# Patient Record
Sex: MALE | Race: WHITE | NOT HISPANIC OR LATINO | Employment: OTHER | URBAN - METROPOLITAN AREA
[De-identification: names, ages, dates, MRNs, and addresses within clinical notes are randomized per-mention and may not be internally consistent; named-entity substitution may affect disease eponyms.]

---

## 2018-09-01 ENCOUNTER — HOSPITAL ENCOUNTER (EMERGENCY)
Facility: HOSPITAL | Age: 78
Discharge: HOME | End: 2018-09-01
Attending: EMERGENCY MEDICINE
Payer: COMMERCIAL

## 2018-09-01 VITALS
TEMPERATURE: 98 F | HEART RATE: 78 BPM | DIASTOLIC BLOOD PRESSURE: 97 MMHG | WEIGHT: 220 LBS | HEIGHT: 73 IN | OXYGEN SATURATION: 93 % | BODY MASS INDEX: 29.16 KG/M2 | RESPIRATION RATE: 18 BRPM | SYSTOLIC BLOOD PRESSURE: 183 MMHG

## 2018-09-01 DIAGNOSIS — N28.89 RENAL MASS, RIGHT: Primary | ICD-10-CM

## 2018-09-01 LAB
ANION GAP SERPL CALC-SCNC: 5 MMOL/L
BACTERIA #/AREA URNS HPF: NORMAL /HPF
BASOPHILS # BLD AUTO: 0.06 K/UL
BASOPHILS NFR BLD: 0.6 %
BILIRUB UR QL STRIP: NEGATIVE
BUN SERPL-MCNC: 18 MG/DL
CALCIUM SERPL-MCNC: 8.8 MG/DL
CHLORIDE SERPL-SCNC: 104 MMOL/L
CLARITY UR: CLEAR
CO2 SERPL-SCNC: 27 MMOL/L
COLOR UR: YELLOW
CREAT SERPL-MCNC: 1.3 MG/DL
DIFFERENTIAL METHOD: ABNORMAL
EOSINOPHIL # BLD AUTO: 0.2 K/UL
EOSINOPHIL NFR BLD: 1.8 %
ERYTHROCYTE [DISTWIDTH] IN BLOOD BY AUTOMATED COUNT: 12.3 %
EST. GFR  (AFRICAN AMERICAN): >60 ML/MIN/1.73 M^2
EST. GFR  (NON AFRICAN AMERICAN): 52.3 ML/MIN/1.73 M^2
GLUCOSE SERPL-MCNC: 136 MG/DL
GLUCOSE UR QL STRIP: NEGATIVE
HCT VFR BLD AUTO: 50.2 %
HGB BLD-MCNC: 17.6 G/DL
HGB UR QL STRIP: NEGATIVE
HYALINE CASTS #/AREA URNS LPF: 0 /LPF
IMM GRANULOCYTES # BLD AUTO: 0.04 K/UL
IMM GRANULOCYTES NFR BLD AUTO: 0.4 %
KETONES UR QL STRIP: NEGATIVE
LEUKOCYTE ESTERASE UR QL STRIP: NEGATIVE
LYMPHOCYTES # BLD AUTO: 1.8 K/UL
LYMPHOCYTES NFR BLD: 17.8 %
MCH RBC QN AUTO: 30.6 PG
MCHC RBC AUTO-ENTMCNC: 35.1 G/DL
MCV RBC AUTO: 87 FL
MICROSCOPIC COMMENT: NORMAL
MONOCYTES # BLD AUTO: 0.8 K/UL
MONOCYTES NFR BLD: 8.3 %
NEUTROPHILS # BLD AUTO: 7.1 K/UL
NEUTROPHILS NFR BLD: 71.1 %
NITRITE UR QL STRIP: NEGATIVE
NRBC BLD-RTO: 0 /100 WBC
PH UR STRIP: 7 [PH] (ref 5–8)
PLATELET # BLD AUTO: 309 K/UL
PMV BLD AUTO: 10.3 FL
POTASSIUM SERPL-SCNC: 3.7 MMOL/L
PROT UR QL STRIP: ABNORMAL
RBC # BLD AUTO: 5.76 M/UL
RBC #/AREA URNS HPF: 2 /HPF (ref 0–4)
SODIUM SERPL-SCNC: 136 MMOL/L
SP GR UR STRIP: 1.01 (ref 1–1.03)
URN SPEC COLLECT METH UR: ABNORMAL
UROBILINOGEN UR STRIP-ACNC: NEGATIVE EU/DL
WBC # BLD AUTO: 10 K/UL
WBC #/AREA URNS HPF: 0 /HPF (ref 0–5)
YEAST URNS QL MICRO: NORMAL

## 2018-09-01 PROCEDURE — 74177 CT ABD & PELVIS W/CONTRAST: CPT | Mod: TC

## 2018-09-01 PROCEDURE — 74176 CT ABD & PELVIS W/O CONTRAST: CPT | Mod: TC

## 2018-09-01 PROCEDURE — 63600175 PHARM REV CODE 636 W HCPCS: Performed by: EMERGENCY MEDICINE

## 2018-09-01 PROCEDURE — 99284 EMERGENCY DEPT VISIT MOD MDM: CPT | Mod: 25

## 2018-09-01 PROCEDURE — 74176 CT ABD & PELVIS W/O CONTRAST: CPT | Mod: 26,,, | Performed by: RADIOLOGY

## 2018-09-01 PROCEDURE — 74177 CT ABD & PELVIS W/CONTRAST: CPT | Mod: 26,,, | Performed by: RADIOLOGY

## 2018-09-01 PROCEDURE — 96375 TX/PRO/DX INJ NEW DRUG ADDON: CPT

## 2018-09-01 PROCEDURE — 25500020 PHARM REV CODE 255: Performed by: EMERGENCY MEDICINE

## 2018-09-01 PROCEDURE — 80048 BASIC METABOLIC PNL TOTAL CA: CPT

## 2018-09-01 PROCEDURE — 96374 THER/PROPH/DIAG INJ IV PUSH: CPT

## 2018-09-01 PROCEDURE — 85025 COMPLETE CBC W/AUTO DIFF WBC: CPT

## 2018-09-01 PROCEDURE — 81000 URINALYSIS NONAUTO W/SCOPE: CPT

## 2018-09-01 RX ORDER — MORPHINE SULFATE 4 MG/ML
4 INJECTION, SOLUTION INTRAMUSCULAR; INTRAVENOUS
Status: COMPLETED | OUTPATIENT
Start: 2018-09-01 | End: 2018-09-01

## 2018-09-01 RX ORDER — ONDANSETRON 2 MG/ML
4 INJECTION INTRAMUSCULAR; INTRAVENOUS
Status: COMPLETED | OUTPATIENT
Start: 2018-09-01 | End: 2018-09-01

## 2018-09-01 RX ORDER — HYDROCODONE BITARTRATE AND ACETAMINOPHEN 5; 325 MG/1; MG/1
1-2 TABLET ORAL EVERY 4 HOURS PRN
Qty: 18 TABLET | Refills: 0 | Status: SHIPPED | OUTPATIENT
Start: 2018-09-01 | End: 2018-09-21 | Stop reason: ALTCHOICE

## 2018-09-01 RX ADMIN — IOHEXOL 100 ML: 350 INJECTION, SOLUTION INTRAVENOUS at 06:09

## 2018-09-01 RX ADMIN — ONDANSETRON 4 MG: 2 INJECTION INTRAMUSCULAR; INTRAVENOUS at 02:09

## 2018-09-01 RX ADMIN — MORPHINE SULFATE 4 MG: 4 INJECTION, SOLUTION INTRAMUSCULAR; INTRAVENOUS at 02:09

## 2018-09-01 NOTE — ED NOTES
[facility], spoke with coordinator Casi.

## 2018-09-01 NOTE — ED PROVIDER NOTES
Encounter Date: 2018       History     Chief Complaint   Patient presents with    Flank Pain     right flank pain    Nausea     Patient presents to the ER with complaints of pain to the right flank.  Patient reports renal colic that started earlier today.  Pain is severe, sharp, nonradiating, with no exacerbating or alleviating factors.  Patient has nausea associated with the flank pain. He has a distant history of kidney stones and says the pain reminds him of that.  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          Review of patient's allergies indicates:  No Known Allergies  Past Medical History:   Diagnosis Date    Cancer     prostate    Hypertension     Kidney problem     only 1 kidney functions    Liver disease      Past Surgical History:   Procedure Laterality Date    colon repair      after prostatectomy    PROSTATECTOMY       History reviewed. No pertinent family history.  Social History     Tobacco Use    Smoking status: Former Smoker     Last attempt to quit: 2/15/1972     Years since quittin.5   Substance Use Topics    Alcohol use: Not on file    Drug use: No     Review of Systems   Constitutional: Negative for chills and fever.   HENT: Negative for sore throat.    Respiratory: Negative for shortness of breath.    Cardiovascular: Negative for chest pain.   Gastrointestinal: Positive for abdominal pain and nausea. Negative for vomiting.   Genitourinary: Negative for hematuria.   All other systems reviewed and are negative.      Physical Exam     Initial Vitals [18 1407]   BP Pulse Resp Temp SpO2   (!) 180/95 94 20 98.3 °F (36.8 °C) 96 %      MAP       --         Physical Exam    Nursing note and vitals reviewed.  Constitutional: He appears well-developed and well-nourished. He appears distressed.   HENT:   Head: Normocephalic and atraumatic.   Right Ear: External ear normal.   Left Ear: External ear normal.   Nose: Nose normal.   Mouth/Throat: Oropharynx is clear and  moist.   Eyes: Conjunctivae and EOM are normal. Pupils are equal, round, and reactive to light.   Neck: Normal range of motion. Neck supple.   Cardiovascular: Normal rate and regular rhythm.   No murmur heard.  Pulmonary/Chest: Breath sounds normal.   Abdominal: Soft. Bowel sounds are normal. He exhibits no distension. There is tenderness.   Mildly tender in the right flank.   Musculoskeletal: Normal range of motion.   Neurological: He is alert and oriented to person, place, and time. No cranial nerve deficit.   Skin: Skin is warm and dry.   Psychiatric: He has a normal mood and affect. His behavior is normal. Judgment and thought content normal.         ED Course   Procedures  Labs Reviewed   CBC W/ AUTO DIFFERENTIAL - Abnormal; Notable for the following components:       Result Value    Lymph% 17.8 (*)     All other components within normal limits   BASIC METABOLIC PANEL - Abnormal; Notable for the following components:    Glucose 136 (*)     Anion Gap 5 (*)     eGFR if non  52.3 (*)     All other components within normal limits   URINALYSIS - Abnormal; Notable for the following components:    Protein, UA 1+ (*)     All other components within normal limits   URINALYSIS MICROSCOPIC          Imaging Results          CT Renal Stone Study ABD Pelvis WO (Final result)     Abnormal  Result time 09/01/18 17:06:00    Final result by Damaris Alexis MD (09/01/18 17:06:00)                 Impression:    .    2.9 cm right renal mass not appearing a simple cyst and although could be complex cyst or solid mass is thought more suggestive of solid mass and renal cell carcinoma with bulky retroperitoneal adenopathy at the level of the right renal hilum.  Severe right renal cortical thinning and atrophy.    Severe left hydronephrosis versus parapelvic cysts more likely hydronephrosis and increased compared to the prior exam.    Fusiform abdominal aortic aneurysm larger today than on the prior exam with AP and  transverse diameters today 3.9 x 3.5 cm and contrast to 2.8 x 2.8 cm previously.    Additional findings as detailed above    This report was flagged in Epic as abnormal.      Electronically signed by: Damaris Alexis MD  Date:    09/01/2018  Time:    17:06             Narrative:    EXAMINATION:  CT RENAL STONE STUDY ABD PELVIS WO    CLINICAL HISTORY:  Flank pain, stone disease suspected;    TECHNIQUE:  Low dose axial images, sagittal and coronal reformations were obtained from the lung bases to the pubic symphysis.  Contrast was not administered.    COMPARISON:  5/2/2010    FINDINGS:  There are mild dependent hypoventilatory change in the lung bases.    There are a couple low-density masses left lobe of the liver the larger which measures 1.4 cm which are not significantly changed compared to the prior exam suggesting cysts.  There are no radiopaque stones in the gallbladder or CT findings of acute cholecystitis.  The spleen is not enlarged and is unremarkable in appearance.  The adrenal glands show no abnormality.  Pancreas is mildly atrophied.  There are couple small fat containing ventral hernias just to the right of the midline at and just cephalad to the level of the umbilicus containing loops of bowel without bowel obstructive change.    The appendix is not identified with certainty but no abnormal appendix inflammatory changes appendiceal region is seen.  There is diverticulosis without CT findings of acute diverticulitis.  There is small bilateral fat containing inguinal hernias.  There there is no free intraperitoneal air or fluid.    There are osseous degenerative changes. There is penile prosthesis.  There is small hiatal hernia.    There is fusiform aneurysmal dilatation of the distal abdominal aorta with AP and transverse diameters of 3.9 and 3.5 cm.    There is severe atrophy of the right kidney with diffuse cortical thinning. There is 2.9 cm exophytic mass anteriorly right kidney which although not  fully characterized on the noncontrast study does not have appearance suggesting a simple cyst. There is bulky right periaortic/retrocaval adenopathy at the level of the right renal hilum effacing fat planes, measuring approximately 6 cm transversely, 4.7 cm AP.  There is severe left hydronephrosis versus parapelvic cysts.  There is no left ureteral dilatation.  There is no opaque renal or left ureteral stone.    There are postoperative changes in the colon consistent hemicolectomy with anastomotic sutures distal descending colon.  There is no longer the left lower quadrant colostomy that was seen on the prior exam.    The abdominal aortic aneurysm previously measured 2.8 cm AP and 2.8 cm transversely.  The bulky adenopathy is new.  The hydronephrosis of the right kidney on the prior exam is no longer seen today.  The right renal mass is new.  The left hydronephrosis versus parapelvic cysts is greater today.                                 Medical Decision Making:   Initial Assessment:   Patient presents with complaints of pain in the right flank.  Differential includes kidney stone, UTI, diverticulitis among other diagnoses.  Patient's workup shows a renal mass on the right.  There is also associated adenopathy.  Obvious concern for neoplasm based on this appearance.  Case discussed with [facility].  The recommendation is for a contrasted scan of his abdomen and pelvis with renal mass protocol and then follow-up.  Dr. Wylie will be available on Tuesday.  Patient will be discharged home with instructions to return on Tuesday to see him.  I will discharge him with pain medicine.  Return immediately for worsening symptoms or fever or anything new.  Patient indicates understanding.  Case discussed with the patient and spouse.                      Clinical Impression:   The encounter diagnosis was Renal mass, right.                             Nir Bucio MD  09/01/18 9596

## 2018-09-21 ENCOUNTER — TELEPHONE (OUTPATIENT)
Dept: UROLOGY | Facility: CLINIC | Age: 78
End: 2018-09-21

## 2018-09-21 ENCOUNTER — OFFICE VISIT (OUTPATIENT)
Dept: UROLOGY | Facility: CLINIC | Age: 78
End: 2018-09-21
Payer: COMMERCIAL

## 2018-09-21 ENCOUNTER — LAB VISIT (OUTPATIENT)
Dept: LAB | Facility: HOSPITAL | Age: 78
End: 2018-09-21
Attending: UROLOGY
Payer: COMMERCIAL

## 2018-09-21 VITALS
WEIGHT: 216 LBS | HEART RATE: 76 BPM | HEIGHT: 73 IN | BODY MASS INDEX: 28.63 KG/M2 | SYSTOLIC BLOOD PRESSURE: 165 MMHG | DIASTOLIC BLOOD PRESSURE: 83 MMHG

## 2018-09-21 DIAGNOSIS — N28.89 RENAL MASS: ICD-10-CM

## 2018-09-21 DIAGNOSIS — C61 PROSTATE CANCER: ICD-10-CM

## 2018-09-21 DIAGNOSIS — C61 PROSTATE CANCER: Primary | ICD-10-CM

## 2018-09-21 LAB
BILIRUB SERPL-MCNC: NEGATIVE MG/DL
BLOOD URINE, POC: NEGATIVE
COLOR, POC UA: ABNORMAL
COMPLEXED PSA SERPL-MCNC: <0.01 NG/ML
GLUCOSE UR QL STRIP: NEGATIVE
KETONES UR QL STRIP: NEGATIVE
LEUKOCYTE ESTERASE URINE, POC: NEGATIVE
NITRITE, POC UA: NEGATIVE
PH, POC UA: 5
PROTEIN, POC: ABNORMAL
SPECIFIC GRAVITY, POC UA: 1020
UROBILINOGEN, POC UA: NEGATIVE

## 2018-09-21 PROCEDURE — 36415 COLL VENOUS BLD VENIPUNCTURE: CPT

## 2018-09-21 PROCEDURE — 99999 PR PBB SHADOW E&M-EST. PATIENT-LVL III: CPT | Mod: PBBFAC,,, | Performed by: UROLOGY

## 2018-09-21 PROCEDURE — 99213 OFFICE O/P EST LOW 20 MIN: CPT | Mod: PBBFAC | Performed by: UROLOGY

## 2018-09-21 PROCEDURE — 99204 OFFICE O/P NEW MOD 45 MIN: CPT | Mod: S$PBB,,, | Performed by: UROLOGY

## 2018-09-21 PROCEDURE — 81002 URINALYSIS NONAUTO W/O SCOPE: CPT | Mod: PBBFAC | Performed by: UROLOGY

## 2018-09-21 PROCEDURE — 84153 ASSAY OF PSA TOTAL: CPT

## 2018-09-21 RX ORDER — PRAVASTATIN SODIUM 20 MG/1
20 TABLET ORAL DAILY
COMMUNITY
Start: 2018-08-25

## 2018-09-21 RX ORDER — AMLODIPINE BESYLATE 10 MG/1
TABLET ORAL
COMMUNITY
Start: 2018-06-29

## 2018-09-21 NOTE — PROGRESS NOTES
Subjective:       Patient ID: Yobani Rios is a 78 y.o. male.    Chief Complaint:   DATE OF VISIT:  09/21/2018.    CHIEF COMPLAINT:  Suspected right renal mass.    HISTORY OF PRESENT ILLNESS:  Mr. Rios is a 78-year-old male who visit the  Emergency Room at [facility] on 09/01/2018.  The patient was  complaining of right flank pain.  A CT scan of the abdomen was performed.   The patient was found to have a 2.9 right renal mass with retroperitoneal  adenopathy at the level of the right renal hilum very suspicious of  neoplasm of the right kidney.  The patient also suffered of severe left  hydronephrosis and the hydronephrosis on the left side is worsen compared  to a previous CT scan.  The patient was referred to our office for further  evaluation and treatment.  The patient denies any urinary symptoms.  The  patient denies any gross hematuria.    PAST GENITOURINARY HISTORY:  Very significant.  The patient underwent a  radical prostatectomy in 2010 by Dr. Teresa Knox at [facility]  with severe complications of an undiagnosed perforated intestine that  required a 5-week hospitalization with many laparotomies and colectomy was  performed too.  Also, it is important to note that the patient underwent a  placement of a penile implant by Dr. Mariscal in [place] many years ago.   The implant that he has is a semirigid implant and also to be noted is that  the patient is incontinent since the prostatectomy was performed.  The  patient denies any pain.  The patient denies any weight loss.    The past medical history, past surgical history, the current medications  and the allergies are well documented in the medical record and all these  were reviewed by me during this visit.    The patient refers to me that he does not have a PSA for at least three  year    / [id number] blank(s)         EOR/HN dd: 09/21/2018 10:30:14 (CDT)   td: 09/21/2018 14:58:31 (CDT)  Doc ID #[id number]   Job ID #[id number]    CC:             HPI  Review of Systems   Constitutional: Negative for activity change and appetite change.   HENT: Negative.    Eyes: Negative for discharge.   Respiratory: Negative for cough and shortness of breath.    Cardiovascular: Negative for chest pain and palpitations.   Gastrointestinal: Negative for abdominal distention, abdominal pain, constipation and vomiting.   Genitourinary: Positive for flank pain. Negative for discharge, dysuria, frequency, hematuria, testicular pain and urgency.        Urine incontinence and penile implant in place.   Musculoskeletal: Negative for arthralgias.   Skin: Negative for rash.   Neurological: Negative for dizziness.   Psychiatric/Behavioral: The patient is not nervous/anxious.        Objective:      Physical Exam   Constitutional: He appears well-developed and well-nourished.   HENT:   Head: Normocephalic.   Eyes: Pupils are equal, round, and reactive to light.   Neck: Normal range of motion.   Cardiovascular: Normal rate.    Pulmonary/Chest: Effort normal.   Abdominal: Soft. He exhibits no distension and no mass. There is no tenderness.   Genitourinary: Rectum normal. Rectal exam shows no external hemorrhoid, no mass and no tenderness. Right testis shows no mass and no tenderness. Left testis shows no mass and no tenderness. Circumcised. No discharge found.             Musculoskeletal: Normal range of motion.   Neurological: He is alert.   Skin: Skin is warm.     Psychiatric: He has a normal mood and affect.       Assessment:       1. Prostate cancer    2. Renal mass        Plan:       Prostate cancer  -     Prostate Specific Antigen, Diagnostic; Future; Expected date: 09/21/2018    Renal mass  -     POCT URINE DIPSTICK WITHOUT MICROSCOPE  -     NM Renogram Without Lasix; Future; Expected date: 09/21/2018  -     MRI Abdomen W WO Contrast; Future; Expected date: 09/21/2018    Patient will need Retrogrades under anesthesia. RTC after above tests.

## 2018-09-21 NOTE — TELEPHONE ENCOUNTER
----- Message from Elo Saucedo sent at 9/21/2018  2:47 PM CDT -----  Contact: pt  Pt is requesting to speak with a nurse in regards to scheduling an appt for an MRI in [place]  Pt stated he can't schedule until they receive reports or orders for MRI  Please call pt to advise  Call back   Thanks

## 2018-09-21 NOTE — LETTER
September 21, 2018      Nir Bucio MD  [address] [place] [address]           [facility]  [address] [place] [address]  Phone: [phone]  Fax: [phone]          Patient: Yobani Rios   MR Number: [id number]   YOB: 1940   Date of Visit: 9/21/2018       Dear Dr. Nir Bucio:    Thank you for referring Yobani Rios to me for evaluation. Attached you will find relevant portions of my assessment and plan of care.    If you have questions, please do not hesitate to call me. I look forward to following Yobani Rios along with you.    Sincerely,    Michel Wylie MD    Enclosure  CC:  No Recipients    If you would like to receive this communication electronically, please contact [email or web address] or [phone] to request more information on [facility] access.    For providers and/or their staff who would like to refer a patient to [facility], please contact us through our one-stop-shop provider referral line, [facility], at [phone].    If you feel you have received this communication in error or would no longer like to receive these types of communications, please e-mail [email or web address]

## 2018-09-21 NOTE — TELEPHONE ENCOUNTER
Pt wanted to know if his penile implant had any metal in it. Informed pt that it does not and he is able to have a MRI. Pt verbalized understanding.

## 2018-09-21 NOTE — TELEPHONE ENCOUNTER
Informed pt that [facility] needs copy of card with penile implant info before scheduling. Pt verbalized understanding and said he would look for it. Also told pt I would look and see if I can find anything in the chart.

## 2018-09-21 NOTE — TELEPHONE ENCOUNTER
----- Message from Sandra Wilkins sent at 9/21/2018  2:18 PM CDT -----  Contact: [phone]  Patient is requesting a call back from the nurse in regards to setting up an appt in [place], he's unable to do so without speaking to a nurse.    Please call the patient upon request at phone number [phone].

## 2018-09-24 ENCOUNTER — TELEPHONE (OUTPATIENT)
Dept: UROLOGY | Facility: CLINIC | Age: 78
End: 2018-09-24

## 2018-09-24 NOTE — TELEPHONE ENCOUNTER
Informed pt that information was received by [facility] and they will be calling him to schedule his MRI appt. Pt verbalized understanding.

## 2018-09-24 NOTE — TELEPHONE ENCOUNTER
----- Message from Nabor Ulloa sent at 9/24/2018 12:18 PM CDT -----  Contact: pt  Pt was told an appointment would be made.  Pt would like to speak to someone regarding this. Pt was told the appointment address but insisted on speaking with Ryann.    Call Back #:    Thanks

## 2018-09-25 ENCOUNTER — TELEPHONE (OUTPATIENT)
Dept: UROLOGY | Facility: CLINIC | Age: 78
End: 2018-09-25

## 2018-09-25 ENCOUNTER — HOSPITAL ENCOUNTER (EMERGENCY)
Facility: HOSPITAL | Age: 78
Discharge: HOME | End: 2018-09-25
Attending: FAMILY MEDICINE
Payer: COMMERCIAL

## 2018-09-25 VITALS
TEMPERATURE: 97 F | SYSTOLIC BLOOD PRESSURE: 173 MMHG | DIASTOLIC BLOOD PRESSURE: 81 MMHG | OXYGEN SATURATION: 94 % | HEIGHT: 73 IN | BODY MASS INDEX: 28.49 KG/M2 | WEIGHT: 215 LBS | HEART RATE: 69 BPM

## 2018-09-25 DIAGNOSIS — R19.00 RETROPERITONEAL MASS: Primary | ICD-10-CM

## 2018-09-25 LAB
ANION GAP SERPL CALC-SCNC: 7 MMOL/L
BASOPHILS # BLD AUTO: 0.06 K/UL
BASOPHILS NFR BLD: 0.6 %
BUN SERPL-MCNC: 17 MG/DL
CALCIUM SERPL-MCNC: 8.8 MG/DL
CHLORIDE SERPL-SCNC: 102 MMOL/L
CO2 SERPL-SCNC: 26 MMOL/L
CREAT SERPL-MCNC: 1.5 MG/DL
DIFFERENTIAL METHOD: ABNORMAL
EOSINOPHIL # BLD AUTO: 0.3 K/UL
EOSINOPHIL NFR BLD: 2.3 %
ERYTHROCYTE [DISTWIDTH] IN BLOOD BY AUTOMATED COUNT: 12.4 %
EST. GFR  (AFRICAN AMERICAN): 50.8 ML/MIN/1.73 M^2
EST. GFR  (NON AFRICAN AMERICAN): 44 ML/MIN/1.73 M^2
GLUCOSE SERPL-MCNC: 106 MG/DL
HCT VFR BLD AUTO: 48.6 %
HGB BLD-MCNC: 17.1 G/DL
IMM GRANULOCYTES # BLD AUTO: 0.05 K/UL
IMM GRANULOCYTES NFR BLD AUTO: 0.5 %
LYMPHOCYTES # BLD AUTO: 1.2 K/UL
LYMPHOCYTES NFR BLD: 10.9 %
MCH RBC QN AUTO: 30.8 PG
MCHC RBC AUTO-ENTMCNC: 35.2 G/DL
MCV RBC AUTO: 87 FL
MONOCYTES # BLD AUTO: 1 K/UL
MONOCYTES NFR BLD: 9.3 %
NEUTROPHILS # BLD AUTO: 8.3 K/UL
NEUTROPHILS NFR BLD: 76.4 %
NRBC BLD-RTO: 0 /100 WBC
PLATELET # BLD AUTO: 271 K/UL
PMV BLD AUTO: 10 FL
POTASSIUM SERPL-SCNC: 3.7 MMOL/L
RBC # BLD AUTO: 5.56 M/UL
SODIUM SERPL-SCNC: 135 MMOL/L
WBC # BLD AUTO: 10.81 K/UL

## 2018-09-25 PROCEDURE — 85025 COMPLETE CBC W/AUTO DIFF WBC: CPT

## 2018-09-25 PROCEDURE — 99284 EMERGENCY DEPT VISIT MOD MDM: CPT | Mod: 25

## 2018-09-25 PROCEDURE — 96374 THER/PROPH/DIAG INJ IV PUSH: CPT

## 2018-09-25 PROCEDURE — 63600175 PHARM REV CODE 636 W HCPCS: Performed by: FAMILY MEDICINE

## 2018-09-25 PROCEDURE — 80048 BASIC METABOLIC PNL TOTAL CA: CPT

## 2018-09-25 PROCEDURE — 74176 CT ABD & PELVIS W/O CONTRAST: CPT | Mod: TC

## 2018-09-25 PROCEDURE — 25000003 PHARM REV CODE 250: Performed by: FAMILY MEDICINE

## 2018-09-25 PROCEDURE — 96361 HYDRATE IV INFUSION ADD-ON: CPT

## 2018-09-25 PROCEDURE — 74176 CT ABD & PELVIS W/O CONTRAST: CPT | Mod: 26,,, | Performed by: RADIOLOGY

## 2018-09-25 RX ORDER — DOCUSATE SODIUM 100 MG/1
100 CAPSULE, LIQUID FILLED ORAL 2 TIMES DAILY
Qty: 60 CAPSULE | Refills: 0 | COMMUNITY
Start: 2018-09-25 | End: 2023-10-05

## 2018-09-25 RX ORDER — KETOROLAC TROMETHAMINE 30 MG/ML
30 INJECTION, SOLUTION INTRAMUSCULAR; INTRAVENOUS
Status: COMPLETED | OUTPATIENT
Start: 2018-09-25 | End: 2018-09-25

## 2018-09-25 RX ORDER — HYDROCODONE BITARTRATE AND ACETAMINOPHEN 7.5; 325 MG/1; MG/1
1 TABLET ORAL EVERY 4 HOURS PRN
Qty: 18 TABLET | Refills: 0 | Status: ON HOLD | OUTPATIENT
Start: 2018-09-25 | End: 2018-10-26 | Stop reason: HOSPADM

## 2018-09-25 RX ADMIN — SODIUM CHLORIDE 1000 ML: 9 INJECTION, SOLUTION INTRAVENOUS at 02:09

## 2018-09-25 RX ADMIN — KETOROLAC TROMETHAMINE 30 MG: 30 INJECTION, SOLUTION INTRAMUSCULAR at 02:09

## 2018-09-25 NOTE — TELEPHONE ENCOUNTER
Spoke with pt and he states that he is making the MRI appt at [facility] onn Thursday or Friday. Make pt and a f/u appt for the following week. Pt verbalized understanding.

## 2018-09-25 NOTE — TELEPHONE ENCOUNTER
----- Message from Elo Saucedo sent at 9/25/2018 10:33 AM CDT -----  Contact: pt  Pt is requesting to speak with a nurse in regards to a mix up with his appt  Please call pt to advise  Call back [phone]  Thanks

## 2018-09-25 NOTE — DISCHARGE INSTRUCTIONS
Follow up with your doctor this week to get a diagnosis of your mass. You may have a tumor and need a biopsy to see if it is cancer or something else.

## 2018-09-25 NOTE — ED PROVIDER NOTES
Encounter Date: 2018       History     Chief Complaint   Patient presents with    Back Pain    Hip Pain     Patient is a very pleasant gentleman who presents with a complaint of right flank and hip pain for the past 3 days.  He states it intermittently gets worse but never goes all the way away.  He feels that he may have a kidney stone as he had this once before and it feels the same.          Review of patient's allergies indicates:  No Known Allergies  Past Medical History:   Diagnosis Date    Cancer     prostate    Hypertension     Kidney problem     only 1 kidney functions    Liver disease      Past Surgical History:   Procedure Laterality Date    colon repair      after prostatectomy    EXTRACTION, CATARACT Right 2012    Performed [facility] William Nice [facility] at [facility]    EXTRACTION, CATARACT, WITH IOL INSERTION Left 3/7/2012    Performed by William Nice MD at [facility]    PROSTATECTOMY      PROSTATECTOMY  2010     History reviewed. No pertinent family history.  Social History     Tobacco Use    Smoking status: Former Smoker     Last attempt to quit: 2/15/1972     Years since quittin.6    Smokeless tobacco: Never Used   Substance Use Topics    Alcohol use: No     Frequency: Never    Drug use: No     Review of Systems   Constitutional: Negative for chills, fatigue and fever.   HENT: Negative for congestion, ear pain, rhinorrhea, sinus pressure, sinus pain and sore throat.    Eyes: Negative for photophobia and redness.   Respiratory: Negative for cough, shortness of breath and wheezing.    Cardiovascular: Negative for chest pain, palpitations and leg swelling.   Gastrointestinal: Negative for abdominal pain, constipation, diarrhea and nausea.   Endocrine: Negative for polydipsia, polyphagia and polyuria.   Genitourinary: Negative for difficulty urinating, dysuria, flank pain, hematuria and urgency.   Musculoskeletal: Negative for arthralgias, back pain and joint swelling.   Skin:  Negative for color change.   Neurological: Negative for dizziness, seizures, syncope, weakness and headaches.   Hematological: Negative for adenopathy.   Psychiatric/Behavioral: Negative for sleep disturbance and suicidal ideas.   All other systems reviewed and are negative.      Physical Exam     Initial Vitals [09/25/18 1349]   BP Pulse Resp Temp SpO2   (!) 173/81 69 -- 97.1 °F (36.2 °C) (!) 94 %      MAP       --         Physical Exam    Nursing note and vitals reviewed.  Constitutional: He appears well-developed.   Uncomfortable but no distress   HENT:   Head: Normocephalic and atraumatic.   Eyes: Conjunctivae and EOM are normal. Pupils are equal, round, and reactive to light.   Neck: Normal range of motion. Neck supple.   Cardiovascular: Normal rate, regular rhythm, normal heart sounds and intact distal pulses.   Pulmonary/Chest: Breath sounds normal.   Abdominal: Soft. Bowel sounds are normal.   Musculoskeletal: Normal range of motion.   Neurological: He is alert and oriented to person, place, and time. He has normal strength and normal reflexes.   Skin: Skin is warm and dry. Capillary refill takes less than 2 seconds.   Psychiatric: He has a normal mood and affect. His behavior is normal.         ED Course   Procedures  Labs Reviewed - No data to display       Imaging Results    None       X-Rays:   Independently Interpreted Readings:   Abdomen: Retroperitoneal neoplasm that has grown since 9/1.      Medical Decision Making:   Clinical Tests:   Lab Tests: Ordered and Reviewed  The following lab test(s) were unremarkable: CBC, CMP and Urinalysis  Radiological Study: Ordered and Reviewed  ED Management:  CT reveals likely neoplastic mass.                       Clinical Impression:   The encounter diagnosis was Retroperitoneal mass.      Disposition:   Disposition: Discharged  Condition: Stable                        Janie Chiu MD  09/25/18 1509       Janie Chiu MD  10/14/18 0642

## 2018-09-26 ENCOUNTER — TELEPHONE (OUTPATIENT)
Dept: UROLOGY | Facility: CLINIC | Age: 78
End: 2018-09-26

## 2018-09-26 ENCOUNTER — HOSPITAL ENCOUNTER (OUTPATIENT)
Dept: RADIOLOGY | Facility: HOSPITAL | Age: 78
Discharge: HOME | End: 2018-09-26
Attending: UROLOGY
Payer: COMMERCIAL

## 2018-09-26 DIAGNOSIS — N28.89 RENAL MASS: ICD-10-CM

## 2018-09-26 PROCEDURE — 78707 K FLOW/FUNCT IMAGE W/O DRUG: CPT | Mod: 26,,, | Performed by: RADIOLOGY

## 2018-09-26 PROCEDURE — A9562 TC99M MERTIATIDE: HCPCS

## 2018-09-26 NOTE — TELEPHONE ENCOUNTER
----- Message from Annie Garrison sent at 9/26/2018  9:05 AM CDT -----  Contact: Dwight Quintanilla calling states got a request for a Prior Auth to see the Dr but the pt is out of network and she wants to know if will accept the rate of medicare pay since out of network. Can be reached at [phone]

## 2018-09-26 NOTE — TELEPHONE ENCOUNTER
Thisha from [employer] wants to know if we will take the medicare reimbursement for the visit because [facility] is out of network with [employer]. Informed her we would but will will look into why we are not in network.

## 2018-09-27 ENCOUNTER — TELEPHONE (OUTPATIENT)
Dept: UROLOGY | Facility: CLINIC | Age: 78
End: 2018-09-27

## 2018-09-27 NOTE — TELEPHONE ENCOUNTER
----- Message from Kelsea San sent at 9/27/2018 11:13 AM CDT -----  Contact: self  Would like a call back at --personal.

## 2018-09-27 NOTE — TELEPHONE ENCOUNTER
Pt wanted to know if Dr. Wylie look at this Renogram yet. Informed pt he has not but I will call him as soon as he does. Also informed pt that we are out of network with his insurance. We will accept the insurance but he will have a higher co-pay. Suggested that he call his insurance company to get estimated co-pay price. Pt verbalized understanding.

## 2018-10-11 ENCOUNTER — TELEPHONE (OUTPATIENT)
Dept: UROLOGY | Facility: CLINIC | Age: 78
End: 2018-10-11

## 2018-10-11 NOTE — TELEPHONE ENCOUNTER
----- Message from Monae Jung sent at 10/11/2018  3:54 PM CDT -----  Contact: Erica with [facility]  Erica is calling to speak with someone regarding a pre-certification for an MRI for the patient.  The patient is scheduled for tomorrow.  Call Back#[phone]  Thanks

## 2018-10-11 NOTE — TELEPHONE ENCOUNTER
----- Message from Katy Flynn sent at 10/11/2018 11:14 AM CDT -----  Contact: Lizz Zamudio with [facility] [phone] in [place] is calling for the precert for the MRI that is scheduled for tomorrow 10 12 18 as she has not received this yet/please advise

## 2018-10-11 NOTE — TELEPHONE ENCOUNTER
Informed her that I was waiting on Pre-service to approve his scan. Will escalate it and call with auth number when received.

## 2018-10-17 ENCOUNTER — HOSPITAL ENCOUNTER (INPATIENT)
Facility: HOSPITAL | Age: 78
LOS: 9 days | Discharge: HOME HEALTH | DRG: 336 | End: 2018-10-26
Attending: EMERGENCY MEDICINE | Admitting: SURGERY
Payer: COMMERCIAL

## 2018-10-17 ENCOUNTER — HOSPITAL ENCOUNTER (EMERGENCY)
Facility: HOSPITAL | Age: 78
Discharge: ANOTHER HOSPITAL | End: 2018-10-17
Attending: EMERGENCY MEDICINE
Payer: COMMERCIAL

## 2018-10-17 VITALS
RESPIRATION RATE: 20 BRPM | TEMPERATURE: 98 F | WEIGHT: 220 LBS | HEIGHT: 73 IN | SYSTOLIC BLOOD PRESSURE: 140 MMHG | HEART RATE: 102 BPM | OXYGEN SATURATION: 94 % | DIASTOLIC BLOOD PRESSURE: 91 MMHG | BODY MASS INDEX: 29.16 KG/M2

## 2018-10-17 DIAGNOSIS — K56.609 SMALL BOWEL OBSTRUCTION: Primary | ICD-10-CM

## 2018-10-17 DIAGNOSIS — I10 HYPERTENSION: ICD-10-CM

## 2018-10-17 DIAGNOSIS — K43.6 VENTRAL HERNIA WITH OBSTRUCTION AND WITHOUT GANGRENE: ICD-10-CM

## 2018-10-17 DIAGNOSIS — R19.00 ABDOMINAL MASS, UNSPECIFIED ABDOMINAL LOCATION: ICD-10-CM

## 2018-10-17 DIAGNOSIS — K46.0 INCARCERATED HERNIA: ICD-10-CM

## 2018-10-17 LAB
ALBUMIN SERPL BCP-MCNC: 3.9 G/DL
ALBUMIN SERPL BCP-MCNC: 4.2 G/DL
ALP SERPL-CCNC: 112 U/L
ALP SERPL-CCNC: 128 U/L
ALT SERPL W/O P-5'-P-CCNC: 32 U/L
ALT SERPL W/O P-5'-P-CCNC: 32 U/L
ANION GAP SERPL CALC-SCNC: 11 MMOL/L
ANION GAP SERPL CALC-SCNC: 12 MMOL/L
AST SERPL-CCNC: 24 U/L
AST SERPL-CCNC: 30 U/L
BACTERIA #/AREA URNS HPF: NORMAL /HPF
BASOPHILS # BLD AUTO: 0.07 K/UL
BASOPHILS # BLD AUTO: 0.08 K/UL
BASOPHILS NFR BLD: 0.3 %
BASOPHILS NFR BLD: 0.4 %
BILIRUB SERPL-MCNC: 1 MG/DL
BILIRUB SERPL-MCNC: 1.5 MG/DL
BILIRUB UR QL STRIP: ABNORMAL
BUN SERPL-MCNC: 23 MG/DL
BUN SERPL-MCNC: 26 MG/DL
CALCIUM SERPL-MCNC: 10.2 MG/DL
CALCIUM SERPL-MCNC: 9.9 MG/DL
CHLORIDE SERPL-SCNC: 101 MMOL/L
CHLORIDE SERPL-SCNC: 99 MMOL/L
CLARITY UR: CLEAR
CO2 SERPL-SCNC: 26 MMOL/L
CO2 SERPL-SCNC: 29 MMOL/L
COLOR UR: YELLOW
CREAT SERPL-MCNC: 1.5 MG/DL
CREAT SERPL-MCNC: 1.6 MG/DL
DIFFERENTIAL METHOD: ABNORMAL
DIFFERENTIAL METHOD: ABNORMAL
EOSINOPHIL # BLD AUTO: 0 K/UL
EOSINOPHIL # BLD AUTO: 0.2 K/UL
EOSINOPHIL NFR BLD: 0.1 %
EOSINOPHIL NFR BLD: 1.1 %
ERYTHROCYTE [DISTWIDTH] IN BLOOD BY AUTOMATED COUNT: 12.3 %
ERYTHROCYTE [DISTWIDTH] IN BLOOD BY AUTOMATED COUNT: 13.1 %
EST. GFR  (AFRICAN AMERICAN): 47 ML/MIN/1.73 M^2
EST. GFR  (AFRICAN AMERICAN): 50.8 ML/MIN/1.73 M^2
EST. GFR  (NON AFRICAN AMERICAN): 40.7 ML/MIN/1.73 M^2
EST. GFR  (NON AFRICAN AMERICAN): 44 ML/MIN/1.73 M^2
GLUCOSE SERPL-MCNC: 135 MG/DL
GLUCOSE SERPL-MCNC: 214 MG/DL
GLUCOSE UR QL STRIP: NEGATIVE
HCT VFR BLD AUTO: 52.7 %
HCT VFR BLD AUTO: 56.7 %
HGB BLD-MCNC: 18.2 G/DL
HGB BLD-MCNC: 18.5 G/DL
HGB UR QL STRIP: ABNORMAL
HYALINE CASTS #/AREA URNS LPF: 0 /LPF
IMM GRANULOCYTES # BLD AUTO: 0.1 K/UL
IMM GRANULOCYTES # BLD AUTO: 0.13 K/UL
IMM GRANULOCYTES NFR BLD AUTO: 0.5 %
IMM GRANULOCYTES NFR BLD AUTO: 0.6 %
KETONES UR QL STRIP: ABNORMAL
LACTATE SERPL-SCNC: 1.1 MMOL/L
LEUKOCYTE ESTERASE UR QL STRIP: NEGATIVE
LIPASE SERPL-CCNC: 12 U/L
LYMPHOCYTES # BLD AUTO: 1.1 K/UL
LYMPHOCYTES # BLD AUTO: 2.1 K/UL
LYMPHOCYTES NFR BLD: 10.1 %
LYMPHOCYTES NFR BLD: 5.1 %
MAGNESIUM SERPL-MCNC: 2.5 MG/DL
MCH RBC QN AUTO: 29.8 PG
MCH RBC QN AUTO: 30.3 PG
MCHC RBC AUTO-ENTMCNC: 32.6 G/DL
MCHC RBC AUTO-ENTMCNC: 34.5 G/DL
MCV RBC AUTO: 88 FL
MCV RBC AUTO: 92 FL
MICROSCOPIC COMMENT: NORMAL
MONOCYTES # BLD AUTO: 1 K/UL
MONOCYTES # BLD AUTO: 1.2 K/UL
MONOCYTES NFR BLD: 4.7 %
MONOCYTES NFR BLD: 5.8 %
NEUTROPHILS # BLD AUTO: 16.9 K/UL
NEUTROPHILS # BLD AUTO: 18.5 K/UL
NEUTROPHILS NFR BLD: 83.2 %
NEUTROPHILS NFR BLD: 88.1 %
NITRITE UR QL STRIP: NEGATIVE
NRBC BLD-RTO: 0 /100 WBC
NRBC BLD-RTO: 0 /100 WBC
PH UR STRIP: 6 [PH] (ref 5–8)
PHOSPHATE SERPL-MCNC: 3.6 MG/DL
PLATELET # BLD AUTO: 356 K/UL
PLATELET # BLD AUTO: 379 K/UL
PMV BLD AUTO: 10.2 FL
PMV BLD AUTO: 9.8 FL
POTASSIUM SERPL-SCNC: 3.5 MMOL/L
POTASSIUM SERPL-SCNC: 3.9 MMOL/L
PROT SERPL-MCNC: 7.7 G/DL
PROT SERPL-MCNC: 7.8 G/DL
PROT UR QL STRIP: ABNORMAL
RBC # BLD AUTO: 6 M/UL
RBC # BLD AUTO: 6.2 M/UL
RBC #/AREA URNS HPF: 3 /HPF (ref 0–4)
SODIUM SERPL-SCNC: 139 MMOL/L
SODIUM SERPL-SCNC: 139 MMOL/L
SP GR UR STRIP: >=1.03 (ref 1–1.03)
URN SPEC COLLECT METH UR: ABNORMAL
UROBILINOGEN UR STRIP-ACNC: NEGATIVE EU/DL
WBC # BLD AUTO: 20.32 K/UL
WBC # BLD AUTO: 21 K/UL
WBC #/AREA URNS HPF: 2 /HPF (ref 0–5)

## 2018-10-17 PROCEDURE — 99285 EMERGENCY DEPT VISIT HI MDM: CPT | Mod: 25

## 2018-10-17 PROCEDURE — 74176 CT ABD & PELVIS W/O CONTRAST: CPT | Mod: 26,,, | Performed by: RADIOLOGY

## 2018-10-17 PROCEDURE — 83690 ASSAY OF LIPASE: CPT

## 2018-10-17 PROCEDURE — 36415 COLL VENOUS BLD VENIPUNCTURE: CPT

## 2018-10-17 PROCEDURE — 63600175 PHARM REV CODE 636 W HCPCS: Performed by: STUDENT IN AN ORGANIZED HEALTH CARE EDUCATION/TRAINING PROGRAM

## 2018-10-17 PROCEDURE — 25000003 PHARM REV CODE 250: Performed by: STUDENT IN AN ORGANIZED HEALTH CARE EDUCATION/TRAINING PROGRAM

## 2018-10-17 PROCEDURE — 74176 CT ABD & PELVIS W/O CONTRAST: CPT | Mod: TC

## 2018-10-17 PROCEDURE — 96365 THER/PROPH/DIAG IV INF INIT: CPT

## 2018-10-17 PROCEDURE — 85025 COMPLETE CBC W/AUTO DIFF WBC: CPT

## 2018-10-17 PROCEDURE — 80053 COMPREHEN METABOLIC PANEL: CPT | Mod: 91

## 2018-10-17 PROCEDURE — 99285 EMERGENCY DEPT VISIT HI MDM: CPT | Mod: ,,, | Performed by: PHYSICIAN ASSISTANT

## 2018-10-17 PROCEDURE — 84100 ASSAY OF PHOSPHORUS: CPT

## 2018-10-17 PROCEDURE — 63600175 PHARM REV CODE 636 W HCPCS: Performed by: SURGERY

## 2018-10-17 PROCEDURE — 96375 TX/PRO/DX INJ NEW DRUG ADDON: CPT

## 2018-10-17 PROCEDURE — 96376 TX/PRO/DX INJ SAME DRUG ADON: CPT

## 2018-10-17 PROCEDURE — 25000003 PHARM REV CODE 250: Performed by: SURGERY

## 2018-10-17 PROCEDURE — 85025 COMPLETE CBC W/AUTO DIFF WBC: CPT | Mod: 91

## 2018-10-17 PROCEDURE — 99285 EMERGENCY DEPT VISIT HI MDM: CPT | Mod: 25,27

## 2018-10-17 PROCEDURE — 25000003 PHARM REV CODE 250: Performed by: EMERGENCY MEDICINE

## 2018-10-17 PROCEDURE — 83735 ASSAY OF MAGNESIUM: CPT

## 2018-10-17 PROCEDURE — 83605 ASSAY OF LACTIC ACID: CPT

## 2018-10-17 PROCEDURE — 25500020 PHARM REV CODE 255: Performed by: EMERGENCY MEDICINE

## 2018-10-17 PROCEDURE — 63600175 PHARM REV CODE 636 W HCPCS: Performed by: PHYSICIAN ASSISTANT

## 2018-10-17 PROCEDURE — 81000 URINALYSIS NONAUTO W/SCOPE: CPT

## 2018-10-17 PROCEDURE — A4216 STERILE WATER/SALINE, 10 ML: HCPCS | Performed by: SURGERY

## 2018-10-17 PROCEDURE — 11000001 HC ACUTE MED/SURG PRIVATE ROOM

## 2018-10-17 PROCEDURE — 96372 THER/PROPH/DIAG INJ SC/IM: CPT

## 2018-10-17 PROCEDURE — 99223 1ST HOSP IP/OBS HIGH 75: CPT | Mod: ,,, | Performed by: SURGERY

## 2018-10-17 PROCEDURE — 80053 COMPREHEN METABOLIC PANEL: CPT

## 2018-10-17 PROCEDURE — 96374 THER/PROPH/DIAG INJ IV PUSH: CPT

## 2018-10-17 PROCEDURE — 63600175 PHARM REV CODE 636 W HCPCS: Performed by: EMERGENCY MEDICINE

## 2018-10-17 RX ORDER — PIPERACILLIN SODIUM, TAZOBACTAM SODIUM 3; .375 G/15ML; G/15ML
3.38 INJECTION, POWDER, LYOPHILIZED, FOR SOLUTION INTRAVENOUS
Status: DISCONTINUED | OUTPATIENT
Start: 2018-10-17 | End: 2018-10-17

## 2018-10-17 RX ORDER — HYDROMORPHONE HYDROCHLORIDE 1 MG/ML
0.5 INJECTION, SOLUTION INTRAMUSCULAR; INTRAVENOUS; SUBCUTANEOUS ONCE
Status: COMPLETED | OUTPATIENT
Start: 2018-10-17 | End: 2018-10-17

## 2018-10-17 RX ORDER — ONDANSETRON 2 MG/ML
INJECTION INTRAMUSCULAR; INTRAVENOUS
Status: DISCONTINUED
Start: 2018-10-17 | End: 2018-10-17 | Stop reason: HOSPADM

## 2018-10-17 RX ORDER — SODIUM CHLORIDE 0.9 % (FLUSH) 0.9 %
3 SYRINGE (ML) INJECTION EVERY 8 HOURS
Status: DISCONTINUED | OUTPATIENT
Start: 2018-10-17 | End: 2018-10-26 | Stop reason: HOSPADM

## 2018-10-17 RX ORDER — LABETALOL HYDROCHLORIDE 5 MG/ML
10 INJECTION, SOLUTION INTRAVENOUS EVERY 6 HOURS PRN
Status: DISCONTINUED | OUTPATIENT
Start: 2018-10-17 | End: 2018-10-18

## 2018-10-17 RX ORDER — BUPIVACAINE HYDROCHLORIDE 5 MG/ML
10 INJECTION, SOLUTION EPIDURAL; INTRACAUDAL
Status: DISCONTINUED | OUTPATIENT
Start: 2018-10-17 | End: 2018-10-17

## 2018-10-17 RX ORDER — ENOXAPARIN SODIUM 100 MG/ML
40 INJECTION SUBCUTANEOUS EVERY 24 HOURS
Status: DISCONTINUED | OUTPATIENT
Start: 2018-10-17 | End: 2018-10-26 | Stop reason: HOSPADM

## 2018-10-17 RX ORDER — MORPHINE SULFATE 4 MG/ML
4 INJECTION, SOLUTION INTRAMUSCULAR; INTRAVENOUS
Status: COMPLETED | OUTPATIENT
Start: 2018-10-17 | End: 2018-10-17

## 2018-10-17 RX ORDER — SODIUM CHLORIDE 9 MG/ML
INJECTION, SOLUTION INTRAVENOUS CONTINUOUS
Status: DISCONTINUED | OUTPATIENT
Start: 2018-10-17 | End: 2018-10-19

## 2018-10-17 RX ORDER — HYDROMORPHONE HYDROCHLORIDE 1 MG/ML
0.5 INJECTION, SOLUTION INTRAMUSCULAR; INTRAVENOUS; SUBCUTANEOUS EVERY 4 HOURS PRN
Status: DISCONTINUED | OUTPATIENT
Start: 2018-10-17 | End: 2018-10-18

## 2018-10-17 RX ORDER — ONDANSETRON 2 MG/ML
4 INJECTION INTRAMUSCULAR; INTRAVENOUS EVERY 8 HOURS PRN
Status: DISCONTINUED | OUTPATIENT
Start: 2018-10-17 | End: 2018-10-26 | Stop reason: HOSPADM

## 2018-10-17 RX ORDER — ONDANSETRON 2 MG/ML
4 INJECTION INTRAMUSCULAR; INTRAVENOUS
Status: COMPLETED | OUTPATIENT
Start: 2018-10-17 | End: 2018-10-17

## 2018-10-17 RX ORDER — ONDANSETRON 4 MG/1
4 TABLET, ORALLY DISINTEGRATING ORAL
Status: COMPLETED | OUTPATIENT
Start: 2018-10-17 | End: 2018-10-17

## 2018-10-17 RX ADMIN — PIPERACILLIN AND TAZOBACTAM 3.38 G: 3; .375 INJECTION, POWDER, LYOPHILIZED, FOR SOLUTION INTRAVENOUS; PARENTERAL at 03:10

## 2018-10-17 RX ADMIN — ONDANSETRON 4 MG: 2 INJECTION INTRAMUSCULAR; INTRAVENOUS at 04:10

## 2018-10-17 RX ADMIN — MORPHINE SULFATE 4 MG: 4 INJECTION, SOLUTION INTRAMUSCULAR; INTRAVENOUS at 01:10

## 2018-10-17 RX ADMIN — TOPICAL ANESTHETIC: 200 SPRAY DENTAL; PERIODONTAL at 11:10

## 2018-10-17 RX ADMIN — ONDANSETRON 4 MG: 2 INJECTION INTRAMUSCULAR; INTRAVENOUS at 01:10

## 2018-10-17 RX ADMIN — ONDANSETRON 4 MG: 2 INJECTION INTRAMUSCULAR; INTRAVENOUS at 07:10

## 2018-10-17 RX ADMIN — MORPHINE SULFATE 4 MG: 4 INJECTION INTRAVENOUS at 03:10

## 2018-10-17 RX ADMIN — ENOXAPARIN SODIUM 40 MG: 100 INJECTION SUBCUTANEOUS at 04:10

## 2018-10-17 RX ADMIN — MORPHINE SULFATE 4 MG: 4 INJECTION, SOLUTION INTRAMUSCULAR; INTRAVENOUS at 04:10

## 2018-10-17 RX ADMIN — SODIUM CHLORIDE: 0.9 INJECTION, SOLUTION INTRAVENOUS at 01:10

## 2018-10-17 RX ADMIN — SODIUM CHLORIDE 1000 ML: 0.9 INJECTION, SOLUTION INTRAVENOUS at 09:10

## 2018-10-17 RX ADMIN — IOHEXOL 200 ML: 350 INJECTION, SOLUTION INTRAVENOUS at 12:10

## 2018-10-17 RX ADMIN — ONDANSETRON 4 MG: 4 TABLET, ORALLY DISINTEGRATING ORAL at 01:10

## 2018-10-17 RX ADMIN — MORPHINE SULFATE 4 MG: 4 INJECTION, SOLUTION INTRAMUSCULAR; INTRAVENOUS at 07:10

## 2018-10-17 RX ADMIN — HYDROMORPHONE HYDROCHLORIDE 0.5 MG: 1 INJECTION, SOLUTION INTRAMUSCULAR; INTRAVENOUS; SUBCUTANEOUS at 07:10

## 2018-10-17 RX ADMIN — Medication 3 ML: at 10:10

## 2018-10-17 RX ADMIN — HYDROMORPHONE HYDROCHLORIDE 0.5 MG: 1 INJECTION, SOLUTION INTRAMUSCULAR; INTRAVENOUS; SUBCUTANEOUS at 10:10

## 2018-10-17 RX ADMIN — MORPHINE SULFATE 4 MG: 4 INJECTION, SOLUTION INTRAMUSCULAR; INTRAVENOUS at 09:10

## 2018-10-17 RX ADMIN — PROMETHAZINE HYDROCHLORIDE 6.25 MG: 25 INJECTION INTRAMUSCULAR; INTRAVENOUS at 10:10

## 2018-10-17 NOTE — ASSESSMENT & PLAN NOTE
79 yo M with multiple previous abdominal surgeries here with possible small bowel obstruction, incidental retroperitoneal mass    -unable to view images from OSH, no CT  -admit  -will obtain SBFT to eval for obstruction  -npo  -ivf  -hold off on NG for now  -lactate wnl, monitor  -will need work up of retroperitoneal mass

## 2018-10-17 NOTE — SUBJECTIVE & OBJECTIVE
Current Facility-Administered Medications on File Prior to Encounter   Medication    [COMPLETED] morphine injection 4 mg    [COMPLETED] morphine injection 4 mg    [COMPLETED] ondansetron disintegrating tablet 4 mg    [COMPLETED] ondansetron injection 4 mg    [COMPLETED] piperacillin-tazobactam 3.375 g in dextrose 5 % 50 mL IVPB (ready to mix system)    [DISCONTINUED] piperacillin-tazobactam injection 3.375 g     Current Outpatient Medications on File Prior to Encounter   Medication Sig    amLODIPine (NORVASC) 10 MG tablet     B-complex with vitamin C tablet Take 1 tablet by mouth once daily.      calcium carbonate (OS-CARA) 600 mg (1,500 mg) Tab Take 600 mg by mouth 2 (two) times daily with meals.      docusate sodium (COLACE) 100 MG capsule Take 1 capsule (100 mg total) by mouth 2 (two) times daily.    fish oil-omega-3 fatty acids 300-1,000 mg capsule Take 1 g by mouth once daily.      HYDROcodone-acetaminophen (NORCO) 7.5-325 mg per tablet Take 1 tablet by mouth every 4 (four) hours as needed for Pain.    losartan-hydrochlorothiazide (HYZAAR) 100-12.5 mg per tablet Take 1 tablet by mouth once daily.      pravastatin (PRAVACHOL) 20 MG tablet     vitamin D 185 MG Tab Take 185 mg by mouth once daily.         Review of patient's allergies indicates:  No Known Allergies    Past Medical History:   Diagnosis Date    Cancer     prostate    Hypertension     Kidney problem     only 1 kidney functions    Liver disease     Umbilical hernia      Past Surgical History:   Procedure Laterality Date    colon repair      after prostatectomy    EXTRACTION, CATARACT Right 2/22/2012    [facility]    EXTRACTION, CATARACT, WITH IOL INSERTION Left 3/7/2012    [facility] at [facility]    PROSTATECTOMY      PROSTATECTOMY  2010     Family History     None        Tobacco Use    Smoking status: Former Smoker     Last attempt to quit: 2/15/1972     Years since  quittin.7    Smokeless tobacco: Never Used   Substance and Sexual Activity    Alcohol use: No     Frequency: Never    Drug use: No    Sexual activity: No     Review of Systems   Constitutional: Negative for activity change, appetite change and unexpected weight change.   Respiratory: Negative for cough.    Cardiovascular: Negative for chest pain.   Gastrointestinal: Positive for abdominal pain and constipation. Negative for abdominal distention.   Skin: Negative for wound.   Neurological: Negative for tremors and weakness.   Psychiatric/Behavioral: Negative for behavioral problems and confusion.     Objective:     Vital Signs (Most Recent):  Temp: 98.3 °F (36.8 °C) (10/17/18 0712)  Pulse: 97 (10/17/18 0803)  Resp: 17 (10/17/18 0803)  BP: (!) 176/86 (10/17/18 0803)  SpO2: 95 % (10/17/18 0724) Vital Signs (24h Range):  Temp:  [97.7 °F (36.5 °C)-98.3 °F (36.8 °C)] 98.3 °F (36.8 °C)  Pulse:  [] 97  Resp:  [14-20] 17  SpO2:  [90 %-97 %] 95 %  BP: (140-176)/() 176/86     Weight: 99.8 kg (220 lb)  Body mass index is 29.03 kg/m².    Physical Exam   Constitutional: He is oriented to person, place, and time. No distress.   HENT:   Head: Normocephalic and atraumatic.   Eyes: EOM are normal. Pupils are equal, round, and reactive to light.   Cardiovascular: Normal rate and regular rhythm.   Pulmonary/Chest: Effort normal. No respiratory distress.   Abdominal: Soft. He exhibits no distension.   Midline incision with reducible hernia to right of umbilicus, mildly tender in this area  No significant distention   Musculoskeletal: Normal range of motion. He exhibits no edema.   Neurological: He is alert and oriented to person, place, and time.   Skin: Skin is warm and dry. He is not diaphoretic.   Psychiatric: He has a normal mood and affect. His behavior is normal.       Significant Labs:  CBC:   Recent Labs   Lab  10/17/18   0145   WBC  20.32*   RBC  6.00   HGB  18.2*   HCT  52.7   PLT  356*   MCV  88   MCH   30.3   MCHC  34.5     BMP:   Recent Labs   Lab  10/17/18   0145   GLU  214*   NA  139   K  3.5   CL  99   CO2  29   BUN  23   CREATININE  1.5*   CALCIUM  9.9       Significant Diagnostics:  CT Abd- unable to review images  Read:    Air and stool throughout the loops of colon.  Surgical clips within the proximal sigmoid colon from previous partial colectomy.  There is a small ventral hernia just to the right of midline and slightly superior to the umbilicus measuring 3.2 cm in diameter.  There is a small internal loop of small bowel which results in a small-bowel obstruction at this level.  The loops of small bowel proximal to this hernia are fluid dilated measuring up to 3.2 cm in diameter.  The distal loops of small bowel are decompressed.    There is a large retroperitoneal soft tissue mass which encompasses the distal abdominal aorta current measuring 5.0 cm AP x 7.7 cm transverse.  This was present on the patient's most recent examination but has slightly increased in size over the interval.  This is new when compared to the examination from 2010.  This is suspicious for neoplasia.

## 2018-10-17 NOTE — ED NOTES
Accepted to [facility] for ED to ED transfer.  Report to be called to [phone].  Dr Santiago Hannah is the accepting physician.

## 2018-10-17 NOTE — HPI
77 yo M with h/o radical prostatectomy 2010 (surgery complicated by ?bowel injury, pt reports 9 surgeries including bowel resection) and ventral incisional hernia presented to OSH with severe abdominal pain. Mostly above umbilicus and on the right side of abdomen. Pain is improved now. No vomiting. Has passed some gas since onset of pain. No BM for two days. Denies hx previous small bowel obstructions. Denies weight loss.      Patient has had multiple ED visits lately for other problems, and was found to have an enlarging retroperitoneal mass with concern for malignancy. Recent PSA normal.

## 2018-10-17 NOTE — ED TRIAGE NOTES
"Pt reports abdominal pain starting at 5pm that has been progressively worsening.  Pt stated,"It feels like my intestines are being pushed out".  Pt reports bowel movement yesterday.  Pt denies nausea, vomiting and diarrhea.  "

## 2018-10-17 NOTE — ED NOTES
Called [facility] to arrange patient transport to [facility], spoke with Payal. Requested priority 2 transport, Payal said she would send an ambulance as soon as possible.

## 2018-10-17 NOTE — ED NOTES
"The patient was sent to the ER this morning by ems as a transfer from [facility] for abdominal pain that began around 0500, that worsened. He has a hx of prostate CA and an umbilical hernia. The pt had a CT at [place] that showed a possible SBO and a retroperitoneal mass measuring 8 cm that is contiguous with the aorta and IVC. Infrarenal abdominal aortic aneurysm noted 4 cm. The patient had Morphine at prior facility that controlled his pain. Pain now is "coming back" and rated 2/10. Pt arrived with PIV #18g Left AC. The patient reports he has been passing "some gas" and is able to belch  "

## 2018-10-17 NOTE — ED NOTES
Patient is dressed in a gown & placed on continuous cardiac monitor, automatic blood pressure cuff and continuous pulse oximeter.

## 2018-10-17 NOTE — CONSULTS
[facility]  General Surgery  Consult Note    Patient Name: Yobani Rios  MRN: [id number]  Code Status: Full Code  Admission Date: 10/17/2018  Hospital Length of Stay: 0 days  Attending Physician: Tiago Malone MD  Primary Care Provider: Laney Nice MD    Patient information was obtained from patient, past medical records and ER records.     Inpatient consult to General surgery  Consult performed by: Tiffany Lechuga MD  Consult ordered by: Rancho Kevin PA-C        Subjective:     Principal Problem: <principal problem not specified>    History of Present Illness: 79 yo M with h/o radical prostatectomy 2010 (surgery complicated by ?bowel injury, pt reports 9 surgeries including bowel resection) and ventral incisional hernia presented to OSH with severe abdominal pain. Mostly above umbilicus and on the right side of abdomen. Pain is improved now. No vomiting. Has passed some gas since onset of pain. No BM for two days. Denies hx previous small bowel obstructions. Denies weight loss.      Patient has had multiple ED visits lately for other problems, and was found to have an enlarging retroperitoneal mass with concern for malignancy. Recent PSA normal.     Current Facility-Administered Medications on File Prior to Encounter   Medication    [COMPLETED] morphine injection 4 mg    [COMPLETED] morphine injection 4 mg    [COMPLETED] ondansetron disintegrating tablet 4 mg    [COMPLETED] ondansetron injection 4 mg    [COMPLETED] piperacillin-tazobactam 3.375 g in dextrose 5 % 50 mL IVPB (ready to mix system)    [DISCONTINUED] piperacillin-tazobactam injection 3.375 g     Current Outpatient Medications on File Prior to Encounter   Medication Sig    amLODIPine (NORVASC) 10 MG tablet     B-complex with vitamin C tablet Take 1 tablet by mouth once daily.      calcium carbonate (OS-CARA) 600 mg (1,500 mg) Tab Take 600 mg by mouth 2 (two) times daily with meals.      docusate sodium (COLACE)  100 MG capsule Take 1 capsule (100 mg total) by mouth 2 (two) times daily.    fish oil-omega-3 fatty acids 300-1,000 mg capsule Take 1 g by mouth once daily.      HYDROcodone-acetaminophen (NORCO) 7.5-325 mg per tablet Take 1 tablet by mouth every 4 (four) hours as needed for Pain.    losartan-hydrochlorothiazide (HYZAAR) 100-12.5 mg per tablet Take 1 tablet by mouth once daily.      pravastatin (PRAVACHOL) 20 MG tablet     vitamin D 185 MG Tab Take 185 mg by mouth once daily.         Review of patient's allergies indicates:  No Known Allergies    Past Medical History:   Diagnosis Date    Cancer     prostate    Hypertension     Kidney problem     only 1 kidney functions    Liver disease     Umbilical hernia      Past Surgical History:   Procedure Laterality Date    colon repair      after prostatectomy    EXTRACTION, CATARACT Right 2012    [facility]    EXTRACTION, CATARACT, WITH IOL INSERTION Left 3/7/2012    [facility]    PROSTATECTOMY      PROSTATECTOMY  2010     Family History     None        Tobacco Use    Smoking status: Former Smoker     Last attempt to quit: 2/15/1972     Years since quittin.7    Smokeless tobacco: Never Used   Substance and Sexual Activity    Alcohol use: No     Frequency: Never    Drug use: No    Sexual activity: No     Review of Systems   Constitutional: Negative for activity change, appetite change and unexpected weight change.   Respiratory: Negative for cough.    Cardiovascular: Negative for chest pain.   Gastrointestinal: Positive for abdominal pain and constipation. Negative for abdominal distention.   Skin: Negative for wound.   Neurological: Negative for tremors and weakness.   Psychiatric/Behavioral: Negative for behavioral problems and confusion.     Objective:     Vital Signs (Most Recent):  Temp: 98.3 °F (36.8 °C) (10/17/18 0712)  Pulse: 97 (10/17/18 0803)  Resp: 17 (10/17/18  0803)  BP: (!) 176/86 (10/17/18 0803)  SpO2: 95 % (10/17/18 0724) Vital Signs (24h Range):  Temp:  [97.7 °F (36.5 °C)-98.3 °F (36.8 °C)] 98.3 °F (36.8 °C)  Pulse:  [] 97  Resp:  [14-20] 17  SpO2:  [90 %-97 %] 95 %  BP: (140-176)/() 176/86     Weight: 99.8 kg (220 lb)  Body mass index is 29.03 kg/m².    Physical Exam   Constitutional: He is oriented to person, place, and time. No distress.   HENT:   Head: Normocephalic and atraumatic.   Eyes: EOM are normal. Pupils are equal, round, and reactive to light.   Cardiovascular: Normal rate and regular rhythm.   Pulmonary/Chest: Effort normal. No respiratory distress.   Abdominal: Soft. He exhibits no distension.   Midline incision with reducible hernia to right of umbilicus, mildly tender in this area  No significant distention   Musculoskeletal: Normal range of motion. He exhibits no edema.   Neurological: He is alert and oriented to person, place, and time.   Skin: Skin is warm and dry. He is not diaphoretic.   Psychiatric: He has a normal mood and affect. His behavior is normal.       Significant Labs:  CBC:   Recent Labs   Lab  10/17/18   0145   WBC  20.32*   RBC  6.00   HGB  18.2*   HCT  52.7   PLT  356*   MCV  88   MCH  30.3   MCHC  34.5     BMP:   Recent Labs   Lab  10/17/18   0145   GLU  214*   NA  139   K  3.5   CL  99   CO2  29   BUN  23   CREATININE  1.5*   CALCIUM  9.9       Significant Diagnostics:  CT Abd- unable to review images  Read:    Air and stool throughout the loops of colon.  Surgical clips within the proximal sigmoid colon from previous partial colectomy.  There is a small ventral hernia just to the right of midline and slightly superior to the umbilicus measuring 3.2 cm in diameter.  There is a small internal loop of small bowel which results in a small-bowel obstruction at this level.  The loops of small bowel proximal to this hernia are fluid dilated measuring up to 3.2 cm in diameter.  The distal loops of small bowel are  decompressed.    There is a large retroperitoneal soft tissue mass which encompasses the distal abdominal aorta current measuring 5.0 cm AP x 7.7 cm transverse.  This was present on the patient's most recent examination but has slightly increased in size over the interval.  This is new when compared to the examination from 2010.  This is suspicious for neoplasia.    Assessment/Plan:     Small bowel obstruction    79 yo M with multiple previous abdominal surgeries here with possible small bowel obstruction, incidental retroperitoneal mass    -unable to view images from OSH, no CT  -admit  -will obtain SBFT to eval for obstruction  -npo  -ivf  -hold off on NG for now  -lactate wnl, monitor  -will need work up of retroperitoneal mass          VTE Risk Mitigation (From admission, onward)        Ordered     enoxaparin injection 40 mg  Daily      10/17/18 0949     IP VTE HIGH RISK PATIENT  Once      10/17/18 0949          Thank you for your consult. I will follow-up with patient. Please contact us if you have any additional questions.    Tiffany Lechuga MD  General Surgery  [facility]

## 2018-10-17 NOTE — ED PROVIDER NOTES
Encounter Date: 10/17/2018       History     Chief Complaint   Patient presents with    Abdominal Pain     Patient presents original chief complaint of abdominal pain.  Patient does have severe abdominal pain lower abdomen.  Patient was having some nausea no vomiting.  Stated pain is a 10 on a scale of 1-10 unable to get relief pain.  Patient denies any history of pain in the past.  Patient does not give a history of a mass in his abdomen which he is scheduled to have MRI done on Thursday.  Patient states that the pain started approximately 2 hr ago unable to get any relief.  Denies any fever chills.          Review of patient's allergies indicates:  No Known Allergies  Past Medical History:   Diagnosis Date    Cancer     prostate    Hypertension     Kidney problem     only 1 kidney functions    Liver disease     Umbilical hernia      Past Surgical History:   Procedure Laterality Date    colon repair      after prostatectomy    EXTRACTION, CATARACT Right 2012    [facility]    EXTRACTION, CATARACT, WITH IOL INSERTION Left 3/7/2012    [facility]    PROSTATECTOMY      PROSTATECTOMY       No family history on file.  Social History     Tobacco Use    Smoking status: Former Smoker     Last attempt to quit: 2/15/1972     Years since quittin.7    Smokeless tobacco: Never Used   Substance Use Topics    Alcohol use: No     Frequency: Never    Drug use: No     Review of Systems   Constitutional: Negative for fever.   HENT: Negative for sore throat.    Respiratory: Negative for shortness of breath.    Cardiovascular: Negative for chest pain.   Gastrointestinal: Positive for abdominal pain and nausea.   Genitourinary: Negative for dysuria.   Musculoskeletal: Negative for back pain.   Skin: Negative for rash.   Neurological: Negative for weakness.   Hematological: Does not bruise/bleed easily.       Physical Exam     Initial Vitals  [10/17/18 0125]   BP Pulse Resp Temp SpO2   (!) 160/94 81 20 97.7 °F (36.5 °C) 96 %      MAP       --         Physical Exam    Nursing note and vitals reviewed.  Constitutional: Vital signs are normal. He appears cachectic. He is Obese . He appears toxic. He appears ill.   HENT:   Head: Normocephalic and atraumatic.   Eyes: Lids are normal. Lids are everted and swept, no foreign bodies found.   Neck: Trachea normal, normal range of motion and phonation normal. Neck supple.   Cardiovascular: Normal rate, regular rhythm and normal pulses.   Pulmonary/Chest: Effort normal and breath sounds normal.   Abdominal: Soft. Normal appearance. Bowel sounds are decreased. There is generalized tenderness. There is rebound and positive Sosa's sign. There is no CVA tenderness. A hernia is present. Hernia confirmed positive in the ventral area.   Musculoskeletal: Normal range of motion.   Lymphadenopathy:        Head (right side): No submental, no tonsillar, no preauricular, no posterior auricular and no occipital adenopathy present.        Head (left side): No tonsillar, no preauricular and no posterior auricular adenopathy present.     He has no cervical adenopathy.        Right cervical: No deep cervical adenopathy present.       Left cervical: No superficial cervical adenopathy present.   Neurological: He is alert and oriented to person, place, and time. No cranial nerve deficit or sensory deficit. GCS eye subscore is 4. GCS verbal subscore is 5. GCS motor subscore is 6.   Skin: Skin is warm, dry and intact.   Psychiatric: He has a normal mood and affect. His speech is normal and behavior is normal. Cognition and memory are normal.         ED Course   Procedures  Labs Reviewed   CBC W/ AUTO DIFFERENTIAL - Abnormal; Notable for the following components:       Result Value    WBC 20.32 (*)     Hemoglobin 18.2 (*)     Platelets 356 (*)     Gran # (ANC) 16.9 (*)     Immature Grans (Abs) 0.10 (*)     Gran% 83.2 (*)     Lymph% 10.1  (*)     All other components within normal limits   COMPREHENSIVE METABOLIC PANEL - Abnormal; Notable for the following components:    Glucose 214 (*)     Creatinine 1.5 (*)     eGFR if  50.8 (*)     eGFR if non  44.0 (*)     All other components within normal limits   URINALYSIS, REFLEX TO URINE CULTURE - Abnormal; Notable for the following components:    Specific Gravity, UA >=1.030 (*)     Protein, UA 2+ (*)     Ketones, UA 1+ (*)     Bilirubin (UA) 1+ (*)     Occult Blood UA Trace (*)     All other components within normal limits    Narrative:     Preferred Collection Type->Urine, Clean Catch   LIPASE   URINALYSIS MICROSCOPIC    Narrative:     Preferred Collection Type->Urine, Clean Catch          Imaging Results          CT Abdomen Pelvis  Without Contrast (In process)               X-Rays:   Independently Interpreted Readings:   Other Readings:  CT scan of the abdomen and pelvis was done which showed retroperitoneal mass measuring 8 cm which is contiguous with aorta and IVC.  This is concerning for malignancy or retroperitoneal fibrosis.  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  Small bowel obstruction.    Medical Decision Making:   Initial Assessment:   Patient presents original chief complaint abdominal pain. Patient was noted have a ventral hernia in severe pain.  CT scan of the abdomen and pelvis were done to rule out possible bowel obstruction versus hernia.  Bowel obstruction noticed patient was given pain medication CBC and electrolytes were done which did show patient have a 20,000 white count.  Patient was started on Unasyn.  A transferred to [facility]  Clinical Tests:   Lab Tests: Ordered and Reviewed  The following lab test(s) were unremarkable: CBC and BMP  Radiological Study: Ordered and Reviewed  Medical Tests: Ordered and Reviewed  ED Management:  Multiple hospitals in [place] who were called to attempt to transfer patient for possible surgery.  No  beds were available in any of the facilities.  Local hospitals in [place] or [facility] as well.  Patient will be transferred to the own hospital available which is [facility] for emergent care                      Clinical Impression:   The primary encounter diagnosis was Small bowel obstruction. Diagnoses of Abdominal mass, unspecified abdominal location, Incarcerated hernia, and Ventral hernia with obstruction and without gangrene were also pertinent to this visit.                             Henri Mohamud,   10/17/18 0325       Henri Mohamud DO  10/17/18 0424

## 2018-10-17 NOTE — ED PROVIDER NOTES
Encounter Date: 10/17/2018    SCRIBE #1 NOTE: I, Son Mica, am scribing for, and in the presence of,  Dr. Malone. I have scribed the following portions of the note - the APC attestation.       History     Chief Complaint   Patient presents with    [place] Transfer     0500 abd cramping, hiatal hernia upon CT, SBO     Mr Rios is a 77 yo  male patient that was transferred from [facility] for a SBO, 8 cm retroperitoneal mass found on CT, and a ventral hernia. Pt states that he developed abdominal pain, nausea and vomiting shortly after dinner last night at approximately 1800. Pt states that the pain and nausea became more severe at around 0100 this morning so he went to the ER at [place]. Pt also states that he has not had a bowel movement in two days, and has passed very little gas. Pt describes the pain as a general tenderness in all quadrants with increased sharp intermittent pain at the site of the ventral hernia. Pain does not radiate.  Pt denies any fevers, chills, body aches, chest pain, shortness of breath, dizziness, or weakness.            Review of patient's allergies indicates:  No Known Allergies  Past Medical History:   Diagnosis Date    Cancer     prostate    Hypertension     Kidney problem     only 1 kidney functions    Liver disease     Umbilical hernia      Past Surgical History:   Procedure Laterality Date    colon repair      after prostatectomy    EXTRACTION, CATARACT Right 2012    Performed by William [facility] at [facility]    EXTRACTION, CATARACT, WITH IOL INSERTION Left 3/7/2012    Performed by William [facility] at [facility]    PROSTATECTOMY      PROSTATECTOMY       History reviewed. No pertinent family history.  Social History     Tobacco Use    Smoking status: Former Smoker     Last attempt to quit: 2/15/1972     Years since quittin.7    Smokeless tobacco: Never Used   Substance Use Topics    Alcohol use: No     Frequency: Never    Drug  use: No     Review of Systems   Constitutional: Negative for chills, diaphoresis, fatigue and fever.   HENT: Negative for congestion, ear pain, rhinorrhea, sore throat and trouble swallowing.    Eyes: Negative for pain, discharge and visual disturbance.   Respiratory: Negative for cough, chest tightness, shortness of breath and wheezing.    Cardiovascular: Negative for chest pain, palpitations and leg swelling.   Gastrointestinal: Positive for abdominal pain, constipation, nausea and vomiting. Negative for blood in stool and diarrhea.   Genitourinary: Negative for dysuria, flank pain, frequency and hematuria.   Musculoskeletal: Negative for back pain, neck pain and neck stiffness.   Skin: Negative for rash.   Neurological: Negative for dizziness, syncope, facial asymmetry, weakness, light-headedness, numbness and headaches.   Psychiatric/Behavioral: Negative for agitation and confusion. The patient is not nervous/anxious.        Physical Exam     Initial Vitals [10/17/18 0712]   BP Pulse Resp Temp SpO2   (!) 144/86 102 16 98.3 °F (36.8 °C) 97 %      MAP       --         Physical Exam    Constitutional: He appears well-developed and well-nourished. He is not diaphoretic. He is Obese . He is cooperative.   HENT:   Head: Normocephalic and atraumatic.   Eyes: Conjunctivae, EOM and lids are normal.   Neck: Normal range of motion. Neck supple.   Cardiovascular: Normal rate, regular rhythm, S1 normal, S2 normal, normal heart sounds and normal pulses. Exam reveals no gallop and no friction rub.    No murmur heard.  Pulmonary/Chest: Effort normal and breath sounds normal. No respiratory distress.   Abdominal: Soft. He exhibits distension. He exhibits no fluid wave and no ascites. There is generalized tenderness and tenderness in the periumbilical area. There is no CVA tenderness and negative Sosa's sign. A hernia is present. Hernia confirmed positive in the ventral area.   Bowel sounds decreased in RUQ and LUQ. Bowel  sounds normal in RLQ and LLQ.   Musculoskeletal: Normal range of motion. He exhibits no edema or tenderness.   Neurological: He is alert.   Skin: Skin is warm and dry. No rash noted.   Psychiatric: He has a normal mood and affect. His behavior is normal. Judgment and thought content normal.         ED Course   Procedures  Labs Reviewed   LACTIC ACID, PLASMA          Imaging Results          FL Small Bowel Follow Through (Final result)  Result time 10/17/18 23:42:18    Final result by Judd Haddad MD (10/17/18 23:42:18)                 Impression:      Moderate gaseous distention of small bowel, with enteric contrast noted overlying the rectum at 7 hours post contrast administration.  No significant contrast is noted elsewhere in the colon.  Correlation advised, with continued radiographic follow-up as warranted.      Electronically signed by: Judd Haddad MD  Date:    10/17/2018  Time:    23:42             Narrative:    EXAMINATION:  FL SMALL BOWEL FOLLOW THROUGH    CLINICAL HISTORY:  eval for obstruction;    TECHNIQUE:  Approximately 200 mL Omnipaque 350 enteric contrast was administered.  Multiple sequential radiographs of the abdomen were obtained up to 7 hr post contrast administration.    COMPARISON:  None    FINDINGS:   radiograph demonstrates moderate gaseous distension of small bowel, measuring up to 4.3 cm in diameter.  No significant enteric contrast is identified in the right hemicolon, however, enteric contrast is noted to fill the rectum on the 7 hr radiograph.  Correlation with patient anatomy and surgical history is recommended.  There are moderate degenerative changes in the lumbar spine.  Penile prosthesis is noted.                                 Medical Decision Making:   History:   Old Medical Records: I decided to obtain old medical records.  Differential Diagnosis:   Small Bowel Obstruction  Incarcerated/Strangulated Hernia  Abdominal Aortic Aneurysm    Clinical Tests:   Lab  Tests: Reviewed  Radiological Study: Reviewed  ED Management:  Mr Rios is a 79 yo  male patient that was transferred from [facility] for a SBO and 8 cm retroperitoneal mass found on CT. Pt states that he developed abdominal pain, nausea and vomiting shortly after dinner last night at approximately 1800.  CT abdomen and pelvis done at [place] showed a retroperitoneal mass measuring 8 cm which is contiguous with aorta and IVC.  This is concerning for malignancy or retroperitoneal fibrosis.  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  Small bowel obstruction.  CBC shows a 20,000 white count. Pt's pain is being managed with IV morphine and zofran.  General Surgery consulted and will put in orders for inpatient admission.              Scribe Attestation:   Scribe #1: I performed the above scribed service and the documentation accurately describes the services I performed. I attest to the accuracy of the note.    Attending Attestation:     Physician Attestation Statement for NP/PA:   I discussed this assessment and plan of this patient with the NP/PA, but I did not personally examine the patient. The face to face encounter was performed by the NP/PA.                     Clinical Impression:   The primary encounter diagnosis was Small bowel obstruction. A diagnosis of Hypertension was also pertinent to this visit.      Disposition:   Disposition: Admitted  Condition: Stable                        Rancho Kevin PA-C  10/17/18 1330       Tiago Malone MD  10/18/18 1352

## 2018-10-18 ENCOUNTER — ANESTHESIA EVENT (OUTPATIENT)
Dept: SURGERY | Facility: HOSPITAL | Age: 78
DRG: 336 | End: 2018-10-18
Payer: COMMERCIAL

## 2018-10-18 ENCOUNTER — ANESTHESIA (OUTPATIENT)
Dept: SURGERY | Facility: HOSPITAL | Age: 78
DRG: 336 | End: 2018-10-18
Payer: COMMERCIAL

## 2018-10-18 LAB
ANION GAP SERPL CALC-SCNC: 11 MMOL/L
BASOPHILS # BLD AUTO: 0.03 K/UL
BASOPHILS NFR BLD: 0.2 %
BUN SERPL-MCNC: 29 MG/DL
CALCIUM SERPL-MCNC: 9.3 MG/DL
CHLORIDE SERPL-SCNC: 103 MMOL/L
CO2 SERPL-SCNC: 26 MMOL/L
CREAT SERPL-MCNC: 1.7 MG/DL
DIFFERENTIAL METHOD: ABNORMAL
EOSINOPHIL # BLD AUTO: 0 K/UL
EOSINOPHIL NFR BLD: 0.1 %
ERYTHROCYTE [DISTWIDTH] IN BLOOD BY AUTOMATED COUNT: 13 %
EST. GFR  (AFRICAN AMERICAN): 43.7 ML/MIN/1.73 M^2
EST. GFR  (NON AFRICAN AMERICAN): 37.8 ML/MIN/1.73 M^2
GLUCOSE SERPL-MCNC: 139 MG/DL
HCT VFR BLD AUTO: 53.4 %
HGB BLD-MCNC: 18.1 G/DL
IMM GRANULOCYTES # BLD AUTO: 0.07 K/UL
IMM GRANULOCYTES NFR BLD AUTO: 0.5 %
LACTATE SERPL-SCNC: 1.6 MMOL/L
LYMPHOCYTES # BLD AUTO: 0.7 K/UL
LYMPHOCYTES NFR BLD: 4.7 %
MCH RBC QN AUTO: 31 PG
MCHC RBC AUTO-ENTMCNC: 33.9 G/DL
MCV RBC AUTO: 91 FL
MONOCYTES # BLD AUTO: 1.1 K/UL
MONOCYTES NFR BLD: 7 %
NEUTROPHILS # BLD AUTO: 13.5 K/UL
NEUTROPHILS NFR BLD: 87.5 %
NRBC BLD-RTO: 0 /100 WBC
PLATELET # BLD AUTO: 322 K/UL
PMV BLD AUTO: 10.5 FL
POTASSIUM SERPL-SCNC: 3.9 MMOL/L
RBC # BLD AUTO: 5.84 M/UL
SODIUM SERPL-SCNC: 140 MMOL/L
WBC # BLD AUTO: 15.41 K/UL

## 2018-10-18 PROCEDURE — 63600175 PHARM REV CODE 636 W HCPCS: Performed by: SURGERY

## 2018-10-18 PROCEDURE — 71000033 HC RECOVERY, INTIAL HOUR: Performed by: SURGERY

## 2018-10-18 PROCEDURE — 71000039 HC RECOVERY, EACH ADD'L HOUR: Performed by: SURGERY

## 2018-10-18 PROCEDURE — D9220A PRA ANESTHESIA: Mod: CRNA,,, | Performed by: NURSE ANESTHETIST, CERTIFIED REGISTERED

## 2018-10-18 PROCEDURE — C9113 INJ PANTOPRAZOLE SODIUM, VIA: HCPCS | Performed by: SURGERY

## 2018-10-18 PROCEDURE — 63600175 PHARM REV CODE 636 W HCPCS: Performed by: NURSE ANESTHETIST, CERTIFIED REGISTERED

## 2018-10-18 PROCEDURE — 27100025 HC TUBING, SET FLUID WARMER: Performed by: NURSE ANESTHETIST, CERTIFIED REGISTERED

## 2018-10-18 PROCEDURE — S0030 INJECTION, METRONIDAZOLE: HCPCS | Performed by: NURSE ANESTHETIST, CERTIFIED REGISTERED

## 2018-10-18 PROCEDURE — S0028 INJECTION, FAMOTIDINE, 20 MG: HCPCS | Performed by: NURSE ANESTHETIST, CERTIFIED REGISTERED

## 2018-10-18 PROCEDURE — 63600175 PHARM REV CODE 636 W HCPCS: Performed by: UROLOGY

## 2018-10-18 PROCEDURE — 93005 ELECTROCARDIOGRAM TRACING: CPT

## 2018-10-18 PROCEDURE — 36000707: Performed by: SURGERY

## 2018-10-18 PROCEDURE — 25000003 PHARM REV CODE 250: Performed by: SURGERY

## 2018-10-18 PROCEDURE — S0030 INJECTION, METRONIDAZOLE: HCPCS | Performed by: SURGERY

## 2018-10-18 PROCEDURE — 36000706: Performed by: SURGERY

## 2018-10-18 PROCEDURE — 25000003 PHARM REV CODE 250: Performed by: NURSE ANESTHETIST, CERTIFIED REGISTERED

## 2018-10-18 PROCEDURE — 85025 COMPLETE CBC W/AUTO DIFF WBC: CPT

## 2018-10-18 PROCEDURE — 63600175 PHARM REV CODE 636 W HCPCS: Performed by: STUDENT IN AN ORGANIZED HEALTH CARE EDUCATION/TRAINING PROGRAM

## 2018-10-18 PROCEDURE — 80048 BASIC METABOLIC PNL TOTAL CA: CPT

## 2018-10-18 PROCEDURE — D9220A PRA ANESTHESIA: Mod: ANES,,, | Performed by: ANESTHESIOLOGY

## 2018-10-18 PROCEDURE — 11000001 HC ACUTE MED/SURG PRIVATE ROOM

## 2018-10-18 PROCEDURE — 94761 N-INVAS EAR/PLS OXIMETRY MLT: CPT

## 2018-10-18 PROCEDURE — 49560 PR REPAIR INCISIONAL HERNIA,REDUCIBLE: CPT | Mod: ,,, | Performed by: SURGERY

## 2018-10-18 PROCEDURE — 36415 COLL VENOUS BLD VENIPUNCTURE: CPT

## 2018-10-18 PROCEDURE — 25000003 PHARM REV CODE 250: Performed by: STUDENT IN AN ORGANIZED HEALTH CARE EDUCATION/TRAINING PROGRAM

## 2018-10-18 PROCEDURE — 93010 ELECTROCARDIOGRAM REPORT: CPT | Mod: ,,, | Performed by: INTERNAL MEDICINE

## 2018-10-18 PROCEDURE — 83605 ASSAY OF LACTIC ACID: CPT

## 2018-10-18 PROCEDURE — 37000008 HC ANESTHESIA 1ST 15 MINUTES: Performed by: SURGERY

## 2018-10-18 PROCEDURE — 37000009 HC ANESTHESIA EA ADD 15 MINS: Performed by: SURGERY

## 2018-10-18 RX ORDER — NEOSTIGMINE METHYLSULFATE 1 MG/ML
INJECTION, SOLUTION INTRAVENOUS
Status: DISCONTINUED | OUTPATIENT
Start: 2018-10-18 | End: 2018-10-18

## 2018-10-18 RX ORDER — HYDROMORPHONE HCL IN 0.9% NACL 6 MG/30 ML
PATIENT CONTROLLED ANALGESIA SYRINGE INTRAVENOUS CONTINUOUS
Status: DISCONTINUED | OUTPATIENT
Start: 2018-10-18 | End: 2018-10-23

## 2018-10-18 RX ORDER — FENTANYL CITRATE 50 UG/ML
25 INJECTION, SOLUTION INTRAMUSCULAR; INTRAVENOUS EVERY 5 MIN PRN
Status: DISCONTINUED | OUTPATIENT
Start: 2018-10-18 | End: 2018-10-18 | Stop reason: HOSPADM

## 2018-10-18 RX ORDER — GLYCOPYRROLATE 0.2 MG/ML
INJECTION INTRAMUSCULAR; INTRAVENOUS
Status: DISCONTINUED | OUTPATIENT
Start: 2018-10-18 | End: 2018-10-18

## 2018-10-18 RX ORDER — ACETAMINOPHEN 10 MG/ML
INJECTION, SOLUTION INTRAVENOUS
Status: DISCONTINUED | OUTPATIENT
Start: 2018-10-18 | End: 2018-10-18

## 2018-10-18 RX ORDER — LIDOCAINE HCL/PF 100 MG/5ML
SYRINGE (ML) INTRAVENOUS
Status: DISCONTINUED | OUTPATIENT
Start: 2018-10-18 | End: 2018-10-18

## 2018-10-18 RX ORDER — EPHEDRINE SULFATE 50 MG/ML
INJECTION, SOLUTION INTRAVENOUS
Status: DISCONTINUED | OUTPATIENT
Start: 2018-10-18 | End: 2018-10-18

## 2018-10-18 RX ORDER — DEXAMETHASONE SODIUM PHOSPHATE 4 MG/ML
INJECTION, SOLUTION INTRA-ARTICULAR; INTRALESIONAL; INTRAMUSCULAR; INTRAVENOUS; SOFT TISSUE
Status: DISCONTINUED | OUTPATIENT
Start: 2018-10-18 | End: 2018-10-18

## 2018-10-18 RX ORDER — CEFAZOLIN SODIUM 2 G/50ML
2 SOLUTION INTRAVENOUS ONCE
Status: DISCONTINUED | OUTPATIENT
Start: 2018-10-18 | End: 2018-10-18

## 2018-10-18 RX ORDER — SUCCINYLCHOLINE CHLORIDE 20 MG/ML
INJECTION INTRAMUSCULAR; INTRAVENOUS
Status: DISCONTINUED | OUTPATIENT
Start: 2018-10-18 | End: 2018-10-18

## 2018-10-18 RX ORDER — PANTOPRAZOLE SODIUM 40 MG/10ML
40 INJECTION, POWDER, LYOPHILIZED, FOR SOLUTION INTRAVENOUS DAILY
Status: DISCONTINUED | OUTPATIENT
Start: 2018-10-18 | End: 2018-10-26

## 2018-10-18 RX ORDER — PROPOFOL 10 MG/ML
VIAL (ML) INTRAVENOUS
Status: DISCONTINUED | OUTPATIENT
Start: 2018-10-18 | End: 2018-10-18

## 2018-10-18 RX ORDER — HYDROMORPHONE HCL IN 0.9% NACL 6 MG/30 ML
PATIENT CONTROLLED ANALGESIA SYRINGE INTRAVENOUS CONTINUOUS
Status: DISCONTINUED | OUTPATIENT
Start: 2018-10-18 | End: 2018-10-18

## 2018-10-18 RX ORDER — METRONIDAZOLE 500 MG/100ML
500 INJECTION, SOLUTION INTRAVENOUS
Status: COMPLETED | OUTPATIENT
Start: 2018-10-18 | End: 2018-10-22

## 2018-10-18 RX ORDER — NALOXONE HCL 0.4 MG/ML
0.02 VIAL (ML) INJECTION
Status: DISCONTINUED | OUTPATIENT
Start: 2018-10-18 | End: 2018-10-26 | Stop reason: HOSPADM

## 2018-10-18 RX ORDER — METRONIDAZOLE 500 MG/100ML
INJECTION, SOLUTION INTRAVENOUS
Status: DISCONTINUED | OUTPATIENT
Start: 2018-10-18 | End: 2018-10-18

## 2018-10-18 RX ORDER — CEFAZOLIN SODIUM 1 G/3ML
INJECTION, POWDER, FOR SOLUTION INTRAMUSCULAR; INTRAVENOUS
Status: DISCONTINUED | OUTPATIENT
Start: 2018-10-18 | End: 2018-10-18

## 2018-10-18 RX ORDER — SODIUM CHLORIDE 0.9 % (FLUSH) 0.9 %
3 SYRINGE (ML) INJECTION
Status: DISCONTINUED | OUTPATIENT
Start: 2018-10-18 | End: 2018-10-26 | Stop reason: HOSPADM

## 2018-10-18 RX ORDER — PHENYLEPHRINE HYDROCHLORIDE 10 MG/ML
INJECTION INTRAVENOUS
Status: DISCONTINUED | OUTPATIENT
Start: 2018-10-18 | End: 2018-10-18

## 2018-10-18 RX ORDER — KETAMINE HYDROCHLORIDE 10 MG/ML
INJECTION, SOLUTION INTRAMUSCULAR; INTRAVENOUS
Status: DISCONTINUED | OUTPATIENT
Start: 2018-10-18 | End: 2018-10-18

## 2018-10-18 RX ORDER — FAMOTIDINE 10 MG/ML
INJECTION INTRAVENOUS
Status: DISCONTINUED | OUTPATIENT
Start: 2018-10-18 | End: 2018-10-18

## 2018-10-18 RX ORDER — NALOXONE HCL 0.4 MG/ML
0.02 VIAL (ML) INJECTION
Status: DISCONTINUED | OUTPATIENT
Start: 2018-10-18 | End: 2018-10-23

## 2018-10-18 RX ORDER — ONDANSETRON 2 MG/ML
INJECTION INTRAMUSCULAR; INTRAVENOUS
Status: DISCONTINUED | OUTPATIENT
Start: 2018-10-18 | End: 2018-10-18

## 2018-10-18 RX ORDER — ROCURONIUM BROMIDE 10 MG/ML
INJECTION, SOLUTION INTRAVENOUS
Status: DISCONTINUED | OUTPATIENT
Start: 2018-10-18 | End: 2018-10-18

## 2018-10-18 RX ORDER — ACETAMINOPHEN 10 MG/ML
1000 INJECTION, SOLUTION INTRAVENOUS EVERY 8 HOURS
Status: COMPLETED | OUTPATIENT
Start: 2018-10-18 | End: 2018-10-19

## 2018-10-18 RX ORDER — FENTANYL CITRATE 50 UG/ML
INJECTION, SOLUTION INTRAMUSCULAR; INTRAVENOUS
Status: DISCONTINUED | OUTPATIENT
Start: 2018-10-18 | End: 2018-10-18

## 2018-10-18 RX ORDER — NALOXONE HCL 0.4 MG/ML
0.02 VIAL (ML) INJECTION
Status: DISCONTINUED | OUTPATIENT
Start: 2018-10-18 | End: 2018-10-18

## 2018-10-18 RX ORDER — HYDROMORPHONE HCL IN 0.9% NACL 6 MG/30 ML
PATIENT CONTROLLED ANALGESIA SYRINGE INTRAVENOUS
Status: DISPENSED
Start: 2018-10-18 | End: 2018-10-18

## 2018-10-18 RX ADMIN — FAMOTIDINE 20 MG: 10 INJECTION, SOLUTION INTRAVENOUS at 10:10

## 2018-10-18 RX ADMIN — PROPOFOL 140 MG: 10 INJECTION, EMULSION INTRAVENOUS at 09:10

## 2018-10-18 RX ADMIN — ONDANSETRON 4 MG: 2 INJECTION INTRAMUSCULAR; INTRAVENOUS at 11:10

## 2018-10-18 RX ADMIN — EPHEDRINE SULFATE 10 MG: 50 INJECTION, SOLUTION INTRAMUSCULAR; INTRAVENOUS; SUBCUTANEOUS at 10:10

## 2018-10-18 RX ADMIN — ONDANSETRON 4 MG: 2 INJECTION INTRAMUSCULAR; INTRAVENOUS at 07:10

## 2018-10-18 RX ADMIN — SODIUM CHLORIDE, SODIUM GLUCONATE, SODIUM ACETATE, POTASSIUM CHLORIDE, MAGNESIUM CHLORIDE, SODIUM PHOSPHATE, DIBASIC, AND POTASSIUM PHOSPHATE: .53; .5; .37; .037; .03; .012; .00082 INJECTION, SOLUTION INTRAVENOUS at 11:10

## 2018-10-18 RX ADMIN — Medication: at 07:10

## 2018-10-18 RX ADMIN — GLYCOPYRROLATE 0.2 MG: 0.2 INJECTION, SOLUTION INTRAMUSCULAR; INTRAVENOUS at 10:10

## 2018-10-18 RX ADMIN — PANTOPRAZOLE SODIUM 40 MG: 40 INJECTION, POWDER, FOR SOLUTION INTRAVENOUS at 12:10

## 2018-10-18 RX ADMIN — ENOXAPARIN SODIUM 40 MG: 100 INJECTION SUBCUTANEOUS at 07:10

## 2018-10-18 RX ADMIN — NEOSTIGMINE METHYLSULFATE 5 MG: 1 INJECTION INTRAVENOUS at 11:10

## 2018-10-18 RX ADMIN — CEFAZOLIN 2 G: 330 INJECTION, POWDER, FOR SOLUTION INTRAMUSCULAR; INTRAVENOUS at 09:10

## 2018-10-18 RX ADMIN — METRONIDAZOLE 500 MG: 500 SOLUTION INTRAVENOUS at 10:10

## 2018-10-18 RX ADMIN — ACETAMINOPHEN 1000 MG: 10 INJECTION, SOLUTION INTRAVENOUS at 10:10

## 2018-10-18 RX ADMIN — METRONIDAZOLE 500 MG: 500 INJECTION, SOLUTION INTRAVENOUS at 07:10

## 2018-10-18 RX ADMIN — PHENYLEPHRINE HYDROCHLORIDE 200 MCG: 10 INJECTION INTRAVENOUS at 10:10

## 2018-10-18 RX ADMIN — PHENYLEPHRINE HYDROCHLORIDE 200 MCG: 10 INJECTION INTRAVENOUS at 09:10

## 2018-10-18 RX ADMIN — LIDOCAINE HYDROCHLORIDE 100 MG: 20 INJECTION, SOLUTION INTRAVENOUS at 09:10

## 2018-10-18 RX ADMIN — SUCCINYLCHOLINE CHLORIDE 100 MG: 20 INJECTION, SOLUTION INTRAMUSCULAR; INTRAVENOUS at 09:10

## 2018-10-18 RX ADMIN — SODIUM CHLORIDE: 0.9 INJECTION, SOLUTION INTRAVENOUS at 11:10

## 2018-10-18 RX ADMIN — KETAMINE HYDROCHLORIDE 30 MG: 10 INJECTION, SOLUTION INTRAMUSCULAR; INTRAVENOUS at 10:10

## 2018-10-18 RX ADMIN — HYDROMORPHONE HYDROCHLORIDE 0.5 MG: 1 INJECTION, SOLUTION INTRAMUSCULAR; INTRAVENOUS; SUBCUTANEOUS at 08:10

## 2018-10-18 RX ADMIN — ACETAMINOPHEN 1000 MG: 10 INJECTION, SOLUTION INTRAVENOUS at 07:10

## 2018-10-18 RX ADMIN — DEXAMETHASONE SODIUM PHOSPHATE 8 MG: 4 INJECTION, SOLUTION INTRAMUSCULAR; INTRAVENOUS at 10:10

## 2018-10-18 RX ADMIN — GLYCOPYRROLATE 0.4 MG: 0.2 INJECTION, SOLUTION INTRAMUSCULAR; INTRAVENOUS at 11:10

## 2018-10-18 RX ADMIN — FENTANYL CITRATE 100 MCG: 50 INJECTION, SOLUTION INTRAMUSCULAR; INTRAVENOUS at 09:10

## 2018-10-18 RX ADMIN — CEFTRIAXONE 2 G: 2 INJECTION, SOLUTION INTRAVENOUS at 12:10

## 2018-10-18 RX ADMIN — SODIUM CHLORIDE, SODIUM GLUCONATE, SODIUM ACETATE, POTASSIUM CHLORIDE, MAGNESIUM CHLORIDE, SODIUM PHOSPHATE, DIBASIC, AND POTASSIUM PHOSPHATE: .53; .5; .37; .037; .03; .012; .00082 INJECTION, SOLUTION INTRAVENOUS at 10:10

## 2018-10-18 RX ADMIN — ROCURONIUM BROMIDE 45 MG: 10 INJECTION, SOLUTION INTRAVENOUS at 09:10

## 2018-10-18 RX ADMIN — FENTANYL CITRATE 25 MCG: 50 INJECTION, SOLUTION INTRAMUSCULAR; INTRAVENOUS at 09:10

## 2018-10-18 RX ADMIN — FENTANYL CITRATE 75 MCG: 50 INJECTION, SOLUTION INTRAMUSCULAR; INTRAVENOUS at 09:10

## 2018-10-18 RX ADMIN — ONDANSETRON 4 MG: 2 INJECTION INTRAMUSCULAR; INTRAVENOUS at 08:10

## 2018-10-18 RX ADMIN — Medication: at 11:10

## 2018-10-18 RX ADMIN — FENTANYL CITRATE 25 MCG: 50 INJECTION INTRAMUSCULAR; INTRAVENOUS at 12:10

## 2018-10-18 RX ADMIN — ROCURONIUM BROMIDE 5 MG: 10 INJECTION, SOLUTION INTRAVENOUS at 09:10

## 2018-10-18 RX ADMIN — FENTANYL CITRATE 25 MCG: 50 INJECTION, SOLUTION INTRAMUSCULAR; INTRAVENOUS at 11:10

## 2018-10-18 RX ADMIN — LABETALOL HYDROCHLORIDE 10 MG: 5 INJECTION, SOLUTION INTRAVENOUS at 02:10

## 2018-10-18 NOTE — ANESTHESIA POSTPROCEDURE EVALUATION
"Anesthesia Post Evaluation    Patient: Yobani Rios    Procedure(s) Performed: Procedure(s) (LRB):  LAPAROTOMY, EXPLORATORY (N/A)  REPAIR, HERNIA, INCISIONAL -multiple (N/A)  LYSIS, ADHESIONS (N/A)    Final Anesthesia Type: general  Patient location during evaluation: PACU  Patient participation: Yes- Able to Participate  Level of consciousness: awake and alert and oriented  Post-procedure vital signs: reviewed and stable  Pain management: adequate  Airway patency: patent  PONV status at discharge: No PONV  Anesthetic complications: no      Cardiovascular status: hemodynamically stable  Respiratory status: unassisted, spontaneous ventilation and room air  Hydration status: euvolemic  Follow-up not needed.        Visit Vitals  BP (!) 159/73   Pulse 81   Temp 36.4 °C (97.5 °F) (Temporal)   Resp 19   Ht 6' 1" (1.854 m)   Wt 99.8 kg (220 lb)   SpO2 95%   BMI 29.03 kg/m²       Pain/Kathrin Score: Pain Assessment Performed: Yes (10/18/2018  9:29 AM)  Presence of Pain: denies (10/18/2018  9:29 AM)  Pain Rating Prior to Med Admin: 7 (10/18/2018 12:16 PM)        "

## 2018-10-18 NOTE — SUBJECTIVE & OBJECTIVE
Interval History: No acute events overnight, afebrile, vitals stable. Patient without passage of contrast into the colon on SBFT. Continued to experience nausea and worsening distention leading to placement of NGT. Denies flatus or BM.    Medications:  Continuous Infusions:   sodium chloride 0.9% Stopped (10/18/18 1001)     Scheduled Meds:   ceFAZolin 2 g/50mL Dextrose IVPB  2 g Intravenous Once    enoxaparin  40 mg Subcutaneous Daily    sodium chloride 0.9%  1,000 mL Intravenous Once    sodium chloride 0.9%  3 mL Intravenous Q8H     PRN Meds:HYDROmorphone, labetalol, ondansetron, promethazine (PHENERGAN) IVPB     Review of patient's allergies indicates:  No Known Allergies  Objective:     Vital Signs (Most Recent):  Temp: 97.7 °F (36.5 °C) (10/18/18 0919)  Pulse: 87 (10/18/18 0919)  Resp: 16 (10/18/18 0919)  BP: (!) 132/95 (10/18/18 0919)  SpO2: 95 % (10/18/18 0919) Vital Signs (24h Range):  Temp:  [96 °F (35.6 °C)-98.4 °F (36.9 °C)] 97.7 °F (36.5 °C)  Pulse:  [62-98] 87  Resp:  [15-20] 16  SpO2:  [90 %-95 %] 95 %  BP: (132-196)/(77-95) 132/95     Weight: 99.8 kg (220 lb)  Body mass index is 29.03 kg/m².    Intake/Output - Last 3 Shifts       10/16 0700 - 10/17 0659 10/17 0700 - 10/18 0659 10/18 0700 - 10/19 0659    I.V. (mL/kg)   1000 (10)    Total Intake(mL/kg)   1000 (10)    Urine (mL/kg/hr)   50 (0.1)    Drains  850     Total Output  850 50    Net  -850 +950                 Physical Exam   Constitutional: He is oriented to person, place, and time. No distress.   HENT:   Head: Normocephalic and atraumatic.   NGT in place to LIWS with dark bilious appearing output   Eyes: EOM are normal. Pupils are equal, round, and reactive to light.   Cardiovascular: Normal rate and regular rhythm.   Pulmonary/Chest: Effort normal. No respiratory distress.   Abdominal: Soft. He exhibits distension (significantly increased from prior). There is tenderness (midline, at site of hernia).   Midline incision with reducible  hernia to right of umbilicus, mildly tender in this area   Musculoskeletal: Normal range of motion. He exhibits no edema.   Neurological: He is alert and oriented to person, place, and time.   Skin: Skin is warm and dry. He is not diaphoretic.   Psychiatric: He has a normal mood and affect. His behavior is normal.       Significant Labs:  CBC:   Recent Labs   Lab 10/18/18  0431   WBC 15.41*   RBC 5.84   HGB 18.1*   HCT 53.4      MCV 91   MCH 31.0   MCHC 33.9     CMP:   Recent Labs   Lab 10/17/18  1906 10/18/18  0431   * 139*   CALCIUM 10.2 9.3   ALBUMIN 3.9  --    PROT 7.8  --     140   K 3.9 3.9   CO2 26 26    103   BUN 26* 29*   CREATININE 1.6* 1.7*   ALKPHOS 128  --    ALT 32  --    AST 24  --    BILITOT 1.5*  --        Significant Diagnostics:  I have reviewed all pertinent imaging results/findings within the past 24 hours.

## 2018-10-18 NOTE — BRIEF OP NOTE
[facility]  Brief Operative Note    SUMMARY     Surgery Date: 10/18/2018     Surgeon(s) and Role:     * Rancho Mariee MD - Primary     * Tiffany Lechuga MD - Resident - Assisting        Pre-op Diagnosis:  Small bowel obstruction [K56.609]    Post-op Diagnosis:  Post-Op Diagnosis Codes:     * Small bowel obstruction [K56.609]    Procedure(s) (LRB):  LAPAROTOMY, EXPLORATORY (N/A)  REPAIR, HERNIA, INCISIONAL -multiple (N/A)  LYSIS, ADHESIONS (N/A)    Anesthesia: General    Description of Procedure: Exploratory laparotomy with lysis of adhesions, proximal small bowel enterotomy approx 1.5 cm repaired with vicryl in 2 layers, multiple serosal tears repaired, repair of multiple incisional hernias primarily    Description of the findings of the procedure: see op note    Estimated Blood Loss: * No values recorded between 10/18/2018  9:57 AM and 10/18/2018 11:30 AM *         Specimens:   Specimen (12h ago, onward)    None

## 2018-10-18 NOTE — ASSESSMENT & PLAN NOTE
77 yo M with multiple previous abdominal surgeries here with possible small bowel obstruction, incidental retroperitoneal mass    -unable to view images from OSH, and SBFT without passage of contrast to colon, as such non-contrast CT ordered here to investigate for point of obstruction. Evidence points to transition point past hernia, given worsening exam added on for class B ex-lap this morning  -consents signed pre-operatively   -npo  -mIVFs  -daily labs  -replete electrolytes  -prn pain meds post-op  -dvt ppx  -will need work up of retroperitoneal mass

## 2018-10-18 NOTE — PLAN OF CARE
Problem: Patient Care Overview  Goal: Plan of Care Review  Outcome: Ongoing (interventions implemented as appropriate)  Pt aaox4, vs stable, no falls or injuries. Fall precautions in place w/ personal items near by. Pain level being monitor and control by PCA. Completed surgery today. No signs of infection or distress.  Call light in reach; will continue to monitor pt.

## 2018-10-18 NOTE — ANESTHESIA PREPROCEDURE EVALUATION
10/18/2018  Yobani Rios is a 78 y.o., male.    Anesthesia Evaluation    I have reviewed the Patient Summary Reports.    I have reviewed the Nursing Notes.   I have reviewed the Medications.     Review of Systems  Anesthesia Hx:  No problems with previous Anesthesia  History of prior surgery of interest to airway management or planning: Previous anesthesia: General  Denies Personal Hx of Anesthesia complications.   Social:  Former Smoker    Hematology/Oncology:         -- Cancer in past history: Oncology Comments: Prostate ca     Cardiovascular:   Hypertension ECG has been reviewed.    Pulmonary:  Pulmonary Normal Former smoker   Renal/:   Chronic Renal Disease    Hepatic/GI:   Liver Disease,    Neurological:  Neurology Normal    Endocrine:  Endocrine Normal      Patient Active Problem List   Diagnosis    Renal mass    Small bowel obstruction     Past Medical History:   Diagnosis Date    Cancer     prostate    Hypertension     Kidney problem     only 1 kidney functions    Liver disease     Umbilical hernia      Past Surgical History:   Procedure Laterality Date    colon repair      after prostatectomy    EXTRACTION, CATARACT Right 2/22/2012    [facility]    EXTRACTION, CATARACT, WITH IOL INSERTION Left 3/7/2012    Performed by [facility] at [facility]    PROSTATECTOMY      PROSTATECTOMY  2010           Physical Exam  General:  Well nourished    Airway/Jaw/Neck:  Airway Findings: Mouth Opening: Normal Tongue: Normal  General Airway Assessment: Adult  Mallampati: II  TM Distance: Normal, at least 6 cm  Jaw/Neck Findings:  Neck ROM: Normal ROM      Dental:  Dental Findings: In tact   Chest/Lungs:  Chest/Lungs Findings: Clear to auscultation     Heart/Vascular:  Heart Findings: Rate: Normal  Rhythm: Regular Rhythm  Sounds: Normal        Mental  Status:  Mental Status Findings:  Cooperative, Alert and Oriented         Anesthesia Plan  Type of Anesthesia, risks & benefits discussed:  Anesthesia Type:  general  Patient's Preference:   Intra-op Monitoring Plan: standard ASA monitors  Intra-op Monitoring Plan Comments:   Post Op Pain Control Plan: per primary service following discharge from PACU  Post Op Pain Control Plan Comments:   Induction:   IV  Beta Blocker:  Patient is not currently on a Beta-Blocker (No further documentation required).       Informed Consent: Patient understands risks and agrees with Anesthesia plan.  Questions answered. Anesthesia consent signed with patient.  ASA Score: 3  emergent   Day of Surgery Review of History & Physical:    H&P update referred to the surgeon.         Ready For Surgery From Anesthesia Perspective.

## 2018-10-18 NOTE — NURSING TRANSFER
Nursing Transfer Note      10/18/2018     Transfer To: 541[address]    Transfer via stretcher    Transfer with aguila, pca, [facility], tele    Transported by pct    Medicines sent: no    Chart send with patient: Yes    Notified: family

## 2018-10-18 NOTE — PLAN OF CARE
Patient lives in a 2 story house w/his spouse. He states they live only on 1st floor. Spouse is at BS. Independent & agile prior to this admit. See below-Needs TBD.        10/18/18 1515   Discharge Assessment   Assessment Type Discharge Planning Assessment   Confirmed/corrected address and phone number on facesheet? Yes   Assessment information obtained from? Patient;Medical Record   Expected Length of Stay (days) (5+)   Communicated expected length of stay with patient/caregiver yes   Prior to hospitilization cognitive status: Alert/Oriented;No Deficits   Prior to hospitalization functional status: Independent   Current cognitive status: Alert/Oriented;No Deficits   Current Functional Status: Independent;Needs Assistance   Facility Arrived From: (N/A)   Lives With spouse   Able to Return to Prior Arrangements yes   Is patient able to care for self after discharge? Yes   Who are your caregiver(s) and their phone number(s)? (FRANKI GALVAN Spouse [phone]   )   Patient's perception of discharge disposition home or selfcare  (Needs TBD/PT/OT recs pending. )   Readmission Within The Last 30 Days no previous admission in last 30 days   Patient currently being followed by outpatient case management? No   Patient currently receives any other outside agency services? No   Equipment Currently Used at Home none  ((Has RW, straight cane left over from 2010 OR-not using. Has taller toilet.))   Do you have any problems affording any of your prescribed medications? No   Is the patient taking medications as prescribed? yes   Does the patient have transportation home? Yes   Transportation Available car;family or friend will provide   Dialysis Name and Scheduled days (N/A)   Does the patient receive services at the Coumadin Clinic? No   Discharge Plan A Home with family   Discharge Plan B Home with family;Home Health  (? HH)   Patient/Family In Agreement With Plan yes

## 2018-10-18 NOTE — PROGRESS NOTES
[facility]  General Surgery  Progress Note    Subjective:     History of Present Illness:  79 yo M with h/o radical prostatectomy 2010 (surgery complicated by ?bowel injury, pt reports 9 surgeries including bowel resection) and ventral incisional hernia presented to OSH with severe abdominal pain. Mostly above umbilicus and on the right side of abdomen. Pain is improved now. No vomiting. Has passed some gas since onset of pain. No BM for two days. Denies hx previous small bowel obstructions. Denies weight loss.      Patient has had multiple ED visits lately for other problems, and was found to have an enlarging retroperitoneal mass with concern for malignancy. Recent PSA normal.     Post-Op Info:  Procedure(s) (LRB):  LAPAROTOMY, EXPLORATORY (N/A)   Day of Surgery     Interval History: No acute events overnight, afebrile, vitals stable. Patient without passage of contrast into the colon on SBFT. Continued to experience nausea and worsening distention leading to placement of NGT. Denies flatus or BM.    Medications:  Continuous Infusions:   sodium chloride 0.9% Stopped (10/18/18 1001)     Scheduled Meds:   ceFAZolin 2 g/50mL Dextrose IVPB  2 g Intravenous Once    enoxaparin  40 mg Subcutaneous Daily    sodium chloride 0.9%  1,000 mL Intravenous Once    sodium chloride 0.9%  3 mL Intravenous Q8H     PRN Meds:HYDROmorphone, labetalol, ondansetron, promethazine (PHENERGAN) IVPB     Review of patient's allergies indicates:  No Known Allergies  Objective:     Vital Signs (Most Recent):  Temp: 97.7 °F (36.5 °C) (10/18/18 0919)  Pulse: 87 (10/18/18 0919)  Resp: 16 (10/18/18 0919)  BP: (!) 132/95 (10/18/18 0919)  SpO2: 95 % (10/18/18 0919) Vital Signs (24h Range):  Temp:  [96 °F (35.6 °C)-98.4 °F (36.9 °C)] 97.7 °F (36.5 °C)  Pulse:  [62-98] 87  Resp:  [15-20] 16  SpO2:  [90 %-95 %] 95 %  BP: (132-196)/(77-95) 132/95     Weight: 99.8 kg (220 lb)  Body mass index is 29.03 kg/m².    Intake/Output -  Last 3 Shifts       10/16 0700 - 10/17 0659 10/17 0700 - 10/18 0659 10/18 0700 - 10/19 0659    I.V. (mL/kg)   1000 (10)    Total Intake(mL/kg)   1000 (10)    Urine (mL/kg/hr)   50 (0.1)    Drains  850     Total Output  850 50    Net  -850 +950                 Physical Exam   Constitutional: He is oriented to person, place, and time. No distress.   HENT:   Head: Normocephalic and atraumatic.   NGT in place to LIWS with dark bilious appearing output   Eyes: EOM are normal. Pupils are equal, round, and reactive to light.   Cardiovascular: Normal rate and regular rhythm.   Pulmonary/Chest: Effort normal. No respiratory distress.   Abdominal: Soft. He exhibits distension (significantly increased from prior). There is tenderness (midline, at site of hernia).   Midline incision with reducible hernia to right of umbilicus, mildly tender in this area   Musculoskeletal: Normal range of motion. He exhibits no edema.   Neurological: He is alert and oriented to person, place, and time.   Skin: Skin is warm and dry. He is not diaphoretic.   Psychiatric: He has a normal mood and affect. His behavior is normal.       Significant Labs:  CBC:   Recent Labs   Lab 10/18/18  0431   WBC 15.41*   RBC 5.84   HGB 18.1*   HCT 53.4      MCV 91   MCH 31.0   MCHC 33.9     CMP:   Recent Labs   Lab 10/17/18  1906 10/18/18  0431   * 139*   CALCIUM 10.2 9.3   ALBUMIN 3.9  --    PROT 7.8  --     140   K 3.9 3.9   CO2 26 26    103   BUN 26* 29*   CREATININE 1.6* 1.7*   ALKPHOS 128  --    ALT 32  --    AST 24  --    BILITOT 1.5*  --        Significant Diagnostics:  I have reviewed all pertinent imaging results/findings within the past 24 hours.    Assessment/Plan:     * Small bowel obstruction    79 yo M with multiple previous abdominal surgeries here with possible small bowel obstruction, incidental retroperitoneal mass    -unable to view images from OSH, and SBFT without passage of contrast to colon, as such non-contrast CT  ordered here to investigate for point of obstruction. Evidence points to transition point past hernia, given worsening exam added on for class B ex-lap this morning  -consents signed pre-operatively   -npo  -mIVFs  -daily labs  -replete electrolytes  -prn pain meds post-op  -dvt ppx  -will need work up of retroperitoneal mass         Michelle Andino MD  General Surgery  [facility]

## 2018-10-18 NOTE — TRANSFER OF CARE
"Anesthesia Transfer of Care Note    Patient: Yobani Rios    Procedure(s) Performed: Procedure(s) (LRB):  LAPAROTOMY, EXPLORATORY (N/A)  REPAIR, HERNIA, INCISIONAL -multiple (N/A)  LYSIS, ADHESIONS (N/A)    Patient location: PACU    Anesthesia Type: general    Transport from OR: Transported from OR on 6-10 L/min O2 by face mask with adequate spontaneous ventilation    Post pain: adequate analgesia    Post assessment: no apparent anesthetic complications and tolerated procedure well    Post vital signs: stable    Level of consciousness: awake and alert    Nausea/Vomiting: no nausea/vomiting    Complications: none    Transfer of care protocol was followed      Last vitals:   Visit Vitals  BP (!) 161/74 (BP Location: Right arm, Patient Position: Lying)   Pulse 67   Temp 36.4 °C (97.5 °F) (Temporal)   Resp 16   Ht 6' 1" (1.854 m)   Wt 99.8 kg (220 lb)   SpO2 95%   BMI 29.03 kg/m²     "

## 2018-10-19 LAB
ANION GAP SERPL CALC-SCNC: 11 MMOL/L
ANION GAP SERPL CALC-SCNC: 9 MMOL/L
BASOPHILS # BLD AUTO: 0.04 K/UL
BASOPHILS # BLD AUTO: 0.04 K/UL
BASOPHILS NFR BLD: 0.2 %
BASOPHILS NFR BLD: 0.2 %
BUN SERPL-MCNC: 43 MG/DL
BUN SERPL-MCNC: 45 MG/DL
CALCIUM SERPL-MCNC: 7.5 MG/DL
CALCIUM SERPL-MCNC: 8 MG/DL
CHLORIDE SERPL-SCNC: 108 MMOL/L
CHLORIDE SERPL-SCNC: 112 MMOL/L
CO2 SERPL-SCNC: 21 MMOL/L
CO2 SERPL-SCNC: 24 MMOL/L
CREAT SERPL-MCNC: 2 MG/DL
CREAT SERPL-MCNC: 2.2 MG/DL
DIFFERENTIAL METHOD: ABNORMAL
DIFFERENTIAL METHOD: ABNORMAL
EOSINOPHIL # BLD AUTO: 0 K/UL
EOSINOPHIL # BLD AUTO: 0 K/UL
EOSINOPHIL NFR BLD: 0 %
EOSINOPHIL NFR BLD: 0 %
ERYTHROCYTE [DISTWIDTH] IN BLOOD BY AUTOMATED COUNT: 13.2 %
ERYTHROCYTE [DISTWIDTH] IN BLOOD BY AUTOMATED COUNT: 13.3 %
EST. GFR  (AFRICAN AMERICAN): 32 ML/MIN/1.73 M^2
EST. GFR  (AFRICAN AMERICAN): 35.9 ML/MIN/1.73 M^2
EST. GFR  (NON AFRICAN AMERICAN): 27.7 ML/MIN/1.73 M^2
EST. GFR  (NON AFRICAN AMERICAN): 31 ML/MIN/1.73 M^2
GLUCOSE SERPL-MCNC: 110 MG/DL
GLUCOSE SERPL-MCNC: 133 MG/DL
HCT VFR BLD AUTO: 43.6 %
HCT VFR BLD AUTO: 47.6 %
HGB BLD-MCNC: 14.5 G/DL
HGB BLD-MCNC: 15.6 G/DL
IMM GRANULOCYTES # BLD AUTO: 0.18 K/UL
IMM GRANULOCYTES # BLD AUTO: 0.24 K/UL
IMM GRANULOCYTES NFR BLD AUTO: 0.9 %
IMM GRANULOCYTES NFR BLD AUTO: 1 %
LYMPHOCYTES # BLD AUTO: 0.8 K/UL
LYMPHOCYTES # BLD AUTO: 0.9 K/UL
LYMPHOCYTES NFR BLD: 3.3 %
LYMPHOCYTES NFR BLD: 4.2 %
MAGNESIUM SERPL-MCNC: 1.7 MG/DL
MAGNESIUM SERPL-MCNC: 1.9 MG/DL
MCH RBC QN AUTO: 30.5 PG
MCH RBC QN AUTO: 30.6 PG
MCHC RBC AUTO-ENTMCNC: 32.8 G/DL
MCHC RBC AUTO-ENTMCNC: 33.3 G/DL
MCV RBC AUTO: 92 FL
MCV RBC AUTO: 94 FL
MONOCYTES # BLD AUTO: 1.1 K/UL
MONOCYTES # BLD AUTO: 1.2 K/UL
MONOCYTES NFR BLD: 5 %
MONOCYTES NFR BLD: 5.4 %
NEUTROPHILS # BLD AUTO: 18.6 K/UL
NEUTROPHILS # BLD AUTO: 22.4 K/UL
NEUTROPHILS NFR BLD: 89.3 %
NEUTROPHILS NFR BLD: 90.5 %
NRBC BLD-RTO: 0 /100 WBC
NRBC BLD-RTO: 0 /100 WBC
PHOSPHATE SERPL-MCNC: 2.8 MG/DL
PHOSPHATE SERPL-MCNC: 3.3 MG/DL
PLATELET # BLD AUTO: 242 K/UL
PLATELET # BLD AUTO: 285 K/UL
PLATELET BLD QL SMEAR: ABNORMAL
PMV BLD AUTO: 10.5 FL
PMV BLD AUTO: 11 FL
POTASSIUM SERPL-SCNC: 4 MMOL/L
POTASSIUM SERPL-SCNC: 4.1 MMOL/L
RBC # BLD AUTO: 4.76 M/UL
RBC # BLD AUTO: 5.09 M/UL
SODIUM SERPL-SCNC: 142 MMOL/L
SODIUM SERPL-SCNC: 143 MMOL/L
WBC # BLD AUTO: 20.77 K/UL
WBC # BLD AUTO: 24.69 K/UL

## 2018-10-19 PROCEDURE — C1751 CATH, INF, PER/CENT/MIDLINE: HCPCS

## 2018-10-19 PROCEDURE — 76937 US GUIDE VASCULAR ACCESS: CPT

## 2018-10-19 PROCEDURE — A4217 STERILE WATER/SALINE, 500 ML: HCPCS | Performed by: STUDENT IN AN ORGANIZED HEALTH CARE EDUCATION/TRAINING PROGRAM

## 2018-10-19 PROCEDURE — 83735 ASSAY OF MAGNESIUM: CPT | Mod: 91

## 2018-10-19 PROCEDURE — B4185 PARENTERAL SOL 10 GM LIPIDS: HCPCS | Performed by: STUDENT IN AN ORGANIZED HEALTH CARE EDUCATION/TRAINING PROGRAM

## 2018-10-19 PROCEDURE — C9113 INJ PANTOPRAZOLE SODIUM, VIA: HCPCS | Performed by: SURGERY

## 2018-10-19 PROCEDURE — 63600175 PHARM REV CODE 636 W HCPCS: Performed by: SURGERY

## 2018-10-19 PROCEDURE — 11000001 HC ACUTE MED/SURG PRIVATE ROOM

## 2018-10-19 PROCEDURE — 25000003 PHARM REV CODE 250: Performed by: SURGERY

## 2018-10-19 PROCEDURE — 0DNE0ZZ RELEASE LARGE INTESTINE, OPEN APPROACH: ICD-10-PCS | Performed by: SURGERY

## 2018-10-19 PROCEDURE — 83735 ASSAY OF MAGNESIUM: CPT

## 2018-10-19 PROCEDURE — 97535 SELF CARE MNGMENT TRAINING: CPT

## 2018-10-19 PROCEDURE — 99232 SBSQ HOSP IP/OBS MODERATE 35: CPT | Mod: 57,,, | Performed by: SURGERY

## 2018-10-19 PROCEDURE — 02HV33Z INSERTION OF INFUSION DEVICE INTO SUPERIOR VENA CAVA, PERCUTANEOUS APPROACH: ICD-10-PCS | Performed by: SURGERY

## 2018-10-19 PROCEDURE — 97166 OT EVAL MOD COMPLEX 45 MIN: CPT

## 2018-10-19 PROCEDURE — 63600175 PHARM REV CODE 636 W HCPCS: Performed by: STUDENT IN AN ORGANIZED HEALTH CARE EDUCATION/TRAINING PROGRAM

## 2018-10-19 PROCEDURE — 80048 BASIC METABOLIC PNL TOTAL CA: CPT

## 2018-10-19 PROCEDURE — 84100 ASSAY OF PHOSPHORUS: CPT

## 2018-10-19 PROCEDURE — 94770 HC EXHALED C02 TEST: CPT

## 2018-10-19 PROCEDURE — 80048 BASIC METABOLIC PNL TOTAL CA: CPT | Mod: 91

## 2018-10-19 PROCEDURE — 84100 ASSAY OF PHOSPHORUS: CPT | Mod: 91

## 2018-10-19 PROCEDURE — 0WQF0ZZ REPAIR ABDOMINAL WALL, OPEN APPROACH: ICD-10-PCS | Performed by: SURGERY

## 2018-10-19 PROCEDURE — 97530 THERAPEUTIC ACTIVITIES: CPT

## 2018-10-19 PROCEDURE — 36569 INSJ PICC 5 YR+ W/O IMAGING: CPT

## 2018-10-19 PROCEDURE — 36415 COLL VENOUS BLD VENIPUNCTURE: CPT

## 2018-10-19 PROCEDURE — A4216 STERILE WATER/SALINE, 10 ML: HCPCS | Performed by: SURGERY

## 2018-10-19 PROCEDURE — 99900035 HC TECH TIME PER 15 MIN (STAT)

## 2018-10-19 PROCEDURE — 85025 COMPLETE CBC W/AUTO DIFF WBC: CPT | Mod: 91

## 2018-10-19 PROCEDURE — 25000003 PHARM REV CODE 250: Performed by: STUDENT IN AN ORGANIZED HEALTH CARE EDUCATION/TRAINING PROGRAM

## 2018-10-19 PROCEDURE — S0030 INJECTION, METRONIDAZOLE: HCPCS | Performed by: SURGERY

## 2018-10-19 RX ORDER — ACETAMINOPHEN 10 MG/ML
1000 INJECTION, SOLUTION INTRAVENOUS EVERY 8 HOURS
Status: COMPLETED | OUTPATIENT
Start: 2018-10-19 | End: 2018-10-20

## 2018-10-19 RX ORDER — SODIUM CHLORIDE 0.9 % (FLUSH) 0.9 %
10 SYRINGE (ML) INJECTION
Status: DISCONTINUED | OUTPATIENT
Start: 2018-10-19 | End: 2018-10-26 | Stop reason: HOSPADM

## 2018-10-19 RX ORDER — SODIUM CHLORIDE, SODIUM LACTATE, POTASSIUM CHLORIDE, CALCIUM CHLORIDE 600; 310; 30; 20 MG/100ML; MG/100ML; MG/100ML; MG/100ML
INJECTION, SOLUTION INTRAVENOUS CONTINUOUS
Status: DISCONTINUED | OUTPATIENT
Start: 2018-10-19 | End: 2018-10-22

## 2018-10-19 RX ORDER — SODIUM CHLORIDE 0.9 % (FLUSH) 0.9 %
10 SYRINGE (ML) INJECTION EVERY 6 HOURS
Status: DISCONTINUED | OUTPATIENT
Start: 2018-10-19 | End: 2018-10-26 | Stop reason: HOSPADM

## 2018-10-19 RX ADMIN — SODIUM CHLORIDE 1000 ML: 0.9 INJECTION, SOLUTION INTRAVENOUS at 05:10

## 2018-10-19 RX ADMIN — CEFTRIAXONE 2 G: 2 INJECTION, SOLUTION INTRAVENOUS at 03:10

## 2018-10-19 RX ADMIN — SOYBEAN OIL 250 ML: 20 INJECTION, SOLUTION INTRAVENOUS at 10:10

## 2018-10-19 RX ADMIN — SODIUM CHLORIDE 1000 ML: 0.9 INJECTION, SOLUTION INTRAVENOUS at 10:10

## 2018-10-19 RX ADMIN — SODIUM CHLORIDE 1000 ML: 0.9 INJECTION, SOLUTION INTRAVENOUS at 08:10

## 2018-10-19 RX ADMIN — METRONIDAZOLE 500 MG: 500 INJECTION, SOLUTION INTRAVENOUS at 05:10

## 2018-10-19 RX ADMIN — ACETAMINOPHEN 1000 MG: 10 INJECTION, SOLUTION INTRAVENOUS at 02:10

## 2018-10-19 RX ADMIN — METRONIDAZOLE 500 MG: 500 INJECTION, SOLUTION INTRAVENOUS at 02:10

## 2018-10-19 RX ADMIN — Medication 3 ML: at 10:10

## 2018-10-19 RX ADMIN — ACETAMINOPHEN 1000 MG: 10 INJECTION, SOLUTION INTRAVENOUS at 10:10

## 2018-10-19 RX ADMIN — PANTOPRAZOLE SODIUM 40 MG: 40 INJECTION, POWDER, FOR SOLUTION INTRAVENOUS at 10:10

## 2018-10-19 RX ADMIN — ENOXAPARIN SODIUM 40 MG: 100 INJECTION SUBCUTANEOUS at 05:10

## 2018-10-19 RX ADMIN — SODIUM CHLORIDE, SODIUM LACTATE, POTASSIUM CHLORIDE, AND CALCIUM CHLORIDE: 600; 310; 30; 20 INJECTION, SOLUTION INTRAVENOUS at 06:10

## 2018-10-19 RX ADMIN — METRONIDAZOLE 500 MG: 500 INJECTION, SOLUTION INTRAVENOUS at 10:10

## 2018-10-19 RX ADMIN — ACETAMINOPHEN 1000 MG: 10 INJECTION, SOLUTION INTRAVENOUS at 05:10

## 2018-10-19 RX ADMIN — CALCIUM GLUCONATE: 94 INJECTION, SOLUTION INTRAVENOUS at 10:10

## 2018-10-19 RX ADMIN — ONDANSETRON 4 MG: 2 INJECTION INTRAMUSCULAR; INTRAVENOUS at 06:10

## 2018-10-19 RX ADMIN — Medication 3 ML: at 02:10

## 2018-10-19 NOTE — NURSING
Pt noted to only have apx 200ml of concentrated urine from [facility] from the past 12 hours. Notified Dr. Larose, on call MD. Per MD he will notify primary team. No new orders at this time.

## 2018-10-19 NOTE — PHYSICIAN QUERY
PT Name: Yobani Rios  MR #: [id number]  Physician Query Form - Renal Condition Clarification     CDS/: Nola Redman               Contact information: [email or web address]    This form is a permanent document in the medical record.     QueryDate: October 19, 2018    By submitting this query, we are merely seeking further clarification of documentation. Please utilize your independent clinical judgment when addressing the question(s) below.    The Medical record contains the following:   Indicator Supporting Clinical Findings Location in Medical Record    Kidney (Renal) Insufficiency     x Kidney (Renal) Failure / Injury Chronic Renal Disease Anesthesia Report    Nephrotoxic Agents     x BUN/Creatinine GFR Cr= 1.6- 2.0  BUN= 26 - 45  GFR= 40.7 - 27.7 Labs, 10/17 - 10/19    Urine: Casts         Eosinophils      Dehydration      Nausea/Vomiting      Dialysis/CRRT     x Treatment: NS x 3 L IV bolus given  NS @ 125 mL/h MAR   x  Kidneys/ Ureters: There is severe advanced asymmetric atrophy of the right kidney.  There is a 2.4 cm exophytic right renal lesion right renal mass which measures greater than that of simple fluid.    There are multiple cystic structures within the left renal pelvis favored to represent peripelvic cysts.   There are several additional scattered hypodensities throughout the left kidney that are too small to definitively characterize but are favored to represent renal cysts. CT Abdomen Pelvis 10/18   x Other:  Past Medical History Diagnosis:  Kidney Problem  Only 1 kidney functions General Surgery Consult   Acute Kidney Injury / Acute Renal Failure has different defining criteria. A generally accepted guideline  is:   A greater than 100% (2X) rise in serum creatinine from baseline* occurring during the course of a single hospital stay.   *Baseline as determined by the providers judgment and consideration of previous lab values and other documentation, if available.    A diagnosis of  Acute Kidney Injury/ Acute Renal Failure should incorporate abnormal labs and clinical findings that are clinically significant      References: 1. Filipe et al. Acute renal failure-definition, outcome measures, animal models, fluid therapy and information technology needs: the Second International Consensus Conference of the Acute Dialysis Quality Initiative (ADQI) Group. Crit Care 2004; 8:B204; 2. [place] et al. Acute Kidney Injury Network: report of an initiative to improve outcomes in acute kidney injury. Crit Care 2007; 11:R31; 3. Kidney Disease: Improving Global Outcomes (KDIGO). Acute Kidney Injury Work Group. KDIGO clinical practice guidelines for acute kidney injury. Kidney Int Suppl 2012; 2:1.    The clinical guidelines noted below is only a system guideline, it does not replace the providers clinical judgment.    Provider, please specify the diagnosis or diagnoses associated with above clinical findings.      [    ]  Other Acute Kidney Failure/Injury (please specify): ____________    [    ]  Unspecified Acute Kidney Failure/Injury     [   x]  Acute Renal Insufficiency  Consider if SCr rise is transient and normalizes quickly with no efforts at real resuscitation of vital signs and perfusion      [    ]  Chronic Kidney Disease (CKD) stage 1   Slight kidney damage with normal or increased filtration eGFR 90+  [    ]  Chronic Kidney Disease (CKD) stage 2   Mildly reduced kidney function eGFR 60-89  [    ]  Chronic Kidney Disease (CKD) stage 3   Moderately reduced kidney function eGFR 30-59   [    ]  Chronic Kidney Disease (CKD) stage 4  Severely reduced kidney function eGFR 15-29    [    ]  Other (please specify): _______________________________  [    ]  Clinically Undetermined    Please document in your progress notes daily for the duration of treatment until resolved and include in your discharge summary.

## 2018-10-19 NOTE — PLAN OF CARE
Problem: Fall Risk (Adult)  Intervention: Patient Rounds  Pt resting in bed, wife at bedside. NPO with mouth swabs. NGT to LIS, tolerating well. PCa in use. Patient states adequate pain control with current pain med reg. States understanding of plan of care. Denies needs at present. Safety and fall precautions maintained.

## 2018-10-19 NOTE — CONSULTS
Double lumen PICC place to right basilic vein.  39 cm in length, 38 cm arm circumference and 0 cm exposed.   Lot #[id number].

## 2018-10-19 NOTE — ASSESSMENT & PLAN NOTE
77 yo M with multiple previous abdominal surgeries here with possible small bowel obstruction, incidental retroperitoneal mass now s/p ex-lap 10/18 with intra-op enterotomies and primary hernia repair    -NPO  -mIVFs  -PCA and scheduled IV tylenol for pain control  -will give 1L NS fluid bolus this morning for up-trending Cr  -re-check BMP @ 2pm  -possible d/c ellis this afternoon  -post-op rocephin x 4 days (stop date Sunday)   -daily labs  -replete electrolytes  -dvt ppx  -will need outpatient work up of retroperitoneal mass

## 2018-10-19 NOTE — PT/OT/SLP EVAL
Occupational Therapy   Evaluation    Name: Yobani Rios  MRN: [id number]  Admitting Diagnosis:  Small bowel obstruction 1 Day Post-Op    Recommendations:     Discharge Recommendations: home health OT  Discharge Equipment Recommendations:  walker, rolling, shower chair  Barriers to discharge:  Inaccessible home environment    History:     Occupational Profile:  Living Environment: Pt lives with wife in a H with 6 CORBIN with a BHR. Pt has a walk in shower and tub shower combo bathroom setup.   Previous level of function: I in all ADLs/IADLs  Roles and Routines: None stated  Equipment Used at Home:  none  Assistance upon Discharge: 24 Hr assistance from wife upon D/C     Past Medical History:   Diagnosis Date    Cancer     prostate    Hypertension     Kidney problem     only 1 kidney functions    Liver disease     Umbilical hernia        Past Surgical History:   Procedure Laterality Date    colon repair      after prostatectomy    EXTRACTION, CATARACT Right 2/22/2012    [facility]formed [facility]    EXTRACTION, CATARACT, WITH IOL INSERTION Left 3/7/2012    Performed by William [facility] MD at [facility] OR    PROSTATECTOMY      PROSTATECTOMY  2010       Subjective     Chief Complaint: None stated  Patient/Family Comments/goals: Pt agreeable to all goals set in therapy.    Pain/Comfort:  · Pain Rating 1: 0/10  · Pain Rating Post-Intervention 1: (pt did not rate)  · Location - Side 2: Right  · Location 2: leg(numbness)  · Pain Addressed 2: Reposition, Cessation of Activity    Patients cultural, spiritual, Islam conflicts given the current situation: none stated    Objective:     Communicated with: MIKA Morley prior to session.  Patient found UIC all lines intact, call button in reach, RN notified and wife present and PCA, telemetry, oxygen, NG tube, peripheral IV, ellis catheter(2L via NC) upon OT entry to room.    General Precautions: Standard, fall, NPO   Orthopedic Precautions:N/A   Braces: N/A      Occupational Performance:    Bed Mobility:    · Not assessed 2/2 patient UIC upon OT arrival    Functional Mobility/Transfers:  · Patient completed Sit <> Stand Transfer with contact guard assistance  with  rolling walker   · Functional Mobility: Pt ambulated ~125 ft with RW requiring CGA for impaired balance and weakness. No LOB noted. No RBs required. Decreased pace noted    Activities of Daily Living:  · Feeding:  supervision pt able to manipulate cup and bring to mouth and drink   · Upper Body Dressing: contact guard assistance to don gown as jacket in sitting with RW    Cognitive/Visual Perceptual:  Cognitive/Psychosocial Skills:     -       Oriented to: Person, Place, Time and Situation   -       Follows Commands/attention:Follows multistep  commands  -       Communication: clear/fluent  -       Memory: No Deficits noted  -       Safety awareness/insight to disability: intact   -       Mood/Affect/Coping skills/emotional control: Appropriate to situation and Pleasant  Visual/Perceptual:      -Intact per patient report    Physical Exam:  Balance:    -       Intact- sitting   Postural examination/scapula alignment:    -       No postural abnormalities identified  Skin integrity: Visible skin intact  Edema:  None noted  Sensation:    -       Intact BUE LT  Upper Extremity Range of Motion:     -       Right Upper Extremity: WFL   -       Left Upper Extremity: WFL   Upper Extremity Strength:    -       Right Upper Extremity: WFL  4  -       Left Upper Extremity: WFL  4   Strength:    -       Right Upper Extremity: WFL 4  -       Left Upper Extremity: WFL  4  Fine Motor Coordination:    -       Intact    AMPAC 6 Click ADL:  AMPAC Total Score: 20    Treatment & Education:  Educated patient on the following:  - OT POC  - Role of OT  - Importance of OOB activity  - Ankle pumps in sitting due to numbness in RLE  - Functional transfer safety  - Functional mobility safety  Education:    Patient left up in chair  "with all lines intact, call button in reach, RN notified and wife present    Assessment:     Yobani Rios is a 78 y.o. male with a medical diagnosis of Small bowel obstruction. Pt presents to OT with generalized weakness affecting his pace and quality of movements during functional activities. Pt used a RW for mobility due to his impaired balance. Pt tolerated eval well. Pt reported that this was his first time up since the surgery. Pt reported of no shortness of breath or pain with ambulation. Pt will continue to benefit from skilled OT to increase his self care and functional independence. He presents with the following performance deficits affecting function: weakness, impaired endurance, impaired self care skills, impaired balance, gait instability, impaired functional mobilty, impaired cardiopulmonary response to activity.      Rehab Prognosis: Good; patient would benefit from acute skilled OT services to address these deficits and reach maximum level of function.         Clinical Decision Makin.  OT Low:  "Pt evaluation falls under low complexity for evaluation coding due to performance deficits noted in 1-3 areas as stated above and 0 co-morbities affecting current functional status. Data obtained from problem focused assessments. No modifications or assistance was required for completion of evaluation. Only brief occupational profile and history review completed."     Plan:     Patient to be seen 4 x/week to address the above listed problems via self-care/home management, therapeutic activities, therapeutic exercises  · Plan of Care Expires: 18  · Plan of Care Reviewed with: patient, spouse    This Plan of care has been discussed with the patient who was involved in its development and understands and is in agreement with the identified goals and treatment plan    GOALS:   Multidisciplinary Problems     Occupational Therapy Goals        Problem: Occupational Therapy Goal    Goal Priority " Disciplines Outcome Interventions   Occupational Therapy Goal     OT, PT/OT Ongoing (interventions implemented as appropriate)    Description:  Goals to be met by: 10/26/18    Patient will increase functional independence with ADLs by performing:    UE Dressing with Modified Levy.  LE Dressing with Supervision.  Grooming while standing at sink with Supervision.  Toileting from toilet with Stand-by Assistance for hygiene and clothing management.   Supine to sit with Modified Levy.  Step transfer with Supervision  Toilet transfer to toilet with Supervision.  Upper extremity exercise program x15 reps per handout, with independence.                      Time Tracking:     OT Date of Treatment: 10/19/18  OT Start Time: 0909  OT Stop Time: 0955  OT Total Time (min): 46 min    Billable Minutes:Evaluation 20  Self Care/Home Management 10  Therapeutic Activity 16    Kallie Flower OT  10/19/2018

## 2018-10-19 NOTE — PROCEDURES
"Yobani Rios is a 78 y.o. male patient.    Temp: 96.6 °F (35.9 °C) (10/19/18 1314)  Pulse: 100 (10/19/18 1314)  Resp: 20 (10/19/18 1314)  BP: 128/73 (10/19/18 1314)  SpO2: (!) 92 % (10/19/18 1314)  Weight: 99.8 kg (220 lb) (10/17/18 0712)  Height: 6' 1" (185.4 cm) (10/17/18 0712)    PICC  Date/Time: 10/19/2018 5:14 PM  Performed by: Veronica Melendez RN  Assisting provider: Codey Hagan RN  Consent Done: Yes  Time out: Immediately prior to procedure a time out was called to verify the correct patient, procedure, equipment, support staff and site/side marked as required  Indications: med administration and vascular access  Anesthesia: local infiltration  Local anesthetic: lidocaine 1% without epinephrine  Anesthetic Total (mL): 3  Description of findings: PICC  Preparation: skin prepped with ChloraPrep  Skin prep agent dried: skin prep agent completely dried prior to procedure  Sterile barriers: all five maximum sterile barriers used - cap, mask, sterile gown, sterile gloves, and large sterile sheet  Hand hygiene: hand hygiene performed prior to central venous catheter insertion  Location details: right basilic  Catheter size: 5 Fr  Catheter Length: 39cm    Ultrasound guidance: yes  Vessel Caliber: large and patent, compressibility normal  Vascular Doppler: not done  Needle advanced into vessel with real time Ultrasound guidance.  Guidewire confirmed in vessel.  Image recorded and saved.  Sterile sheath used.  Number of attempts: 1  Post-procedure: blood return through all ports, chlorhexidine patch and sterile dressing applied  Technical procedures used: 3CG  Specimens: No  Implants: No  Assessment: placement verified by x-ray  Complications: none          Codey Hagan  10/19/2018  "

## 2018-10-19 NOTE — PHYSICIAN QUERY
PT Name: Yobani Rios  MR #: [id number]     Physician Query Form - Documentation Clarification      CDS/: Nola Redman               Contact information: [email or web address]    This form is a permanent document in the medical record.     Query Date: October 19, 2018    By submitting this query, we are merely seeking further clarification of documentation. Please utilize your independent clinical judgment when addressing the question(s) below.    The Medical record reflects the following:    Supporting Clinical Findings Location in Medical Record   Multiple previous abdominal surgeries here with possible small bowel obstruction, incidental retroperitoneal mass    H/o radical prostatectomy 2010 (surgery complicated by ?bowel injury, pt reports 9 surgeries including bowel resection) and ventral incisional hernia     Midline incision with reducible hernia to right of umbilicus, mildly tender in this area     H&P   Ventral hernia containing loop of small bowel and resulting in proximal small bowel obstruction. CT Abdomen Pelvis 10/18   Small bowel obstruction  Enterolysis for small bowel obstruction.  Repair of multiple ventral or incisional hernias    Significant number of incisional hernias and multiple aspects of previous mesh repair from the patient's multiple procedures and attempted abdominal wall reconstruction    Continued to take down adhesions     We were able to free up the area, reduce areas of herniation through the abdominal wall     Op Note filed 10/19   S/p ex-lap 10/18 with intra-op enterotomies and primary hernia repair   General Surgery PN 10/19   No significant contrast is noted elsewhere in the colon.     SBFT 10/17                                                                            Doctor, Please specify diagnosis or diagnoses associated with above clinical findings.    Provider Use Only      [ x ] SBO due to adhesions        [  ] Partial/Incomplete  [  ] Complete  [  ] Other:   ___________  [  ] Degree Clinically Undetermined     [x] SBO due to hernia        [  ] Partial/Incomplete  [ x ] Complete  [  ] Other:  ___________  [  ] Degree Clinically Undetermined     [  ] Other cause of SBO, please specify:  _____________________        [  ] Partial/Incomplete  [  ] Complete  [  ] Other:  ___________  [  ] Degree Clinically Undetermined                                                                                                              [  ] Clinically undetermined

## 2018-10-19 NOTE — SUBJECTIVE & OBJECTIVE
Interval History: No acute events overnight, afebrile, vitals stable. Denies significant pain. No nausea or emesis overnight. Reports some belching, no bowel function. Dark bilious NGT output in canister.     Medications:  Continuous Infusions:   sodium chloride 0.9% 125 mL/hr at 10/18/18 1145    hydromorphone in 0.9 % NaCl 6 mg/30 ml       Scheduled Meds:   acetaminophen  1,000 mg Intravenous Q8H    cefTRIAXone (ROCEPHIN) IVPB  2 g Intravenous Q24H    enoxaparin  40 mg Subcutaneous Daily    metronidazole  500 mg Intravenous Q8H    pantoprazole  40 mg Intravenous Daily    sodium chloride 0.9%  1,000 mL Intravenous Once    sodium chloride 0.9%  1,000 mL Intravenous Once    sodium chloride 0.9%  3 mL Intravenous Q8H     PRN Meds:naloxone, naloxone, ondansetron, promethazine (PHENERGAN) IVPB, sodium chloride 0.9%     Review of patient's allergies indicates:  No Known Allergies  Objective:     Vital Signs (Most Recent):  Temp: 98.1 °F (36.7 °C) (10/19/18 0507)  Pulse: 93 (10/19/18 0507)  Resp: 11 (10/19/18 0548)  BP: 129/61 (10/19/18 0507)  SpO2: (!) 88 % (10/19/18 0507) Vital Signs (24h Range):  Temp:  [96.5 °F (35.8 °C)-98.9 °F (37.2 °C)] 98.1 °F (36.7 °C)  Pulse:  [] 93  Resp:  [11-20] 11  SpO2:  [86 %-97 %] 88 %  BP: (115-180)/(57-95) 129/61     Weight: 99.8 kg (220 lb)  Body mass index is 29.03 kg/m².    Intake/Output - Last 3 Shifts       10/17 0700 - 10/18 0659 10/18 0700 - 10/19 0659 10/19 0700 - 10/20 0659    P.O.  0     I.V. (mL/kg)  2000 (20)     Total Intake(mL/kg)  2000 (20)     Urine (mL/kg/hr)  475 (0.2)     Emesis/NG output  1200     Drains 850 100     Total Output 850 1775     Net -850 +225            Stool Occurrence  0 x           Physical Exam   Constitutional: He is oriented to person, place, and time. No distress.   HENT:   Head: Normocephalic and atraumatic.   NGT in place to LIWS with dark bilious appearing output   Eyes: EOM are normal. Pupils are equal, round, and reactive to  light.   Cardiovascular: Normal rate and regular rhythm.   Pulmonary/Chest: Effort normal. No respiratory distress.   Abdominal: Soft. He exhibits distension (mild). There is tenderness (appropriate).   Abdomen with mild distention, incision coverage with dressing, some old blood on bottom part   Musculoskeletal: Normal range of motion. He exhibits no edema.   Neurological: He is alert and oriented to person, place, and time.   Skin: Skin is warm and dry. He is not diaphoretic.   Psychiatric: He has a normal mood and affect. His behavior is normal.       Significant Labs:  CBC:   Recent Labs   Lab 10/19/18  0339   WBC 24.69*   RBC 5.09   HGB 15.6   HCT 47.6      MCV 94   MCH 30.6   MCHC 32.8     CMP:   Recent Labs   Lab 10/17/18  1906  10/19/18  0339   *   < > 110   CALCIUM 10.2   < > 8.0*   ALBUMIN 3.9  --   --    PROT 7.8  --   --       < > 143   K 3.9   < > 4.1   CO2 26   < > 24      < > 108   BUN 26*   < > 43*   CREATININE 1.6*   < > 2.2*   ALKPHOS 128  --   --    ALT 32  --   --    AST 24  --   --    BILITOT 1.5*  --   --     < > = values in this interval not displayed.       Significant Diagnostics:  I have reviewed all pertinent imaging results/findings within the past 24 hours.

## 2018-10-19 NOTE — PROGRESS NOTES
[facility]  General Surgery  Progress Note    Subjective:     History of Present Illness:  79 yo M with h/o radical prostatectomy 2010 (surgery complicated by ?bowel injury, pt reports 9 surgeries including bowel resection) and ventral incisional hernia presented to OSH with severe abdominal pain. Mostly above umbilicus and on the right side of abdomen. Pain is improved now. No vomiting. Has passed some gas since onset of pain. No BM for two days. Denies hx previous small bowel obstructions. Denies weight loss.      Patient has had multiple ED visits lately for other problems, and was found to have an enlarging retroperitoneal mass with concern for malignancy. Recent PSA normal.     Post-Op Info:  Procedure(s) (LRB):  LAPAROTOMY, EXPLORATORY (N/A)  REPAIR, HERNIA, INCISIONAL -multiple (N/A)  LYSIS, ADHESIONS (N/A)   1 Day Post-Op     Interval History: No acute events overnight, afebrile, vitals stable. Denies significant pain. No nausea or emesis overnight. Reports some belching, no bowel function. Dark bilious NGT output in canister.     Medications:  Continuous Infusions:   sodium chloride 0.9% 125 mL/hr at 10/18/18 1145    hydromorphone in 0.9 % NaCl 6 mg/30 ml       Scheduled Meds:   acetaminophen  1,000 mg Intravenous Q8H    cefTRIAXone (ROCEPHIN) IVPB  2 g Intravenous Q24H    enoxaparin  40 mg Subcutaneous Daily    metronidazole  500 mg Intravenous Q8H    pantoprazole  40 mg Intravenous Daily    sodium chloride 0.9%  1,000 mL Intravenous Once    sodium chloride 0.9%  1,000 mL Intravenous Once    sodium chloride 0.9%  3 mL Intravenous Q8H     PRN Meds:naloxone, naloxone, ondansetron, promethazine (PHENERGAN) IVPB, sodium chloride 0.9%     Review of patient's allergies indicates:  No Known Allergies  Objective:     Vital Signs (Most Recent):  Temp: 98.1 °F (36.7 °C) (10/19/18 0507)  Pulse: 93 (10/19/18 0507)  Resp: 11 (10/19/18 0548)  BP: 129/61 (10/19/18 0507)  SpO2: (!) 88 %  (10/19/18 9988) Vital Signs (24h Range):  Temp:  [96.5 °F (35.8 °C)-98.9 °F (37.2 °C)] 98.1 °F (36.7 °C)  Pulse:  [] 93  Resp:  [11-20] 11  SpO2:  [86 %-97 %] 88 %  BP: (115-180)/(57-95) 129/61     Weight: 99.8 kg (220 lb)  Body mass index is 29.03 kg/m².    Intake/Output - Last 3 Shifts       10/17 0700 - 10/18 0659 10/18 0700 - 10/19 0659 10/19 0700 - 10/20 0659    P.O.  0     I.V. (mL/kg)  2000 (20)     Total Intake(mL/kg)  2000 (20)     Urine (mL/kg/hr)  475 (0.2)     Emesis/NG output  1200     Drains 850 100     Total Output 850 1775     Net -850 +225            Stool Occurrence  0 x           Physical Exam   Constitutional: He is oriented to person, place, and time. No distress.   HENT:   Head: Normocephalic and atraumatic.   NGT in place to LIWS with dark bilious appearing output   Eyes: EOM are normal. Pupils are equal, round, and reactive to light.   Cardiovascular: Normal rate and regular rhythm.   Pulmonary/Chest: Effort normal. No respiratory distress.   Abdominal: Soft. He exhibits distension (mild). There is tenderness (appropriate).   Abdomen with mild distention, incision coverage with dressing, some old blood on bottom part   Musculoskeletal: Normal range of motion. He exhibits no edema.   Neurological: He is alert and oriented to person, place, and time.   Skin: Skin is warm and dry. He is not diaphoretic.   Psychiatric: He has a normal mood and affect. His behavior is normal.       Significant Labs:  CBC:   Recent Labs   Lab 10/19/18  0339   WBC 24.69*   RBC 5.09   HGB 15.6   HCT 47.6      MCV 94   MCH 30.6   MCHC 32.8     CMP:   Recent Labs   Lab 10/17/18  1906  10/19/18  0339   *   < > 110   CALCIUM 10.2   < > 8.0*   ALBUMIN 3.9  --   --    PROT 7.8  --   --       < > 143   K 3.9   < > 4.1   CO2 26   < > 24      < > 108   BUN 26*   < > 43*   CREATININE 1.6*   < > 2.2*   ALKPHOS 128  --   --    ALT 32  --   --    AST 24  --   --    BILITOT 1.5*  --   --     < > =  values in this interval not displayed.       Significant Diagnostics:  I have reviewed all pertinent imaging results/findings within the past 24 hours.    Assessment/Plan:     * Small bowel obstruction    79 yo M with multiple previous abdominal surgeries here with possible small bowel obstruction, incidental retroperitoneal mass now s/p ex-lap 10/18 with intra-op enterotomies and primary hernia repair    -NPO  -mIVFs  -PCA and scheduled IV tylenol for pain control  -will give 1L NS fluid bolus this morning for up-trending Cr  -re-check BMP @ 2pm  -possible d/c ellis this afternoon  -post-op rocephin x 4 days (stop date Sunday)   -daily labs  -replete electrolytes  -dvt ppx  -will need outpatient work up of retroperitoneal mass         Michlele Andino MD  General Surgery  [facility]

## 2018-10-19 NOTE — PLAN OF CARE
Problem: Occupational Therapy Goal  Goal: Occupational Therapy Goal  Goals to be met by: 10/26/18    Patient will increase functional independence with ADLs by performing:    UE Dressing with Modified [place].  LE Dressing with Supervision.  Grooming while standing at sink with Supervision.  Toileting from toilet with Stand-by Assistance for hygiene and clothing management.   Supine to sit with Modified [place].  Step transfer with Supervision  Toilet transfer to toilet with Supervision.  Upper extremity exercise program x15 reps per handout, with independence.    Outcome: Ongoing (interventions implemented as appropriate)  OT POC implemented     Comments: Kallie Flower OTR/L

## 2018-10-20 LAB
ANION GAP SERPL CALC-SCNC: 10 MMOL/L
ANION GAP SERPL CALC-SCNC: 11 MMOL/L
BASOPHILS # BLD AUTO: 0.04 K/UL
BASOPHILS # BLD AUTO: 0.04 K/UL
BASOPHILS NFR BLD: 0.2 %
BASOPHILS NFR BLD: 0.2 %
BUN SERPL-MCNC: 29 MG/DL
BUN SERPL-MCNC: 40 MG/DL
CALCIUM SERPL-MCNC: 8.1 MG/DL
CALCIUM SERPL-MCNC: 8.4 MG/DL
CHLORIDE SERPL-SCNC: 109 MMOL/L
CHLORIDE SERPL-SCNC: 111 MMOL/L
CO2 SERPL-SCNC: 22 MMOL/L
CO2 SERPL-SCNC: 23 MMOL/L
CREAT SERPL-MCNC: 1.3 MG/DL
CREAT SERPL-MCNC: 1.6 MG/DL
DIFFERENTIAL METHOD: ABNORMAL
DIFFERENTIAL METHOD: ABNORMAL
EOSINOPHIL # BLD AUTO: 0.1 K/UL
EOSINOPHIL # BLD AUTO: 0.2 K/UL
EOSINOPHIL NFR BLD: 0.3 %
EOSINOPHIL NFR BLD: 0.9 %
ERYTHROCYTE [DISTWIDTH] IN BLOOD BY AUTOMATED COUNT: 13.2 %
ERYTHROCYTE [DISTWIDTH] IN BLOOD BY AUTOMATED COUNT: 13.3 %
EST. GFR  (AFRICAN AMERICAN): 47 ML/MIN/1.73 M^2
EST. GFR  (AFRICAN AMERICAN): >60 ML/MIN/1.73 M^2
EST. GFR  (NON AFRICAN AMERICAN): 40.7 ML/MIN/1.73 M^2
EST. GFR  (NON AFRICAN AMERICAN): 52.3 ML/MIN/1.73 M^2
GLUCOSE SERPL-MCNC: 137 MG/DL
GLUCOSE SERPL-MCNC: 143 MG/DL
HCT VFR BLD AUTO: 43.5 %
HCT VFR BLD AUTO: 44.7 %
HGB BLD-MCNC: 14.5 G/DL
HGB BLD-MCNC: 14.8 G/DL
IMM GRANULOCYTES # BLD AUTO: 0.16 K/UL
IMM GRANULOCYTES # BLD AUTO: 0.18 K/UL
IMM GRANULOCYTES NFR BLD AUTO: 0.9 %
IMM GRANULOCYTES NFR BLD AUTO: 1 %
LYMPHOCYTES # BLD AUTO: 1 K/UL
LYMPHOCYTES # BLD AUTO: 1.1 K/UL
LYMPHOCYTES NFR BLD: 5.7 %
LYMPHOCYTES NFR BLD: 5.8 %
MAGNESIUM SERPL-MCNC: 2.3 MG/DL
MCH RBC QN AUTO: 30.5 PG
MCH RBC QN AUTO: 30.6 PG
MCHC RBC AUTO-ENTMCNC: 33.1 G/DL
MCHC RBC AUTO-ENTMCNC: 33.3 G/DL
MCV RBC AUTO: 91 FL
MCV RBC AUTO: 93 FL
MONOCYTES # BLD AUTO: 0.7 K/UL
MONOCYTES # BLD AUTO: 0.8 K/UL
MONOCYTES NFR BLD: 3.9 %
MONOCYTES NFR BLD: 4.1 %
NEUTROPHILS # BLD AUTO: 15.8 K/UL
NEUTROPHILS # BLD AUTO: 16.2 K/UL
NEUTROPHILS NFR BLD: 88 %
NEUTROPHILS NFR BLD: 89 %
NRBC BLD-RTO: 0 /100 WBC
NRBC BLD-RTO: 0 /100 WBC
PHOSPHATE SERPL-MCNC: 1.3 MG/DL
PHOSPHATE SERPL-MCNC: 1.7 MG/DL
PHOSPHATE SERPL-MCNC: 1.9 MG/DL
PLATELET # BLD AUTO: 250 K/UL
PLATELET # BLD AUTO: 258 K/UL
PMV BLD AUTO: 10.3 FL
PMV BLD AUTO: 11.1 FL
POTASSIUM SERPL-SCNC: 3.2 MMOL/L
POTASSIUM SERPL-SCNC: 3.2 MMOL/L
POTASSIUM SERPL-SCNC: 3.4 MMOL/L
RBC # BLD AUTO: 4.76 M/UL
RBC # BLD AUTO: 4.83 M/UL
SODIUM SERPL-SCNC: 143 MMOL/L
SODIUM SERPL-SCNC: 143 MMOL/L
WBC # BLD AUTO: 17.71 K/UL
WBC # BLD AUTO: 18.39 K/UL

## 2018-10-20 PROCEDURE — 84100 ASSAY OF PHOSPHORUS: CPT

## 2018-10-20 PROCEDURE — 94770 HC EXHALED C02 TEST: CPT

## 2018-10-20 PROCEDURE — S0030 INJECTION, METRONIDAZOLE: HCPCS | Performed by: SURGERY

## 2018-10-20 PROCEDURE — 84100 ASSAY OF PHOSPHORUS: CPT | Mod: 91

## 2018-10-20 PROCEDURE — 99900035 HC TECH TIME PER 15 MIN (STAT)

## 2018-10-20 PROCEDURE — C9113 INJ PANTOPRAZOLE SODIUM, VIA: HCPCS | Performed by: SURGERY

## 2018-10-20 PROCEDURE — 84132 ASSAY OF SERUM POTASSIUM: CPT

## 2018-10-20 PROCEDURE — A4216 STERILE WATER/SALINE, 10 ML: HCPCS | Performed by: SURGERY

## 2018-10-20 PROCEDURE — 94799 UNLISTED PULMONARY SVC/PX: CPT

## 2018-10-20 PROCEDURE — 36415 COLL VENOUS BLD VENIPUNCTURE: CPT

## 2018-10-20 PROCEDURE — 27000646 HC AEROBIKA DEVICE

## 2018-10-20 PROCEDURE — 80048 BASIC METABOLIC PNL TOTAL CA: CPT | Mod: 91

## 2018-10-20 PROCEDURE — 97161 PT EVAL LOW COMPLEX 20 MIN: CPT

## 2018-10-20 PROCEDURE — B4185 PARENTERAL SOL 10 GM LIPIDS: HCPCS | Performed by: STUDENT IN AN ORGANIZED HEALTH CARE EDUCATION/TRAINING PROGRAM

## 2018-10-20 PROCEDURE — 25000003 PHARM REV CODE 250: Performed by: STUDENT IN AN ORGANIZED HEALTH CARE EDUCATION/TRAINING PROGRAM

## 2018-10-20 PROCEDURE — 83735 ASSAY OF MAGNESIUM: CPT

## 2018-10-20 PROCEDURE — 11000001 HC ACUTE MED/SURG PRIVATE ROOM

## 2018-10-20 PROCEDURE — 85025 COMPLETE CBC W/AUTO DIFF WBC: CPT

## 2018-10-20 PROCEDURE — A4217 STERILE WATER/SALINE, 500 ML: HCPCS | Performed by: STUDENT IN AN ORGANIZED HEALTH CARE EDUCATION/TRAINING PROGRAM

## 2018-10-20 PROCEDURE — 63600175 PHARM REV CODE 636 W HCPCS: Performed by: STUDENT IN AN ORGANIZED HEALTH CARE EDUCATION/TRAINING PROGRAM

## 2018-10-20 PROCEDURE — 80048 BASIC METABOLIC PNL TOTAL CA: CPT

## 2018-10-20 PROCEDURE — 63600175 PHARM REV CODE 636 W HCPCS: Performed by: SURGERY

## 2018-10-20 PROCEDURE — 25000003 PHARM REV CODE 250: Performed by: SURGERY

## 2018-10-20 PROCEDURE — 94761 N-INVAS EAR/PLS OXIMETRY MLT: CPT

## 2018-10-20 PROCEDURE — 94664 DEMO&/EVAL PT USE INHALER: CPT

## 2018-10-20 RX ORDER — TAMSULOSIN HYDROCHLORIDE 0.4 MG/1
0.4 CAPSULE ORAL DAILY
Status: DISCONTINUED | OUTPATIENT
Start: 2018-10-20 | End: 2018-10-26 | Stop reason: HOSPADM

## 2018-10-20 RX ORDER — LABETALOL HYDROCHLORIDE 5 MG/ML
10 INJECTION, SOLUTION INTRAVENOUS EVERY 4 HOURS PRN
Status: DISCONTINUED | OUTPATIENT
Start: 2018-10-20 | End: 2018-10-26 | Stop reason: HOSPADM

## 2018-10-20 RX ORDER — LABETALOL HYDROCHLORIDE 5 MG/ML
20 INJECTION, SOLUTION INTRAVENOUS ONCE
Status: COMPLETED | OUTPATIENT
Start: 2018-10-20 | End: 2018-10-20

## 2018-10-20 RX ADMIN — Medication 10 ML: at 06:10

## 2018-10-20 RX ADMIN — PANTOPRAZOLE SODIUM 40 MG: 40 INJECTION, POWDER, FOR SOLUTION INTRAVENOUS at 09:10

## 2018-10-20 RX ADMIN — Medication 10 ML: at 12:10

## 2018-10-20 RX ADMIN — METRONIDAZOLE 500 MG: 500 INJECTION, SOLUTION INTRAVENOUS at 01:10

## 2018-10-20 RX ADMIN — POTASSIUM PHOSPHATE, MONOBASIC AND POTASSIUM PHOSPHATE, DIBASIC 30 MMOL: 224; 236 INJECTION, SOLUTION, CONCENTRATE INTRAVENOUS at 08:10

## 2018-10-20 RX ADMIN — LABETALOL HYDROCHLORIDE 10 MG: 5 INJECTION, SOLUTION INTRAVENOUS at 09:10

## 2018-10-20 RX ADMIN — Medication 3 ML: at 05:10

## 2018-10-20 RX ADMIN — TAMSULOSIN HYDROCHLORIDE 0.4 MG: 0.4 CAPSULE ORAL at 12:10

## 2018-10-20 RX ADMIN — ACETAMINOPHEN 1000 MG: 10 INJECTION, SOLUTION INTRAVENOUS at 01:10

## 2018-10-20 RX ADMIN — Medication 3 ML: at 06:10

## 2018-10-20 RX ADMIN — CALCIUM GLUCONATE: 94 INJECTION, SOLUTION INTRAVENOUS at 10:10

## 2018-10-20 RX ADMIN — ENOXAPARIN SODIUM 40 MG: 100 INJECTION SUBCUTANEOUS at 04:10

## 2018-10-20 RX ADMIN — METRONIDAZOLE 500 MG: 500 INJECTION, SOLUTION INTRAVENOUS at 02:10

## 2018-10-20 RX ADMIN — SOYBEAN OIL 250 ML: 20 INJECTION, SOLUTION INTRAVENOUS at 10:10

## 2018-10-20 RX ADMIN — POTASSIUM PHOSPHATE, MONOBASIC AND POTASSIUM PHOSPHATE, DIBASIC 30 MMOL: 224; 236 INJECTION, SOLUTION, CONCENTRATE INTRAVENOUS at 05:10

## 2018-10-20 RX ADMIN — ACETAMINOPHEN 1000 MG: 10 INJECTION, SOLUTION INTRAVENOUS at 02:10

## 2018-10-20 RX ADMIN — METRONIDAZOLE 500 MG: 500 INJECTION, SOLUTION INTRAVENOUS at 06:10

## 2018-10-20 RX ADMIN — Medication: at 05:10

## 2018-10-20 RX ADMIN — LABETALOL HYDROCHLORIDE 10 MG: 5 INJECTION, SOLUTION INTRAVENOUS at 07:10

## 2018-10-20 RX ADMIN — CEFTRIAXONE 2 G: 2 INJECTION, SOLUTION INTRAVENOUS at 01:10

## 2018-10-20 RX ADMIN — LABETALOL HYDROCHLORIDE 20 MG: 5 INJECTION, SOLUTION INTRAVENOUS at 09:10

## 2018-10-20 NOTE — PLAN OF CARE
Problem: Physical Therapy Goal  Goal: Physical Therapy Goal  Goals to be met by: 10/30/18    Patient will increase functional independence with mobility by performin. Sit to stand transfer with Supervision  2. Bed to chair transfer with Supervision  3. Gait  x 140 feet with Supervision    Outcome: Ongoing (interventions implemented as appropriate)  Evaluation complete.  Goals established to improve functional mobility.    DC rec's for Home with HHPT    Asif Gandara III, LEILANIT, PT  10/20/2018

## 2018-10-20 NOTE — PROGRESS NOTES
[facility]  General Surgery  Progress Note    Subjective:     History of Present Illness:  79 yo M with h/o radical prostatectomy 2010 (surgery complicated by ?bowel injury, pt reports 9 surgeries including bowel resection) and ventral incisional hernia presented to OSH with severe abdominal pain. Mostly above umbilicus and on the right side of abdomen. Pain is improved now. No vomiting. Has passed some gas since onset of pain. No BM for two days. Denies hx previous small bowel obstructions. Denies weight loss.      Patient has had multiple ED visits lately for other problems, and was found to have an enlarging retroperitoneal mass with concern for malignancy. Recent PSA normal.     Post-Op Info:  Procedure(s) (LRB):  LAPAROTOMY, EXPLORATORY (N/A)  REPAIR, HERNIA, INCISIONAL -multiple (N/A)  LYSIS, ADHESIONS (N/A)   2 Days Post-Op     Interval History: No acute events overnight, afebrile, vitals stable. Denies significant pain. No nausea or emesis overnight. Reports some continued belching, no gas or BM. Only 200cc bilious output recorded out of NGT. Cr downtrending, UOP adequate     Medications:  Continuous Infusions:   hydromorphone in 0.9 % NaCl 6 mg/30 ml      lactated ringers 125 mL/hr at 10/19/18 1815    TPN ADULT CENTRAL LINE CUSTOM 75 mL/hr at 10/19/18 2235    TPN ADULT CENTRAL LINE CUSTOM       Scheduled Meds:   acetaminophen  1,000 mg Intravenous Q8H    cefTRIAXone (ROCEPHIN) IVPB  2 g Intravenous Q24H    enoxaparin  40 mg Subcutaneous Daily    fat emulsion 20%  250 mL Intravenous Daily    fat emulsion 20%  250 mL Intravenous Daily    metronidazole  500 mg Intravenous Q8H    pantoprazole  40 mg Intravenous Daily    potassium phosphate IVPB  30 mmol Intravenous Once    sodium chloride 0.9%  10 mL Intravenous Q6H    sodium chloride 0.9%  3 mL Intravenous Q8H    tamsulosin  0.4 mg Oral Daily     PRN Meds:labetalol, naloxone, naloxone, ondansetron, promethazine (PHENERGAN)  IVPB, Flushing PICC Protocol **AND** sodium chloride 0.9% **AND** sodium chloride 0.9%, sodium chloride 0.9%     Review of patient's allergies indicates:  No Known Allergies  Objective:     Vital Signs (Most Recent):  Temp: 98.4 °F (36.9 °C) (10/20/18 0745)  Pulse: 98 (10/20/18 0745)  Resp: 20 (10/20/18 0745)  BP: (!) 186/91 (10/20/18 0745)  SpO2: (!) 91 % (10/20/18 0745) Vital Signs (24h Range):  Temp:  [96.1 °F (35.6 °C)-98.4 °F (36.9 °C)] 98.4 °F (36.9 °C)  Pulse:  [] 98  Resp:  [18-20] 20  SpO2:  [91 %-97 %] 91 %  BP: (128-186)/(72-91) 186/91     Weight: 99.8 kg (220 lb)  Body mass index is 29.03 kg/m².    Intake/Output - Last 3 Shifts       10/18 0700 - 10/19 0659 10/19 0700 - 10/20 0659 10/20 0700 - 10/21 0659    P.O. 0 0     I.V. (mL/kg) 2000 (20) 1330 (13.3)     NG/GT  350     IV Piggyback  200     TPN  621     Total Intake(mL/kg) 2000 (20) 2501 (25.1)     Urine (mL/kg/hr) 475 (0.2) 1300 (0.5)     Emesis/NG output 1200      Drains 100 200     Total Output 1775 1500     Net +225 +1001            Stool Occurrence 0 x            Physical Exam   Constitutional: He is oriented to person, place, and time. No distress.   HENT:   Head: Normocephalic and atraumatic.   NGT in place to LIWS with dark bilious appearing output   Eyes: EOM are normal. Pupils are equal, round, and reactive to light.   Cardiovascular: Normal rate and regular rhythm.   Pulmonary/Chest: Effort normal. No respiratory distress.   Abdominal: Soft. He exhibits distension (mild). There is tenderness (appropriate).   Abdomen with mild distention, incision largely clean and dry, small amount of blood oozing from inferior portion of the incision, pressure dressing applied   Musculoskeletal: Normal range of motion. He exhibits no edema.   Neurological: He is alert and oriented to person, place, and time.   Skin: Skin is warm and dry. He is not diaphoretic.   Psychiatric: He has a normal mood and affect. His behavior is normal.       Significant  Labs:  CBC:   Recent Labs   Lab 10/20/18  0437   WBC 17.71*   RBC 4.76   HGB 14.5   HCT 43.5      MCV 91   MCH 30.5   MCHC 33.3     CMP:   Recent Labs   Lab 10/17/18  1906  10/20/18  0437   *   < > 137*   CALCIUM 10.2   < > 8.1*   ALBUMIN 3.9  --   --    PROT 7.8  --   --       < > 143   K 3.9   < > 3.4*   CO2 26   < > 22*      < > 111*   BUN 26*   < > 40*   CREATININE 1.6*   < > 1.6*   ALKPHOS 128  --   --    ALT 32  --   --    AST 24  --   --    BILITOT 1.5*  --   --     < > = values in this interval not displayed.       Significant Diagnostics:  I have reviewed all pertinent imaging results/findings within the past 24 hours.    Assessment/Plan:     * Small bowel obstruction    79 yo M with multiple previous abdominal surgeries here with possible small bowel obstruction, incidental retroperitoneal mass now s/p ex-lap 10/18 with intra-op enterotomies and primary hernia repair    -NPO, TPN  -mIVFs  -PCA and scheduled IV tylenol for pain control  -Cr improving 1.6 from 2.2 yesterday AM  -re-check phos @ 2pm, giving replacements this morning  -flomax today, plan for d/c ellis tomorrow  -post-op rocephin x 4 days (stop date Sunday)   -daily labs  -replete electrolytes  -dvt ppx  -will need outpatient work up of retroperitoneal mass         Michelle Andino MD  General Surgery  [facility]

## 2018-10-20 NOTE — PROGRESS NOTES
" [facility]  Adult Nutrition  Progress Note    SUMMARY       Recommendations    Recommendation/Intervention:   1. Recommend increasing TPN to 110 gm AA/ 375 gm dext with IV lipids to better meet pt's needs. This will provide 2215 kcal, 110 gm pro (GIR=3.76).   -Current order only meeting ~80% EEN/EPN.   -Check TG weekly while on IV lipids. Electrolytes/anions per MD/pharmacy.   2. RD following.    Goals: TPN to meet >/=85% EEN/EPN  Nutrition Goal Status: new  Communication of RD Recs: (POC)    Reason for Assessment    Reason for Assessment: new TPN  Diagnosis: gastrointestinal disease(SBO s/p repair of incisional hernia)  Relevant Medical History: HTN, prostate ca s/p prostatectomy, multiple bowel surgeries    General Information Comments: POD#2 s/p repair of incisional hernia. Pt started on TPN, NGT to suction. Pt denies wt loss, changes in appetite PTA. Appears nourished.     Nutrition Discharge Planning: Unable to determine at this time    Nutrition Risk Screen    Nutrition Risk Screen: no indicators present    Nutrition/Diet History    Patient Reported Diet/Restrictions/Preferences: general  Do you have any cultural, spiritual, Anglican conflicts, given your current situation?: none stated  Factors Affecting Nutritional Intake: NPO, altered gastrointestinal function    Anthropometrics    Temp: 97 °F (36.1 °C)  Height Method: Stated  Height: 6' 1" (185.4 cm)  Height (inches): 73 in  Weight Method: Stated  Weight: 99.8 kg (220 lb)  Weight (lb): 220 lb  Ideal Body Weight (IBW), Male: 184 lb  % Ideal Body Weight, Male (lb): 119.57 lb  BMI (Calculated): 29.1  BMI Grade: 25 - 29.9 - overweight       Lab/Procedures/Meds    Pertinent Labs Reviewed: reviewed  Pertinent Labs Comments: K 3.4, BUN 40, Crt 1.6, GFR 40.7, Glu 137, Phos 1.3  Pertinent Medications Reviewed: reviewed  Pertinent Medications Comments: pantoprazole, K phosphage, IVF    Physical Findings/Assessment    Overall Physical " Appearance: nourished, overweight  Tubes: nasogastric tube(to suction)  Skin: incision(s)    Estimated/Assessed Needs    Weight Used For Calorie Calculations: 99.8 kg (220 lb 0.3 oz)  Energy Calorie Requirements (kcal): 2215  Energy Need Method: [place]-St Jeor(x 1.25 (PAL))  Protein Requirements: 110-120 gm (1.1-1.3 gm/kg)  Weight Used For Protein Calculations: 99.8 kg (220 lb 0.3 oz)  Fluid Requirements (mL): per MD     RDA Method (mL): 2215       Nutrition Prescription Ordered    Current Diet Order: NPO  Current Nutrition Support Formula Ordered: (Custom TPN 5/15 + IV lipids)  Current Nutrition Support Rate Ordered: 75 (ml)  Current Nutrition Support Frequency Ordered: ml/hr    Evaluation of Received Nutrient/Fluid Intake    Parenteral Calories (kcal): 1278  Parenteral Protein (gm): 90  Parenteral Fluid (mL): 1800  Lipid Calories (kcals): 500 kcals  GIR (Glucose Infusion Rate) (mg/kg/min): 1.88 mg/kg/min  Total Calories (kcal): 1778  % Kcal Needs: 80  % Protein Needs: 82  IV Fluid (mL): 1330  Total Fluid Intake (mL): 3530  I/O: +1L  Energy Calories Required: not meeting needs  Protein Required: not meeting needs  Fluid Required: (per MD)  % Intake of Estimated Energy Needs: 75 - 100 %  % Meal Intake: NPO    Nutrition Risk    Level of Risk/Frequency of Follow-up: (F/u 1x weekly)     Assessment and Plan    Nutrition Problem  Inadequate energy intake    Related to (etiology):   Altered GI function    Signs and Symptoms (as evidenced by):   TPN meeting 80% EEN     Interventions/Recommendations (treatment strategy):  See recs above.    Nutrition Diagnosis Status:   New       Monitor and Evaluation    Food and Nutrient Intake: energy intake, parenteral nutrition intake  Food and Nutrient Adminstration: enteral and parenteral nutrition administration  Anthropometric Measurements: weight, weight change, body mass index  Biochemical Data, Medical Tests and Procedures: electrolyte and renal panel, gastrointestinal profile,  glucose/endocrine profile, inflammatory profile, lipid profile  Nutrition-Focused Physical Findings: overall appearance     Nutrition Follow-Up    RD Follow-up?: Yes

## 2018-10-20 NOTE — ASSESSMENT & PLAN NOTE
79 yo M with multiple previous abdominal surgeries here with possible small bowel obstruction, incidental retroperitoneal mass now s/p ex-lap 10/18 with intra-op enterotomies and primary hernia repair    -NPO, TPN  -mIVFs  -PCA and scheduled IV tylenol for pain control  -Cr improving 1.6 from 2.2 yesterday AM  -re-check phos @ 2pm, giving replacements this morning  -flomax today, plan for d/c ellis tomorrow  -post-op rocephin x 4 days (stop date Sunday)   -daily labs  -replete electrolytes  -dvt ppx  -will need outpatient work up of retroperitoneal mass

## 2018-10-20 NOTE — PLAN OF CARE
Problem: Pain, Acute (Adult)  Intervention: Monitor/Manage Analgesia  pT WITH PCA DILAUDID .2/6MIN , TOLERATED WELL.

## 2018-10-20 NOTE — SUBJECTIVE & OBJECTIVE
Interval History: No acute events overnight, afebrile, vitals stable. Denies significant pain. No nausea or emesis overnight. Reports some continued belching, no gas or BM. Only 200cc bilious output recorded out of NGT. Cr downtrending, UOP adequate     Medications:  Continuous Infusions:   hydromorphone in 0.9 % NaCl 6 mg/30 ml      lactated ringers 125 mL/hr at 10/19/18 1815    TPN ADULT CENTRAL LINE CUSTOM 75 mL/hr at 10/19/18 2235    TPN ADULT CENTRAL LINE CUSTOM       Scheduled Meds:   acetaminophen  1,000 mg Intravenous Q8H    cefTRIAXone (ROCEPHIN) IVPB  2 g Intravenous Q24H    enoxaparin  40 mg Subcutaneous Daily    fat emulsion 20%  250 mL Intravenous Daily    fat emulsion 20%  250 mL Intravenous Daily    metronidazole  500 mg Intravenous Q8H    pantoprazole  40 mg Intravenous Daily    potassium phosphate IVPB  30 mmol Intravenous Once    sodium chloride 0.9%  10 mL Intravenous Q6H    sodium chloride 0.9%  3 mL Intravenous Q8H    tamsulosin  0.4 mg Oral Daily     PRN Meds:labetalol, naloxone, naloxone, ondansetron, promethazine (PHENERGAN) IVPB, Flushing PICC Protocol **AND** sodium chloride 0.9% **AND** sodium chloride 0.9%, sodium chloride 0.9%     Review of patient's allergies indicates:  No Known Allergies  Objective:     Vital Signs (Most Recent):  Temp: 98.4 °F (36.9 °C) (10/20/18 0745)  Pulse: 98 (10/20/18 0745)  Resp: 20 (10/20/18 0745)  BP: (!) 186/91 (10/20/18 0745)  SpO2: (!) 91 % (10/20/18 0745) Vital Signs (24h Range):  Temp:  [96.1 °F (35.6 °C)-98.4 °F (36.9 °C)] 98.4 °F (36.9 °C)  Pulse:  [] 98  Resp:  [18-20] 20  SpO2:  [91 %-97 %] 91 %  BP: (128-186)/(72-91) 186/91     Weight: 99.8 kg (220 lb)  Body mass index is 29.03 kg/m².    Intake/Output - Last 3 Shifts       10/18 0700 - 10/19 0659 10/19 0700 - 10/20 0659 10/20 0700 - 10/21 0659    P.O. 0 0     I.V. (mL/kg) 2000 (20) 1330 (13.3)     NG/GT  350     IV Piggyback  200     TPN  621     Total Intake(mL/kg) 2000 (20)  2501 (25.1)     Urine (mL/kg/hr) 475 (0.2) 1300 (0.5)     Emesis/NG output 1200      Drains 100 200     Total Output 1775 1500     Net +225 +1001            Stool Occurrence 0 x            Physical Exam   Constitutional: He is oriented to person, place, and time. No distress.   HENT:   Head: Normocephalic and atraumatic.   NGT in place to LIWS with dark bilious appearing output   Eyes: EOM are normal. Pupils are equal, round, and reactive to light.   Cardiovascular: Normal rate and regular rhythm.   Pulmonary/Chest: Effort normal. No respiratory distress.   Abdominal: Soft. He exhibits distension (mild). There is tenderness (appropriate).   Abdomen with mild distention, incision largely clean and dry, small amount of blood oozing from inferior portion of the incision, pressure dressing applied   Musculoskeletal: Normal range of motion. He exhibits no edema.   Neurological: He is alert and oriented to person, place, and time.   Skin: Skin is warm and dry. He is not diaphoretic.   Psychiatric: He has a normal mood and affect. His behavior is normal.       Significant Labs:  CBC:   Recent Labs   Lab 10/20/18  0437   WBC 17.71*   RBC 4.76   HGB 14.5   HCT 43.5      MCV 91   MCH 30.5   MCHC 33.3     CMP:   Recent Labs   Lab 10/17/18  1906  10/20/18  0437   *   < > 137*   CALCIUM 10.2   < > 8.1*   ALBUMIN 3.9  --   --    PROT 7.8  --   --       < > 143   K 3.9   < > 3.4*   CO2 26   < > 22*      < > 111*   BUN 26*   < > 40*   CREATININE 1.6*   < > 1.6*   ALKPHOS 128  --   --    ALT 32  --   --    AST 24  --   --    BILITOT 1.5*  --   --     < > = values in this interval not displayed.       Significant Diagnostics:  I have reviewed all pertinent imaging results/findings within the past 24 hours.

## 2018-10-20 NOTE — PROGRESS NOTES
Pt ambulating in halls with assist and distal end of drging dripping and saturated with blood x2. Dressing reinforced and started dripping again with blood, drAngela notified. Continue to monitor.

## 2018-10-20 NOTE — PT/OT/SLP EVAL
Physical Therapy Evaluation    Patient Name:  Yobani Rios   MRN:  [id number]    Recommendations:     Discharge Recommendations:  home with home health   Discharge Equipment Recommendations: bedside commode, shower chair   Barriers to discharge: None    Assessment:     Yobani Rios is a 78 y.o. male admitted with a medical diagnosis of Small bowel obstruction.  He presents with the following impairments/functional limitations:  weakness, impaired endurance, impaired self care skills, gait instability, impaired functional mobilty, impaired balance, impaired cardiopulmonary response to activity limiting overall functional mobility. Good tolerance to PT session. Able to ambulate in hallway pushing IV pole without loss of balance. No marked gait deviations noted. Stair gait deferred secondary to short PICC line. To benefit from continued skilled PT intervention to address deficits. DC rec's for home with HH therapy services to maximize functional independence.     Rehab Prognosis:  Good; patient would benefit from acute skilled PT services to address these deficits and reach maximum level of function.      Recent Surgery: Procedure(s) (LRB):  LAPAROTOMY, EXPLORATORY (N/A)  REPAIR, HERNIA, INCISIONAL -multiple (N/A)  LYSIS, ADHESIONS (N/A) 2 Days Post-Op    Plan:     During this hospitalization, patient to be seen 3 x/week to address the above listed problems via gait training, therapeutic activities  · Plan of Care Expires:  11/20/18   Plan of Care Reviewed with: patient    Subjective     Communicated with RN prior to session.  Patient found seated in bedside chair with MD and wife present upon PT entry to room, agreeable to evaluation.      Chief Complaint: dry mouth  Patient comments/goals: requesting mouth swab  Pain/Comfort:  · Pain Rating 1: 0/10  · Pain Rating Post-Intervention 1: 0/10    Patients cultural, spiritual, Yazdanism conflicts given the current situation: none stated    Living Environment:  Patient  lives with wife in 1-story home with 6 [place] BHRs. Walk-in shower and tub/shower combo present.  Prior to admission, patients level of function was Indep without equipment for all mobility and ADL tasks.  Patient has the following equipment: none.  DME owned (not currently used): none.  Upon discharge, patient will have assistance from wife as needed.    Objective:     Patient found with: PCA, telemetry, peripheral IV, NG tube, oxygen, ellis catheter     General Precautions: Standard, fall, NPO   Orthopedic Precautions:N/A   Braces: N/A     Exams:  · Cognitive Exam:  Patient is oriented to Person, Place, Time and Situation  · Gross Motor Coordination:  WFL  · Postural Exam:  Patient presented with the following abnormalities: -       Rounded shoulders  · -       Forward head  · RLE ROM: WNL  · RLE Strength: WFL  · LLE ROM: WNL  · LLE Strength: WFL    Functional Mobility:  · Bed Mobility:  NT. Up in chair upon PT entry to room  · Transfers:  Sit to Stand:  stand by assistance with no AD  · Bed to Chair: stand by assistance with  no AD  using  Stand Pivot  · Gait: [id number] using IV pole for support. No loss of balance or marked gait deviations noted. Demonstrates decreased berkley attributed to caution. Cues provided for controlled breathing rate.  · Balance: Fair+ dynamic standing balance    AM-PAC 6 CLICK MOBILITY  Total Score:18       Therapeutic Activities and Exercises:   Patient educated on:   - role of PT/POC    - safety with all functional mobility   - deep breathing techniques   - transfer training   - gait training without device   - ambulating 3x/day to tolerance with 1 person assist   - importance of participation with therapy services   - safes to ambulate with 1 person assist at this time.    Patient/spouse verbalized understanding of all education provided.      Patient left up in chair with all lines intact, call button in reach and wife present.    GOALS:   Multidisciplinary Problems     Physical  Therapy Goals        Problem: Physical Therapy Goal    Goal Priority Disciplines Outcome Goal Variances Interventions   Physical Therapy Goal     PT, PT/OT Ongoing (interventions implemented as appropriate)     Description:  Goals to be met by: 10/30/18    Patient will increase functional independence with mobility by performin. Sit to stand transfer with Supervision  2. Bed to chair transfer with Supervision  3. Gait  x 140 feet with Supervision                      History:     Past Medical History:   Diagnosis Date    Cancer     prostate    Hypertension     Kidney problem     only 1 kidney functions    Liver disease     Umbilical hernia        Past Surgical History:   Procedure Laterality Date    colon repair      after prostatectomy    EXTRACTION, CATARACT Right 2012    Performed by William Nice [facility] at [facility] OR    EXTRACTION, CATARACT, WITH IOL INSERTION Left 3/7/2012    Performed by William Nice [facility] at [facility] OR    LAPAROTOMY, EXPLORATORY N/A 10/18/2018    Performed by Rancho [facility]e [facility] at [facility] OR [address] [place] [address]    LYSIS OF ADHESIONS N/A 10/18/2018    Procedure: LYSIS, ADHESIONS;  Surgeon: Rancho Mariee MD;  Location: [facility] OR [address] [place] [address];  Service: General;  Laterality: N/A;    LYSIS, ADHESIONS N/A 10/18/2018    Performed by [facility] at [facility] OR [address] [place] [address]    PROSTATECTOMY      PROSTATECTOMY  2010    REPAIR OF RECURRENT INCISIONAL HERNIA N/A 10/18/2018    Procedure: REPAIR, HERNIA, INCISIONAL -multiple;  Surgeon: Rancho Mariee MD;  Location: [facility] OR [address] [place] [address];  Service: General;  Laterality: N/A;    REPAIR, HERNIA, INCISIONAL -multiple N/A 10/18/2018    Performed by Rancho [facility]e MD at [facility] OR [address] [place] [address]       Clinical Decision Making:     History  Co-morbidities and personal factors that may impact the plan of care Examination  Body Structures and Functions, activity limitations and participation restrictions that may impact the plan of care Clinical  Presentation   Decision Making/ Complexity Score   Co-morbidities:   [x] Time since onset of injury / illness / exacerbation  [x] Status of current condition  []Patient's cognitive status and safety concerns    [] Multiple Medical Problems (see med hx)  Personal Factors:   [] Patient's age  [] Prior Level of function   [] Patient's home situation (environment and family support)  [] Patient's level of motivation  [] Expected progression of patient      HISTORY:(criteria)    [] 93313 - no personal factors/history    [x] 07572 - has 1-2 personal factor/comorbidity     [] 81064 - has >3 personal factor/comorbidity     Body Regions:  [] Objective examination findings  [] Head     []  Neck  [] Trunk   [] Upper Extremity  [] Lower Extremity    Body Systems:  [] For communication ability, affect, cognition, language, and learning style: the assessment of the ability to make needs known, consciousness, orientation (person, place, and time), expected emotional /behavioral responses, and learning preferences (eg, learning barriers, education  needs)  [] For the neuromuscular system: a general assessment of gross coordinated movement (eg, balance, gait, locomotion, transfers, and transitions) and motor function  (motor control and motor learning)  [x] For the musculoskeletal system: the assessment of gross symmetry, gross range of motion, gross strength, height, and weight  [x] For the integumentary system: the assessment of pliability(texture), presence of scar formation, skin color, and skin integrity  [] For cardiovascular/pulmonary system: the assessment of heart rate, respiratory rate, blood pressure, and edema     Activity limitations:    [] Patient's cognitive status and saf ety concerns          [] Status of current condition      [] Weight bearing restriction  [] Cardiopulmunary Restriction    Participation Restrictions:   [] Goals and goal agreement with the patient     [] Rehab potential (prognosis) and probable  outcome      Examination of Body System: (criteria)    [x] 58397 - addressing 1-2 elements    [] 00731 - addressing a total of 3 or more elements     [] 38079 -  Addressing a total of 4 or more elements         Clinical Presentation: (criteria)  Stable - 35838     On examination of body system using standardized tests and measures patient presents with 1-2 elements from any of the following: body structures and functions, activity limitations, and/or participation restrictions.  Leading to a clinical presentation that is considered stable and/or uncomplicated                              Clinical Decision Making  (Eval Complexity):  Low- 62562     Time Tracking:     PT Received On: 10/20/18  PT Start Time: 1354     PT Stop Time: 1411  PT Total Time (min): 17 min     Billable Minutes: Evaluation 17      Asif Gandara III, PT  10/20/2018

## 2018-10-21 LAB
ANION GAP SERPL CALC-SCNC: 5 MMOL/L
BASOPHILS # BLD AUTO: 0.04 K/UL
BASOPHILS NFR BLD: 0.3 %
BUN SERPL-MCNC: 23 MG/DL
CALCIUM SERPL-MCNC: 7.9 MG/DL
CHLORIDE SERPL-SCNC: 112 MMOL/L
CO2 SERPL-SCNC: 25 MMOL/L
CREAT SERPL-MCNC: 1 MG/DL
DIFFERENTIAL METHOD: ABNORMAL
EOSINOPHIL # BLD AUTO: 0.5 K/UL
EOSINOPHIL NFR BLD: 3.4 %
ERYTHROCYTE [DISTWIDTH] IN BLOOD BY AUTOMATED COUNT: 13.3 %
EST. GFR  (AFRICAN AMERICAN): >60 ML/MIN/1.73 M^2
EST. GFR  (NON AFRICAN AMERICAN): >60 ML/MIN/1.73 M^2
GLUCOSE SERPL-MCNC: 119 MG/DL
HCT VFR BLD AUTO: 42.6 %
HGB BLD-MCNC: 13.9 G/DL
IMM GRANULOCYTES # BLD AUTO: 0.17 K/UL
IMM GRANULOCYTES NFR BLD AUTO: 1.1 %
LYMPHOCYTES # BLD AUTO: 0.9 K/UL
LYMPHOCYTES NFR BLD: 5.7 %
MAGNESIUM SERPL-MCNC: 2.2 MG/DL
MCH RBC QN AUTO: 30.2 PG
MCHC RBC AUTO-ENTMCNC: 32.6 G/DL
MCV RBC AUTO: 92 FL
MONOCYTES # BLD AUTO: 0.8 K/UL
MONOCYTES NFR BLD: 5.6 %
NEUTROPHILS # BLD AUTO: 12.5 K/UL
NEUTROPHILS NFR BLD: 83.9 %
NRBC BLD-RTO: 0 /100 WBC
PHOSPHATE SERPL-MCNC: 2.5 MG/DL
PLATELET # BLD AUTO: 207 K/UL
PMV BLD AUTO: 10.4 FL
POTASSIUM SERPL-SCNC: 3.6 MMOL/L
RBC # BLD AUTO: 4.61 M/UL
SODIUM SERPL-SCNC: 142 MMOL/L
WBC # BLD AUTO: 14.92 K/UL

## 2018-10-21 PROCEDURE — 25000003 PHARM REV CODE 250: Performed by: STUDENT IN AN ORGANIZED HEALTH CARE EDUCATION/TRAINING PROGRAM

## 2018-10-21 PROCEDURE — 25000003 PHARM REV CODE 250: Performed by: SURGERY

## 2018-10-21 PROCEDURE — 85025 COMPLETE CBC W/AUTO DIFF WBC: CPT

## 2018-10-21 PROCEDURE — A4217 STERILE WATER/SALINE, 500 ML: HCPCS | Performed by: STUDENT IN AN ORGANIZED HEALTH CARE EDUCATION/TRAINING PROGRAM

## 2018-10-21 PROCEDURE — 11000001 HC ACUTE MED/SURG PRIVATE ROOM

## 2018-10-21 PROCEDURE — 27000221 HC OXYGEN, UP TO 24 HOURS

## 2018-10-21 PROCEDURE — 27000646 HC AEROBIKA DEVICE

## 2018-10-21 PROCEDURE — 94664 DEMO&/EVAL PT USE INHALER: CPT

## 2018-10-21 PROCEDURE — 63600175 PHARM REV CODE 636 W HCPCS: Performed by: SURGERY

## 2018-10-21 PROCEDURE — C9113 INJ PANTOPRAZOLE SODIUM, VIA: HCPCS | Performed by: SURGERY

## 2018-10-21 PROCEDURE — S0030 INJECTION, METRONIDAZOLE: HCPCS | Performed by: SURGERY

## 2018-10-21 PROCEDURE — 63600175 PHARM REV CODE 636 W HCPCS: Performed by: STUDENT IN AN ORGANIZED HEALTH CARE EDUCATION/TRAINING PROGRAM

## 2018-10-21 PROCEDURE — 99900035 HC TECH TIME PER 15 MIN (STAT)

## 2018-10-21 PROCEDURE — B4185 PARENTERAL SOL 10 GM LIPIDS: HCPCS | Performed by: STUDENT IN AN ORGANIZED HEALTH CARE EDUCATION/TRAINING PROGRAM

## 2018-10-21 PROCEDURE — 94770 HC EXHALED C02 TEST: CPT

## 2018-10-21 PROCEDURE — 84100 ASSAY OF PHOSPHORUS: CPT

## 2018-10-21 PROCEDURE — A4216 STERILE WATER/SALINE, 10 ML: HCPCS | Performed by: SURGERY

## 2018-10-21 PROCEDURE — 94761 N-INVAS EAR/PLS OXIMETRY MLT: CPT

## 2018-10-21 PROCEDURE — 80048 BASIC METABOLIC PNL TOTAL CA: CPT

## 2018-10-21 PROCEDURE — 83735 ASSAY OF MAGNESIUM: CPT

## 2018-10-21 RX ORDER — HYDROCHLOROTHIAZIDE 12.5 MG/1
12.5 TABLET ORAL DAILY
Status: DISCONTINUED | OUTPATIENT
Start: 2018-10-21 | End: 2018-10-26 | Stop reason: HOSPADM

## 2018-10-21 RX ORDER — AMLODIPINE BESYLATE 10 MG/1
10 TABLET ORAL DAILY
Status: DISCONTINUED | OUTPATIENT
Start: 2018-10-21 | End: 2018-10-26 | Stop reason: HOSPADM

## 2018-10-21 RX ORDER — POTASSIUM CHLORIDE 7.45 MG/ML
10 INJECTION INTRAVENOUS
Status: COMPLETED | OUTPATIENT
Start: 2018-10-21 | End: 2018-10-21

## 2018-10-21 RX ADMIN — METRONIDAZOLE 500 MG: 500 INJECTION, SOLUTION INTRAVENOUS at 02:10

## 2018-10-21 RX ADMIN — POTASSIUM CHLORIDE 10 MEQ: 7.46 INJECTION, SOLUTION INTRAVENOUS at 05:10

## 2018-10-21 RX ADMIN — AMLODIPINE BESYLATE 10 MG: 10 TABLET ORAL at 09:10

## 2018-10-21 RX ADMIN — METRONIDAZOLE 500 MG: 500 INJECTION, SOLUTION INTRAVENOUS at 09:10

## 2018-10-21 RX ADMIN — METRONIDAZOLE 500 MG: 500 INJECTION, SOLUTION INTRAVENOUS at 05:10

## 2018-10-21 RX ADMIN — POTASSIUM PHOSPHATE, MONOBASIC AND POTASSIUM PHOSPHATE, DIBASIC 30 MMOL: 224; 236 INJECTION, SOLUTION, CONCENTRATE INTRAVENOUS at 05:10

## 2018-10-21 RX ADMIN — LABETALOL HYDROCHLORIDE 10 MG: 5 INJECTION, SOLUTION INTRAVENOUS at 03:10

## 2018-10-21 RX ADMIN — Medication 3 ML: at 03:10

## 2018-10-21 RX ADMIN — Medication: at 10:10

## 2018-10-21 RX ADMIN — Medication 10 ML: at 05:10

## 2018-10-21 RX ADMIN — Medication 3 ML: at 10:10

## 2018-10-21 RX ADMIN — ENOXAPARIN SODIUM 40 MG: 100 INJECTION SUBCUTANEOUS at 05:10

## 2018-10-21 RX ADMIN — CLONIDINE HYDROCHLORIDE 0.2 MG: 0.3 TABLET ORAL at 12:10

## 2018-10-21 RX ADMIN — POTASSIUM CHLORIDE 10 MEQ: 7.46 INJECTION, SOLUTION INTRAVENOUS at 03:10

## 2018-10-21 RX ADMIN — LABETALOL HYDROCHLORIDE 10 MG: 5 INJECTION, SOLUTION INTRAVENOUS at 09:10

## 2018-10-21 RX ADMIN — HYDROCHLOROTHIAZIDE 12.5 MG: 12.5 TABLET ORAL at 09:10

## 2018-10-21 RX ADMIN — POTASSIUM CHLORIDE 10 MEQ: 7.46 INJECTION, SOLUTION INTRAVENOUS at 04:10

## 2018-10-21 RX ADMIN — PANTOPRAZOLE SODIUM 40 MG: 40 INJECTION, POWDER, FOR SOLUTION INTRAVENOUS at 09:10

## 2018-10-21 RX ADMIN — SOYBEAN OIL 250 ML: 20 INJECTION, SOLUTION INTRAVENOUS at 09:10

## 2018-10-21 RX ADMIN — POTASSIUM CHLORIDE 10 MEQ: 7.46 INJECTION, SOLUTION INTRAVENOUS at 02:10

## 2018-10-21 RX ADMIN — TAMSULOSIN HYDROCHLORIDE 0.4 MG: 0.4 CAPSULE ORAL at 09:10

## 2018-10-21 RX ADMIN — CEFTRIAXONE 2 G: 2 INJECTION, SOLUTION INTRAVENOUS at 12:10

## 2018-10-21 RX ADMIN — Medication 10 ML: at 12:10

## 2018-10-21 RX ADMIN — POTASSIUM CHLORIDE 10 MEQ: 7.46 INJECTION, SOLUTION INTRAVENOUS at 01:10

## 2018-10-21 RX ADMIN — CALCIUM GLUCONATE: 94 INJECTION, SOLUTION INTRAVENOUS at 09:10

## 2018-10-21 NOTE — PROGRESS NOTES
All orders completed. Pts BP maintaining in the 200's Spoke with MD Sylvia. Will order additional PRN Labetalol. Will continue to monitor.

## 2018-10-21 NOTE — PLAN OF CARE
Problem: Fall Risk (Adult)  Intervention: Patient Rounds  Pt up in chair, ambulated in hallways this shift. NPO except swabs. Villasenor removed, urinating w/o difficulty. PRN antihypertensives given per orders. PCA in use. Patient states adequate pain control with current pain med reg. States understanding of plan of care. Denies needs at present. Safety and fall precautions maintained.

## 2018-10-21 NOTE — SUBJECTIVE & OBJECTIVE
Interval History:  No acute events overnight, afebrile, vitals stable. Denies significant pain. No nausea or emesis overnight. Reports some continued belching, no gas or BM. Only 50cc bilious output recorded out of NGT. Cr normalized, UOP adequate     Medications:  Continuous Infusions:   hydromorphone in 0.9 % NaCl 6 mg/30 ml      lactated ringers 125 mL/hr at 10/19/18 1815    TPN ADULT CENTRAL LINE CUSTOM 75 mL/hr at 10/20/18 2209    TPN ADULT CENTRAL LINE CUSTOM       Scheduled Meds:   amLODIPine  10 mg Oral Daily    cefTRIAXone (ROCEPHIN) IVPB  2 g Intravenous Q24H    enoxaparin  40 mg Subcutaneous Daily    fat emulsion 20%  250 mL Intravenous Daily    fat emulsion 20%  250 mL Intravenous Daily    hydroCHLOROthiazide  12.5 mg Oral Daily    metronidazole  500 mg Intravenous Q8H    pantoprazole  40 mg Intravenous Daily    potassium phosphate IVPB  30 mmol Intravenous Once    sodium chloride 0.9%  10 mL Intravenous Q6H    sodium chloride 0.9%  3 mL Intravenous Q8H    tamsulosin  0.4 mg Oral Daily     PRN Meds:labetalol, naloxone, naloxone, ondansetron, promethazine (PHENERGAN) IVPB, Flushing PICC Protocol **AND** sodium chloride 0.9% **AND** sodium chloride 0.9%, sodium chloride 0.9%     Review of patient's allergies indicates:  No Known Allergies  Objective:     Vital Signs (Most Recent):  Temp: 97 °F (36.1 °C) (10/21/18 0400)  Pulse: 70 (10/21/18 0707)  Resp: 18 (10/20/18 2023)  BP: (!) 183/86 (10/21/18 0400)  SpO2: (!) 93 % (10/21/18 0400) Vital Signs (24h Range):  Temp:  [96.2 °F (35.7 °C)-97.1 °F (36.2 °C)] 97 °F (36.1 °C)  Pulse:  [] 70  Resp:  [18-20] 18  SpO2:  [89 %-93 %] 93 %  BP: (135-219)/() 183/86     Weight: 99.3 kg (219 lb)  Body mass index is 28.89 kg/m².    Intake/Output - Last 3 Shifts       10/19 0700 - 10/20 0659 10/20 0700 - 10/21 0659 10/21 0700 - 10/22 0659    P.O. 0      I.V. (mL/kg) 1330 (13.3) 26 (0.3)     NG/      IV Piggyback 200       376      Total Intake(mL/kg) 2501 (25.1) 626 (6.3)     Urine (mL/kg/hr) 1300 (0.5) 1700 (0.7)     Emesis/NG output       Drains 200 50     Total Output 1500 1750     Net +1001 -1124                  Physical Exam   Constitutional: He is oriented to person, place, and time. No distress.   HENT:   Head: Normocephalic and atraumatic.   NGT in place to LIWS with dark bilious appearing output   Eyes: EOM are normal. Pupils are equal, round, and reactive to light.   Cardiovascular: Normal rate and regular rhythm.   Pulmonary/Chest: Effort normal. No respiratory distress.   Abdominal: Soft. He exhibits distension (mild). There is tenderness (appropriate).   Abdomen with mild distention, incision clean and dry   Musculoskeletal: Normal range of motion. He exhibits no edema.   Neurological: He is alert and oriented to person, place, and time.   Skin: Skin is warm and dry. He is not diaphoretic.   Psychiatric: He has a normal mood and affect. His behavior is normal.       Significant Labs:  CBC:   Recent Labs   Lab 10/20/18  2123   WBC 18.39*   RBC 4.83   HGB 14.8   HCT 44.7      MCV 93   MCH 30.6   MCHC 33.1     CMP:   Recent Labs   Lab 10/17/18  1906  10/20/18  2124   *   < > 143*   CALCIUM 10.2   < > 8.4*   ALBUMIN 3.9  --   --    PROT 7.8  --   --       < > 143   K 3.9   < > 3.2*   CO2 26   < > 23      < > 109   BUN 26*   < > 29*   CREATININE 1.6*   < > 1.3   ALKPHOS 128  --   --    ALT 32  --   --    AST 24  --   --    BILITOT 1.5*  --   --     < > = values in this interval not displayed.       Significant Diagnostics:  I have reviewed all pertinent imaging results/findings within the past 24 hours.

## 2018-10-21 NOTE — ASSESSMENT & PLAN NOTE
77 yo M with multiple previous abdominal surgeries here with possible small bowel obstruction, incidental retroperitoneal mass now s/p ex-lap 10/18 with intra-op enterotomies and primary hernia repair    -NPO, TPN  -IVFs to equal rate of 125 combined with TPN  -PCA and scheduled IV tylenol for pain control  -Cr normalized, 1.3 today  -hypophosphatemia - replete  -hypokalemia - replete  -flomax   - d/c ellis   -post-op rocephin x 4 days (stop date today)   -daily labs  -dvt ppx  -will need outpatient work up of retroperitoneal mass

## 2018-10-21 NOTE — PROGRESS NOTES
Upon assessment found pts BP to be in the 200's systolic. Pt complaining of feeling anxious and SOB. MD pennington, charge nurse called to bedside. Rapid nurse also called to evaluate pt. MD Espinal came to pts bedside, ordered LABS, stat chest xray, IV Labetalol and Clonidine SL.

## 2018-10-21 NOTE — CARE UPDATE
RN Proactive Rounding Note  Time of Visit:     Admit Date: 10/17/2018  LOS: 3  Code Status: Full Code   Date of Visit: 10/20/2018  : 1940  Age: 78 y.o.  Sex: male  Bed: 1/[facility] A:   MRN: [id number]  Was the patient discharged from an ICU this admission? no   Was the patient discharged from a PACU within last 24 hours? No  Did the patient receive conscious sedation/general anesthesia in last 24 hours? No  Was the patient in the ED within the past 24 hours? No  Was the patient started on NIPPV within the past 24 hours? No  Attending Physician: Rancho Mariee MD  Primary Service: Networked reference to record PCT       ASSESSMENT:     Abnormal Vital Signs: SBP in 190s-200s  Clinical Issues: Circulatory     INTERVENTIONS/ RECOMMENDATIONS:     Call received from charge RNPraneeth, regarding patient's elevated SBP. Upon arrival to room, patient sitting up in bed; NAD noted. AAOx4; no endorsement of pain, chest pain, SOB, or dizziness at this time. HR 78, SpO2 93% on 3L NC, RR 19, and SBP ranging from 190s-200s. MD Espinal at bedside. Per patient and spouse, patient does not have a history of hypertension. Of note, home medication list includes both Amlodipine and Losartan; no blood pressure medications currently scheduled on patient's MAR.     Orders obtained for Labetalol IVP, Clonidine SL, BMP, CBC, and CXR. RRT RN to continue to closely monitor.    Discussed plan of care with bedside RNRosa; charge Praneeth BRENNAN, and MD Teddy.    PHYSICIAN ESCALATION:     Yes/No Yes    Orders received and case discussed with Dr Espinal     Disposition: Remain in room 541A at this time    FOLLOW-UP/CONTINGENCY:       Call back the Rapid Response Nurse at x [phone]  for additional questions or concerns

## 2018-10-21 NOTE — PROGRESS NOTES
[facility]  General Surgery  Progress Note    Subjective:     History of Present Illness:  77 yo M with h/o radical prostatectomy 2010 (surgery complicated by ?bowel injury, pt reports 9 surgeries including bowel resection) and ventral incisional hernia presented to OSH with severe abdominal pain. Mostly above umbilicus and on the right side of abdomen. Pain is improved now. No vomiting. Has passed some gas since onset of pain. No BM for two days. Denies hx previous small bowel obstructions. Denies weight loss.      Patient has had multiple ED visits lately for other problems, and was found to have an enlarging retroperitoneal mass with concern for malignancy. Recent PSA normal.     Post-Op Info:  Procedure(s) (LRB):  LAPAROTOMY, EXPLORATORY (N/A)  REPAIR, HERNIA, INCISIONAL -multiple (N/A)  LYSIS, ADHESIONS (N/A)   3 Days Post-Op     Interval History:  No acute events overnight, afebrile, vitals stable. Denies significant pain. No nausea or emesis overnight. Reports some continued belching, no gas or BM. Only  50cc bilious output recorded out of NGT. Cr normalized, UOP adequate     Medications:  Continuous Infusions:   hydromorphone in 0.9 % NaCl 6 mg/30 ml      lactated ringers 125 mL/hr at 10/19/18 1815    TPN ADULT CENTRAL LINE CUSTOM 75 mL/hr at 10/20/18 2209    TPN ADULT CENTRAL LINE CUSTOM       Scheduled Meds:   amLODIPine  10 mg Oral Daily    cefTRIAXone (ROCEPHIN) IVPB  2 g Intravenous Q24H    enoxaparin  40 mg Subcutaneous Daily    fat emulsion 20%  250 mL Intravenous Daily    fat emulsion 20%  250 mL Intravenous Daily    hydroCHLOROthiazide  12.5 mg Oral Daily    metronidazole  500 mg Intravenous Q8H    pantoprazole  40 mg Intravenous Daily    potassium phosphate IVPB  30 mmol Intravenous Once    sodium chloride 0.9%  10 mL Intravenous Q6H    sodium chloride 0.9%  3 mL Intravenous Q8H    tamsulosin  0.4 mg Oral Daily     PRN Meds:labetalol, naloxone, naloxone,  ondansetron, promethazine (PHENERGAN) IVPB, Flushing PICC Protocol **AND** sodium chloride 0.9% **AND** sodium chloride 0.9%, sodium chloride 0.9%     Review of patient's allergies indicates:  No Known Allergies  Objective:     Vital Signs (Most Recent):  Temp: 97 °F (36.1 °C) (10/21/18 0400)  Pulse: 70 (10/21/18 0707)  Resp: 18 (10/20/18 2023)  BP: (!) 183/86 (10/21/18 0400)  SpO2: (!) 93 % (10/21/18 0400) Vital Signs (24h Range):  Temp:  [96.2 °F (35.7 °C)-97.1 °F (36.2 °C)] 97 °F (36.1 °C)  Pulse:  [] 70  Resp:  [18-20] 18  SpO2:  [89 %-93 %] 93 %  BP: (135-219)/() 183/86     Weight: 99.3 kg (219 lb)  Body mass index is 28.89 kg/m².    Intake/Output - Last 3 Shifts       10/19 0700 - 10/20 0659 10/20 0700 - 10/21 0659 10/21 0700 - 10/22 0659    P.O. 0      I.V. (mL/kg) 1330 (13.3) 26 (0.3)     NG/      IV Piggyback 200       600     Total Intake(mL/kg) 2501 (25.1) 626 (6.3)     Urine (mL/kg/hr) 1300 (0.5) 1700 (0.7)     Emesis/NG output       Drains 200 50     Total Output 1500 1750     Net +1001 -1124                  Physical Exam   Constitutional: He is oriented to person, place, and time. No distress.   HENT:   Head: Normocephalic and atraumatic.   NGT in place to LIWS with dark bilious appearing output   Eyes: EOM are normal. Pupils are equal, round, and reactive to light.   Cardiovascular: Normal rate and regular rhythm.   Pulmonary/Chest: Effort normal. No respiratory distress.   Abdominal: Soft. He exhibits distension (mild). There is tenderness (appropriate).   Abdomen with mild distention, incision clean and dry   Musculoskeletal: Normal range of motion. He exhibits no edema.   Neurological: He is alert and oriented to person, place, and time.   Skin: Skin is warm and dry. He is not diaphoretic.   Psychiatric: He has a normal mood and affect. His behavior is normal.       Significant Labs:  CBC:   Recent Labs   Lab 10/20/18  2123   WBC 18.39*   RBC 4.83   HGB 14.8   HCT 44.7       MCV 93   MCH 30.6   MCHC 33.1     CMP:   Recent Labs   Lab 10/17/18  1906  10/20/18  2124   *   < > 143*   CALCIUM 10.2   < > 8.4*   ALBUMIN 3.9  --   --    PROT 7.8  --   --       < > 143   K 3.9   < > 3.2*   CO2 26   < > 23      < > 109   BUN 26*   < > 29*   CREATININE 1.6*   < > 1.3   ALKPHOS 128  --   --    ALT 32  --   --    AST 24  --   --    BILITOT 1.5*  --   --     < > = values in this interval not displayed.       Significant Diagnostics:  I have reviewed all pertinent imaging results/findings within the past 24 hours.    Assessment/Plan:     * Small bowel obstruction    79 yo M with multiple previous abdominal surgeries here with possible small bowel obstruction, incidental retroperitoneal mass now s/p ex-lap 10/18 with intra-op enterotomies and primary hernia repair    -NPO, TPN  -IVFs to equal rate of 125 combined with TPN  -PCA and scheduled IV tylenol for pain control  -Cr normalized, 1.3 today  -hypophosphatemia - replete  -hypokalemia - replete  -flomax   - d/c ellis   -post-op rocephin x 4 days (stop date today)   -daily labs  -dvt ppx  -will need outpatient work up of retroperitoneal mass         Michelle Andino MD  General Surgery  [facility]

## 2018-10-22 LAB
ANION GAP SERPL CALC-SCNC: 9 MMOL/L
BASOPHILS # BLD AUTO: 0.06 K/UL
BASOPHILS NFR BLD: 0.5 %
BUN SERPL-MCNC: 18 MG/DL
CALCIUM SERPL-MCNC: 8.1 MG/DL
CHLORIDE SERPL-SCNC: 105 MMOL/L
CO2 SERPL-SCNC: 23 MMOL/L
CREAT SERPL-MCNC: 0.9 MG/DL
DIFFERENTIAL METHOD: ABNORMAL
EOSINOPHIL # BLD AUTO: 0.6 K/UL
EOSINOPHIL NFR BLD: 5 %
ERYTHROCYTE [DISTWIDTH] IN BLOOD BY AUTOMATED COUNT: 13.2 %
EST. GFR  (AFRICAN AMERICAN): >60 ML/MIN/1.73 M^2
EST. GFR  (NON AFRICAN AMERICAN): >60 ML/MIN/1.73 M^2
GLUCOSE SERPL-MCNC: 131 MG/DL
HCT VFR BLD AUTO: 43.2 %
HGB BLD-MCNC: 14.4 G/DL
IMM GRANULOCYTES # BLD AUTO: 0.15 K/UL
IMM GRANULOCYTES NFR BLD AUTO: 1.3 %
LYMPHOCYTES # BLD AUTO: 1 K/UL
LYMPHOCYTES NFR BLD: 8.6 %
MAGNESIUM SERPL-MCNC: 2.3 MG/DL
MCH RBC QN AUTO: 29.9 PG
MCHC RBC AUTO-ENTMCNC: 33.3 G/DL
MCV RBC AUTO: 90 FL
MONOCYTES # BLD AUTO: 0.9 K/UL
MONOCYTES NFR BLD: 8.3 %
NEUTROPHILS # BLD AUTO: 8.7 K/UL
NEUTROPHILS NFR BLD: 76.3 %
NRBC BLD-RTO: 0 /100 WBC
PHOSPHATE SERPL-MCNC: 2.9 MG/DL
PLATELET # BLD AUTO: 199 K/UL
PMV BLD AUTO: 10.7 FL
POTASSIUM SERPL-SCNC: 3.6 MMOL/L
RBC # BLD AUTO: 4.82 M/UL
SODIUM SERPL-SCNC: 137 MMOL/L
WBC # BLD AUTO: 11.39 K/UL

## 2018-10-22 PROCEDURE — 94799 UNLISTED PULMONARY SVC/PX: CPT

## 2018-10-22 PROCEDURE — 94770 HC EXHALED C02 TEST: CPT

## 2018-10-22 PROCEDURE — 25000003 PHARM REV CODE 250: Performed by: STUDENT IN AN ORGANIZED HEALTH CARE EDUCATION/TRAINING PROGRAM

## 2018-10-22 PROCEDURE — 99900035 HC TECH TIME PER 15 MIN (STAT)

## 2018-10-22 PROCEDURE — 80048 BASIC METABOLIC PNL TOTAL CA: CPT

## 2018-10-22 PROCEDURE — 63600175 PHARM REV CODE 636 W HCPCS: Performed by: SURGERY

## 2018-10-22 PROCEDURE — 94761 N-INVAS EAR/PLS OXIMETRY MLT: CPT

## 2018-10-22 PROCEDURE — A4216 STERILE WATER/SALINE, 10 ML: HCPCS | Performed by: SURGERY

## 2018-10-22 PROCEDURE — 85025 COMPLETE CBC W/AUTO DIFF WBC: CPT

## 2018-10-22 PROCEDURE — 25000003 PHARM REV CODE 250: Performed by: SURGERY

## 2018-10-22 PROCEDURE — 94664 DEMO&/EVAL PT USE INHALER: CPT

## 2018-10-22 PROCEDURE — 27000221 HC OXYGEN, UP TO 24 HOURS

## 2018-10-22 PROCEDURE — 11000001 HC ACUTE MED/SURG PRIVATE ROOM

## 2018-10-22 PROCEDURE — 84100 ASSAY OF PHOSPHORUS: CPT

## 2018-10-22 PROCEDURE — 27000646 HC AEROBIKA DEVICE

## 2018-10-22 PROCEDURE — 63600175 PHARM REV CODE 636 W HCPCS: Performed by: STUDENT IN AN ORGANIZED HEALTH CARE EDUCATION/TRAINING PROGRAM

## 2018-10-22 PROCEDURE — B4185 PARENTERAL SOL 10 GM LIPIDS: HCPCS | Performed by: STUDENT IN AN ORGANIZED HEALTH CARE EDUCATION/TRAINING PROGRAM

## 2018-10-22 PROCEDURE — A4217 STERILE WATER/SALINE, 500 ML: HCPCS | Performed by: STUDENT IN AN ORGANIZED HEALTH CARE EDUCATION/TRAINING PROGRAM

## 2018-10-22 PROCEDURE — 83735 ASSAY OF MAGNESIUM: CPT

## 2018-10-22 PROCEDURE — S0030 INJECTION, METRONIDAZOLE: HCPCS | Performed by: SURGERY

## 2018-10-22 PROCEDURE — C9113 INJ PANTOPRAZOLE SODIUM, VIA: HCPCS | Performed by: SURGERY

## 2018-10-22 RX ADMIN — METRONIDAZOLE 500 MG: 500 INJECTION, SOLUTION INTRAVENOUS at 11:10

## 2018-10-22 RX ADMIN — METRONIDAZOLE 500 MG: 500 INJECTION, SOLUTION INTRAVENOUS at 03:10

## 2018-10-22 RX ADMIN — LABETALOL HYDROCHLORIDE 10 MG: 5 INJECTION, SOLUTION INTRAVENOUS at 12:10

## 2018-10-22 RX ADMIN — TAMSULOSIN HYDROCHLORIDE 0.4 MG: 0.4 CAPSULE ORAL at 08:10

## 2018-10-22 RX ADMIN — PANTOPRAZOLE SODIUM 40 MG: 40 INJECTION, POWDER, FOR SOLUTION INTRAVENOUS at 08:10

## 2018-10-22 RX ADMIN — LABETALOL HYDROCHLORIDE 10 MG: 5 INJECTION, SOLUTION INTRAVENOUS at 10:10

## 2018-10-22 RX ADMIN — Medication 10 ML: at 12:10

## 2018-10-22 RX ADMIN — SOYBEAN OIL 250 ML: 20 INJECTION, SOLUTION INTRAVENOUS at 09:10

## 2018-10-22 RX ADMIN — AMLODIPINE BESYLATE 10 MG: 10 TABLET ORAL at 08:10

## 2018-10-22 RX ADMIN — HYDROCHLOROTHIAZIDE 12.5 MG: 12.5 TABLET ORAL at 08:10

## 2018-10-22 RX ADMIN — Medication 10 ML: at 06:10

## 2018-10-22 RX ADMIN — CALCIUM GLUCONATE: 94 INJECTION, SOLUTION INTRAVENOUS at 09:10

## 2018-10-22 RX ADMIN — LABETALOL HYDROCHLORIDE 10 MG: 5 INJECTION, SOLUTION INTRAVENOUS at 05:10

## 2018-10-22 RX ADMIN — Medication 3 ML: at 10:10

## 2018-10-22 RX ADMIN — ENOXAPARIN SODIUM 40 MG: 100 INJECTION SUBCUTANEOUS at 05:10

## 2018-10-22 NOTE — ASSESSMENT & PLAN NOTE
77 yo M with multiple previous abdominal surgeries here with possible small bowel obstruction, incidental retroperitoneal mass now s/p ex-lap 10/18 with intra-op enterotomies and primary hernia repair    -NPO, TPN  -d/c NGT, possible sips of clears later today  -IVFs to equal rate of 125 combined with TPN  -PCA and scheduled IV tylenol for pain control  -Cr normalized, 0.9 today  -hypophosphatemia - replete  -hypokalemia - replete  -flomax  -post-op rocephin x 4 days - now complete as of 10/21   -daily labs  -dvt ppx  -will need outpatient work up of retroperitoneal mass

## 2018-10-22 NOTE — PROGRESS NOTES
[facility]  General Surgery  Progress Note    Subjective:     History of Present Illness:  77 yo M with h/o radical prostatectomy 2010 (surgery complicated by ?bowel injury, pt reports 9 surgeries including bowel resection) and ventral incisional hernia presented to OSH with severe abdominal pain. Mostly above umbilicus and on the right side of abdomen. Pain is improved now. No vomiting. Has passed some gas since onset of pain. No BM for two days. Denies hx previous small bowel obstructions. Denies weight loss.      Patient has had multiple ED visits lately for other problems, and was found to have an enlarging retroperitoneal mass with concern for malignancy. Recent PSA normal.     Post-Op Info:  Procedure(s) (LRB):  LAPAROTOMY, EXPLORATORY (N/A)  REPAIR, HERNIA, INCISIONAL -multiple (N/A)  LYSIS, ADHESIONS (N/A)   4 Days Post-Op     Interval History:  No acute events overnight, afebrile, vitals stable. Denies significant pain. No nausea or emesis overnight. Reports some continued belching, no gas or BM. Only 220cc bilious output recorded out of NGT. Cr normalized, UOP excellent    Medications:  Continuous Infusions:   hydromorphone in 0.9 % NaCl 6 mg/30 ml      lactated ringers 50 mL/hr at 10/21/18 0913    TPN ADULT CENTRAL LINE CUSTOM 75 mL/hr at 10/21/18 2122    TPN ADULT CENTRAL LINE CUSTOM       Scheduled Meds:   amLODIPine  10 mg Oral Daily    enoxaparin  40 mg Subcutaneous Daily    fat emulsion 20%  250 mL Intravenous Daily    fat emulsion 20%  250 mL Intravenous Daily    hydroCHLOROthiazide  12.5 mg Oral Daily    metronidazole  500 mg Intravenous Q8H    pantoprazole  40 mg Intravenous Daily    potassium phosphate IVPB  30 mmol Intravenous Once    sodium chloride 0.9%  10 mL Intravenous Q6H    sodium chloride 0.9%  3 mL Intravenous Q8H    tamsulosin  0.4 mg Oral Daily     PRN Meds:labetalol, naloxone, naloxone, ondansetron, promethazine (PHENERGAN) IVPB, Flushing PICC  Protocol **AND** sodium chloride 0.9% **AND** sodium chloride 0.9%, sodium chloride 0.9%     Review of patient's allergies indicates:  No Known Allergies  Objective:     Vital Signs (Most Recent):  Temp: 96.6 °F (35.9 °C) (10/22/18 0504)  Pulse: 80 (10/22/18 0504)  Resp: 18 (10/22/18 0504)  BP: (!) 172/88 (10/22/18 0546)  SpO2: 95 % (10/22/18 0504) Vital Signs (24h Range):  Temp:  [96 °F (35.6 °C)-97.7 °F (36.5 °C)] 96.6 °F (35.9 °C)  Pulse:  [68-90] 80  Resp:  [16-18] 18  SpO2:  [92 %-95 %] 95 %  BP: (168-214)/() 172/88     Weight: 99.3 kg (219 lb)  Body mass index is 28.89 kg/m².    Intake/Output - Last 3 Shifts       10/20 0700 - 10/21 0659 10/21 0700 - 10/22 0659    P.O.  0    I.V. (mL/kg) 26 (0.3)          Total Intake(mL/kg) 626 (6.3) 0 (0)    Urine (mL/kg/hr) 1700 (0.7) 3400 (1.4)    Drains 50 220    Total Output 1750 3620    Net -9317 -2284                Physical Exam   Constitutional: He is oriented to person, place, and time. No distress.   HENT:   Head: Normocephalic and atraumatic.   NGT in place to LIWS with dark bilious appearing output   Eyes: EOM are normal. Pupils are equal, round, and reactive to light.   Cardiovascular: Normal rate and regular rhythm.   Pulmonary/Chest: Effort normal. No respiratory distress.   Abdominal: Soft. He exhibits distension (mild). There is tenderness (appropriate).   Abdomen with improving distention, incision clean and dry   Musculoskeletal: Normal range of motion. He exhibits no edema.   Neurological: He is alert and oriented to person, place, and time.   Skin: Skin is warm and dry. He is not diaphoretic.   Psychiatric: He has a normal mood and affect. His behavior is normal.       Significant Labs:  CBC:   Recent Labs   Lab 10/22/18  0412   WBC 11.39   RBC 4.82   HGB 14.4   HCT 43.2      MCV 90   MCH 29.9   MCHC 33.3     CMP:   Recent Labs   Lab 10/17/18  1906  10/22/18  0412   *   < > 131*   CALCIUM 10.2   < > 8.1*   ALBUMIN 3.9  --   --     PROT 7.8  --   --       < > 137   K 3.9   < > 3.6   CO2 26   < > 23      < > 105   BUN 26*   < > 18   CREATININE 1.6*   < > 0.9   ALKPHOS 128  --   --    ALT 32  --   --    AST 24  --   --    BILITOT 1.5*  --   --     < > = values in this interval not displayed.       Significant Diagnostics:  I have reviewed all pertinent imaging results/findings within the past 24 hours.    Assessment/Plan:     * Small bowel obstruction    79 yo M with multiple previous abdominal surgeries here with possible small bowel obstruction, incidental retroperitoneal mass now s/p ex-lap 10/18 with intra-op enterotomies and primary hernia repair    -NPO, TPN  -d/c NGT, possible sips of clears later today  -IVFs to equal rate of 125 combined with TPN  -PCA and scheduled IV tylenol for pain control  -Cr normalized, 0.9 today  -hypophosphatemia - replete  -hypokalemia - replete  -flomax  -post-op rocephin x 4 days - now complete as of 10/21   -daily labs  -dvt ppx  -will need outpatient work up of retroperitoneal mass         Michelle Andino MD  General Surgery  [facility]

## 2018-10-22 NOTE — SUBJECTIVE & OBJECTIVE
Interval History:  No acute events overnight, afebrile, vitals stable. Denies significant pain. No nausea or emesis overnight. Reports some continued belching, no gas or BM. Only 220cc bilious output recorded out of NGT. Cr normalized, UOP excellent    Medications:  Continuous Infusions:   hydromorphone in 0.9 % NaCl 6 mg/30 ml      lactated ringers 50 mL/hr at 10/21/18 0913    TPN ADULT CENTRAL LINE CUSTOM 75 mL/hr at 10/21/18 2122    TPN ADULT CENTRAL LINE CUSTOM       Scheduled Meds:   amLODIPine  10 mg Oral Daily    enoxaparin  40 mg Subcutaneous Daily    fat emulsion 20%  250 mL Intravenous Daily    fat emulsion 20%  250 mL Intravenous Daily    hydroCHLOROthiazide  12.5 mg Oral Daily    metronidazole  500 mg Intravenous Q8H    pantoprazole  40 mg Intravenous Daily    potassium phosphate IVPB  30 mmol Intravenous Once    sodium chloride 0.9%  10 mL Intravenous Q6H    sodium chloride 0.9%  3 mL Intravenous Q8H    tamsulosin  0.4 mg Oral Daily     PRN Meds:labetalol, naloxone, naloxone, ondansetron, promethazine (PHENERGAN) IVPB, Flushing PICC Protocol **AND** sodium chloride 0.9% **AND** sodium chloride 0.9%, sodium chloride 0.9%     Review of patient's allergies indicates:  No Known Allergies  Objective:     Vital Signs (Most Recent):  Temp: 96.6 °F (35.9 °C) (10/22/18 0504)  Pulse: 80 (10/22/18 0504)  Resp: 18 (10/22/18 0504)  BP: (!) 172/88 (10/22/18 0546)  SpO2: 95 % (10/22/18 0504) Vital Signs (24h Range):  Temp:  [96 °F (35.6 °C)-97.7 °F (36.5 °C)] 96.6 °F (35.9 °C)  Pulse:  [68-90] 80  Resp:  [16-18] 18  SpO2:  [92 %-95 %] 95 %  BP: (168-214)/() 172/88     Weight: 99.3 kg (219 lb)  Body mass index is 28.89 kg/m².    Intake/Output - Last 3 Shifts       10/20 0700 - 10/21 0659 10/21 0700 - 10/22 0659    P.O.  0    I.V. (mL/kg) 26 (0.3)          Total Intake(mL/kg) 626 (6.3) 0 (0)    Urine (mL/kg/hr) 1700 (0.7) 3400 (1.4)    Drains 50 220    Total Output 1750 3620    Net -1124  -3620                Physical Exam   Constitutional: He is oriented to person, place, and time. No distress.   HENT:   Head: Normocephalic and atraumatic.   NGT in place to LIWS with dark bilious appearing output   Eyes: EOM are normal. Pupils are equal, round, and reactive to light.   Cardiovascular: Normal rate and regular rhythm.   Pulmonary/Chest: Effort normal. No respiratory distress.   Abdominal: Soft. He exhibits distension (mild). There is tenderness (appropriate).   Abdomen with improving distention, incision clean and dry   Musculoskeletal: Normal range of motion. He exhibits no edema.   Neurological: He is alert and oriented to person, place, and time.   Skin: Skin is warm and dry. He is not diaphoretic.   Psychiatric: He has a normal mood and affect. His behavior is normal.       Significant Labs:  CBC:   Recent Labs   Lab 10/22/18  0412   WBC 11.39   RBC 4.82   HGB 14.4   HCT 43.2      MCV 90   MCH 29.9   MCHC 33.3     CMP:   Recent Labs   Lab 10/17/18  1906  10/22/18  0412   *   < > 131*   CALCIUM 10.2   < > 8.1*   ALBUMIN 3.9  --   --    PROT 7.8  --   --       < > 137   K 3.9   < > 3.6   CO2 26   < > 23      < > 105   BUN 26*   < > 18   CREATININE 1.6*   < > 0.9   ALKPHOS 128  --   --    ALT 32  --   --    AST 24  --   --    BILITOT 1.5*  --   --     < > = values in this interval not displayed.       Significant Diagnostics:  I have reviewed all pertinent imaging results/findings within the past 24 hours.

## 2018-10-23 LAB
ANION GAP SERPL CALC-SCNC: 8 MMOL/L
BASOPHILS # BLD AUTO: 0.09 K/UL
BASOPHILS NFR BLD: 0.8 %
BUN SERPL-MCNC: 18 MG/DL
CALCIUM SERPL-MCNC: 8.2 MG/DL
CHLORIDE SERPL-SCNC: 103 MMOL/L
CO2 SERPL-SCNC: 24 MMOL/L
CREAT SERPL-MCNC: 0.9 MG/DL
DIFFERENTIAL METHOD: ABNORMAL
EOSINOPHIL # BLD AUTO: 0.7 K/UL
EOSINOPHIL NFR BLD: 6.5 %
ERYTHROCYTE [DISTWIDTH] IN BLOOD BY AUTOMATED COUNT: 13 %
EST. GFR  (AFRICAN AMERICAN): >60 ML/MIN/1.73 M^2
EST. GFR  (NON AFRICAN AMERICAN): >60 ML/MIN/1.73 M^2
GLUCOSE SERPL-MCNC: 122 MG/DL
HCT VFR BLD AUTO: 43.7 %
HGB BLD-MCNC: 14.6 G/DL
IMM GRANULOCYTES # BLD AUTO: 0.36 K/UL
IMM GRANULOCYTES NFR BLD AUTO: 3.2 %
LYMPHOCYTES # BLD AUTO: 1.1 K/UL
LYMPHOCYTES NFR BLD: 9.3 %
MAGNESIUM SERPL-MCNC: 2.2 MG/DL
MCH RBC QN AUTO: 30.3 PG
MCHC RBC AUTO-ENTMCNC: 33.4 G/DL
MCV RBC AUTO: 91 FL
MONOCYTES # BLD AUTO: 1.5 K/UL
MONOCYTES NFR BLD: 12.8 %
NEUTROPHILS # BLD AUTO: 7.6 K/UL
NEUTROPHILS NFR BLD: 67.4 %
NRBC BLD-RTO: 0 /100 WBC
PHOSPHATE SERPL-MCNC: 3.8 MG/DL
PLATELET # BLD AUTO: 203 K/UL
PMV BLD AUTO: 10.7 FL
POTASSIUM SERPL-SCNC: 3.5 MMOL/L
RBC # BLD AUTO: 4.82 M/UL
SODIUM SERPL-SCNC: 135 MMOL/L
TRIGL SERPL-MCNC: 223 MG/DL
WBC # BLD AUTO: 11.33 K/UL

## 2018-10-23 PROCEDURE — 97535 SELF CARE MNGMENT TRAINING: CPT

## 2018-10-23 PROCEDURE — 94761 N-INVAS EAR/PLS OXIMETRY MLT: CPT

## 2018-10-23 PROCEDURE — 99900035 HC TECH TIME PER 15 MIN (STAT)

## 2018-10-23 PROCEDURE — 80048 BASIC METABOLIC PNL TOTAL CA: CPT

## 2018-10-23 PROCEDURE — 83735 ASSAY OF MAGNESIUM: CPT

## 2018-10-23 PROCEDURE — A4216 STERILE WATER/SALINE, 10 ML: HCPCS | Performed by: SURGERY

## 2018-10-23 PROCEDURE — 94664 DEMO&/EVAL PT USE INHALER: CPT

## 2018-10-23 PROCEDURE — 25000003 PHARM REV CODE 250: Performed by: SURGERY

## 2018-10-23 PROCEDURE — 63600175 PHARM REV CODE 636 W HCPCS: Performed by: SURGERY

## 2018-10-23 PROCEDURE — 85025 COMPLETE CBC W/AUTO DIFF WBC: CPT

## 2018-10-23 PROCEDURE — C9113 INJ PANTOPRAZOLE SODIUM, VIA: HCPCS | Performed by: SURGERY

## 2018-10-23 PROCEDURE — 84100 ASSAY OF PHOSPHORUS: CPT

## 2018-10-23 PROCEDURE — 25000003 PHARM REV CODE 250: Performed by: STUDENT IN AN ORGANIZED HEALTH CARE EDUCATION/TRAINING PROGRAM

## 2018-10-23 PROCEDURE — 84478 ASSAY OF TRIGLYCERIDES: CPT

## 2018-10-23 PROCEDURE — 11000001 HC ACUTE MED/SURG PRIVATE ROOM

## 2018-10-23 RX ORDER — POTASSIUM CHLORIDE 750 MG/1
50 CAPSULE, EXTENDED RELEASE ORAL ONCE
Status: COMPLETED | OUTPATIENT
Start: 2018-10-23 | End: 2018-10-23

## 2018-10-23 RX ORDER — OXYCODONE AND ACETAMINOPHEN 10; 325 MG/1; MG/1
1 TABLET ORAL EVERY 4 HOURS PRN
Status: DISCONTINUED | OUTPATIENT
Start: 2018-10-23 | End: 2018-10-26 | Stop reason: HOSPADM

## 2018-10-23 RX ORDER — LOSARTAN POTASSIUM 50 MG/1
100 TABLET ORAL DAILY
Status: DISCONTINUED | OUTPATIENT
Start: 2018-10-23 | End: 2018-10-26 | Stop reason: HOSPADM

## 2018-10-23 RX ORDER — OXYCODONE AND ACETAMINOPHEN 5; 325 MG/1; MG/1
1 TABLET ORAL EVERY 4 HOURS PRN
Status: DISCONTINUED | OUTPATIENT
Start: 2018-10-23 | End: 2018-10-26 | Stop reason: HOSPADM

## 2018-10-23 RX ADMIN — LOSARTAN POTASSIUM 100 MG: 50 TABLET, FILM COATED ORAL at 08:10

## 2018-10-23 RX ADMIN — Medication 10 ML: at 05:10

## 2018-10-23 RX ADMIN — OXYCODONE HYDROCHLORIDE AND ACETAMINOPHEN 1 TABLET: 10; 325 TABLET ORAL at 06:10

## 2018-10-23 RX ADMIN — HYDROCHLOROTHIAZIDE 12.5 MG: 12.5 TABLET ORAL at 08:10

## 2018-10-23 RX ADMIN — ENOXAPARIN SODIUM 40 MG: 100 INJECTION SUBCUTANEOUS at 05:10

## 2018-10-23 RX ADMIN — POTASSIUM CHLORIDE 50 MEQ: 750 CAPSULE, EXTENDED RELEASE ORAL at 08:10

## 2018-10-23 RX ADMIN — Medication 10 ML: at 12:10

## 2018-10-23 RX ADMIN — Medication: at 12:10

## 2018-10-23 RX ADMIN — Medication 10 ML: at 09:10

## 2018-10-23 RX ADMIN — LABETALOL HYDROCHLORIDE 10 MG: 5 INJECTION, SOLUTION INTRAVENOUS at 05:10

## 2018-10-23 RX ADMIN — Medication 3 ML: at 03:10

## 2018-10-23 RX ADMIN — AMLODIPINE BESYLATE 10 MG: 10 TABLET ORAL at 08:10

## 2018-10-23 RX ADMIN — PANTOPRAZOLE SODIUM 40 MG: 40 INJECTION, POWDER, FOR SOLUTION INTRAVENOUS at 08:10

## 2018-10-23 RX ADMIN — LABETALOL HYDROCHLORIDE 10 MG: 5 INJECTION, SOLUTION INTRAVENOUS at 10:10

## 2018-10-23 RX ADMIN — TAMSULOSIN HYDROCHLORIDE 0.4 MG: 0.4 CAPSULE ORAL at 08:10

## 2018-10-23 NOTE — PT/OT/SLP PROGRESS
"Occupational Therapy   Treatment    Name: Yobani Rios  MRN: [id number]  Admitting Diagnosis:  Small bowel obstruction  5 Days Post-Op    Recommendations:     Discharge Recommendations: home health OT  Discharge Equipment Recommendations:  bedside commode, shower chair  Barriers to discharge:  Inaccessible home environment    Subjective     Communicated with: MIKA Adams prior to session. Pt agreeable to OT session. "I've been in this chair all day."  Pain/Comfort:  · Pain Rating 1: 0/10  · Pain Rating Post-Intervention 1: 0/10  · Pain Rating Post-Intervention 2: (pt did not rate)    Patients cultural, spiritual, Protestant conflicts given the current situation: none stated    Objective:     Patient found with: SCD, peripheral IV, PCA, oxygen    General Precautions: Standard, fall   Orthopedic Precautions:N/A   Braces: N/A     Occupational Performance:    Bed Mobility:    · Patient completed Sit to Supine with stand by assistance     Functional Mobility/Transfers:  · Patient completed Sit <> Stand Transfer with stand by assistance  with  no assistive device   · Patient completed Bed <> Chair Transfer using Step Transfer technique with stand by assistance with rolling walker  · Functional Mobility: Pt ambulated ~250ft with RW requiring SBA for line management. No LOB noted. 1 verbal cue to maintain upright posture required. No RBs required    Activities of Daily Living:  · Grooming: stand by assistance for dental hygiene standing at sink with RW  · Upper Body Dressing: stand by assistance to don gown as jacket in standing  · Toileting: stand by assistance for clothing management and wiping in standing    Patient left supine with all lines intact, call button in reach, RN  notified and Wife present    [facility] 6 Click:  [facility] Total Score: 21    Treatment & Education:  Educated patient on the following:  - OT POC  - Functional mobility safety  - Functional transfer safety  - Self care independence  - Postural control  - " Pursed lip breathing during functional activities   - Progress made thus far  Education:    Assessment:     Yobani Rios is a 78 y.o. male with a medical diagnosis of Small bowel obstruction.  He presents with improved self care performance, endurance, and functional mobility. Pt progressing well in therapy. Pt tolerated tx session well with no complaints of pain.  After transferring from bathroom to the bed, pt displayed slight fatigue with increased shortness of breath. Pt recovered with pursed lip breathing. Pt will continue to benefit from skilled OT to increase their self care and functional independence. Performance deficits affecting function are weakness, impaired endurance, gait instability, impaired functional mobilty, impaired balance, impaired cardiopulmonary response to activity.      Rehab Prognosis:  Good; patient would benefit from acute skilled OT services to address these deficits and reach maximum level of function.       Plan:     Patient to be seen 4 x/week to address the above listed problems via self-care/home management, therapeutic activities, therapeutic exercises  · Plan of Care Expires: 11/17/18  · Plan of Care Reviewed with: patient, spouse    This Plan of care has been discussed with the patient who was involved in its development and understands and is in agreement with the identified goals and treatment plan    GOALS:   Multidisciplinary Problems     Occupational Therapy Goals        Problem: Occupational Therapy Goal    Goal Priority Disciplines Outcome Interventions   Occupational Therapy Goal     OT, PT/OT Ongoing (interventions implemented as appropriate)    Description:  Goals to be met by: 10/26/18    Patient will increase functional independence with ADLs by performing:    UE Dressing with Modified [place].  LE Dressing with Supervision.  Grooming while standing at sink with Supervision.- Progressing  Toileting from toilet with Stand-by Assistance for hygiene and  clothing management. Met on 10/23/18  Supine to sit with Modified [place].  Step transfer with Supervision- Progressing  Toilet transfer to toilet with Supervision.- Progressing  Upper extremity exercise program x15 reps per handout, with independence.                       Time Tracking:     OT Date of Treatment: 10/23/18  OT Start Time: 1440  OT Stop Time: 1504  OT Total Time (min): 24 min    Billable Minutes:Self Care/Home Management 24    Kallie Flower, OT  10/23/2018

## 2018-10-23 NOTE — PLAN OF CARE
Problem: Patient Care Overview  Goal: Plan of Care Review  Outcome: Ongoing (interventions implemented as appropriate)  Pt lying in bed calmly, VSS ex BP, pt worked with PT in PM after BP was under control, PCA d/c, TPN slowed to 38 ml/hr, bed in low locked position, side rails up x2, fall precautions maintained, wife at bedside will continue to monitor.

## 2018-10-23 NOTE — SUBJECTIVE & OBJECTIVE
Interval History: No acute events overnight, afebrile, vitals stable. Denies significant pain. Tolerated clears without any nausea or emesis overnight. Continues to pass gas, no BM. UOP remains adequate.     Medications:  Continuous Infusions:   TPN ADULT CENTRAL LINE CUSTOM 75 mL/hr at 10/22/18 2126     Scheduled Meds:   amLODIPine  10 mg Oral Daily    enoxaparin  40 mg Subcutaneous Daily    fat emulsion 20%  250 mL Intravenous Daily    hydroCHLOROthiazide  12.5 mg Oral Daily    losartan  100 mg Oral Daily    pantoprazole  40 mg Intravenous Daily    potassium phosphate IVPB  30 mmol Intravenous Once    sodium chloride 0.9%  10 mL Intravenous Q6H    sodium chloride 0.9%  3 mL Intravenous Q8H    tamsulosin  0.4 mg Oral Daily     PRN Meds:labetalol, naloxone, ondansetron, oxyCODONE-acetaminophen, oxyCODONE-acetaminophen, promethazine (PHENERGAN) IVPB, Flushing PICC Protocol **AND** sodium chloride 0.9% **AND** sodium chloride 0.9%, sodium chloride 0.9%     Review of patient's allergies indicates:  No Known Allergies  Objective:     Vital Signs (Most Recent):  Temp: 96.2 °F (35.7 °C) (10/23/18 0421)  Pulse: 67 (10/23/18 0530)  Resp: 18 (10/23/18 0530)  BP: (!) 171/82 (10/23/18 0530)  SpO2: (!) 92 % (10/23/18 0530) Vital Signs (24h Range):  Temp:  [96.2 °F (35.7 °C)-97.2 °F (36.2 °C)] 96.2 °F (35.7 °C)  Pulse:  [58-83] 67  Resp:  [18-20] 18  SpO2:  [91 %-94 %] 92 %  BP: (169-195)/(78-91) 171/82     Weight: 99.3 kg (219 lb)  Body mass index is 28.89 kg/m².    Intake/Output - Last 3 Shifts       10/21 0700 - 10/22 0659 10/22 0700 - 10/23 0659    P.O. 0 125    Total Intake(mL/kg) 0 (0) 125 (1.3)    Urine (mL/kg/hr) 3400 (1.4) 1075 (0.5)    Drains 220     Total Output 3620 1075    Net -3626 -519                Physical Exam   Constitutional: He is oriented to person, place, and time. No distress.   HENT:   Head: Normocephalic and atraumatic.   Eyes: EOM are normal. Pupils are equal, round, and reactive to light.    Cardiovascular: Normal rate and regular rhythm.   Pulmonary/Chest: Effort normal. No respiratory distress.   Abdominal: Soft. He exhibits no distension. There is tenderness (appropriate).   Abdomen with improving distention, incision clean and dry   Musculoskeletal: Normal range of motion. He exhibits no edema.   Neurological: He is alert and oriented to person, place, and time.   Skin: Skin is warm and dry. He is not diaphoretic.   Psychiatric: He has a normal mood and affect. His behavior is normal.       Significant Labs:  CBC:   Recent Labs   Lab 10/23/18  0427   WBC 11.33   RBC 4.82   HGB 14.6   HCT 43.7      MCV 91   MCH 30.3   MCHC 33.4     CMP:   Recent Labs   Lab 10/17/18  1906  10/23/18  0427   *   < > 122*   CALCIUM 10.2   < > 8.2*   ALBUMIN 3.9  --   --    PROT 7.8  --   --       < > 135*   K 3.9   < > 3.5   CO2 26   < > 24      < > 103   BUN 26*   < > 18   CREATININE 1.6*   < > 0.9   ALKPHOS 128  --   --    ALT 32  --   --    AST 24  --   --    BILITOT 1.5*  --   --     < > = values in this interval not displayed.       Significant Diagnostics:  I have reviewed and interpreted all pertinent imaging results/findings within the past 24 hours.

## 2018-10-23 NOTE — PLAN OF CARE
Problem: Occupational Therapy Goal  Goal: Occupational Therapy Goal  Goals to be met by: 10/26/18    Patient will increase functional independence with ADLs by performing:    UE Dressing with Modified [place].  LE Dressing with Supervision.  Grooming while standing at sink with Supervision.- Progressing  Toileting from toilet with Stand-by Assistance for hygiene and clothing management. Met on 10/23/18  Supine to sit with Modified [place].  Step transfer with Supervision- Progressing  Toilet transfer to toilet with Supervision.- Progressing  Upper extremity exercise program x15 reps per handout, with independence.     Outcome: Ongoing (interventions implemented as appropriate)  Will continue plan of care by assisting patient reach their goals and increase their functional independence.      Comments: Kallie Flower OTR/L

## 2018-10-23 NOTE — PT/OT/SLP PROGRESS
Occupational Therapy      Patient Name:  Yobani Rios   MRN:  [id number]    Patient not seen today secondary to Other (Comment). Attempted pt in the AM, RN requested to hold OT services due to patient's high blood pressure of 211/95.  Will follow-up in the PM.     Kallie Flower OT  10/23/2018

## 2018-10-23 NOTE — PROGRESS NOTES
[facility]  General Surgery  Progress Note    Subjective:     History of Present Illness:  79 yo M with h/o radical prostatectomy 2010 (surgery complicated by ?bowel injury, pt reports 9 surgeries including bowel resection) and ventral incisional hernia presented to OSH with severe abdominal pain. Mostly above umbilicus and on the right side of abdomen. Pain is improved now. No vomiting. Has passed some gas since onset of pain. No BM for two days. Denies hx previous small bowel obstructions. Denies weight loss.      Patient has had multiple ED visits lately for other problems, and was found to have an enlarging retroperitoneal mass with concern for malignancy. Recent PSA normal.     Post-Op Info:  Procedure(s) (LRB):  LAPAROTOMY, EXPLORATORY (N/A)  REPAIR, HERNIA, INCISIONAL -multiple (N/A)  LYSIS, ADHESIONS (N/A)   5 Days Post-Op     Interval History: No acute events overnight, afebrile, vitals stable. Denies significant pain. Tolerated clears without any nausea or emesis overnight. Continues to pass gas, no BM. UOP remains adequate.     Medications:  Continuous Infusions:   TPN ADULT CENTRAL LINE CUSTOM 75 mL/hr at 10/22/18 2126     Scheduled Meds:   amLODIPine  10 mg Oral Daily    enoxaparin  40 mg Subcutaneous Daily    fat emulsion 20%  250 mL Intravenous Daily    hydroCHLOROthiazide  12.5 mg Oral Daily    losartan  100 mg Oral Daily    pantoprazole  40 mg Intravenous Daily    potassium phosphate IVPB  30 mmol Intravenous Once    sodium chloride 0.9%  10 mL Intravenous Q6H    sodium chloride 0.9%  3 mL Intravenous Q8H    tamsulosin  0.4 mg Oral Daily     PRN Meds:labetalol, naloxone, ondansetron, oxyCODONE-acetaminophen, oxyCODONE-acetaminophen, promethazine (PHENERGAN) IVPB, Flushing PICC Protocol **AND** sodium chloride 0.9% **AND** sodium chloride 0.9%, sodium chloride 0.9%     Review of patient's allergies indicates:  No Known Allergies  Objective:     Vital Signs (Most  Recent):  Temp: 96.2 °F (35.7 °C) (10/23/18 0421)  Pulse: 67 (10/23/18 0530)  Resp: 18 (10/23/18 0530)  BP: (!) 171/82 (10/23/18 0530)  SpO2: (!) 92 % (10/23/18 0530) Vital Signs (24h Range):  Temp:  [96.2 °F (35.7 °C)-97.2 °F (36.2 °C)] 96.2 °F (35.7 °C)  Pulse:  [58-83] 67  Resp:  [18-20] 18  SpO2:  [91 %-94 %] 92 %  BP: (169-195)/(78-91) 171/82     Weight: 99.3 kg (219 lb)  Body mass index is 28.89 kg/m².    Intake/Output - Last 3 Shifts       10/21 0700 - 10/22 0659 10/22 0700 - 10/23 0659    P.O. 0 125    Total Intake(mL/kg) 0 (0) 125 (1.3)    Urine (mL/kg/hr) 3400 (1.4) 1075 (0.5)    Drains 220     Total Output 3620 1075    Net -3620 -950                Physical Exam   Constitutional: He is oriented to person, place, and time. No distress.   HENT:   Head: Normocephalic and atraumatic.   Eyes: EOM are normal. Pupils are equal, round, and reactive to light.   Cardiovascular: Normal rate and regular rhythm.   Pulmonary/Chest: Effort normal. No respiratory distress.   Abdominal: Soft. He exhibits no distension. There is tenderness (appropriate).   Abdomen with improving distention, incision clean and dry   Musculoskeletal: Normal range of motion. He exhibits no edema.   Neurological: He is alert and oriented to person, place, and time.   Skin: Skin is warm and dry. He is not diaphoretic.   Psychiatric: He has a normal mood and affect. His behavior is normal.       Significant Labs:  CBC:   Recent Labs   Lab 10/23/18  0427   WBC 11.33   RBC 4.82   HGB 14.6   HCT 43.7      MCV 91   MCH 30.3   MCHC 33.4     CMP:   Recent Labs   Lab 10/17/18  1906  10/23/18  0427   *   < > 122*   CALCIUM 10.2   < > 8.2*   ALBUMIN 3.9  --   --    PROT 7.8  --   --       < > 135*   K 3.9   < > 3.5   CO2 26   < > 24      < > 103   BUN 26*   < > 18   CREATININE 1.6*   < > 0.9   ALKPHOS 128  --   --    ALT 32  --   --    AST 24  --   --    BILITOT 1.5*  --   --     < > = values in this interval not displayed.        Significant Diagnostics:  I have reviewed and interpreted all pertinent imaging results/findings within the past 24 hours.    Assessment/Plan:     * Small bowel obstruction    77 yo M with multiple previous abdominal surgeries here with possible small bowel obstruction, incidental retroperitoneal mass now s/p ex-lap 10/18 with intra-op enterotomies and primary hernia repair    -Regular diet today  -TPN to run out today, will not re-order  -PO pain meds prn today  -Cr normalized, 0.9 today  -hypokalemia - replete  -flomax  -post-op rocephin x 4 days - now complete as of 10/21   -daily labs  -dvt ppx  -will need outpatient work up of retroperitoneal mass  -possible d/c home later today vs. tomorrow if pain controlled and having bowel function         Michelle Andino MD  General Surgery  [facility]

## 2018-10-23 NOTE — PLAN OF CARE
Visited patient. AAOX4, sitting up in recliner at . Spouse on sofa at . See below.        10/23/18 1330   Discharge Reassessment   Assessment Type Discharge Planning Reassessment   Provided patient/caregiver education on the expected discharge date and the discharge plan Yes  (D/c date per MD, pending able to tolerate advancing diet, & having bowel function return. D/c plan: PT/OT recs from few days ago, HH w/Home DME rec: [facility], shower chair. Informed patient & spouse. Patient wants HH. He declines needing home DME. SW, updated)   Do you have any problems affording any of your prescribed medications? No   Discharge Plan A Home with family;Home Health   Discharge Plan B Home Health;Home with family   Patient choice form signed by patient/caregiver N/A   Anticipated Discharge Disposition Home-Health   Can the patient answer the patient profile reliably? Yes, cognitively intact   How does the patient rate their overall health at the present time? Good   Describe the patient's ability to walk at the present time. Minor restrictions or changes   How often would a person be available to care for the patient? Whenever needed

## 2018-10-23 NOTE — ASSESSMENT & PLAN NOTE
79 yo M with multiple previous abdominal surgeries here with possible small bowel obstruction, incidental retroperitoneal mass now s/p ex-lap 10/18 with intra-op enterotomies and primary hernia repair    -Regular diet today  -TPN to run out today, will not re-order  -PO pain meds prn today  -Cr normalized, 0.9 today  -hypokalemia - replete  -flomax  -post-op rocephin x 4 days - now complete as of 10/21   -daily labs  -dvt ppx  -will need outpatient work up of retroperitoneal mass  -possible d/c home later today vs. tomorrow if pain controlled and having bowel function

## 2018-10-23 NOTE — PLAN OF CARE
SUSAN was notified by CM, Lyssa Zamora, that Pt was requesting home health, which therapy also recommended. Pt's insurance, [employer], requires that home health be arranged via a third party called [employer]. SUSAN faxed ([phone]) Pt's face sheet, H&P and orders to [facility] and asked that request be expedited. SW to continue to follow.     Natasha Braga, [facility]     Update: SUSAN was informed by [facility] that they no longer managed [facility]. SUSAN sent all necessary referral information to three different home health agencies in [facility] asking if they accepted Pt's insurance. SW to continue to follow.     [facility] accepted Pt.

## 2018-10-23 NOTE — PLAN OF CARE
[facility]    HOME HEALTH ORDERS  FACE TO FACE ENCOUNTER    Patient Name: Yobani Rios  YOB: 1940    PCP: Laney Nice MD   PCP Address: Yessy HINOJOSA [facility] / [place] *  PCP Phone Number: [phone]  PCP Fax: [phone]    Encounter Date: 10/23/2018    Admit to Home Health    Diagnoses:  Active Hospital Problems    Diagnosis  POA    *Small bowel obstruction [K56.609]  Yes      Resolved Hospital Problems   No resolved problems to display.       No future appointments.  Follow-up Information     Rancho Mariee MD In 2 weeks.    Specialty:  General Surgery  Contact information:  Kavin [address] [address]  [facility] [id number] [phone]             will need outpatient work up of retroperitoneal mass.                   I have seen and examined this patient face to face today. My clinical findings that support the need for the home health skilled services and home bound status are the following:  Weakness/numbness causing balance and gait disturbance due to Surgery making it taxing to leave home.    Allergies:Review of patient's allergies indicates:  No Known Allergies    Diet: regular diet    Activities: no heavy lifting for 6 weeks    Nursing:   SN to complete comprehensive assessment including routine vital signs. Instruct on disease process and s/s of complications to report to MD. Review/verify medication list sent home with the patient at time of discharge  and instruct patient/caregiver as needed. Frequency may be adjusted depending on start of care date.    Notify MD if SBP > 160 or < 90; DBP > 90 or < 50; HR > 120 or < 50; Temp > 101;     CONSULTS:    Physical Therapy to evaluate and treat. Evaluate for home safety and equipment needs; Establish/upgrade home exercise program. Perform / instruct on therapeutic exercises, gait training, transfer training, and Range of Motion.  Occupational Therapy to evaluate and treat. Evaluate home  environment for safety and equipment needs. Perform/Instruct on transfers, ADL training, ROM, and therapeutic exercises.    MISCELLANEOUS CARE:  N/A    WOUND CARE ORDERS  n/a      Medications: Review discharge medications with patient and family and provide education.      Current Discharge Medication List      CONTINUE these medications which have NOT CHANGED    Details   amLODIPine (NORVASC) 10 MG tablet       B-complex with vitamin C tablet Take 1 tablet by mouth once daily.        calcium carbonate (OS-CARA) 600 mg (1,500 mg) Tab Take 600 mg by mouth 2 (two) times daily with meals.        docusate sodium (COLACE) 100 MG capsule Take 1 capsule (100 mg total) by mouth 2 (two) times daily.  Qty: 60 capsule, Refills: 0    Associated Diagnoses: Retroperitoneal mass      fish oil-omega-3 fatty acids 300-1,000 mg capsule Take 1 g by mouth once daily.        HYDROcodone-acetaminophen (NORCO) 7.5-325 mg per tablet Take 1 tablet by mouth every 4 (four) hours as needed for Pain.  Qty: 18 tablet, Refills: 0    Associated Diagnoses: Retroperitoneal mass      losartan-hydrochlorothiazide (HYZAAR) 100-12.5 mg per tablet Take 1 tablet by mouth once daily.        pravastatin (PRAVACHOL) 20 MG tablet       vitamin D 185 MG Tab Take 185 mg by mouth once daily.               I certify that this patient is confined to his home and needs physical therapy and occupational therapy.

## 2018-10-24 ENCOUNTER — TELEPHONE (OUTPATIENT)
Dept: UROLOGY | Facility: CLINIC | Age: 78
End: 2018-10-24

## 2018-10-24 LAB
ANION GAP SERPL CALC-SCNC: 8 MMOL/L
APTT BLDCRRT: 25.7 SEC
BASOPHILS # BLD AUTO: 0.08 K/UL
BASOPHILS NFR BLD: 0.5 %
BUN SERPL-MCNC: 21 MG/DL
CALCIUM SERPL-MCNC: 8.6 MG/DL
CHLORIDE SERPL-SCNC: 103 MMOL/L
CO2 SERPL-SCNC: 23 MMOL/L
CREAT SERPL-MCNC: 1 MG/DL
DIFFERENTIAL METHOD: ABNORMAL
EOSINOPHIL # BLD AUTO: 0.6 K/UL
EOSINOPHIL NFR BLD: 3.2 %
ERYTHROCYTE [DISTWIDTH] IN BLOOD BY AUTOMATED COUNT: 13 %
EST. GFR  (AFRICAN AMERICAN): >60 ML/MIN/1.73 M^2
EST. GFR  (NON AFRICAN AMERICAN): >60 ML/MIN/1.73 M^2
GLUCOSE SERPL-MCNC: 122 MG/DL
HCT VFR BLD AUTO: 45.9 %
HGB BLD-MCNC: 15.5 G/DL
IMM GRANULOCYTES # BLD AUTO: 0.8 K/UL
IMM GRANULOCYTES NFR BLD AUTO: 4.6 %
LYMPHOCYTES # BLD AUTO: 1.1 K/UL
LYMPHOCYTES NFR BLD: 6.2 %
MAGNESIUM SERPL-MCNC: 2.3 MG/DL
MCH RBC QN AUTO: 29.7 PG
MCHC RBC AUTO-ENTMCNC: 33.8 G/DL
MCV RBC AUTO: 88 FL
MONOCYTES # BLD AUTO: 1.3 K/UL
MONOCYTES NFR BLD: 7.6 %
NEUTROPHILS # BLD AUTO: 13.5 K/UL
NEUTROPHILS NFR BLD: 77.9 %
NRBC BLD-RTO: 0 /100 WBC
PHOSPHATE SERPL-MCNC: 3.3 MG/DL
PLATELET # BLD AUTO: 236 K/UL
PMV BLD AUTO: 10.4 FL
POTASSIUM SERPL-SCNC: 4 MMOL/L
RBC # BLD AUTO: 5.22 M/UL
SODIUM SERPL-SCNC: 134 MMOL/L
WBC # BLD AUTO: 17.36 K/UL

## 2018-10-24 PROCEDURE — G8988 SELF CARE GOAL STATUS: HCPCS | Mod: CI

## 2018-10-24 PROCEDURE — 97535 SELF CARE MNGMENT TRAINING: CPT

## 2018-10-24 PROCEDURE — 84100 ASSAY OF PHOSPHORUS: CPT

## 2018-10-24 PROCEDURE — 25000003 PHARM REV CODE 250: Performed by: STUDENT IN AN ORGANIZED HEALTH CARE EDUCATION/TRAINING PROGRAM

## 2018-10-24 PROCEDURE — 25000003 PHARM REV CODE 250: Performed by: SURGERY

## 2018-10-24 PROCEDURE — 94664 DEMO&/EVAL PT USE INHALER: CPT

## 2018-10-24 PROCEDURE — 83735 ASSAY OF MAGNESIUM: CPT

## 2018-10-24 PROCEDURE — 25500020 PHARM REV CODE 255: Performed by: SURGERY

## 2018-10-24 PROCEDURE — 36415 COLL VENOUS BLD VENIPUNCTURE: CPT

## 2018-10-24 PROCEDURE — 99900035 HC TECH TIME PER 15 MIN (STAT)

## 2018-10-24 PROCEDURE — 27000646 HC AEROBIKA DEVICE

## 2018-10-24 PROCEDURE — 11000001 HC ACUTE MED/SURG PRIVATE ROOM

## 2018-10-24 PROCEDURE — 94761 N-INVAS EAR/PLS OXIMETRY MLT: CPT

## 2018-10-24 PROCEDURE — C9113 INJ PANTOPRAZOLE SODIUM, VIA: HCPCS | Performed by: SURGERY

## 2018-10-24 PROCEDURE — A4216 STERILE WATER/SALINE, 10 ML: HCPCS | Performed by: SURGERY

## 2018-10-24 PROCEDURE — 80048 BASIC METABOLIC PNL TOTAL CA: CPT

## 2018-10-24 PROCEDURE — 63600175 PHARM REV CODE 636 W HCPCS: Performed by: SURGERY

## 2018-10-24 PROCEDURE — 25500020 PHARM REV CODE 255: Performed by: STUDENT IN AN ORGANIZED HEALTH CARE EDUCATION/TRAINING PROGRAM

## 2018-10-24 PROCEDURE — 85025 COMPLETE CBC W/AUTO DIFF WBC: CPT

## 2018-10-24 PROCEDURE — 27000221 HC OXYGEN, UP TO 24 HOURS

## 2018-10-24 PROCEDURE — 85730 THROMBOPLASTIN TIME PARTIAL: CPT

## 2018-10-24 PROCEDURE — 97530 THERAPEUTIC ACTIVITIES: CPT

## 2018-10-24 PROCEDURE — G8987 SELF CARE CURRENT STATUS: HCPCS | Mod: CJ

## 2018-10-24 RX ADMIN — Medication 3 ML: at 02:10

## 2018-10-24 RX ADMIN — Medication 3 ML: at 10:10

## 2018-10-24 RX ADMIN — OXYCODONE HYDROCHLORIDE AND ACETAMINOPHEN 1 TABLET: 10; 325 TABLET ORAL at 10:10

## 2018-10-24 RX ADMIN — PANTOPRAZOLE SODIUM 40 MG: 40 INJECTION, POWDER, FOR SOLUTION INTRAVENOUS at 08:10

## 2018-10-24 RX ADMIN — OXYCODONE HYDROCHLORIDE AND ACETAMINOPHEN 1 TABLET: 10; 325 TABLET ORAL at 05:10

## 2018-10-24 RX ADMIN — AMLODIPINE BESYLATE 10 MG: 10 TABLET ORAL at 08:10

## 2018-10-24 RX ADMIN — TAMSULOSIN HYDROCHLORIDE 0.4 MG: 0.4 CAPSULE ORAL at 08:10

## 2018-10-24 RX ADMIN — LOSARTAN POTASSIUM 100 MG: 50 TABLET, FILM COATED ORAL at 08:10

## 2018-10-24 RX ADMIN — ENOXAPARIN SODIUM 40 MG: 100 INJECTION SUBCUTANEOUS at 04:10

## 2018-10-24 RX ADMIN — IOHEXOL 15 ML: 350 INJECTION, SOLUTION INTRAVENOUS at 09:10

## 2018-10-24 RX ADMIN — HYDROCHLOROTHIAZIDE 12.5 MG: 12.5 TABLET ORAL at 08:10

## 2018-10-24 RX ADMIN — SODIUM CHLORIDE 1000 ML: 0.9 INJECTION, SOLUTION INTRAVENOUS at 08:10

## 2018-10-24 RX ADMIN — Medication 10 ML: at 06:10

## 2018-10-24 RX ADMIN — Medication 3 ML: at 06:10

## 2018-10-24 RX ADMIN — Medication 10 ML: at 12:10

## 2018-10-24 RX ADMIN — IOHEXOL 15 ML: 350 INJECTION, SOLUTION INTRAVENOUS at 08:10

## 2018-10-24 RX ADMIN — IOHEXOL 100 ML: 350 INJECTION, SOLUTION INTRAVENOUS at 12:10

## 2018-10-24 RX ADMIN — ONDANSETRON 4 MG: 2 INJECTION INTRAMUSCULAR; INTRAVENOUS at 05:10

## 2018-10-24 NOTE — ASSESSMENT & PLAN NOTE
77 yo M with multiple previous abdominal surgeries here with possible small bowel obstruction, incidental retroperitoneal mass now s/p ex-lap 10/18 with intra-op enterotomies and primary hernia repair    -Regular diet again today  -Will obtain CT abdomen/pelvis today given new leukocytosis to r/o intra-abdominal abscess  -PO pain meds prn today  -flomax  -post-op rocephin x 4 days - now complete as of 10/21   -daily labs  -dvt ppx  -will need outpatient work up of retroperitoneal mass  -possible d/c home later today vs. Tomorrow pending CT

## 2018-10-24 NOTE — TELEPHONE ENCOUNTER
Informed pt that I called the hospital that he is in and scheduled the MRI for tomorrow morning so he could get it done before he is discharged. Pt verbalized understanding.

## 2018-10-24 NOTE — PROGRESS NOTES
" [facility]  Adult Nutrition  Progress Note    SUMMARY       Recommendations    Recommendation/Intervention:     1. Continue Regular Diet.   2. Encourage po intake.   3. If po intake continues to be <50%, recommend adding Boost Plus with meals.   4. RD following.     Goals: meet >50% EEN/EPN via po intake  Nutrition Goal Status: new  Communication of RD Recs: (POC)    Reason for Assessment    Reason for Assessment: RD follow-up  Diagnosis: gastrointestinal disease(SBO s/p repair of incisional hernia)  Relevant Medical History: HTN, prostate ca s/p prostatectomy, multiple bowel surgeries  Interdisciplinary Rounds: attended  General Information Comments: S/p ex-lap 10/18 with intra-op enterotomies and primary hernia repair. Unable to see pt 2/2 KEVAN for procedure. TPN D/C and diet advanced yesterday to Regular. Spoke with family member - reports good appetite, but gets full fast. Denies N/V/D/C. NFPE completed 10/20.   Nutrition Discharge Planning: adequate po intake    Nutrition Risk Screen    Nutrition Risk Screen: no indicators present    Nutrition/Diet History    Patient Reported Diet/Restrictions/Preferences: general  Do you have any cultural, spiritual, Temple conflicts, given your current situation?: none stated  Factors Affecting Nutritional Intake: early satiety    Anthropometrics    Temp: 98 °F (36.7 °C)  Height Method: Stated  Height: 6' 1" (185.4 cm)  Height (inches): 73 in  Weight Method: Bed Scale  Weight: 99.3 kg (219 lb)  Weight (lb): 219 lb  Ideal Body Weight (IBW), Male: 184 lb  % Ideal Body Weight, Male (lb): 119.57 lb  BMI (Calculated): 29.1  BMI Grade: 25 - 29.9 - overweight     Lab/Procedures/Meds    Pertinent Labs Reviewed: reviewed  Pertinent Labs Comments: Na 134, glucose 122  Pertinent Medications Reviewed: reviewed  Pertinent Medications Comments: amlodipine, losartan     Physical Findings/Assessment    Overall Physical Appearance: nourished, overweight  Tubes: other " (see comments)  Skin: incision(s)    Estimated/Assessed Needs    Weight Used For Calorie Calculations: 99.8 kg (220 lb 0.3 oz)  Energy Calorie Requirements (kcal): 2215  Energy Need Method: [place]-St Jeor(x 1.25 (PAL))  Protein Requirements: 110-120 gm (1.1-1.3 gm/kg)  Weight Used For Protein Calculations: 99.8 kg (220 lb 0.3 oz)  Fluid Requirements (mL): per MD     RDA Method (mL): 2215     Nutrition Prescription Ordered    Current Diet Order: Regular     Evaluation of Received Nutrient/Fluid Intake    I/O: -4.7L since admit  Comments: LBM 10/24  Tolerance: tolerating  % Intake of Estimated Energy Needs: 0 - 25 %  % Meal Intake: 0 - 25 %    Nutrition Risk    Level of Risk/Frequency of Follow-up: low(f/u 1 x wk)     Assessment and Plan    Nutrition Problem  Inadequate Oral Intake     Related to (etiology):   Early satiety     Signs and Symptoms (as evidenced by):   Pt eating 0-25% of meals.      Nutrition Diagnosis Status:   New      Monitor and Evaluation    Food and Nutrient Intake: energy intake, food and beverage intake  Food and Nutrient Adminstration: diet order  Physical Activity and Function: nutrition-related ADLs and IADLs  Anthropometric Measurements: weight, weight change, body mass index  Biochemical Data, Medical Tests and Procedures: electrolyte and renal panel, gastrointestinal profile, glucose/endocrine profile, inflammatory profile, lipid profile  Nutrition-Focused Physical Findings: overall appearance     Nutrition Follow-Up    RD Follow-up?: Yes

## 2018-10-24 NOTE — PT/OT/SLP PROGRESS
Occupational Therapy   Treatment    Name: Yobani Rios  MRN: [id number]  Admitting Diagnosis:  Small bowel obstruction  6 Days Post-Op    Recommendations:     Discharge Recommendations: home health OT  Discharge Equipment Recommendations:  bedside commode, shower chair  Barriers to discharge:  Inaccessible home environment    Subjective     Communicated with: RN prior to session.  Pain/Comfort:  · Pain Rating 1: 4/10  · Location - Side 1: Right  · Location 1: flank  · Pain Addressed 1: Reposition, Distraction, Cessation of Activity  · Pain Rating Post-Intervention 1: 4/10    Patients cultural, spiritual, [employer] conflicts given the current situation: none stated    Objective:     Patient found with: SCD, peripheral IV, oxygen    General Precautions: Standard, fall   Orthopedic Precautions:N/A   Braces: N/A     Occupational Performance:    Bed Mobility:    · Patient completed Supine to Sit with stand by assistance and with side rail  · Patient completed Sit to Supine with stand by assistance and with side rail     Functional Mobility/Transfers:  · Patient completed Sit <> Stand Transfer with stand by assistance  with  rolling walker   · Functional Mobility: pt ambulate 250 ft with SBA using RW and with 2L O2 in tow    Activities of Daily Living:  · Upper Body Dressing: minimum assistance to don backward gown while seated EOB 2/2 lines  · Lower Body Dressing: setup assistance to don L sock while seated EOB using figure-four position  · Toileting: stand by assistance for voiding in standing    Patient left HOB elevated with all lines intact, call button in reach and s/o present    AMPAC 6 Click:  AMPAC Total Score: 22    Treatment & Education:  Pt educated on role of OT/POC  Pt educated on importance of ambulation/UIC  White board/communication board updated  Education:    Assessment:     Yobani Rios is a 78 y.o. male with a medical diagnosis of Small bowel obstruction.  He presents with pain and fatigue limiting  participation in functional mobility and self care.  Performance deficits affecting function are weakness, impaired endurance, gait instability, impaired functional mobilty, impaired self care skills, impaired balance, pain.      Rehab Prognosis:  Good; patient would benefit from acute skilled OT services to address these deficits and reach maximum level of function.       Plan:     Patient to be seen 4 x/week to address the above listed problems via self-care/home management, therapeutic activities, therapeutic exercises  · Plan of Care Expires: 11/17/18  · Plan of Care Reviewed with: patient    This Plan of care has been discussed with the patient who was involved in its development and understands and is in agreement with the identified goals and treatment plan    GOALS:   Multidisciplinary Problems     Occupational Therapy Goals        Problem: Occupational Therapy Goal    Goal Priority Disciplines Outcome Interventions   Occupational Therapy Goal     OT, PT/OT Ongoing (interventions implemented as appropriate)    Description:  Goals to be met by: 10/26/18    Patient will increase functional independence with ADLs by performing:    UE Dressing with Modified [place].  LE Dressing with Supervision.  Grooming while standing at sink with Supervision.- Progressing  Toileting from toilet with Stand-by Assistance for hygiene and clothing management. Met on 10/23/18  Supine to sit with Modified [place].  Step transfer with Supervision- Progressing  Toilet transfer to toilet with Supervision.- Progressing  Upper extremity exercise program x15 reps per handout, with independence.                       Time Tracking:     OT Date of Treatment: 10/24/18  OT Start Time: 0933  OT Stop Time: 0956  OT Total Time (min): 23 min    Billable Minutes:Self Care/Home Management 10  Therapeutic Activity 13    Pamela Christy OT  10/24/2018

## 2018-10-24 NOTE — PLAN OF CARE
Problem: Patient Care Overview  Goal: Plan of Care Review  Outcome: Ongoing (interventions implemented as appropriate)  Pt is lying in bed comfortably, pain is under control, pt worked with PT and tolerated it well, VSS, on 3L O2 NC, wife at bedside, no reports of pain, no N/V, will continue to monitor.

## 2018-10-24 NOTE — PLAN OF CARE
Problem: Occupational Therapy Goal  Goal: Occupational Therapy Goal  Goals to be met by: 10/26/18    Patient will increase functional independence with ADLs by performing:    UE Dressing with Modified [place].  LE Dressing with Supervision.  Grooming while standing at sink with Supervision.- Progressing  Toileting from toilet with Stand-by Assistance for hygiene and clothing management. Met on 10/23/18  Supine to sit with Modified [place].  Step transfer with Supervision- Progressing  Toilet transfer to toilet with Supervision.- Progressing  Upper extremity exercise program x15 reps per handout, with independence.      Outcome: Ongoing (interventions implemented as appropriate)  Pt progressing well toward remaining goals    Comments: Continue OT MARCIN Christy OT  10/24/2018

## 2018-10-24 NOTE — TELEPHONE ENCOUNTER
Called [facility] and gave them the Auth number for his MRI. Called pt and notified him. Pt states he has been in [facility] for a week now but will go get it done when he gets home.

## 2018-10-24 NOTE — PLAN OF CARE
Problem: Patient Care Overview  Goal: Plan of Care Review    Recommendations    Recommendation/Intervention:     1. Continue Regular Diet.   2. Encourage po intake.   3. If po intake continues to be <50%, recommend adding Boost Plus with meals.   4. RD following.     Goals: meet >50% EEN/EPN via po intake  Nutrition Goal Status: new

## 2018-10-24 NOTE — SUBJECTIVE & OBJECTIVE
Interval History: No acute events overnight, afebrile, vitals stable. Continues to deny significant pain. Tolerated regular diet without any nausea or emesis overnight. Continues to pass gas, no BM. UOP remains adequate. WBC 17 this morning.     Medications:  Continuous Infusions:  Scheduled Meds:   amLODIPine  10 mg Oral Daily    enoxaparin  40 mg Subcutaneous Daily    hydroCHLOROthiazide  12.5 mg Oral Daily    losartan  100 mg Oral Daily    pantoprazole  40 mg Intravenous Daily    potassium phosphate IVPB  30 mmol Intravenous Once    sodium chloride 0.9%  10 mL Intravenous Q6H    sodium chloride 0.9%  3 mL Intravenous Q8H    tamsulosin  0.4 mg Oral Daily     PRN Meds:labetalol, naloxone, omnipaque, ondansetron, oxyCODONE-acetaminophen, oxyCODONE-acetaminophen, promethazine (PHENERGAN) IVPB, Flushing PICC Protocol **AND** sodium chloride 0.9% **AND** sodium chloride 0.9%, sodium chloride 0.9%     Review of patient's allergies indicates:  No Known Allergies  Objective:     Vital Signs (Most Recent):  Temp: 98 °F (36.7 °C) (10/24/18 0816)  Pulse: 76 (10/24/18 0816)  Resp: 16 (10/24/18 0816)  BP: (!) 170/81 (10/24/18 0816)  SpO2: (!) 93 % (10/24/18 0816) Vital Signs (24h Range):  Temp:  [96.2 °F (35.7 °C)-98.2 °F (36.8 °C)] 98 °F (36.7 °C)  Pulse:  [64-98] 76  Resp:  [16-20] 16  SpO2:  [89 %-94 %] 93 %  BP: (101-171)/(63-90) 170/81     Weight: 99.3 kg (219 lb)  Body mass index is 28.89 kg/m².    Intake/Output - Last 3 Shifts       10/22 0700 - 10/23 0659 10/23 0700 - 10/24 0659 10/24 0700 - 10/25 0659    P.O. 125 300     TPN  900     Total Intake(mL/kg) 125 (1.3) 1200 (12.1)     Urine (mL/kg/hr) 1075 (0.5) 600 (0.3)     Drains       Total Output 1075 600     Net -950 +600                  Physical Exam   Constitutional: He is oriented to person, place, and time. No distress.   HENT:   Head: Normocephalic and atraumatic.   Eyes: EOM are normal. Pupils are equal, round, and reactive to light.   Cardiovascular:  Normal rate and regular rhythm.   Pulmonary/Chest: Effort normal. No respiratory distress.   Abdominal: Soft. He exhibits no distension. There is tenderness (appropriate).   Abdomen with improving distention, incision clean and dry   Musculoskeletal: Normal range of motion. He exhibits no edema.   Neurological: He is alert and oriented to person, place, and time.   Skin: Skin is warm and dry. He is not diaphoretic.   Psychiatric: He has a normal mood and affect. His behavior is normal.       Significant Labs:  CBC:   Recent Labs   Lab 10/24/18  0354   WBC 17.36*   RBC 5.22   HGB 15.5   HCT 45.9      MCV 88   MCH 29.7   MCHC 33.8     CMP:   Recent Labs   Lab 10/17/18  1906  10/24/18  0354   *   < > 122*   CALCIUM 10.2   < > 8.6*   ALBUMIN 3.9  --   --    PROT 7.8  --   --       < > 134*   K 3.9   < > 4.0   CO2 26   < > 23      < > 103   BUN 26*   < > 21   CREATININE 1.6*   < > 1.0   ALKPHOS 128  --   --    ALT 32  --   --    AST 24  --   --    BILITOT 1.5*  --   --     < > = values in this interval not displayed.       Significant Diagnostics:  I have reviewed all pertinent imaging results/findings within the past 24 hours.

## 2018-10-24 NOTE — PROGRESS NOTES
[facility]  General Surgery  Progress Note    Subjective:     History of Present Illness:  79 yo M with h/o radical prostatectomy 2010 (surgery complicated by ?bowel injury, pt reports 9 surgeries including bowel resection) and ventral incisional hernia presented to OSH with severe abdominal pain. Mostly above umbilicus and on the right side of abdomen. Pain is improved now. No vomiting. Has passed some gas since onset of pain. No BM for two days. Denies hx previous small bowel obstructions. Denies weight loss.      Patient has had multiple ED visits lately for other problems, and was found to have an enlarging retroperitoneal mass with concern for malignancy. Recent PSA normal.     Post-Op Info:  Procedure(s) (LRB):  LAPAROTOMY, EXPLORATORY (N/A)  REPAIR, HERNIA, INCISIONAL -multiple (N/A)  LYSIS, ADHESIONS (N/A)   6 Days Post-Op     Interval History: No acute events overnight, afebrile, vitals stable. Continues to deny significant pain. Tolerated  regular diet without any nausea or emesis overnight. Continues to pass gas, no BM. UOP remains adequate. WBC 17 this morning.     Medications:  Continuous Infusions:  Scheduled Meds:   amLODIPine  10 mg Oral Daily    enoxaparin  40 mg Subcutaneous Daily    hydroCHLOROthiazide  12.5 mg Oral Daily    losartan  100 mg Oral Daily    pantoprazole  40 mg Intravenous Daily    potassium phosphate IVPB  30 mmol Intravenous Once    sodium chloride 0.9%  10 mL Intravenous Q6H    sodium chloride 0.9%  3 mL Intravenous Q8H    tamsulosin  0.4 mg Oral Daily     PRN Meds:labetalol, naloxone, omnipaque, ondansetron, oxyCODONE-acetaminophen, oxyCODONE-acetaminophen, promethazine (PHENERGAN) IVPB, Flushing PICC Protocol **AND** sodium chloride 0.9% **AND** sodium chloride 0.9%, sodium chloride 0.9%     Review of patient's allergies indicates:  No Known Allergies  Objective:     Vital Signs (Most Recent):  Temp: 98 °F (36.7 °C) (10/24/18 0816)  Pulse: 76  (10/24/18 0816)  Resp: 16 (10/24/18 0816)  BP: (!) 170/81 (10/24/18 0816)  SpO2: (!) 93 % (10/24/18 0816) Vital Signs (24h Range):  Temp:  [96.2 °F (35.7 °C)-98.2 °F (36.8 °C)] 98 °F (36.7 °C)  Pulse:  [64-98] 76  Resp:  [16-20] 16  SpO2:  [89 %-94 %] 93 %  BP: (101-171)/(63-90) 170/81     Weight: 99.3 kg (219 lb)  Body mass index is 28.89 kg/m².    Intake/Output - Last 3 Shifts       10/22 0700 - 10/23 0659 10/23 0700 - 10/24 0659 10/24 0700 - 10/25 0659    P.O. 125 300     TPN  900     Total Intake(mL/kg) 125 (1.3) 1200 (12.1)     Urine (mL/kg/hr) 1075 (0.5) 600 (0.3)     Drains       Total Output 1075 600     Net -950 +600                  Physical Exam   Constitutional: He is oriented to person, place, and time. No distress.   HENT:   Head: Normocephalic and atraumatic.   Eyes: EOM are normal. Pupils are equal, round, and reactive to light.   Cardiovascular: Normal rate and regular rhythm.   Pulmonary/Chest: Effort normal. No respiratory distress.   Abdominal: Soft. He exhibits no distension. There is tenderness (appropriate).   Abdomen with improving distention, incision clean and dry   Musculoskeletal: Normal range of motion. He exhibits no edema.   Neurological: He is alert and oriented to person, place, and time.   Skin: Skin is warm and dry. He is not diaphoretic.   Psychiatric: He has a normal mood and affect. His behavior is normal.       Significant Labs:  CBC:   Recent Labs   Lab 10/24/18  0354   WBC 17.36*   RBC 5.22   HGB 15.5   HCT 45.9      MCV 88   MCH 29.7   MCHC 33.8     CMP:   Recent Labs   Lab 10/17/18  1906  10/24/18  0354   *   < > 122*   CALCIUM 10.2   < > 8.6*   ALBUMIN 3.9  --   --    PROT 7.8  --   --       < > 134*   K 3.9   < > 4.0   CO2 26   < > 23      < > 103   BUN 26*   < > 21   CREATININE 1.6*   < > 1.0   ALKPHOS 128  --   --    ALT 32  --   --    AST 24  --   --    BILITOT 1.5*  --   --     < > = values in this interval not displayed.       Significant  Diagnostics:  I have reviewed all pertinent imaging results/findings within the past 24 hours.    Assessment/Plan:     * Small bowel obstruction    79 yo M with multiple previous abdominal surgeries here with possible small bowel obstruction, incidental retroperitoneal mass now s/p ex-lap 10/18 with intra-op enterotomies and primary hernia repair    -Regular diet again today  -Will obtain CT abdomen/pelvis today given new leukocytosis to r/o intra-abdominal abscess  -PO pain meds prn today  -flomax  -post-op rocephin x 4 days - now complete as of 10/21   -daily labs  -dvt ppx  -will need outpatient work up of retroperitoneal mass  -possible d/c home later today vs. Tomorrow pending CT          Michelle Andino MD  General Surgery  [facility]

## 2018-10-25 LAB
ANION GAP SERPL CALC-SCNC: 6 MMOL/L
BASOPHILS # BLD AUTO: 0.1 K/UL
BASOPHILS NFR BLD: 0.6 %
BILIRUB UR QL STRIP: NEGATIVE
BUN SERPL-MCNC: 20 MG/DL
CALCIUM SERPL-MCNC: 8.1 MG/DL
CHLORIDE SERPL-SCNC: 103 MMOL/L
CLARITY UR REFRACT.AUTO: CLEAR
CO2 SERPL-SCNC: 25 MMOL/L
COLOR UR AUTO: YELLOW
CREAT SERPL-MCNC: 1.1 MG/DL
DIFFERENTIAL METHOD: ABNORMAL
EOSINOPHIL # BLD AUTO: 0.7 K/UL
EOSINOPHIL NFR BLD: 4.1 %
ERYTHROCYTE [DISTWIDTH] IN BLOOD BY AUTOMATED COUNT: 13.3 %
EST. GFR  (AFRICAN AMERICAN): >60 ML/MIN/1.73 M^2
EST. GFR  (NON AFRICAN AMERICAN): >60 ML/MIN/1.73 M^2
GLUCOSE SERPL-MCNC: 87 MG/DL
GLUCOSE UR QL STRIP: NEGATIVE
HCT VFR BLD AUTO: 42.1 %
HGB BLD-MCNC: 14.1 G/DL
HGB UR QL STRIP: NEGATIVE
IMM GRANULOCYTES # BLD AUTO: 0.85 K/UL
IMM GRANULOCYTES NFR BLD AUTO: 5 %
KETONES UR QL STRIP: NEGATIVE
LEUKOCYTE ESTERASE UR QL STRIP: NEGATIVE
LYMPHOCYTES # BLD AUTO: 1.2 K/UL
LYMPHOCYTES NFR BLD: 6.9 %
MAGNESIUM SERPL-MCNC: 2.2 MG/DL
MCH RBC QN AUTO: 30.6 PG
MCHC RBC AUTO-ENTMCNC: 33.5 G/DL
MCV RBC AUTO: 91 FL
MONOCYTES # BLD AUTO: 1.1 K/UL
MONOCYTES NFR BLD: 6.5 %
NEUTROPHILS # BLD AUTO: 13 K/UL
NEUTROPHILS NFR BLD: 76.9 %
NITRITE UR QL STRIP: NEGATIVE
NRBC BLD-RTO: 0 /100 WBC
PH UR STRIP: 6 [PH] (ref 5–8)
PHOSPHATE SERPL-MCNC: 3.1 MG/DL
PLATELET # BLD AUTO: 274 K/UL
PMV BLD AUTO: 10.4 FL
POTASSIUM SERPL-SCNC: 4 MMOL/L
PROT UR QL STRIP: NEGATIVE
RBC # BLD AUTO: 4.61 M/UL
SODIUM SERPL-SCNC: 134 MMOL/L
SP GR UR STRIP: 1.02 (ref 1–1.03)
URN SPEC COLLECT METH UR: NORMAL
WBC # BLD AUTO: 16.88 K/UL

## 2018-10-25 PROCEDURE — C9113 INJ PANTOPRAZOLE SODIUM, VIA: HCPCS | Performed by: SURGERY

## 2018-10-25 PROCEDURE — 25000003 PHARM REV CODE 250: Performed by: SURGERY

## 2018-10-25 PROCEDURE — A4216 STERILE WATER/SALINE, 10 ML: HCPCS | Performed by: SURGERY

## 2018-10-25 PROCEDURE — 25000003 PHARM REV CODE 250: Performed by: STUDENT IN AN ORGANIZED HEALTH CARE EDUCATION/TRAINING PROGRAM

## 2018-10-25 PROCEDURE — 97110 THERAPEUTIC EXERCISES: CPT

## 2018-10-25 PROCEDURE — 80048 BASIC METABOLIC PNL TOTAL CA: CPT

## 2018-10-25 PROCEDURE — 94664 DEMO&/EVAL PT USE INHALER: CPT

## 2018-10-25 PROCEDURE — 85027 COMPLETE CBC AUTOMATED: CPT

## 2018-10-25 PROCEDURE — 81003 URINALYSIS AUTO W/O SCOPE: CPT

## 2018-10-25 PROCEDURE — 84100 ASSAY OF PHOSPHORUS: CPT

## 2018-10-25 PROCEDURE — 99900035 HC TECH TIME PER 15 MIN (STAT)

## 2018-10-25 PROCEDURE — 97530 THERAPEUTIC ACTIVITIES: CPT

## 2018-10-25 PROCEDURE — 63600175 PHARM REV CODE 636 W HCPCS: Performed by: SURGERY

## 2018-10-25 PROCEDURE — 11000001 HC ACUTE MED/SURG PRIVATE ROOM

## 2018-10-25 PROCEDURE — 85007 BL SMEAR W/DIFF WBC COUNT: CPT

## 2018-10-25 PROCEDURE — 83735 ASSAY OF MAGNESIUM: CPT

## 2018-10-25 RX ORDER — BISACODYL 10 MG
10 SUPPOSITORY, RECTAL RECTAL DAILY
Status: DISCONTINUED | OUTPATIENT
Start: 2018-10-25 | End: 2018-10-26 | Stop reason: HOSPADM

## 2018-10-25 RX ORDER — SULFAMETHOXAZOLE AND TRIMETHOPRIM 800; 160 MG/1; MG/1
1 TABLET ORAL 2 TIMES DAILY
Status: DISCONTINUED | OUTPATIENT
Start: 2018-10-25 | End: 2018-10-26 | Stop reason: HOSPADM

## 2018-10-25 RX ADMIN — TAMSULOSIN HYDROCHLORIDE 0.4 MG: 0.4 CAPSULE ORAL at 09:10

## 2018-10-25 RX ADMIN — SULFAMETHOXAZOLE AND TRIMETHOPRIM 1 TABLET: 800; 160 TABLET ORAL at 08:10

## 2018-10-25 RX ADMIN — ENOXAPARIN SODIUM 40 MG: 100 INJECTION SUBCUTANEOUS at 04:10

## 2018-10-25 RX ADMIN — LOSARTAN POTASSIUM 100 MG: 50 TABLET, FILM COATED ORAL at 09:10

## 2018-10-25 RX ADMIN — Medication 3 ML: at 03:10

## 2018-10-25 RX ADMIN — OXYCODONE HYDROCHLORIDE AND ACETAMINOPHEN 1 TABLET: 10; 325 TABLET ORAL at 08:10

## 2018-10-25 RX ADMIN — AMLODIPINE BESYLATE 10 MG: 10 TABLET ORAL at 09:10

## 2018-10-25 RX ADMIN — PANTOPRAZOLE SODIUM 40 MG: 40 INJECTION, POWDER, FOR SOLUTION INTRAVENOUS at 09:10

## 2018-10-25 RX ADMIN — Medication 10 ML: at 06:10

## 2018-10-25 RX ADMIN — Medication 3 ML: at 10:10

## 2018-10-25 RX ADMIN — HYDROCHLOROTHIAZIDE 12.5 MG: 12.5 TABLET ORAL at 09:10

## 2018-10-25 RX ADMIN — Medication 10 ML: at 12:10

## 2018-10-25 RX ADMIN — OXYCODONE HYDROCHLORIDE AND ACETAMINOPHEN 1 TABLET: 10; 325 TABLET ORAL at 12:10

## 2018-10-25 RX ADMIN — Medication 3 ML: at 05:10

## 2018-10-25 RX ADMIN — Medication 10 ML: at 05:10

## 2018-10-25 NOTE — PLAN OF CARE
Problem: Patient Care Overview  Goal: Plan of Care Review  Outcome: Ongoing (interventions implemented as appropriate)  Pt is lying in bed comfortably, pain is under control, pt worked with PT and tolerated it well, VSS, wife at bedside, no reports of pain, no N/V, fall precautions maintained, safety precautions WDL, will continue to monitor.

## 2018-10-25 NOTE — PROGRESS NOTES
[facility]  General Surgery  Progress Note    Subjective:     History of Present Illness:  77 yo M with h/o radical prostatectomy 2010 (surgery complicated by ?bowel injury, pt reports 9 surgeries including bowel resection) and ventral incisional hernia presented to OSH with severe abdominal pain. Mostly above umbilicus and on the right side of abdomen. Pain is improved now. No vomiting. Has passed some gas since onset of pain. No BM for two days. Denies hx previous small bowel obstructions. Denies weight loss.      Patient has had multiple ED visits lately for other problems, and was found to have an enlarging retroperitoneal mass with concern for malignancy. Recent PSA normal.     Post-Op Info:  Procedure(s) (LRB):  LAPAROTOMY, EXPLORATORY (N/A)  REPAIR, HERNIA, INCISIONAL -multiple (N/A)  LYSIS, ADHESIONS (N/A)   7 Days Post-Op     Interval History: No acute events overnight, afebrile, vitals stable. Continues to deny significant pain. Tolerated regular diet without any nausea or emesis overnight. Continues to pass gas, no BM. UOP remains adequate. WBC  slightly improved from yesterday, but still elevated.      Medications:  Continuous Infusions:  Scheduled Meds:   amLODIPine  10 mg Oral Daily    bisacodyl  10 mg Rectal Daily    enoxaparin  40 mg Subcutaneous Daily    hydroCHLOROthiazide  12.5 mg Oral Daily    losartan  100 mg Oral Daily    pantoprazole  40 mg Intravenous Daily    potassium phosphate IVPB  30 mmol Intravenous Once    sodium chloride 0.9%  10 mL Intravenous Q6H    sodium chloride 0.9%  3 mL Intravenous Q8H    tamsulosin  0.4 mg Oral Daily     PRN Meds:labetalol, naloxone, ondansetron, oxyCODONE-acetaminophen, oxyCODONE-acetaminophen, promethazine (PHENERGAN) IVPB, Flushing PICC Protocol **AND** sodium chloride 0.9% **AND** sodium chloride 0.9%, sodium chloride 0.9%     Review of patient's allergies indicates:  No Known Allergies  Objective:     Vital Signs (Most  Recent):  Temp: 98.1 °F (36.7 °C) (10/25/18 0801)  Pulse: 86 (10/25/18 0801)  Resp: 12 (10/25/18 0801)  BP: 120/78 (10/25/18 0801)  SpO2: 95 % (10/25/18 0801) Vital Signs (24h Range):  Temp:  [96.7 °F (35.9 °C)-98.1 °F (36.7 °C)] 98.1 °F (36.7 °C)  Pulse:  [77-94] 86  Resp:  [12-18] 12  SpO2:  [93 %-95 %] 95 %  BP: (120-178)/(70-84) 120/78     Weight: 99.3 kg (219 lb)  Body mass index is 28.89 kg/m².    Intake/Output - Last 3 Shifts       10/23 0700 - 10/24 0659 10/24 0700 - 10/25 0659 10/25 0700 - 10/26 0659    P.O. 300 1300           Total Intake(mL/kg) 1200 (12.1) 1300 (13.1)     Urine (mL/kg/hr) 600 (0.3)      Total Output 600      Net +600 +1300            Urine Occurrence  4 x           Physical Exam   Constitutional: He is oriented to person, place, and time. No distress.   HENT:   Head: Normocephalic and atraumatic.   Eyes: EOM are normal. Pupils are equal, round, and reactive to light.   Cardiovascular: Normal rate and regular rhythm.   Pulmonary/Chest: Effort normal. No respiratory distress.   Abdominal: Soft. He exhibits no distension. There is tenderness (appropriate).   Abdomen with improving distention, incision clean and dry   Musculoskeletal: Normal range of motion. He exhibits no edema.   Neurological: He is alert and oriented to person, place, and time.   Skin: Skin is warm and dry. He is not diaphoretic.   Psychiatric: He has a normal mood and affect. His behavior is normal.       Significant Labs:  CBC:   Recent Labs   Lab 10/25/18  0423   WBC 16.88*   RBC 4.61   HGB 14.1   HCT 42.1      MCV 91   MCH 30.6   MCHC 33.5     CMP:   Recent Labs   Lab 10/25/18  0423   GLU 87   CALCIUM 8.1*   *   K 4.0   CO2 25      BUN 20   CREATININE 1.1       Significant Diagnostics:  I have reviewed all pertinent imaging results/findings within the past 24 hours.    Assessment/Plan:     * Small bowel obstruction    77 yo M with multiple previous abdominal surgeries here with possible small  bowel obstruction, incidental retroperitoneal mass now s/p ex-lap 10/18 with intra-op enterotomies and primary hernia repair    -Regular diet again today  -CT abdomen/pelvis yesterday without any evidence of new intra-abdominal process, will obtain CXR to rule out pulmonary source, UA was negative  -PO pain meds prn today  -flomax  -post-op rocephin x 4 days - now complete as of 10/21   -daily labs  -dvt ppx  -will need outpatient work up of retroperitoneal mass  -d/c likely postponed until tomorrow to look for resolution of leukocytosis         Michelle Andino MD  General Surgery  [facility]

## 2018-10-25 NOTE — PT/OT/SLP PROGRESS
Physical Therapy      Patient Name:  Yobani Rios   MRN:  [id number]    Patient not seen today secondary to (pt. with OT in AM and gone to MRI in PM). Will follow-up tomorrow.    Tiago Munoz, PT   10/25/2018

## 2018-10-25 NOTE — PLAN OF CARE
Problem: Occupational Therapy Goal  Goal: Occupational Therapy Goal  Goals to be met by: 10/26/18    Patient will increase functional independence with ADLs by performing:    UE Dressing with Modified [place].  LE Dressing with Supervision.  Grooming while standing at sink with Supervision.- Progressing  Toileting from toilet with Stand-by Assistance for hygiene and clothing management. Met on 10/23/18  Supine to sit with Modified [place].  Step transfer with Supervision- Progressing  Toilet transfer to toilet with Supervision.- Progressing  Upper extremity exercise program x15 reps per handout, with independence.      Outcome: Ongoing (interventions implemented as appropriate)  Cont w/ OT Plan of Care

## 2018-10-25 NOTE — SUBJECTIVE & OBJECTIVE
Interval History: No acute events overnight, afebrile, vitals stable. Continues to deny significant pain. Tolerated regular diet without any nausea or emesis overnight. Continues to pass gas, no BM. UOP remains adequate. WBC slightly improved from yesterday, but still elevated.      Medications:  Continuous Infusions:  Scheduled Meds:   amLODIPine  10 mg Oral Daily    bisacodyl  10 mg Rectal Daily    enoxaparin  40 mg Subcutaneous Daily    hydroCHLOROthiazide  12.5 mg Oral Daily    losartan  100 mg Oral Daily    pantoprazole  40 mg Intravenous Daily    potassium phosphate IVPB  30 mmol Intravenous Once    sodium chloride 0.9%  10 mL Intravenous Q6H    sodium chloride 0.9%  3 mL Intravenous Q8H    tamsulosin  0.4 mg Oral Daily     PRN Meds:labetalol, naloxone, ondansetron, oxyCODONE-acetaminophen, oxyCODONE-acetaminophen, promethazine (PHENERGAN) IVPB, Flushing PICC Protocol **AND** sodium chloride 0.9% **AND** sodium chloride 0.9%, sodium chloride 0.9%     Review of patient's allergies indicates:  No Known Allergies  Objective:     Vital Signs (Most Recent):  Temp: 98.1 °F (36.7 °C) (10/25/18 0801)  Pulse: 86 (10/25/18 0801)  Resp: 12 (10/25/18 0801)  BP: 120/78 (10/25/18 0801)  SpO2: 95 % (10/25/18 0801) Vital Signs (24h Range):  Temp:  [96.7 °F (35.9 °C)-98.1 °F (36.7 °C)] 98.1 °F (36.7 °C)  Pulse:  [77-94] 86  Resp:  [12-18] 12  SpO2:  [93 %-95 %] 95 %  BP: (120-178)/(70-84) 120/78     Weight: 99.3 kg (219 lb)  Body mass index is 28.89 kg/m².    Intake/Output - Last 3 Shifts       10/23 0700 - 10/24 0659 10/24 0700 - 10/25 0659 10/25 0700 - 10/26 0659    P.O. 300 1300           Total Intake(mL/kg) 1200 (12.1) 1300 (13.1)     Urine (mL/kg/hr) 600 (0.3)      Total Output 600      Net +600 +1300            Urine Occurrence  4 x           Physical Exam   Constitutional: He is oriented to person, place, and time. No distress.   HENT:   Head: Normocephalic and atraumatic.   Eyes: EOM are normal. Pupils  are equal, round, and reactive to light.   Cardiovascular: Normal rate and regular rhythm.   Pulmonary/Chest: Effort normal. No respiratory distress.   Abdominal: Soft. He exhibits no distension. There is tenderness (appropriate).   Abdomen with improving distention, incision clean and dry   Musculoskeletal: Normal range of motion. He exhibits no edema.   Neurological: He is alert and oriented to person, place, and time.   Skin: Skin is warm and dry. He is not diaphoretic.   Psychiatric: He has a normal mood and affect. His behavior is normal.       Significant Labs:  CBC:   Recent Labs   Lab 10/25/18  0423   WBC 16.88*   RBC 4.61   HGB 14.1   HCT 42.1      MCV 91   MCH 30.6   MCHC 33.5     CMP:   Recent Labs   Lab 10/25/18  0423   GLU 87   CALCIUM 8.1*   *   K 4.0   CO2 25      BUN 20   CREATININE 1.1       Significant Diagnostics:  I have reviewed all pertinent imaging results/findings within the past 24 hours.

## 2018-10-25 NOTE — ASSESSMENT & PLAN NOTE
77 yo M with multiple previous abdominal surgeries here with possible small bowel obstruction, incidental retroperitoneal mass now s/p ex-lap 10/18 with intra-op enterotomies and primary hernia repair    -Regular diet again today  -CT abdomen/pelvis yesterday without any evidence of new intra-abdominal process, will obtain CXR to rule out pulmonary source, UA was negative  -PO pain meds prn today  -flomax  -post-op rocephin x 4 days - now complete as of 10/21   -daily labs  -dvt ppx  -will need outpatient work up of retroperitoneal mass  -d/c likely postponed until tomorrow to look for resolution of leukocytosis

## 2018-10-25 NOTE — PT/OT/SLP PROGRESS
Occupational Therapy   Treatment    Name: Yobani Rios  MRN: [id number]  Admitting Diagnosis:  Small bowel obstruction  7 Days Post-Op    Recommendations:     Discharge Recommendations: home health OT  Discharge Equipment Recommendations:  bedside commode, shower chair  Barriers to discharge:  Inaccessible home environment    Subjective     Communicated with: Nursing prior to session.  Pain/Comfort:  · Pain Rating 1: 0/10    Patients cultural, spiritual, Amish conflicts given the current situation: None     Objective:     Patient found with: oxygen    General Precautions: Standard, fall   Orthopedic Precautions:N/A   Braces: N/A     Occupational Performance:    Bed Mobility:    · Patient completed Rolling/Turning to Left with  stand by assistance     Functional Mobility/Transfers:  · Patient completed Sit <> Stand Transfer with stand by assistance  with  no assistive device   · Functional Mobility: Pt walked in the Hallway 98', 60' w/ rolling walker, 30' w/o walker.    · Pt completed the following exercises in preparation for functional mobility:  · BUE shldr flex, 2x10 reps  · BUE shldr horiz abd (pray and hug), 2x10  · BUE scap elevation 2x10 reps hold for 3 seconds.         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   Education:    Assessment:     Yobani Rios is a 78 y.o. male with a medical diagnosis of Small bowel obstruction.  He presents with some generalized weakness and deconditioning due to hospital stay.  Performance deficits affecting function are weakness, impaired endurance, impaired self care skills.      Rehab Prognosis:  Good; patient would benefit from acute skilled OT services to address these deficits and reach maximum level of function.       Plan:      Patient to be seen 4 x/week to address the above listed problems via self-care/home management, therapeutic groups, therapeutic exercises  · Plan of Care Expires: 11/17/18  · Plan of Care Reviewed with: patient    This Plan of care has been discussed with the patient who was involved in its development and understands and is in agreement with the identified goals and treatment plan    GOALS:   Multidisciplinary Problems     Occupational Therapy Goals        Problem: Occupational Therapy Goal    Goal Priority Disciplines Outcome Interventions   Occupational Therapy Goal     OT, PT/OT Ongoing (interventions implemented as appropriate)    Description:  Goals to be met by: 10/26/18    Patient will increase functional independence with ADLs by performing:    UE Dressing with Modified [place].  LE Dressing with Supervision.  Grooming while standing at sink with Supervision.- Progressing  Toileting from toilet with Stand-by Assistance for hygiene and clothing management. Met on 10/23/18  Supine to sit with Modified [place].  Step transfer with Supervision- Progressing  Toilet transfer to toilet with Supervision.- Progressing  Upper extremity exercise program x15 reps per handout, with independence.                       Time Tracking:     OT Date of Treatment: 10/25/18  OT Start Time: 1026  OT Stop Time: 1049  OT Total Time (min): 23 min    Billable Minutes:Therapeutic Activity 12  Therapeutic Exercise 11    Dany Abbasi, OT  10/25/2018

## 2018-10-25 NOTE — PLAN OF CARE
Problem: Patient Care Overview  Goal: Plan of Care Review  Outcome: Ongoing (interventions implemented as appropriate)  Pt AAOx4. VS as charted. Q2 rounding for pt care and safety. Pain controlled with PRN medication. No falls/injury reported this shift. No reports of NV. SCD in place. Surgical incision intact. Pt ambulated with no distress noted. Safety precautions maintained - bed in low position, call light in reach, side rails up x2. Will continue to monitor.

## 2018-10-26 ENCOUNTER — TELEPHONE (OUTPATIENT)
Dept: UROLOGY | Facility: CLINIC | Age: 78
End: 2018-10-26

## 2018-10-26 VITALS
HEIGHT: 73 IN | BODY MASS INDEX: 29.03 KG/M2 | TEMPERATURE: 98 F | DIASTOLIC BLOOD PRESSURE: 72 MMHG | OXYGEN SATURATION: 92 % | HEART RATE: 78 BPM | RESPIRATION RATE: 20 BRPM | WEIGHT: 219 LBS | SYSTOLIC BLOOD PRESSURE: 151 MMHG

## 2018-10-26 LAB
ANION GAP SERPL CALC-SCNC: 7 MMOL/L
BASOPHILS # BLD AUTO: 0.08 K/UL
BASOPHILS NFR BLD: 0.6 %
BUN SERPL-MCNC: 17 MG/DL
CALCIUM SERPL-MCNC: 8.3 MG/DL
CHLORIDE SERPL-SCNC: 104 MMOL/L
CO2 SERPL-SCNC: 25 MMOL/L
CREAT SERPL-MCNC: 1.1 MG/DL
DIFFERENTIAL METHOD: ABNORMAL
EOSINOPHIL # BLD AUTO: 0.6 K/UL
EOSINOPHIL NFR BLD: 4.5 %
ERYTHROCYTE [DISTWIDTH] IN BLOOD BY AUTOMATED COUNT: 13.3 %
EST. GFR  (AFRICAN AMERICAN): >60 ML/MIN/1.73 M^2
EST. GFR  (NON AFRICAN AMERICAN): >60 ML/MIN/1.73 M^2
GLUCOSE SERPL-MCNC: 78 MG/DL
HCT VFR BLD AUTO: 42.2 %
HGB BLD-MCNC: 13.8 G/DL
IMM GRANULOCYTES # BLD AUTO: 0.61 K/UL
IMM GRANULOCYTES NFR BLD AUTO: 4.4 %
LYMPHOCYTES # BLD AUTO: 1.2 K/UL
LYMPHOCYTES NFR BLD: 8.4 %
MAGNESIUM SERPL-MCNC: 2.3 MG/DL
MCH RBC QN AUTO: 29.9 PG
MCHC RBC AUTO-ENTMCNC: 32.7 G/DL
MCV RBC AUTO: 92 FL
MONOCYTES # BLD AUTO: 1.2 K/UL
MONOCYTES NFR BLD: 8.3 %
NEUTROPHILS # BLD AUTO: 10.3 K/UL
NEUTROPHILS NFR BLD: 73.8 %
NRBC BLD-RTO: 0 /100 WBC
PHOSPHATE SERPL-MCNC: 2.9 MG/DL
PLATELET # BLD AUTO: 295 K/UL
PMV BLD AUTO: 10.7 FL
POTASSIUM SERPL-SCNC: 3.5 MMOL/L
RBC # BLD AUTO: 4.61 M/UL
SODIUM SERPL-SCNC: 136 MMOL/L
WBC # BLD AUTO: 13.92 K/UL

## 2018-10-26 PROCEDURE — 25000003 PHARM REV CODE 250: Performed by: STUDENT IN AN ORGANIZED HEALTH CARE EDUCATION/TRAINING PROGRAM

## 2018-10-26 PROCEDURE — 80048 BASIC METABOLIC PNL TOTAL CA: CPT

## 2018-10-26 PROCEDURE — A4216 STERILE WATER/SALINE, 10 ML: HCPCS | Performed by: SURGERY

## 2018-10-26 PROCEDURE — 97116 GAIT TRAINING THERAPY: CPT

## 2018-10-26 PROCEDURE — 27000221 HC OXYGEN, UP TO 24 HOURS

## 2018-10-26 PROCEDURE — 25000003 PHARM REV CODE 250: Performed by: SURGERY

## 2018-10-26 PROCEDURE — 25500020 PHARM REV CODE 255: Performed by: SURGERY

## 2018-10-26 PROCEDURE — 85025 COMPLETE CBC W/AUTO DIFF WBC: CPT

## 2018-10-26 PROCEDURE — 84100 ASSAY OF PHOSPHORUS: CPT

## 2018-10-26 PROCEDURE — A9585 GADOBUTROL INJECTION: HCPCS | Performed by: SURGERY

## 2018-10-26 PROCEDURE — 99900035 HC TECH TIME PER 15 MIN (STAT)

## 2018-10-26 PROCEDURE — 94664 DEMO&/EVAL PT USE INHALER: CPT

## 2018-10-26 PROCEDURE — 94761 N-INVAS EAR/PLS OXIMETRY MLT: CPT

## 2018-10-26 PROCEDURE — 83735 ASSAY OF MAGNESIUM: CPT

## 2018-10-26 RX ORDER — GADOBUTROL 604.72 MG/ML
10 INJECTION INTRAVENOUS
Status: COMPLETED | OUTPATIENT
Start: 2018-10-26 | End: 2018-10-26

## 2018-10-26 RX ORDER — SULFAMETHOXAZOLE AND TRIMETHOPRIM 800; 160 MG/1; MG/1
1 TABLET ORAL 2 TIMES DAILY
Qty: 14 TABLET | Refills: 0 | Status: SHIPPED | OUTPATIENT
Start: 2018-10-26 | End: 2018-11-02

## 2018-10-26 RX ORDER — PANTOPRAZOLE SODIUM 40 MG/1
40 TABLET, DELAYED RELEASE ORAL DAILY
Status: DISCONTINUED | OUTPATIENT
Start: 2018-10-26 | End: 2018-10-26 | Stop reason: HOSPADM

## 2018-10-26 RX ORDER — OXYCODONE AND ACETAMINOPHEN 5; 325 MG/1; MG/1
1 TABLET ORAL EVERY 4 HOURS PRN
Qty: 28 TABLET | Refills: 0 | OUTPATIENT
Start: 2018-10-26 | End: 2023-10-05

## 2018-10-26 RX ADMIN — PANTOPRAZOLE SODIUM 40 MG: 40 TABLET, DELAYED RELEASE ORAL at 08:10

## 2018-10-26 RX ADMIN — Medication 3 ML: at 05:10

## 2018-10-26 RX ADMIN — SULFAMETHOXAZOLE AND TRIMETHOPRIM 1 TABLET: 800; 160 TABLET ORAL at 08:10

## 2018-10-26 RX ADMIN — Medication 10 ML: at 05:10

## 2018-10-26 RX ADMIN — OXYCODONE HYDROCHLORIDE AND ACETAMINOPHEN 1 TABLET: 10; 325 TABLET ORAL at 10:10

## 2018-10-26 RX ADMIN — OXYCODONE HYDROCHLORIDE AND ACETAMINOPHEN 1 TABLET: 5; 325 TABLET ORAL at 12:10

## 2018-10-26 RX ADMIN — AMLODIPINE BESYLATE 10 MG: 10 TABLET ORAL at 08:10

## 2018-10-26 RX ADMIN — HYDROCHLOROTHIAZIDE 12.5 MG: 12.5 TABLET ORAL at 08:10

## 2018-10-26 RX ADMIN — TAMSULOSIN HYDROCHLORIDE 0.4 MG: 0.4 CAPSULE ORAL at 08:10

## 2018-10-26 RX ADMIN — Medication 10 ML: at 12:10

## 2018-10-26 RX ADMIN — LOSARTAN POTASSIUM 100 MG: 50 TABLET, FILM COATED ORAL at 08:10

## 2018-10-26 RX ADMIN — GADOBUTROL 10 ML: 604.72 INJECTION INTRAVENOUS at 02:10

## 2018-10-26 NOTE — TELEPHONE ENCOUNTER
----- Message from Nola Salmeron sent at 10/26/2018  9:10 AM CDT -----  Contact: Dr. Rey Le needs to speak to nurse about patient     Please call back

## 2018-10-26 NOTE — ASSESSMENT & PLAN NOTE
77 yo M with multiple previous abdominal surgeries here with possible small bowel obstruction, incidental retroperitoneal mass now s/p ex-lap 10/18 with intra-op enterotomies and primary hernia repair    -Regular diet again today  -CT abdomen/pelvis yesterday without any evidence of new intra-abdominal process, UA was negative, CXR benign  -PO pain meds prn today  -flomax  -post-op rocephin x 4 days - now complete as of 10/21   -daily labs  -dvt ppx  -will need outpatient work up of retroperitoneal mass, will try and obtain paperwork to have MRI completed here today  - d/c today

## 2018-10-26 NOTE — PLAN OF CARE
Problem: Physical Therapy Goal  Goal: Physical Therapy Goal  Goals to be met by: 10/30/18    Patient will increase functional independence with mobility by performin. Sit to stand transfer with Supervision - Met  2. Bed to chair transfer with Supervision - Met  3. Gait  x 140 feet with Supervision - Met     Outcome: Outcome(s) achieved Date Met: 10/26/18  Goals met

## 2018-10-26 NOTE — PT/OT/SLP PROGRESS
Physical Therapy Treatment/Discharge    Patient Name:  Yobani Rios   MRN:  [id number]    Recommendations:     Discharge Recommendations:  home   Discharge Equipment Recommendations:     Barriers to discharge: None    Assessment:     Yobani Rios is a 78 y.o. male admitted with a medical diagnosis of Small bowel obstruction.  He presents with the following impairments/functional limitations:    Pt. cooperative and tolerated treatment well. Pt. progressing with mobility, and has met goals for acute PT.    Rehab Prognosis:  good; patient would benefit from acute skilled PT services to address these deficits and reach maximum level of function.      Recent Surgery: Procedure(s) (LRB):  LAPAROTOMY, EXPLORATORY (N/A)  REPAIR, HERNIA, INCISIONAL -multiple (N/A)  LYSIS, ADHESIONS (N/A) 8 Days Post-Op    Plan:     ·  (Discontinue acute PT)   ·   · Plan of Care Expires:  11/20/18   Plan of Care Reviewed with: patient, spouse    Subjective     Communicated with nursing prior to session.  Patient found supine upon PT entry to room, agreeable to treatment.      Chief Complaint: none stated  Patient comments/goals: to go home  Pain/Comfort:  · Pain Rating 1: (pt. did not rate)    Patients cultural, spiritual, Caodaism conflicts given the current situation: no    Objective:     Patient found with: oxygen, SCD     General Precautions: Standard, fall   Orthopedic Precautions:N/A   Braces:       Functional Mobility:  · Bed Mobility:     · Rolling Left:  supervision  · Scooting: supervision  · Supine to Sit: supervision  · Sit to Supine: supervision  · Transfers:     · Sit to Stand:  supervision with no AD  · Gait: 140' with Supervision without AD or LOB  · Balance: good      AM-PAC 6 CLICK MOBILITY  Turning over in bed (including adjusting bedclothes, sheets and blankets)?: 4  Sitting down on and standing up from a chair with arms (e.g., wheelchair, bedside commode, etc.): 4  Moving from lying on back to sitting on the side of  the bed?: 4  Moving to and from a bed to a chair (including a wheelchair)?: 4  Need to walk in hospital room?: 4  Climbing 3-5 steps with a railing?: 3  Basic Mobility Total Score: 23       Therapeutic Activities and Exercises:   Discussed pt.'s progress, goals, and POC.    Patient left supine with all lines intact and call button in reach..    GOALS:   Multidisciplinary Problems     Physical Therapy Goals     Not on file          Multidisciplinary Problems (Resolved)        Problem: Physical Therapy Goal    Goal Priority Disciplines Outcome Goal Variances Interventions   Physical Therapy Goal   (Resolved)     PT, PT/OT Outcome(s) achieved     Description:  Goals to be met by: 10/30/18    Patient will increase functional independence with mobility by performin. Sit to stand transfer with Supervision - Met  2. Bed to chair transfer with Supervision - Met  3. Gait  x 140 feet with Supervision - Met                       Time Tracking:     PT Received On: 10/26/18  PT Start Time: 1103     PT Stop Time: 1112  PT Total Time (min): 9 min     Billable Minutes: Gait Training 9    Treatment Type: Treatment  PT/PTA: PT           Tiago Munoz, PT  10/26/2018

## 2018-10-26 NOTE — DISCHARGE SUMMARY
[facility]  General Surgery  Discharge Summary      Patient Name: Yobani Rios  MRN: [id number]  Admission Date: 10/17/2018  Hospital Length of Stay: 9 days  Discharge Date and Time: 10/26/2018  3:39 PM  Attending Physician: No att. providers found   Discharging Provider: Tiffany Lechuga MD  Primary Care Provider: Laney Nice MD     HPI: 77 yo M with h/o radical prostatectomy 2010 (surgery complicated by ?bowel injury, pt reports 9 surgeries including bowel resection) and ventral incisional hernia presented to OSH with severe abdominal pain. Mostly above umbilicus and on the right side of abdomen. Pain is improved now. No vomiting. Has passed some gas since onset of pain. No BM for two days. Denies hx previous small bowel obstructions. Denies weight loss.       Patient has had multiple ED visits lately for other problems, and was found to have an enlarging retroperitoneal mass with concern for malignancy. Recent PSA normal.     Procedure(s) (LRB):  LAPAROTOMY, EXPLORATORY (N/A)  REPAIR, HERNIA, INCISIONAL -multiple (N/A)  LYSIS, ADHESIONS (N/A)     Hospital Course: The patient was admitted. He had a small bowel follow through which did not show contrast in the colon, and had persistent pain. We took the patient for an exploratory laparotomy. He had multiple adhesions, an enterotomy was repaired, and his hernia was repaired primarily. He tolerated the procedure well. His pain was well-controlled. He had his NG removed and his diet was advanced as he had return of bowel function. His ISABELL resolved. He did develop leukocytosis and a CT did not demonstrate an abscess. He was started on Bactrim for mild cellulitis around his incision without drainage which improved. He had an MRI performed for a known retroperitoneal mass which was being worked up as an outpatient and was discharged to home.     Consults:   Consults (From admission, onward)        Status Ordering Provider     Inpatient consult  to General surgery  Once     Provider:  (Not yet assigned)    Completed RANCHO LOCKETT     Inpatient consult to PICC team ([facility])  Once     Provider:  (Not yet assigned)    Completed JAMEE PALMER          Significant Diagnostic Studies: Labs:   CBC   Recent Labs   Lab 10/26/18  0406   WBC 13.92*   HGB 13.8*   HCT 42.2          Pending Diagnostic Studies:     Procedure Component Value Units Date/Time    MRI Abdomen W WO Contrast [[id number]] Resulted:  10/26/18 1536    Order Status:  Sent Lab Status:  In process Updated:  10/26/18 1450        Final Active Diagnoses:    Diagnosis Date Noted POA    PRINCIPAL PROBLEM:  Small bowel obstruction [K56.609] 10/17/2018 Yes      Problems Resolved During this Admission:      Discharged Condition: stable    Disposition: Admitted as an Inpatient    Follow Up:  Follow-up Information     Rancho Mariee MD In 2 weeks.    Specialty:  General Surgery  Why:  Post-op-Office to schedule & call you w/Appt. (Appt 10/31/18 at 2:45 PM)  Contact information:  [address] [place] [address]  [phone]             will need outpatient work up of retroperitoneal mass.    Why:  as directed by Dr. Kodi Gaffney [facility].    Specialties:  Hospitalist, Hospice and Palliative Medicine, Hospice Services  Why:  Home health  Contact information:  [address] [place] [address] [phone]                 Patient Instructions:      Diet Adult Regular     Other restrictions (specify):   Order Comments: No driving while still taking pain medications daily.   Take a stool softener while taking pain medications daily.   No lifting more than 10 lbs for 6 weeks.   Ok to shower, pat incision dry.     Notify your health care provider if you experience any of the following:  temperature >100.4     Notify your health care provider if you experience any of the following:  persistent nausea and vomiting or diarrhea     Notify your health care provider if  you experience any of the following:  severe uncontrolled pain     Notify your health care provider if you experience any of the following:  redness, tenderness, or signs of infection (pain, swelling, redness, odor or green/yellow discharge around incision site)     Notify your health care provider if you experience any of the following:  difficulty breathing or increased cough     Notify your health care provider if you experience any of the following:  worsening rash     Notify your health care provider if you experience any of the following:  persistent dizziness, light-headedness, or visual disturbances     No dressing needed   Order Comments: Gauze as needed for comfort     Medications:  Reconciled Home Medications:      Medication List      START taking these medications    oxyCODONE-acetaminophen 5-325 mg per tablet  Commonly known as:  PERCOCET  Take 1 tablet by mouth every 4 (four) hours as needed.     sulfamethoxazole-trimethoprim 800-160mg 800-160 mg Tab  Commonly known as:  BACTRIM DS  Take 1 tablet by mouth 2 (two) times daily. for 7 days        CONTINUE taking these medications    amLODIPine 10 MG tablet  Commonly known as:  NORVASC     B-complex with vitamin C tablet  Commonly known as:  Z-Bec or Equiv  Take 1 tablet by mouth once daily.     calcium carbonate 600 mg calcium (1,500 mg) Tab  Commonly known as:  OS-CARA  Take 600 mg by mouth 2 (two) times daily with meals.     docusate sodium 100 MG capsule  Commonly known as:  COLACE  Take 1 capsule (100 mg total) by mouth 2 (two) times daily.     fish oil-omega-3 fatty acids 300-1,000 mg capsule  Take 1 g by mouth once daily.     losartan-hydrochlorothiazide 100-12.5 mg 100-12.5 mg Tab  Commonly known as:  HYZAAR  Take 1 tablet by mouth once daily.     pravastatin 20 MG tablet  Commonly known as:  PRAVACHOL     vitamin D 1000 units Tab  Commonly known as:  VITAMIN D3  Take 185 mg by mouth once daily.        STOP taking these medications     HYDROcodone-acetaminophen 7.5-325 mg per tablet  Commonly known as:  SAMEER Lechuga MD  General Surgery  [facility]

## 2018-10-26 NOTE — PLAN OF CARE
SW was informed by Surgery Resident that Pt was going to get an MRI today since he had missed his Outpatient appts while in the hospital. Pt will then be discharging home. SUSAN notified Pt's Home Health agency, [facility], of his discharge for this afternoon.     Natasha Braga LCSW

## 2018-10-26 NOTE — SUBJECTIVE & OBJECTIVE
Interval History: No acute events overnight, afebrile, vitals stable. Continues to deny significant pain. Tolerated regular diet without any nausea or emesis overnight. Continues to pass gas, + bowel function yesterday. UOP remains adequate. WBC continues to improve.    Medications:  Continuous Infusions:  Scheduled Meds:   amLODIPine  10 mg Oral Daily    bisacodyl  10 mg Rectal Daily    enoxaparin  40 mg Subcutaneous Daily    hydroCHLOROthiazide  12.5 mg Oral Daily    losartan  100 mg Oral Daily    pantoprazole  40 mg Oral Daily    potassium phosphate IVPB  30 mmol Intravenous Once    sodium chloride 0.9%  10 mL Intravenous Q6H    sodium chloride 0.9%  3 mL Intravenous Q8H    sulfamethoxazole-trimethoprim 800-160mg  1 tablet Oral BID    tamsulosin  0.4 mg Oral Daily     PRN Meds:labetalol, naloxone, ondansetron, oxyCODONE-acetaminophen, oxyCODONE-acetaminophen, promethazine (PHENERGAN) IVPB, Flushing PICC Protocol **AND** sodium chloride 0.9% **AND** sodium chloride 0.9%, sodium chloride 0.9%     Review of patient's allergies indicates:  No Known Allergies  Objective:     Vital Signs (Most Recent):  Temp: 96.1 °F (35.6 °C) (10/26/18 0735)  Pulse: 81 (10/26/18 0840)  Resp: 20 (10/26/18 0840)  BP: (!) 172/80 (10/26/18 0735)  SpO2: 95 % (10/26/18 0735) Vital Signs (24h Range):  Temp:  [96.1 °F (35.6 °C)-97.8 °F (36.6 °C)] 96.1 °F (35.6 °C)  Pulse:  [69-96] 81  Resp:  [14-20] 20  SpO2:  [93 %-98 %] 95 %  BP: (138-172)/(70-80) 172/80     Weight: 99.3 kg (219 lb)  Body mass index is 28.89 kg/m².    Intake/Output - Last 3 Shifts       10/24 0700 - 10/25 0659 10/25 0700 - 10/26 0659 10/26 0700 - 10/27 0659    P.O. 1300 970     TPN       Total Intake(mL/kg) 1300 (13.1) 970 (9.8)     Urine (mL/kg/hr)  1400 (0.6)     Total Output  1400     Net +1300 -430            Urine Occurrence 4 x 2 x           Physical Exam   Constitutional: He is oriented to person, place, and time. No distress.   HENT:   Head: Normocephalic  and atraumatic.   Eyes: EOM are normal. Pupils are equal, round, and reactive to light.   Cardiovascular: Normal rate and regular rhythm.   Pulmonary/Chest: Effort normal. No respiratory distress.   Abdominal: Soft. He exhibits no distension. There is tenderness (appropriate).   Abdomen with improving distention, incision clean and dry   Musculoskeletal: Normal range of motion. He exhibits no edema.   Neurological: He is alert and oriented to person, place, and time.   Skin: Skin is warm and dry. He is not diaphoretic.   Psychiatric: He has a normal mood and affect. His behavior is normal.       Significant Labs:  CBC:   Recent Labs   Lab 10/26/18  0406   WBC 13.92*   RBC 4.61   HGB 13.8*   HCT 42.2      MCV 92   MCH 29.9   MCHC 32.7     CMP:   Recent Labs   Lab 10/26/18  0406   GLU 78   CALCIUM 8.3*      K 3.5   CO2 25      BUN 17   CREATININE 1.1       Significant Diagnostics:  I have reviewed all pertinent imaging results/findings within the past 24 hours.

## 2018-10-26 NOTE — PLAN OF CARE
Problem: Patient Care Overview  Goal: Plan of Care Review  Outcome: Ongoing (interventions implemented as appropriate)  Pt AAOx4. VS as charted. Q2 rounding for pt care and safety. Pain controlled with PRN medication. No falls/injury reported this shift. No reports of NV. SCD in place. Surgical incision intact. Pt ambulated in concepcion with no distress. Safety precautions maintained - bed in low position, call light in reach, side rails up x2. Will continue to monitor.

## 2018-10-26 NOTE — PLAN OF CARE
10/26/18 1543   Final Note   Assessment Type Final Discharge Note   Anticipated Discharge Disposition Home-Health  ([facility] )   What phone number can be called within the next 1-3 days to see how you are doing after discharge? ([phone])   Hospital Follow Up  Appt(s) scheduled? Yes   Discharge plans and expectations educations in teach back method with documentation complete? Yes   Right Care Referral Info   Post Acute Recommendation No Care

## 2018-10-26 NOTE — PROGRESS NOTES
[facility]  General Surgery  Progress Note    Subjective:     History of Present Illness:  79 yo M with h/o radical prostatectomy 2010 (surgery complicated by ?bowel injury, pt reports 9 surgeries including bowel resection) and ventral incisional hernia presented to OSH with severe abdominal pain. Mostly above umbilicus and on the right side of abdomen. Pain is improved now. No vomiting. Has passed some gas since onset of pain. No BM for two days. Denies hx previous small bowel obstructions. Denies weight loss.      Patient has had multiple ED visits lately for other problems, and was found to have an enlarging retroperitoneal mass with concern for malignancy. Recent PSA normal.     Post-Op Info:  Procedure(s) (LRB):  LAPAROTOMY, EXPLORATORY (N/A)  REPAIR, HERNIA, INCISIONAL -multiple (N/A)  LYSIS, ADHESIONS (N/A)   8 Days Post-Op     Interval History: No acute events overnight, afebrile, vitals stable. Continues to deny significant pain. Tolerated regular diet without any nausea or emesis overnight. Continues to pass gas, + bowel function yesterday. UOP remains adequate. WBC continues to improve.    Medications:  Continuous Infusions:  Scheduled Meds:   amLODIPine  10 mg Oral Daily    bisacodyl  10 mg Rectal Daily    enoxaparin  40 mg Subcutaneous Daily    hydroCHLOROthiazide  12.5 mg Oral Daily    losartan  100 mg Oral Daily    pantoprazole  40 mg Oral Daily    potassium phosphate IVPB  30 mmol Intravenous Once    sodium chloride 0.9%  10 mL Intravenous Q6H    sodium chloride 0.9%  3 mL Intravenous Q8H    sulfamethoxazole-trimethoprim 800-160mg  1 tablet Oral BID    tamsulosin  0.4 mg Oral Daily     PRN Meds:labetalol, naloxone, ondansetron, oxyCODONE-acetaminophen, oxyCODONE-acetaminophen, promethazine (PHENERGAN) IVPB, Flushing PICC Protocol **AND** sodium chloride 0.9% **AND** sodium chloride 0.9%, sodium chloride 0.9%     Review of patient's allergies indicates:  No Known  Allergies  Objective:     Vital Signs (Most Recent):  Temp: 96.1 °F (35.6 °C) (10/26/18 0735)  Pulse: 81 (10/26/18 0840)  Resp: 20 (10/26/18 0840)  BP: (!) 172/80 (10/26/18 0735)  SpO2: 95 % (10/26/18 0735) Vital Signs (24h Range):  Temp:  [96.1 °F (35.6 °C)-97.8 °F (36.6 °C)] 96.1 °F (35.6 °C)  Pulse:  [69-96] 81  Resp:  [14-20] 20  SpO2:  [93 %-98 %] 95 %  BP: (138-172)/(70-80) 172/80     Weight: 99.3 kg (219 lb)  Body mass index is 28.89 kg/m².    Intake/Output - Last 3 Shifts       10/24 0700 - 10/25 0659 10/25 0700 - 10/26 0659 10/26 0700 - 10/27 0659    P.O. 1300 970     TPN       Total Intake(mL/kg) 1300 (13.1) 970 (9.8)     Urine (mL/kg/hr)  1400 (0.6)     Total Output  1400     Net +1300 -430            Urine Occurrence 4 x 2 x           Physical Exam   Constitutional: He is oriented to person, place, and time. No distress.   HENT:   Head: Normocephalic and atraumatic.   Eyes: EOM are normal. Pupils are equal, round, and reactive to light.   Cardiovascular: Normal rate and regular rhythm.   Pulmonary/Chest: Effort normal. No respiratory distress.   Abdominal: Soft. He exhibits no distension. There is tenderness (appropriate).   Abdomen with improving distention, incision clean and dry   Musculoskeletal: Normal range of motion. He exhibits no edema.   Neurological: He is alert and oriented to person, place, and time.   Skin: Skin is warm and dry. He is not diaphoretic.   Psychiatric: He has a normal mood and affect. His behavior is normal.       Significant Labs:  CBC:   Recent Labs   Lab 10/26/18  0406   WBC 13.92*   RBC 4.61   HGB 13.8*   HCT 42.2      MCV 92   MCH 29.9   MCHC 32.7     CMP:   Recent Labs   Lab 10/26/18  0406   GLU 78   CALCIUM 8.3*      K 3.5   CO2 25      BUN 17   CREATININE 1.1       Significant Diagnostics:  I have reviewed all pertinent imaging results/findings within the past 24 hours.    Assessment/Plan:     * Small bowel obstruction    77 yo M with multiple  previous abdominal surgeries here with possible small bowel obstruction, incidental retroperitoneal mass now s/p ex-lap 10/18 with intra-op enterotomies and primary hernia repair    -Regular diet again today  -CT abdomen/pelvis yesterday without any evidence of new intra-abdominal process, UA was negative, CXR benign  -PO pain meds prn today  -flomax  -post-op rocephin x 4 days - now complete as of 10/21   -daily labs  -dvt ppx  -will need outpatient work up of retroperitoneal mass, will try and obtain paperwork to have MRI completed here today  - d/c today         Michelle Andino MD  General Surgery  [facility]

## 2018-10-26 NOTE — NURSING
Patient discharged home via wheelchair to private vehicle with spouse.  Medication education, script, aftercare instructions and follow up appointment provided.  Patient verbalized understanding.

## 2018-10-29 NOTE — PHYSICIAN QUERY
PT Name: Yobani Rios  MR #: [id number]     Physician Query Form - Documentation Clarification      CDS/: Nola Redman               Contact information: [email or web address]    This form is a permanent document in the medical record.     Query Date: October 29, 2018    By submitting this query, we are merely seeking further clarification of documentation. Please utilize your independent clinical judgment when addressing the question(s) below.    The Medical record reflects the following:    Supporting Clinical Findings Location in Medical Record   Will obtain CT abdomen/pelvis today given new leukocytosis to r/o intra-abdominal abscess    Post-op rocephin x 4 days - now complete as of 10/21     Possible d/c home later today vs. Tomorrow pending CT    General Surgery PN 10/24   WBC= 11.33 - 17.36 - 13.92   Labs, CBC 10/23 - 10/24 - 10/26   WBC  slightly improved from yesterday, but still elevated.      D/c likely postponed until tomorrow to look for resolution of leukocytosis    Abdomen with improving distention, incision clean and dry    General Surgery PN 10/25   WBC continues to improve.    CT abdomen/pelvis yesterday without any evidence of new intra-abdominal process, UA was negative, CXR benign   General Surgery PN 10/26   He did develop leukocytosis and a CT did not demonstrate an abscess.   He was started on Bactrim for mild cellulitis around his incision without drainage which improved.   Discharge Summary   Bactrim po  Indications of Use:  Skin and soft tissue   MAR started 10/25                                                                            Doctor, Please specify diagnosis or diagnoses associated with above clinical findings.    Provider Use Only      [  ] Cellulitis Ruled In, IS NOT a complication of surgery    [  ] Cellulitis Ruled In, IS a complication of surgery    [  x] Cellulitis Ruled Out    [  ] Other diagnosis, please specify:  ______________________                                                                                                                 Clinically Undetermined

## 2018-10-29 NOTE — PT/OT/SLP DISCHARGE
Occupational Therapy Discharge Summary    Yobani Rios  MRN: [id number]   Principal Problem: Small bowel obstruction      Patient Discharged from acute Occupational Therapy on 10/27/2018.  Please refer to prior OT note dated 10/24/2018 for functional status.    Assessment:      Goals partially met.    Objective:     GOALS:   Multidisciplinary Problems     Occupational Therapy Goals     Not on file          Multidisciplinary Problems (Resolved)        Problem: Occupational Therapy Goal    Goal Priority Disciplines Outcome Interventions   Occupational Therapy Goal   (Resolved)     OT, PT/OT Outcome(s) achieved    Description:  Goals to be met by: 10/26/18    Patient will increase functional independence with ADLs by performing:    UE Dressing with Modified [place].  LE Dressing with Supervision.  Grooming while standing at sink with Supervision.- Progressing  Toileting from toilet with Stand-by Assistance for hygiene and clothing management. Met on 10/23/18  Supine to sit with Modified [place].  Step transfer with Supervision- Progressing  Toilet transfer to toilet with Supervision.- Progressing  Upper extremity exercise program x15 reps per handout, with independence.                       Reasons for Discontinuation of Therapy Services  Transfer to alternate level of care.      Plan:     Patient Discharged to: Home with Home Health Service.     Dany Abbasi, OT  10/29/2018

## 2018-10-31 ENCOUNTER — OFFICE VISIT (OUTPATIENT)
Dept: SURGERY | Facility: CLINIC | Age: 78
End: 2018-10-31
Payer: COMMERCIAL

## 2018-10-31 VITALS
WEIGHT: 203.63 LBS | TEMPERATURE: 98 F | SYSTOLIC BLOOD PRESSURE: 167 MMHG | HEART RATE: 82 BPM | DIASTOLIC BLOOD PRESSURE: 79 MMHG | BODY MASS INDEX: 26.99 KG/M2 | HEIGHT: 73 IN

## 2018-10-31 DIAGNOSIS — Z87.19 S/P HERNIA REPAIR: Primary | ICD-10-CM

## 2018-10-31 DIAGNOSIS — Z98.890 S/P HERNIA REPAIR: Primary | ICD-10-CM

## 2018-10-31 PROCEDURE — 99024 POSTOP FOLLOW-UP VISIT: CPT | Mod: ,,, | Performed by: SURGERY

## 2018-10-31 PROCEDURE — 99999 PR PBB SHADOW E&M-EST. PATIENT-LVL III: CPT | Mod: PBBFAC,,, | Performed by: SURGERY

## 2018-10-31 PROCEDURE — 99213 OFFICE O/P EST LOW 20 MIN: CPT | Mod: PBBFAC | Performed by: SURGERY

## 2018-11-01 ENCOUNTER — OFFICE VISIT (OUTPATIENT)
Dept: UROLOGY | Facility: CLINIC | Age: 78
End: 2018-11-01
Payer: COMMERCIAL

## 2018-11-01 VITALS
BODY MASS INDEX: 26.9 KG/M2 | WEIGHT: 203 LBS | HEIGHT: 73 IN | HEART RATE: 73 BPM | SYSTOLIC BLOOD PRESSURE: 155 MMHG | DIASTOLIC BLOOD PRESSURE: 79 MMHG | TEMPERATURE: 98 F

## 2018-11-01 DIAGNOSIS — I71.40 ABDOMINAL AORTIC ANEURYSM (AAA) 30 TO 34 MM IN DIAMETER: ICD-10-CM

## 2018-11-01 DIAGNOSIS — N28.89 RENAL MASS: Primary | ICD-10-CM

## 2018-11-01 DIAGNOSIS — R19.00 RETROPERITONEAL MASS: ICD-10-CM

## 2018-11-01 LAB
BILIRUB SERPL-MCNC: NEGATIVE MG/DL
BLOOD URINE, POC: NEGATIVE
COLOR, POC UA: NORMAL
GLUCOSE UR QL STRIP: NEGATIVE
KETONES UR QL STRIP: NEGATIVE
LEUKOCYTE ESTERASE URINE, POC: NEGATIVE
NITRITE, POC UA: NEGATIVE
PH, POC UA: 6
PROTEIN, POC: NEGATIVE
SPECIFIC GRAVITY, POC UA: 1015
UROBILINOGEN, POC UA: NEGATIVE

## 2018-11-01 PROCEDURE — 99214 OFFICE O/P EST MOD 30 MIN: CPT | Mod: 25,S$GLB,, | Performed by: UROLOGY

## 2018-11-01 PROCEDURE — 99213 OFFICE O/P EST LOW 20 MIN: CPT | Mod: PBBFAC,PN | Performed by: UROLOGY

## 2018-11-01 PROCEDURE — 99999 PR PBB SHADOW E&M-EST. PATIENT-LVL III: CPT | Mod: PBBFAC,,, | Performed by: UROLOGY

## 2018-11-01 PROCEDURE — 81002 URINALYSIS NONAUTO W/O SCOPE: CPT | Mod: S$GLB,,, | Performed by: UROLOGY

## 2018-11-01 NOTE — PROGRESS NOTES
DATE OF VISIT:  11/01/2018.    CHIEF COMPLAINT:  1.  Right renal mass.  2.  Retroperitoneal mass.  3.  Abdominal aortic aneurysm.    HISTORY OF PRESENT ILLNESS:  Mr. Rios was last seen by me on 09/21/2018  with a suspected right renal mass.  A CT scan was performed that confirmed  that the mass is a complex cyst with no significant evidence that this mass  is a malignant neoplasm of the kidney.  It is to be noted that his right  kidney where the mass is is a nonfunctioning kidney.  The left kidney is  his functional kidney and has no difficulties or abnormalities.    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  Also, an abdominal aortic  aneurysm that is 3.3 cm.    It is also to be noted that the patient recently underwent a correction of  his umbilical hernia.  This was repaired with some bowel problems.  Today,  I had a lengthy discussion with him and his wife and we agree on the  following terms:  1.  Abdominal aortic aneurysm will be monitored yearly with an ultrasound  to measure and be sure it is not expanding.  2.  We are going to monitor the right kidney with a CT scan that will be  repeated in a year.  3.  At the end of January of next year, I will suggest that we proceed with  a possible biopsy of the retroperitoneal mass to make a pathological  diagnosis of that mass and determine if he needs no treatment.  I will wait  two to three months for him to be healed from his recent abdominal surgery.   The wife and the patient agreed for that approach.  In January of this  year, we are going to recheck him back and at that point, we are going to  arrange a possible retroperitoneal mass biopsy by Radiology.  All the  questions were answered at his and his wife's satisfaction.  They left the  office in satisfactory condition.  I spent approximately 25 minutes  with  Mr. and Mrs. Rios and all the time was spent in counseling.        EOR/HN dd: 11/01/2018 12:04:43 (CDT)   td: 11/01/2018 12:41:48 (CDT)  Doc ID #[id number]   Job ID #[id number]    CC:

## 2018-11-02 ENCOUNTER — TELEPHONE (OUTPATIENT)
Dept: SURGERY | Facility: CLINIC | Age: 78
End: 2018-11-02

## 2018-11-02 NOTE — TELEPHONE ENCOUNTER
----- Message from Nola Mendoza RN sent at 11/2/2018  9:24 AM CDT -----  Mr. Rios has an Exploratory Laparotomy with Dr. Mariee at [facility] on 10/18/18.  He needs to have his staples removed next week (week of 11/5/18) and would like to be seen closer to his home.  He lives in [place].  Please see Dr. Mariee's note in Epic.  Can he be seen next week for staple removal?  Thanks,  Nola Mendoza RN, [facility], [facility]  [phone]

## 2018-11-02 NOTE — TELEPHONE ENCOUNTER
Writer spoke to Nola and let her know that we would need an approval for a nurses visit due to pts [employer] [place]. Nola expressed verbal understanding and stated that she would see if pt wanted to go to the [facility] for his staple removal. She is going to let me know if pts decision is to go to [facility] with an approval or to [facility]. Writer expressed verbal understanding.

## 2018-11-05 ENCOUNTER — TELEPHONE (OUTPATIENT)
Dept: SURGERY | Facility: CLINIC | Age: 78
End: 2018-11-05

## 2018-11-05 NOTE — TELEPHONE ENCOUNTER
----- Message from Kelsie Smith LPN sent at 11/2/2018  9:15 AM CDT -----  We will not be in [facility] till dec 6th. If he would like to come to [place] he is more than welcome to see dr. butcher here. Also, he wanted me to make sure I told you hi for him.  Kelsie ALMENDRAEZ  ----- Message -----  From: Nola Mendoza RN  Sent: 11/2/2018   8:42 AM  To: Rony Walker Staff    Mr. Rios had an Exploratory Laparotomy with Dr. Mariee at [facility] on 10/18/18.  He lives in [place] and would like to have his staples removed closer to home.  He will need them removed next week (week of 11/5/18).  Please see Dr. Mariee's note in Epic.  Can Dr. Butcher see him or does he need to see another surgeon?  Thanks,  Nola Mendoza RN, [facility], [facility]  [phone]

## 2018-11-05 NOTE — TELEPHONE ENCOUNTER
Pt called to report that his staples will be removed by his PCP in [place] and that his PCP will manage his post operative care.

## 2018-11-07 ENCOUNTER — TELEPHONE (OUTPATIENT)
Dept: UROLOGY | Facility: CLINIC | Age: 78
End: 2018-11-07

## 2018-11-07 ENCOUNTER — HOSPITAL ENCOUNTER (EMERGENCY)
Facility: HOSPITAL | Age: 78
Discharge: HOME | End: 2018-11-07
Attending: INTERNAL MEDICINE
Payer: COMMERCIAL

## 2018-11-07 VITALS
DIASTOLIC BLOOD PRESSURE: 90 MMHG | TEMPERATURE: 98 F | OXYGEN SATURATION: 94 % | BODY MASS INDEX: 29.16 KG/M2 | RESPIRATION RATE: 18 BRPM | SYSTOLIC BLOOD PRESSURE: 143 MMHG | WEIGHT: 220 LBS | HEART RATE: 72 BPM | HEIGHT: 73 IN

## 2018-11-07 DIAGNOSIS — D20.0: Primary | ICD-10-CM

## 2018-11-07 PROCEDURE — 63600175 PHARM REV CODE 636 W HCPCS: Performed by: INTERNAL MEDICINE

## 2018-11-07 PROCEDURE — 99284 EMERGENCY DEPT VISIT MOD MDM: CPT | Mod: 25

## 2018-11-07 PROCEDURE — 96372 THER/PROPH/DIAG INJ SC/IM: CPT

## 2018-11-07 RX ORDER — HYDROMORPHONE HYDROCHLORIDE 2 MG/ML
2 INJECTION, SOLUTION INTRAMUSCULAR; INTRAVENOUS; SUBCUTANEOUS
Status: COMPLETED | OUTPATIENT
Start: 2018-11-07 | End: 2018-11-07

## 2018-11-07 RX ORDER — DEXAMETHASONE SODIUM PHOSPHATE 100 MG/10ML
10 INJECTION INTRAMUSCULAR; INTRAVENOUS
Status: COMPLETED | OUTPATIENT
Start: 2018-11-07 | End: 2018-11-07

## 2018-11-07 RX ADMIN — DEXAMETHASONE SODIUM PHOSPHATE 10 MG: 10 INJECTION INTRAMUSCULAR; INTRAVENOUS at 02:11

## 2018-11-07 RX ADMIN — HYDROMORPHONE HYDROCHLORIDE 2 MG: 2 INJECTION, SOLUTION INTRAMUSCULAR; INTRAVENOUS; SUBCUTANEOUS at 02:11

## 2018-11-07 NOTE — TELEPHONE ENCOUNTER
----- Message from Monae Jung sent at 11/7/2018  1:31 PM CST -----  Contact: Patient  Type:  Patient Returning Call    Who Called:  Patient  Who Left Message for Patient:  Maritza  Does the patient know what this is regarding?:    Best Call Back Number:    Additional Information:  Please call patient because he is still having pains and needs to speak with someone.

## 2018-11-07 NOTE — TELEPHONE ENCOUNTER
----- Message from Annie Garrison sent at 11/7/2018 10:34 AM CST -----  Contact: pt  Pt calling states that he is having an issue and needs to speak to the nurse also needs to talk to her about a refill please...[phone] (home)

## 2018-11-07 NOTE — TELEPHONE ENCOUNTER
Pt states he is having severe right-sided pain and he thinks it may be his appendix or kidney. Informed pt that I will let Dr. Wylie know and give him a call back. Pt verbalized understanding

## 2018-11-07 NOTE — ED PROVIDER NOTES
Encounter Date: 11/7/2018       History     Chief Complaint   Patient presents with    Flank Pain     right, started 5 days ago, does not have kidney on right side. Pain meds are not working     Patient presents with right flank pain which he has had on and off since Labor Day.  At that time he was diagnosed with a retroperitoneal mass just medial to the kidney.  He was being worked up by Dr. Wylie when he had a sudden bowel obstruction on 10/21 requiring immediate surgery at [facility].  He is 3 weeks post surgery and woke up the 1 o'clock this morning with severe right flank pain exactly as a previous pains.  His pain is neuritic, sharp, and [place].  He has no tenderness on the of the abdomen or flank surface.  This pain is no doubt coming from this retroperitoneal mass.    He records reviewed and the previous studies were personally reviewed and reviewed with the radiologist.  Dr. rojas.  This mass is not part of the kidney, sits just dorsal to the vertebrae, approximately 4 x 6 cm.  It was not present on CT scans 2 years ago.  This represents a neoplastic process, either benign or malignant.  Lesions are not seen in the pancreas or the kidneys although he does have severe hydronephrosis of his only remaining kidney.  The date is not had any renal failure.    Both Dr. kenia williamson and radiologist agree that a definitive diagnosis is necessary.  This mass is approachable by Interventional Radiology, and Dr. rojas has agreed to perform this procedure.    The patient was given pain control and instructed to see his primary care doctor Dr. kulkarni, Friday for staple removal.  It is recommended Dr. fonseca schedule the retroperitoneal biopsy which may require pre approval.  The patient needs to have laboratory studies and coagulation studies the morning of the procedure, remain NPO, will require mild sedation in the radiology suite, and discharge is stable.    This was all explained to the patient and his wife  any understand.  They will discuss this with Dr. kulkarni Friday.          Review of patient's allergies indicates:  No Known Allergies  Past Medical History:   Diagnosis Date    Cancer     prostate    Hypertension     Kidney problem     only 1 kidney functions    Liver disease     Umbilical hernia      Past Surgical History:   Procedure Laterality Date    colon repair      after prostatectomy    EXTRACTION, CATARACT Right 2012    Performed [facility] at [facility] OR    EXTRACTION, CATARACT, WITH IOL INSERTION Left 3/7/2012    [facility] OR    LAPAROTOMY, EXPLORATORY N/A 10/18/2018    Performed by Rancho [facility]e [facility] at [facility] OR [address] [place] [address]    LYSIS OF ADHESIONS N/A 10/18/2018    Procedure: LYSIS, ADHESIONS;  Surgeon: Rancho Mariee MD;  Location: [facility] OR [address] [place] [address];  Service: General;  Laterality: N/A;    LYSIS, ADHESIONS N/A 10/18/2018    Performed by [facility] at [facility] OR [address] [place] [address]    PROSTATECTOMY      PROSTATECTOMY  2010    REPAIR OF RECURRENT INCISIONAL HERNIA N/A 10/18/2018    Procedure: REPAIR, HERNIA, INCISIONAL -multiple;  Surgeon: Rancho Mariee MD;  Location: [facility] OR [address] [place] [address];  Service: General;  Laterality: N/A;    REPAIR, HERNIA, INCISIONAL -multiple N/A 10/18/2018    Performed by [facility]e [facility] at [facility] OR [address] [place] [address]     History reviewed. No pertinent family history.  Social History     Tobacco Use    Smoking status: Former Smoker     Last attempt to quit: 2/15/1972     Years since quittin.7    Smokeless tobacco: Never Used   Substance Use Topics    Alcohol use: No     Frequency: Never    Drug use: No     Review of Systems   Constitutional: Negative for fever.   HENT: Negative for sore throat.    Respiratory: Negative for shortness of breath.    Cardiovascular: Negative for chest pain.   Gastrointestinal: Negative for nausea.   Genitourinary: Negative for dysuria.   Musculoskeletal: Negative for back pain.   Skin:  Negative for rash.   Neurological: Negative for weakness.   Hematological: Does not bruise/bleed easily.       Physical Exam     Initial Vitals [11/07/18 1403]   BP Pulse Resp Temp SpO2   (!) 158/104 74 14 98.1 °F (36.7 °C) 97 %      MAP       --         Physical Exam    Nursing note and vitals reviewed.  Constitutional: Vital signs are normal. He appears well-developed and well-nourished. He is active and cooperative.   HENT:   Head: Normocephalic and atraumatic.   Eyes: Conjunctivae, EOM and lids are normal. Pupils are equal, round, and reactive to light. Lids are everted and swept, no foreign bodies found.   Neck: Trachea normal, normal range of motion and full passive range of motion without pain. Neck supple.   Cardiovascular: Normal rate, regular rhythm, S1 normal, S2 normal, normal heart sounds, intact distal pulses and normal pulses.  No extrasystoles are present.    Pulmonary/Chest: Breath sounds normal.   Abdominal: Soft. Normal appearance and bowel sounds are normal.   No external pain is elicited on palpation either in the belly or on the ribs or in the flank.   Musculoskeletal: Normal range of motion.   Neurological: He is alert. He has normal reflexes. GCS eye subscore is 4. GCS verbal subscore is 5. GCS motor subscore is 6.   Skin: Skin is warm, dry and intact. Capillary refill takes less than 2 seconds.   Psychiatric: He has a normal mood and affect. His speech is normal and behavior is normal. Cognition and memory are normal.         ED Course   Procedures  Labs Reviewed - No data to display       Imaging Results    None          Medical Decision Making:   ED Management:  Patient presented with right flank pain emanating from a known retroperitoneal neoplasm.  A neoplasm is not been definitively diagnosed.  The case was reviewed with interventional radiology and was agreed that this is approachable with a retroperitoneal biopsy.  He is to see his primary care doctor, Dr. kulkarni, Friday for temitope  removal of his previous abdominal surgery.  Dr. diaz is office was called but no answer was obtained at [phone].  Another call will be attempted.  This rectal be handed to the patient to deliver to Dr. fonseca for expedition of the biopsy.  Dr. Wylie feels this is not a urologic problem.  Once definitive diagnosis is reached, if malignant, oncology can be consulted.                      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      Disposition:   Disposition: Discharged  Condition: Stable                        Lawrence Bruce MD  11/07/18 1525       Lawrence Bruce MD  11/07/18 1531

## 2018-11-07 NOTE — TELEPHONE ENCOUNTER
----- Message from David Patton sent at 11/7/2018  1:47 PM CST -----  Contact: Wife/Funmi  Unsuccessful call placed to office.  Funmi called in and stated patient had called in because he was in so much pain and Funmi wanted to let office know patient is going to go to ER at [facility] & hung up.

## 2018-11-07 NOTE — TELEPHONE ENCOUNTER
Spoke with pt and he is currently in the ER due to pains. Pt states the oral medication just wasn't helping. States the ER doctor was trying to get ahold of Dr. Wylie. Gave the ER nurse my extension. Informed pt that I will wait for their call. Pt verbalized understanding.

## 2018-11-12 ENCOUNTER — HOSPITAL ENCOUNTER (EMERGENCY)
Facility: HOSPITAL | Age: 78
Discharge: HOME | End: 2018-11-12
Attending: EMERGENCY MEDICINE
Payer: COMMERCIAL

## 2018-11-12 ENCOUNTER — TELEPHONE (OUTPATIENT)
Dept: UROLOGY | Facility: CLINIC | Age: 78
End: 2018-11-12

## 2018-11-12 VITALS
HEART RATE: 87 BPM | TEMPERATURE: 98 F | HEIGHT: 73 IN | WEIGHT: 220 LBS | DIASTOLIC BLOOD PRESSURE: 81 MMHG | SYSTOLIC BLOOD PRESSURE: 105 MMHG | BODY MASS INDEX: 29.16 KG/M2 | RESPIRATION RATE: 16 BRPM | OXYGEN SATURATION: 97 %

## 2018-11-12 DIAGNOSIS — M54.9 RIGHT-SIDED BACK PAIN, UNSPECIFIED BACK LOCATION, UNSPECIFIED CHRONICITY: ICD-10-CM

## 2018-11-12 DIAGNOSIS — D20.0: Primary | ICD-10-CM

## 2018-11-12 PROCEDURE — 99283 EMERGENCY DEPT VISIT LOW MDM: CPT

## 2018-11-12 PROCEDURE — 25000003 PHARM REV CODE 250: Performed by: EMERGENCY MEDICINE

## 2018-11-12 RX ORDER — OXYCODONE AND ACETAMINOPHEN 5; 325 MG/1; MG/1
1 TABLET ORAL
Status: COMPLETED | OUTPATIENT
Start: 2018-11-12 | End: 2018-11-12

## 2018-11-12 RX ADMIN — OXYCODONE HYDROCHLORIDE AND ACETAMINOPHEN 1 TABLET: 5; 325 TABLET ORAL at 02:11

## 2018-11-12 NOTE — ED PROVIDER NOTES
"Encounter Date: 11/12/2018       History     Chief Complaint   Patient presents with    Flank Pain     right side. Seen here on 11/07 and was told he needed to get a biopsy. Waiting for Rukhsana to follow up with insurance info to get the biopsy.    Nausea     77yo male presents for acute exacerbation of right flank pain that has been ongoing x weeks. Pain is sharp, localized to right flank, non-radiating, and intermittent. Denies urinary or bowel incontinence, weakness, numbness, tingling.   Seen in ED 11/7/18 with imaging and full workup diagnosing a retroperitoneal mass, for which he is in the process of having his insurance approve a biopsy. States he spoke with Dr. Maldonado and the biopsy with interventional radiology should be approved "either today or tomorrow."          Review of patient's allergies indicates:  No Known Allergies  Past Medical History:   Diagnosis Date    Cancer     prostate    Hypertension     Kidney problem     only 1 kidney functions    Liver disease     Umbilical hernia      Past Surgical History:   Procedure Laterality Date    colon repair      after prostatectomy    EXTRACTION, CATARACT Right 2/22/2012    [facility]    EXTRACTION, CATARACT, WITH IOL INSERTION Left 3/7/2012    [facility] by [facility] at [facility] OR    LAPAROTOMY, EXPLORATORY N/A 10/18/2018    Performed by Rancho [facility]e MD at [facility] OR [address] [place] [address]    LYSIS OF ADHESIONS N/A 10/18/2018    Procedure: LYSIS, ADHESIONS;  Surgeon: Rancho Mariee MD;  Location: [facility] OR [address] [place] [address];  Service: General;  Laterality: N/A;    LYSIS, ADHESIONS N/A 10/18/2018    Performed by [facility]o [facility]e [facility] at [facility] OR [address] [place] [address]    PROSTATECTOMY      PROSTATECTOMY  2010    REPAIR OF RECURRENT INCISIONAL HERNIA N/A 10/18/2018    Procedure: REPAIR, HERNIA, INCISIONAL -multiple;  Surgeon: Rancho Mariee MD;  Location: [facility] OR [address] [place] [address];  Service: General;  Laterality: N/A;    REPAIR, " HERNIA, INCISIONAL -multiple N/A 10/18/2018    [facility] OR [address] [place] [address]     History reviewed. No pertinent family history.  Social History     Tobacco Use    Smoking status: Former Smoker     Last attempt to quit: 2/15/1972     Years since quittin.7    Smokeless tobacco: Never Used   Substance Use Topics    Alcohol use: No     Frequency: Never    Drug use: No     Review of Systems   Constitutional: Negative for chills, diaphoresis, fatigue and fever.   HENT: Negative for congestion, rhinorrhea, sinus pressure, sinus pain, sore throat and tinnitus.    Eyes: Negative for photophobia, redness and visual disturbance.   Respiratory: Negative for cough, chest tightness and shortness of breath.    Cardiovascular: Negative for chest pain, palpitations and leg swelling.   Gastrointestinal: Negative for abdominal distention, abdominal pain, constipation, diarrhea, nausea and vomiting.   Genitourinary: Positive for flank pain. Negative for decreased urine volume, difficulty urinating, discharge, dysuria, enuresis, frequency, genital sores, hematuria, penile pain, penile swelling, scrotal swelling, testicular pain and urgency.   Musculoskeletal: Positive for back pain. Negative for arthralgias, gait problem, joint swelling, myalgias, neck pain and neck stiffness.   Skin: Negative for color change, pallor, rash and wound.   Neurological: Negative for dizziness, tremors, seizures, syncope, facial asymmetry, speech difficulty, weakness, light-headedness, numbness and headaches.   Hematological: Negative for adenopathy.   All other systems reviewed and are negative.      Physical Exam     Initial Vitals [18 1435]   BP Pulse Resp Temp SpO2   105/81 87 16 97.8 °F (36.6 °C) 97 %      MAP       --         Physical Exam    Nursing note and vitals reviewed.  Constitutional: He appears well-developed and well-nourished. No distress.   HENT:   Head: Normocephalic and atraumatic.   Right Ear:  External ear normal.   Left Ear: External ear normal.   Nose: Nose normal.   Mouth/Throat: Oropharynx is clear and moist. No oropharyngeal exudate.   Eyes: Conjunctivae are normal. Pupils are equal, round, and reactive to light. No scleral icterus.   Neck: Normal range of motion. Neck supple.   Cardiovascular: Normal rate, regular rhythm, normal heart sounds and intact distal pulses. Exam reveals no friction rub.    No murmur heard.  Pulmonary/Chest: Breath sounds normal. No respiratory distress. He has no wheezes. He has no rhonchi. He has no rales. He exhibits no tenderness.   Abdominal: Soft. Bowel sounds are normal. He exhibits no distension. There is no tenderness. There is no rebound and no guarding.   Musculoskeletal: Normal range of motion. He exhibits no edema or tenderness.   Neurological: He is alert and oriented to person, place, and time. He has normal strength and normal reflexes. He displays normal reflexes. No cranial nerve deficit or sensory deficit. GCS score is 15. GCS eye subscore is 4. GCS verbal subscore is 5. GCS motor subscore is 6.   Skin: Skin is warm. Capillary refill takes less than 2 seconds. No erythema.   Psychiatric: He has a normal mood and affect.         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 A diagnosis of Right-sided back pain, unspecified back location, unspecified chronicity was also pertinent to this visit.      Disposition:   Disposition: Discharged  Condition: Stable                        Bela Arzola MD  11/12/18 5926

## 2018-11-12 NOTE — ED NOTES
Pt states he is going to call Dr. Milian for further pain control before he leaves the ER. Wife is at the bedside with pt.

## 2018-11-12 NOTE — TELEPHONE ENCOUNTER
Pt came to office complaining of severe right sided flank pain. Informed pt that Dr. Wylie is not in office and I am not able to help relieve his plan, that the best option would be to go to the ED. Pt verbalized understanding and proceeded to in house ED.

## 2018-11-12 NOTE — ED NOTES
Spoke with Dr. Milian's nurse who stated the request for insurance approval was still pending at this time, but she stated she would call us with any changes.

## 2018-11-13 ENCOUNTER — HOSPITAL ENCOUNTER (EMERGENCY)
Facility: HOSPITAL | Age: 78
Discharge: HOME | End: 2018-11-13
Attending: EMERGENCY MEDICINE
Payer: COMMERCIAL

## 2018-11-13 VITALS
TEMPERATURE: 98 F | SYSTOLIC BLOOD PRESSURE: 143 MMHG | DIASTOLIC BLOOD PRESSURE: 83 MMHG | BODY MASS INDEX: 29.16 KG/M2 | HEART RATE: 82 BPM | OXYGEN SATURATION: 93 % | HEIGHT: 73 IN | RESPIRATION RATE: 14 BRPM | WEIGHT: 220 LBS

## 2018-11-13 DIAGNOSIS — R07.9 CHEST PAIN: ICD-10-CM

## 2018-11-13 DIAGNOSIS — M94.0 COSTOCHONDRITIS, ACUTE: Primary | ICD-10-CM

## 2018-11-13 LAB — TROPONIN I SERPL DL<=0.01 NG/ML-MCNC: 0.01 NG/ML

## 2018-11-13 PROCEDURE — 84484 ASSAY OF TROPONIN QUANT: CPT

## 2018-11-13 PROCEDURE — 93005 ELECTROCARDIOGRAM TRACING: CPT

## 2018-11-13 PROCEDURE — 99284 EMERGENCY DEPT VISIT MOD MDM: CPT | Mod: 25

## 2018-11-13 RX ORDER — LEVOFLOXACIN 500 MG/1
500 TABLET, FILM COATED ORAL DAILY
COMMUNITY
End: 2023-10-05

## 2018-11-13 RX ORDER — HYDROCODONE BITARTRATE AND ACETAMINOPHEN 5; 325 MG/1; MG/1
1 TABLET ORAL EVERY 6 HOURS PRN
COMMUNITY
End: 2023-10-05

## 2018-11-13 NOTE — ED PROVIDER NOTES
Encounter Date: 2018       History     Chief Complaint   Patient presents with    Chest Pain     78-year-old white male here with complaint chest pain the right side.  States he woke up with it states the pain is worse with inspiration he denies any nausea sweating or syncope.  His vital signs within normal limits and he is in no apparent distress.          Review of patient's allergies indicates:  No Known Allergies  Past Medical History:   Diagnosis Date    Cancer     prostate    Hypertension     Kidney problem     only 1 kidney functions    Liver disease     Umbilical hernia      Past Surgical History:   Procedure Laterality Date    colon repair      after prostatectomy    EXTRACTION, CATARACT Right 2012    [facility]    EXTRACTION, CATARACT, WITH IOL INSERTION Left 3/7/2012    [facility] by [facility] at [facility]    LAPAROTOMY, EXPLORATORY N/A 10/18/2018    Performed by [facility]o [facility]e [facility] at [facility] OR [address] [place] [address]    LYSIS OF ADHESIONS N/A 10/18/2018    Procedure: LYSIS, ADHESIONS;  Surgeon: Rancho Mariee MD;  Location: [facility] OR [address] [place] [address];  Service: General;  Laterality: N/A;    LYSIS, ADHESIONS N/A 10/18/2018    Performed by [facility]e [facility] at [facility] OR [address] [place] [address]    PROSTATECTOMY      PROSTATECTOMY  2010    REPAIR OF RECURRENT INCISIONAL HERNIA N/A 10/18/2018    Procedure: REPAIR, HERNIA, INCISIONAL -multiple;  Surgeon: Rancho Mariee MD;  Location: [facility] OR [address] [place] [address];  Service: General;  Laterality: N/A;    REPAIR, HERNIA, INCISIONAL -multiple N/A 10/18/2018    Performed by [facility]o [facility]e [facility] at [facility] OR [address] [place] [address]     No family history on file.  Social History     Tobacco Use    Smoking status: Former Smoker     Last attempt to quit: 2/15/1972     Years since quittin.7    Smokeless tobacco: Never Used   Substance Use Topics    Alcohol use: No     Frequency: Never    Drug use: No     Review of Systems   Constitutional:  Negative for fever.   HENT: Negative for sore throat.    Respiratory: Negative for shortness of breath.    Cardiovascular: Positive for chest pain.   Gastrointestinal: Negative for nausea.   Genitourinary: Negative for dysuria.   Musculoskeletal: Negative for back pain.   Skin: Negative for rash.   Neurological: Negative for weakness.   Hematological: Does not bruise/bleed easily.   All other systems reviewed and are negative.      Physical Exam     Initial Vitals   BP Pulse Resp Temp SpO2   11/13/18 0058 11/13/18 0058 11/13/18 0058 11/13/18 0102 11/13/18 0058   (!) 146/94 89 20 97.7 °F (36.5 °C) 96 %      MAP       --                Physical Exam    Nursing note and vitals reviewed.  Constitutional: He appears well-developed and well-nourished. He is not diaphoretic. No distress.   HENT:   Head: Normocephalic and atraumatic.   Right Ear: External ear normal.   Left Ear: External ear normal.   Nose: Nose normal.   Mouth/Throat: Oropharynx is clear and moist. No oropharyngeal exudate.   Eyes: Conjunctivae and EOM are normal. Pupils are equal, round, and reactive to light.   Neck: Normal range of motion. Neck supple.   Cardiovascular: Normal rate, regular rhythm, normal heart sounds and intact distal pulses.   No murmur heard.  Pulmonary/Chest: Breath sounds normal. No respiratory distress. He has no wheezes. He has no rhonchi. He has no rales. He exhibits tenderness and bony tenderness.       Patient has reproducible chest pain on the right-hand side 4th intercostal space rt sternal border   Abdominal: Soft. Bowel sounds are normal. He exhibits no distension and no mass. There is no tenderness. There is no rebound.   Musculoskeletal: Normal range of motion. He exhibits no edema.   Neurological: He is alert and oriented to person, place, and time. He has normal strength and normal reflexes. He displays normal reflexes. No cranial nerve deficit. GCS score is 15. GCS eye subscore is 4. GCS verbal subscore is 5. GCS  motor subscore is 6.   Skin: Skin is warm and dry. Capillary refill takes less than 2 seconds.   Psychiatric: He has a normal mood and affect. His behavior is normal. Judgment and thought content normal.         ED Course   Procedures  Labs Reviewed   TROPONIN I          Imaging Results    None                               Clinical Impression:   The primary encounter diagnosis was Costochondritis, acute. A diagnosis of Chest pain was also pertinent to this visit.                             Rony Nix MD  11/13/18 0153

## 2018-11-13 NOTE — DISCHARGE INSTRUCTIONS
HEAT applied to area as needed. Take motrin or tylenol as needed. CONTINUE current medicines as perscribed TYLENOL and /or MOTRIN for fever and pain as needed  PCP follow up ASAP/ or as needed.

## 2018-11-13 NOTE — ED NOTES
"Pt here with complaints of mid sternal chest pain- radiates to back and right shoulder pain onset tonight while sleeping.  Pt awake and alert. Respirations non labored. Pt reports coming to ER earlier for right "side" pain. Pt reports pain is worse with inspiration and palpation.  "

## 2018-11-16 ENCOUNTER — TELEPHONE (OUTPATIENT)
Dept: UROLOGY | Facility: CLINIC | Age: 78
End: 2018-11-16

## 2018-11-16 NOTE — TELEPHONE ENCOUNTER
----- Message from Maryann Ledezma sent at 11/16/2018  2:51 PM CST -----  Type: Needs Medical Advice    Who Called:  Patient  Best Call Back Number: [phone]  Additional Information: Patient needs copy of MRI sent to Dr. Conde at [phone]/please send and call patient back to confirm.

## 2018-12-05 ENCOUNTER — TELEPHONE (OUTPATIENT)
Dept: ADMINISTRATIVE | Facility: CLINIC | Age: 78
End: 2018-12-05

## 2018-12-05 NOTE — TELEPHONE ENCOUNTER
Home Health SOC 10/27/2018 - 12/25/2018 with [facility] - Dr. Rancho Mariee. Patient received SN, PT and OT services.

## 2018-12-25 ENCOUNTER — HOSPITAL ENCOUNTER (EMERGENCY)
Facility: HOSPITAL | Age: 78
Discharge: HOME | End: 2018-12-25
Attending: FAMILY MEDICINE
Payer: COMMERCIAL

## 2018-12-25 VITALS
SYSTOLIC BLOOD PRESSURE: 124 MMHG | HEART RATE: 108 BPM | BODY MASS INDEX: 28.89 KG/M2 | WEIGHT: 218 LBS | RESPIRATION RATE: 20 BRPM | DIASTOLIC BLOOD PRESSURE: 99 MMHG | TEMPERATURE: 99 F | OXYGEN SATURATION: 98 % | HEIGHT: 73 IN

## 2018-12-25 DIAGNOSIS — K40.20 BILATERAL INGUINAL HERNIA WITHOUT OBSTRUCTION OR GANGRENE, RECURRENCE NOT SPECIFIED: ICD-10-CM

## 2018-12-25 DIAGNOSIS — R19.00 RETROPERITONEAL MASS: Primary | ICD-10-CM

## 2018-12-25 LAB
ALBUMIN SERPL BCP-MCNC: 3.4 G/DL
ALP SERPL-CCNC: 115 U/L
ALT SERPL W/O P-5'-P-CCNC: 39 U/L
ANION GAP SERPL CALC-SCNC: 10 MMOL/L
AST SERPL-CCNC: 27 U/L
BASOPHILS # BLD AUTO: 0.04 K/UL
BASOPHILS NFR BLD: 0.3 %
BILIRUB SERPL-MCNC: 0.5 MG/DL
BUN SERPL-MCNC: 24 MG/DL
CALCIUM SERPL-MCNC: 9 MG/DL
CHLORIDE SERPL-SCNC: 102 MMOL/L
CO2 SERPL-SCNC: 27 MMOL/L
CREAT SERPL-MCNC: 1.7 MG/DL
DIFFERENTIAL METHOD: ABNORMAL
EOSINOPHIL # BLD AUTO: 0.3 K/UL
EOSINOPHIL NFR BLD: 2.1 %
ERYTHROCYTE [DISTWIDTH] IN BLOOD BY AUTOMATED COUNT: 13.3 %
EST. GFR  (AFRICAN AMERICAN): 43.7 ML/MIN/1.73 M^2
EST. GFR  (NON AFRICAN AMERICAN): 37.8 ML/MIN/1.73 M^2
GLUCOSE SERPL-MCNC: 115 MG/DL
HCT VFR BLD AUTO: 44.1 %
HGB BLD-MCNC: 14.8 G/DL
IMM GRANULOCYTES # BLD AUTO: 0.07 K/UL
IMM GRANULOCYTES NFR BLD AUTO: 0.5 %
LIPASE SERPL-CCNC: 15 U/L
LYMPHOCYTES # BLD AUTO: 1.5 K/UL
LYMPHOCYTES NFR BLD: 11.6 %
MCH RBC QN AUTO: 29.5 PG
MCHC RBC AUTO-ENTMCNC: 33.6 G/DL
MCV RBC AUTO: 88 FL
MONOCYTES # BLD AUTO: 0.9 K/UL
MONOCYTES NFR BLD: 6.7 %
NEUTROPHILS # BLD AUTO: 10.4 K/UL
NEUTROPHILS NFR BLD: 78.8 %
NRBC BLD-RTO: 0 /100 WBC
PLATELET # BLD AUTO: 401 K/UL
PMV BLD AUTO: 9.3 FL
POTASSIUM SERPL-SCNC: 3.7 MMOL/L
PROT SERPL-MCNC: 6.9 G/DL
RBC # BLD AUTO: 5.02 M/UL
SODIUM SERPL-SCNC: 139 MMOL/L
WBC # BLD AUTO: 13.19 K/UL

## 2018-12-25 PROCEDURE — 80053 COMPREHEN METABOLIC PANEL: CPT

## 2018-12-25 PROCEDURE — 96374 THER/PROPH/DIAG INJ IV PUSH: CPT | Mod: 59

## 2018-12-25 PROCEDURE — 25000003 PHARM REV CODE 250: Performed by: FAMILY MEDICINE

## 2018-12-25 PROCEDURE — 85025 COMPLETE CBC W/AUTO DIFF WBC: CPT

## 2018-12-25 PROCEDURE — 63600175 PHARM REV CODE 636 W HCPCS: Performed by: FAMILY MEDICINE

## 2018-12-25 PROCEDURE — 74177 CT ABD & PELVIS W/CONTRAST: CPT | Mod: 26,,, | Performed by: RADIOLOGY

## 2018-12-25 PROCEDURE — 96375 TX/PRO/DX INJ NEW DRUG ADDON: CPT

## 2018-12-25 PROCEDURE — 74177 CT ABD & PELVIS W/CONTRAST: CPT | Mod: TC

## 2018-12-25 PROCEDURE — 99284 EMERGENCY DEPT VISIT MOD MDM: CPT | Mod: 25

## 2018-12-25 PROCEDURE — 25500020 PHARM REV CODE 255: Performed by: FAMILY MEDICINE

## 2018-12-25 PROCEDURE — 96361 HYDRATE IV INFUSION ADD-ON: CPT

## 2018-12-25 PROCEDURE — 83690 ASSAY OF LIPASE: CPT

## 2018-12-25 RX ORDER — ONDANSETRON 2 MG/ML
4 INJECTION INTRAMUSCULAR; INTRAVENOUS
Status: COMPLETED | OUTPATIENT
Start: 2018-12-25 | End: 2018-12-25

## 2018-12-25 RX ORDER — SODIUM CHLORIDE 9 MG/ML
1000 INJECTION, SOLUTION INTRAVENOUS
Status: COMPLETED | OUTPATIENT
Start: 2018-12-25 | End: 2018-12-25

## 2018-12-25 RX ORDER — HYDROCODONE BITARTRATE AND ACETAMINOPHEN 5; 325 MG/1; MG/1
1 TABLET ORAL EVERY 8 HOURS PRN
Qty: 18 TABLET | Refills: 0 | OUTPATIENT
Start: 2018-12-25 | End: 2023-10-05

## 2018-12-25 RX ORDER — HYDROMORPHONE HYDROCHLORIDE 2 MG/ML
1 INJECTION, SOLUTION INTRAMUSCULAR; INTRAVENOUS; SUBCUTANEOUS
Status: COMPLETED | OUTPATIENT
Start: 2018-12-25 | End: 2018-12-25

## 2018-12-25 RX ADMIN — ONDANSETRON 4 MG: 2 INJECTION INTRAMUSCULAR; INTRAVENOUS at 09:12

## 2018-12-25 RX ADMIN — SODIUM CHLORIDE 1000 ML: 9 INJECTION, SOLUTION INTRAVENOUS at 06:12

## 2018-12-25 RX ADMIN — IOHEXOL 75 ML: 350 INJECTION, SOLUTION INTRAVENOUS at 07:12

## 2018-12-25 RX ADMIN — HYDROMORPHONE HYDROCHLORIDE 1 MG: 2 INJECTION INTRAMUSCULAR; INTRAVENOUS; SUBCUTANEOUS at 09:12

## 2018-12-26 NOTE — ED PROVIDER NOTES
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, the etiology of this lesion is still uncertain patient did have an incisional hernia repair over 1 year ago he is not a smoker          Review of patient's allergies indicates:  No Known Allergies  Past Medical History:   Diagnosis Date    Cancer     prostate    Hypertension     Kidney problem     only 1 kidney functions    Liver disease     Umbilical hernia      Past Surgical History:   Procedure Laterality Date    colon repair      after prostatectomy    EXTRACTION, CATARACT Right 2012    Performed by [facility] at [facility]    EXTRACTION, CATARACT, WITH IOL INSERTION Left 3/7/2012    Performed by [facility] at [facility] OR    LAPAROTOMY, EXPLORATORY N/A 10/18/2018    [facility]    LYSIS, ADHESIONS N/A 10/18/2018    [facility]    PROSTATECTOMY      PROSTATECTOMY  2010    REPAIR, HERNIA, INCISIONAL -multiple N/A 10/18/2018    Performed by Rancho [facility]e [facility] at [facility] OR [address] [place] [address]     History reviewed. No pertinent family history.  Social History     Tobacco Use    Smoking status: Former Smoker     Last attempt to quit: 2/15/1972     Years since quittin.8    Smokeless tobacco: Never Used   Substance Use Topics    Alcohol use: No     Frequency: Never    Drug use: No     Review of Systems   Constitutional: Negative for fever.   HENT: Negative for sore throat.    Respiratory: Negative for shortness of breath.    Cardiovascular: Negative for  chest pain.   Gastrointestinal: Positive for abdominal pain and nausea. Negative for abdominal distention, diarrhea and vomiting.   Genitourinary: Negative for dysuria.   Musculoskeletal: Negative for back pain.   Skin: Negative for rash.   Neurological: Negative for weakness.   Hematological: Does not bruise/bleed easily.       Physical Exam     Initial Vitals [12/25/18 1751]   BP Pulse Resp Temp SpO2   (!) 149/79 108 20 99 °F (37.2 °C) 98 %      MAP       --         Physical Exam    Nursing note and vitals reviewed.  Constitutional: He appears well-developed and well-nourished. He is not diaphoretic. No distress.   HENT:   Head: Normocephalic and atraumatic.   Right Ear: External ear normal.   Left Ear: External ear normal.   Nose: Nose normal.   Mouth/Throat: Oropharynx is clear and moist. No oropharyngeal exudate.   Eyes: EOM are normal.   Neck: Normal range of motion. Neck supple. No tracheal deviation present.   Cardiovascular: Normal rate and regular rhythm.   No murmur heard.  Pulmonary/Chest: Breath sounds normal. No stridor. No respiratory distress. He has no rales.   Abdominal: Soft. He exhibits no distension and no mass. There is no tenderness. There is no rebound.   Positive deep scar in the abdominal abdomen midline there is no tenderness in this area however there is a bulge in the right inguinal area which can be gently reduced it does vary with Valsalva, inguinal canal exam showed reveals a right inguinal hernia which does not seem to be car so incarcerated there is also a smaller left inguinal hernia, the testes are normal bilaterally   Musculoskeletal: Normal range of motion. He exhibits no edema.   Lymphadenopathy:     He has no cervical adenopathy.   Neurological: He is alert and oriented to person, place, and time. He has normal strength.   Skin: Skin is warm and dry. Capillary refill takes less than 2 seconds. No pallor.   Psychiatric: He has a normal mood and affect.         ED Course    Procedures  Labs Reviewed   CBC W/ AUTO DIFFERENTIAL - Abnormal; Notable for the following components:       Result Value    WBC 13.19 (*)     Platelets 401 (*)     Gran # (ANC) 10.4 (*)     Immature Grans (Abs) 0.07 (*)     Gran% 78.8 (*)     Lymph% 11.6 (*)     All other components within normal limits   COMPREHENSIVE METABOLIC PANEL - Abnormal; Notable for the following components:    Glucose 115 (*)     BUN, Bld 24 (*)     Creatinine 1.7 (*)     Albumin 3.4 (*)     eGFR if  43.7 (*)     eGFR if non  37.8 (*)     All other components within normal limits   LIPASE          Imaging Results          CT Abdomen Pelvis With Contrast (In process)                                       Clinical Impression:   The encounter diagnosis was Retroperitoneal mass.                             Abel Soto MD  12/26/18 0439

## 2020-09-16 NOTE — ED NOTES
Group Therapy Note    Date: 9/15/2020    Group Start Time: 2020  Group End Time: 2050  Group Topic: Wrap-Up    LIZANDRO IBARRA    Natacha Rodrigezson        Group Therapy Note    Attendees: 6/18         Patient's Goal:  Wrap Up    Notes:  Pt was supportive of others and appeared brightened during group. Pt shared that her goal for tomorrow is to attend all groups. Pt shared positive things about herself - including that she has a, \"bubbly\" personality. Pt shared a quote she lives by with the group, \"treat people with kindness\". Pt also shared with the group she does not have a support system, but hopes to work on that when she is discharged. Status After Intervention:  Improved    Participation Level: Active Listener and Interactive    Participation Quality: Appropriate, Attentive, Sharing and Supportive      Speech:  normal      Thought Process/Content: Logical  Linear      Affective Functioning: Congruent      Mood: anxious      Level of consciousness:  Alert, Oriented x4 and Attentive      Response to Learning: Able to verbalize current knowledge/experience, Capable of insight, Able to change behavior and Progressing to goal      Endings: None Reported    Modes of Intervention: Support, Socialization and Activity      Discipline Responsible: Behavorial Health Tech      Signature:   George Cooper Patient placed on continuous cardiac monitor, automatic blood pressure cuff and continuous pulse oximeter.

## 2021-06-17 NOTE — NURSING
Pt transported to MRI via bed   Doxepin Pregnancy And Lactation Text: This medication is Pregnancy Category C and it isn't known if it is safe during pregnancy. It is also excreted in breast milk and breast feeding isn't recommended.

## 2021-12-18 ENCOUNTER — HOSPITAL ENCOUNTER (EMERGENCY)
Facility: HOSPITAL | Age: 81
Discharge: HOME | End: 2021-12-18
Attending: EMERGENCY MEDICINE
Payer: MEDICARE

## 2021-12-18 VITALS
RESPIRATION RATE: 20 BRPM | OXYGEN SATURATION: 97 % | BODY MASS INDEX: 27.17 KG/M2 | TEMPERATURE: 98 F | HEART RATE: 69 BPM | HEIGHT: 73 IN | DIASTOLIC BLOOD PRESSURE: 99 MMHG | SYSTOLIC BLOOD PRESSURE: 183 MMHG | WEIGHT: 205 LBS

## 2021-12-18 DIAGNOSIS — M79.2 NEURALGIA OF RIGHT FOOT: Primary | ICD-10-CM

## 2021-12-18 PROCEDURE — 99281 EMR DPT VST MAYX REQ PHY/QHP: CPT

## 2023-04-25 ENCOUNTER — HOSPITAL ENCOUNTER (OUTPATIENT)
Facility: HOSPITAL | Age: 83
Discharge: LEFT BEFORE DISCHARGE | End: 2023-04-25
Attending: EMERGENCY MEDICINE | Admitting: HOSPITALIST
Payer: MEDICARE

## 2023-04-25 VITALS
TEMPERATURE: 97 F | BODY MASS INDEX: 25.84 KG/M2 | WEIGHT: 195 LBS | SYSTOLIC BLOOD PRESSURE: 136 MMHG | HEIGHT: 73 IN | RESPIRATION RATE: 24 BRPM | DIASTOLIC BLOOD PRESSURE: 67 MMHG | OXYGEN SATURATION: 97 % | HEART RATE: 85 BPM

## 2023-04-25 DIAGNOSIS — I50.9 CONGESTIVE HEART FAILURE, UNSPECIFIED HF CHRONICITY, UNSPECIFIED HEART FAILURE TYPE: ICD-10-CM

## 2023-04-25 DIAGNOSIS — R53.83 FATIGUE, UNSPECIFIED TYPE: Primary | ICD-10-CM

## 2023-04-25 PROBLEM — R41.0 CONFUSION: Status: ACTIVE | Noted: 2023-04-25

## 2023-04-25 LAB
ALBUMIN SERPL BCP-MCNC: 3.6 G/DL (ref 3.5–5.2)
ALP SERPL-CCNC: 117 U/L (ref 55–135)
ALT SERPL W/O P-5'-P-CCNC: 80 U/L (ref 10–44)
AMMONIA PLAS-SCNC: 13 UMOL/L (ref 10–50)
ANION GAP SERPL CALC-SCNC: 11 MMOL/L (ref 8–16)
AST SERPL-CCNC: 73 U/L (ref 10–40)
BASOPHILS # BLD AUTO: 0.05 K/UL (ref 0–0.2)
BASOPHILS NFR BLD: 0.4 % (ref 0–1.9)
BILIRUB SERPL-MCNC: 0.8 MG/DL (ref 0.1–1)
BNP SERPL-MCNC: 801 PG/ML (ref 0–99)
BUN SERPL-MCNC: 30 MG/DL (ref 8–23)
CALCIUM SERPL-MCNC: 9.1 MG/DL (ref 8.7–10.5)
CHLORIDE SERPL-SCNC: 102 MMOL/L (ref 95–110)
CO2 SERPL-SCNC: 25 MMOL/L (ref 23–29)
CREAT SERPL-MCNC: 1.6 MG/DL (ref 0.5–1.4)
DIFFERENTIAL METHOD: ABNORMAL
EOSINOPHIL # BLD AUTO: 0.3 K/UL (ref 0–0.5)
EOSINOPHIL NFR BLD: 2.5 % (ref 0–8)
ERYTHROCYTE [DISTWIDTH] IN BLOOD BY AUTOMATED COUNT: 14.6 % (ref 11.5–14.5)
EST. GFR  (NO RACE VARIABLE): 42.5 ML/MIN/1.73 M^2
ETHANOL SERPL-MCNC: <5 MG/DL
GLUCOSE SERPL-MCNC: 120 MG/DL (ref 70–110)
HCT VFR BLD AUTO: 48.3 % (ref 40–54)
HGB BLD-MCNC: 15.8 G/DL (ref 14–18)
IMM GRANULOCYTES # BLD AUTO: 0.14 K/UL (ref 0–0.04)
IMM GRANULOCYTES NFR BLD AUTO: 1.2 % (ref 0–0.5)
INFLUENZA A, MOLECULAR: NEGATIVE
INFLUENZA B, MOLECULAR: NEGATIVE
INR PPP: 1 (ref 0.8–1.2)
LACTATE SERPL-SCNC: 1.1 MMOL/L (ref 0.5–1.9)
LIPASE SERPL-CCNC: 17 U/L (ref 4–60)
LYMPHOCYTES # BLD AUTO: 1.5 K/UL (ref 1–4.8)
LYMPHOCYTES NFR BLD: 12.9 % (ref 18–48)
MAGNESIUM SERPL-MCNC: 2.3 MG/DL (ref 1.6–2.6)
MCH RBC QN AUTO: 30.2 PG (ref 27–31)
MCHC RBC AUTO-ENTMCNC: 32.7 G/DL (ref 32–36)
MCV RBC AUTO: 92 FL (ref 82–98)
MONOCYTES # BLD AUTO: 0.8 K/UL (ref 0.3–1)
MONOCYTES NFR BLD: 6.7 % (ref 4–15)
NEUTROPHILS # BLD AUTO: 9 K/UL (ref 1.8–7.7)
NEUTROPHILS NFR BLD: 76.3 % (ref 38–73)
NRBC BLD-RTO: 0 /100 WBC
PLATELET # BLD AUTO: 457 K/UL (ref 150–450)
PMV BLD AUTO: 9.4 FL (ref 9.2–12.9)
POTASSIUM SERPL-SCNC: 3.4 MMOL/L (ref 3.5–5.1)
PROCALCITONIN SERPL IA-MCNC: 0.08 NG/ML (ref 0–0.5)
PROT SERPL-MCNC: 7 G/DL (ref 6–8.4)
PROTHROMBIN TIME: 11.3 SEC (ref 9–12.5)
RBC # BLD AUTO: 5.24 M/UL (ref 4.6–6.2)
SARS-COV-2 RDRP RESP QL NAA+PROBE: NEGATIVE
SODIUM SERPL-SCNC: 138 MMOL/L (ref 136–145)
SPECIMEN SOURCE: NORMAL
TROPONIN I SERPL HS-MCNC: 1252.2 PG/ML (ref 0–14.9)
TSH SERPL DL<=0.005 MIU/L-ACNC: 4.25 UIU/ML (ref 0.34–5.6)
WBC # BLD AUTO: 11.77 K/UL (ref 3.9–12.7)

## 2023-04-25 PROCEDURE — 93010 ELECTROCARDIOGRAM REPORT: CPT | Mod: ,,, | Performed by: GENERAL PRACTICE

## 2023-04-25 PROCEDURE — 83880 ASSAY OF NATRIURETIC PEPTIDE: CPT | Performed by: EMERGENCY MEDICINE

## 2023-04-25 PROCEDURE — 87502 INFLUENZA DNA AMP PROBE: CPT | Performed by: EMERGENCY MEDICINE

## 2023-04-25 PROCEDURE — G0378 HOSPITAL OBSERVATION PER HR: HCPCS

## 2023-04-25 PROCEDURE — 99285 EMERGENCY DEPT VISIT HI MDM: CPT | Mod: 25

## 2023-04-25 PROCEDURE — 36415 COLL VENOUS BLD VENIPUNCTURE: CPT | Performed by: EMERGENCY MEDICINE

## 2023-04-25 PROCEDURE — 84443 ASSAY THYROID STIM HORMONE: CPT | Performed by: EMERGENCY MEDICINE

## 2023-04-25 PROCEDURE — 85025 COMPLETE CBC W/AUTO DIFF WBC: CPT | Performed by: EMERGENCY MEDICINE

## 2023-04-25 PROCEDURE — 96374 THER/PROPH/DIAG INJ IV PUSH: CPT

## 2023-04-25 PROCEDURE — 84145 PROCALCITONIN (PCT): CPT | Performed by: EMERGENCY MEDICINE

## 2023-04-25 PROCEDURE — 82077 ASSAY SPEC XCP UR&BREATH IA: CPT | Performed by: EMERGENCY MEDICINE

## 2023-04-25 PROCEDURE — 87040 BLOOD CULTURE FOR BACTERIA: CPT | Performed by: EMERGENCY MEDICINE

## 2023-04-25 PROCEDURE — 80053 COMPREHEN METABOLIC PANEL: CPT | Performed by: EMERGENCY MEDICINE

## 2023-04-25 PROCEDURE — 85610 PROTHROMBIN TIME: CPT | Performed by: EMERGENCY MEDICINE

## 2023-04-25 PROCEDURE — 83690 ASSAY OF LIPASE: CPT | Performed by: EMERGENCY MEDICINE

## 2023-04-25 PROCEDURE — 93005 ELECTROCARDIOGRAM TRACING: CPT | Performed by: GENERAL PRACTICE

## 2023-04-25 PROCEDURE — 25000003 PHARM REV CODE 250: Performed by: EMERGENCY MEDICINE

## 2023-04-25 PROCEDURE — 93010 EKG 12-LEAD: ICD-10-PCS | Mod: ,,, | Performed by: GENERAL PRACTICE

## 2023-04-25 PROCEDURE — 82140 ASSAY OF AMMONIA: CPT | Performed by: EMERGENCY MEDICINE

## 2023-04-25 PROCEDURE — 63600175 PHARM REV CODE 636 W HCPCS: Performed by: EMERGENCY MEDICINE

## 2023-04-25 PROCEDURE — 83735 ASSAY OF MAGNESIUM: CPT | Performed by: EMERGENCY MEDICINE

## 2023-04-25 PROCEDURE — 84484 ASSAY OF TROPONIN QUANT: CPT | Performed by: EMERGENCY MEDICINE

## 2023-04-25 PROCEDURE — 83605 ASSAY OF LACTIC ACID: CPT | Performed by: EMERGENCY MEDICINE

## 2023-04-25 PROCEDURE — U0002 COVID-19 LAB TEST NON-CDC: HCPCS | Performed by: EMERGENCY MEDICINE

## 2023-04-25 RX ORDER — METOPROLOL TARTRATE 25 MG/1
25 TABLET, FILM COATED ORAL 2 TIMES DAILY
COMMUNITY
Start: 2023-04-24

## 2023-04-25 RX ORDER — FUROSEMIDE 40 MG/1
40 TABLET ORAL 2 TIMES DAILY
COMMUNITY
Start: 2023-04-24

## 2023-04-25 RX ORDER — AMOXICILLIN AND CLAVULANATE POTASSIUM 875; 125 MG/1; MG/1
1 TABLET, FILM COATED ORAL 2 TIMES DAILY
Qty: 14 TABLET | Refills: 0 | Status: SHIPPED | OUTPATIENT
Start: 2023-04-25 | End: 2024-01-11

## 2023-04-25 RX ORDER — BENZONATATE 100 MG/1
100 CAPSULE ORAL 3 TIMES DAILY PRN
COMMUNITY
Start: 2023-04-17

## 2023-04-25 RX ORDER — AMLODIPINE BESYLATE 5 MG/1
5 TABLET ORAL 2 TIMES DAILY
COMMUNITY
Start: 2023-02-06

## 2023-04-25 RX ORDER — ATORVASTATIN CALCIUM 40 MG/1
40 TABLET, FILM COATED ORAL DAILY
COMMUNITY
Start: 2023-02-06

## 2023-04-25 RX ORDER — TICAGRELOR 90 MG/1
90 TABLET ORAL 2 TIMES DAILY
COMMUNITY
Start: 2023-04-20

## 2023-04-25 RX ORDER — CHOLECALCIFEROL (VITAMIN D3) 25 MCG
25 TABLET ORAL DAILY
COMMUNITY
Start: 2023-04-18

## 2023-04-25 RX ORDER — RIVAROXABAN 2.5 MG/1
2.5 TABLET, FILM COATED ORAL 2 TIMES DAILY
COMMUNITY
Start: 2023-01-12

## 2023-04-25 RX ORDER — ASPIRIN 81 MG/1
81 TABLET ORAL DAILY
COMMUNITY
Start: 2023-04-20

## 2023-04-25 RX ORDER — LOSARTAN POTASSIUM 100 MG/1
100 TABLET ORAL DAILY
COMMUNITY
Start: 2023-03-20

## 2023-04-25 RX ORDER — VANCOMYCIN HCL IN 5 % DEXTROSE 1G/250ML
1000 PLASTIC BAG, INJECTION (ML) INTRAVENOUS
Status: DISCONTINUED | OUTPATIENT
Start: 2023-04-25 | End: 2023-04-25

## 2023-04-25 RX ORDER — CLOPIDOGREL BISULFATE 75 MG/1
75 TABLET ORAL DAILY
COMMUNITY
Start: 2023-03-29

## 2023-04-25 RX ADMIN — CEFEPIME 2 G: 2 INJECTION, POWDER, FOR SOLUTION INTRAVENOUS at 07:04

## 2023-04-25 NOTE — ED PROVIDER NOTES
Encounter Date: 2023       History     Chief Complaint   Patient presents with    Fatigue     Fatigued x1 week, had cardiac stents placed on , left [facility] ama     83-year-old male presents complaining of fatigue patient has generalized fatigue for the past week, family has also noticed confusion, patient presented to [facility] a week ago and had 3 cardiac stents placed and was treated for pneumonia, patient discharged on , family reports that patient's symptoms have persisted and confusion lingers which they report is not his baseline.  Patient has a history of B-cell lymphoma, hypertension, prostate cancer.      Review of patient's allergies indicates:  No Known Allergies  Past Medical History:   Diagnosis Date    Cancer     prostate    Hypertension     Kidney problem     only 1 kidney functions    Liver disease     Umbilical hernia      Past Surgical History:   Procedure Laterality Date    colon repair      after prostatectomy    LYSIS OF ADHESIONS N/A 10/18/2018    Procedure: LYSIS, ADHESIONS;  Surgeon: Rancho Mariee MD;  Location: [facility] OR [address] [place] [address];  Service: General;  Laterality: N/A;    PROSTATECTOMY      PROSTATECTOMY      REPAIR OF RECURRENT INCISIONAL HERNIA N/A 10/18/2018    Procedure: REPAIR, HERNIA, INCISIONAL -multiple;  Surgeon: Rancho Mariee MD;  Location: [facility] OR [address] [place] [address];  Service: General;  Laterality: N/A;     No family history on file.  Social History     Tobacco Use    Smoking status: Former     Types: Cigarettes     Quit date: 2/15/1972     Years since quittin.2    Smokeless tobacco: Never   Substance Use Topics    Alcohol use: No    Drug use: No     Review of Systems   Constitutional:  Positive for fatigue. Negative for fever.   HENT:  Positive for congestion. Negative for rhinorrhea, sore throat and trouble swallowing.    Eyes:  Negative for visual disturbance.   Respiratory:  Positive for cough. Negative for chest tightness, shortness of  breath and wheezing.    Cardiovascular:  Negative for chest pain, palpitations and leg swelling.   Gastrointestinal:  Negative for abdominal distention, abdominal pain, constipation, diarrhea, nausea and vomiting.   Genitourinary:  Negative for difficulty urinating, dysuria, flank pain and frequency.   Musculoskeletal:  Negative for arthralgias, back pain, joint swelling and neck pain.   Skin:  Negative for color change and rash.   Neurological:  Negative for dizziness, syncope, speech difficulty, weakness, numbness and headaches.   All other systems reviewed and are negative.    Physical Exam     Initial Vitals [04/25/23 1535]   BP Pulse Resp Temp SpO2   105/67 73 18 97.4 °F (36.3 °C) 97 %      MAP       --         Physical Exam    Nursing note and vitals reviewed.  Constitutional: He appears well-developed and well-nourished. He is not diaphoretic. No distress.   HENT:   Head: Normocephalic and atraumatic.   Right Ear: External ear normal.   Left Ear: External ear normal.   Nose: Nose normal.   Mouth/Throat: Oropharynx is clear and moist. No oropharyngeal exudate.   Eyes: Conjunctivae and EOM are normal. Pupils are equal, round, and reactive to light. Right eye exhibits no discharge. Left eye exhibits no discharge. No scleral icterus.   Neck: Neck supple. No thyromegaly present. No tracheal deviation present. No JVD present.   Normal range of motion.  Cardiovascular:  Normal rate, regular rhythm, normal heart sounds and intact distal pulses.     Exam reveals no gallop and no friction rub.       No murmur heard.  Pulmonary/Chest: Breath sounds normal. No stridor. No respiratory distress. He has no wheezes. He has no rhonchi. He has no rales. He exhibits no tenderness.   Abdominal: Abdomen is soft. Bowel sounds are normal. He exhibits no distension and no mass. There is no abdominal tenderness. There is no rebound and no guarding.   Musculoskeletal:         General: No tenderness or edema. Normal range of motion.       Cervical back: Normal range of motion and neck supple.     Lymphadenopathy:     He has no cervical adenopathy.   Neurological: He is alert. He has normal strength. No cranial nerve deficit or sensory deficit.   Oriented to person place and approximately oriented to time patient knows month and year but not the day   Skin: Skin is warm and dry. No rash noted. No erythema.       ED Course   Procedures  Labs Reviewed   B-TYPE NATRIURETIC PEPTIDE - Abnormal; Notable for the following components:       Result Value     (*)     All other components within normal limits   CBC W/ AUTO DIFFERENTIAL - Abnormal; Notable for the following components:    RDW 14.6 (*)     Platelets 457 (*)     Immature Granulocytes 1.2 (*)     Gran # (ANC) 9.0 (*)     Immature Grans (Abs) 0.14 (*)     Gran % 76.3 (*)     Lymph % 12.9 (*)     All other components within normal limits   COMPREHENSIVE METABOLIC PANEL - Abnormal; Notable for the following components:    Potassium 3.4 (*)     Glucose 120 (*)     BUN 30 (*)     Creatinine 1.6 (*)     AST 73 (*)     ALT 80 (*)     eGFR 42.5 (*)     All other components within normal limits   TROPONIN I HIGH SENSITIVITY - Abnormal; Notable for the following components:    Troponin I High Sensitivity 1252.2 (*)     All other components within normal limits    Narrative:     Trop critical result(s) repeated. Called and verbal readback obtained   from Fernanda Neal RN/ed by [facility] 04/25/2023 18:13   CULTURE, BLOOD   CULTURE, BLOOD   SARS-COV-2 RNA AMPLIFICATION, QUAL   LACTIC ACID, PLASMA   LIPASE   MAGNESIUM   PROTIME-INR   INFLUENZA A AND B ANTIGEN    Narrative:     Specimen Source->Nasopharyngeal Swab   TSH   ALCOHOL,MEDICAL (ETHANOL)   AMMONIA   PROCALCITONIN   PROCALCITONIN   DRUG SCREEN PANEL, URINE EMERGENCY   URINALYSIS, REFLEX TO URINE CULTURE          Imaging Results              CT Head Without Contrast (Final result)  Result time 04/25/23 18:01:56      Final result by Ruddy Calvin MD  (04/25/23 18:01:56)                   Narrative:      EXAM: CT HEAD WITHOUT CONTRAST    HISTORY: Mental status change, unknown cause    COMPARISON: None    TECHNIQUE: Multiple axial 3 mm thick images were acquired from the base of the skull to the vertex without IV contrast.    This exam was performed according to our departmental dose-optimization program, which includes automated exposure control, adjustment of the mA and/or kV according to patient size and/or use of iterative reconstruction technique.    Unless otherwise stated, incidental findings do not require dedicated follow-up imaging.    FINDINGS: The brain and ventricular system are structurally within normal limits for a patient of this age. There is no evidence of recent or old intracranial hemorrhage or infarction. There are no masses. There is mild callosal thickening in the floor of the right maxillary sinus. The paranasal sinuses are otherwise well aerated. The mastoid air cells and middle ear cavities are well aerated. Bone window images show a tiny osteoma in the right air cell of the frontal sinus and are otherwise unremarkable    IMPRESSION:   Unremarkable CT scan of the head without contrast. No acute intracranial abnormality is identified.    Electronically signed by:  Tiago Calvin MD  4/25/2023 6:01 PM CDT Workstation: [id number]                                     X-Ray Chest AP Portable (Final result)  Result time 04/25/23 16:14:11      Final result by Ishaan Gamble MD (04/25/23 16:14:11)                   Narrative:    EXAM DESCRIPTION: XR CHEST AP PORTABLE    CLINICAL HISTORY: 83 years Male, Altered mental status    COMPARISON: October 25, 2018.    FINDINGS: A single AP view of the chest was obtained. The heart size is normal. A Port-A-Cath is present on the left with the tip overlying the expected area of superior vena cava. Short linear density is seen in the right mid lung. No pleural fluid is seen. The osseous structures are  unremarkable.    IMPRESSION:  The linear density in the right mid lung may represent an area of atelectasis or pneumonia.    Electronically signed by:  Ishaan Gamble MD  4/25/2023 4:14 PM CDT Workstation: [id number]                                     Medications   cefepime 2 g in dextrose 5% 100 mL IVPB (ready to mix) (2 g Intravenous New Bag 4/25/23 1952)   cefepime 2 g in dextrose 5% 100 mL IVPB (ready to mix) (has no administration in time range)     Medical Decision Making:   History:   Old Medical Records: I decided to obtain old medical records.  Initial Assessment:   Emergent evaluation of an 83-year-old male presenting with altered mental status and fatigue after recent cardiac stenting differential diagnosis includes ACS, electrolyte abnormality, endocrine dysfunction, infection          Attending Attestation:             Attending ED Notes:   Patient has possible pneumonia on chest x-ray patient started on broad-spectrum antibiotics, patient noted to have elevation in troponin however no active chest pain and he has recent cardiac stenting, patient consulted Internal Medicine for further evaluation and management of altered mental status and fatigue.  Patient does not currently show any signs of altered mental status and has been alert and oriented his entire stay.  Patient appears to have capacity to make his own medical decisions.  AMS was described by son as an occasional delirium type phenomenon.         Addendum:  Patient seen and evaluated by hospital medicine doctor, hospital medicine doctor after discussing things with patient reports that patient wishes to leave.  Patient is currently alert and oriented x3, patient appears to have capacity to make his own medical decisions patient understands that by leaving there is a risk of death disability and deterioration.  Furthermore patient's hospitalist is in agreement with discharge reporting that she does not feel patient requires further inpatient  treatment at this time, patient is pain-free, patient has no other acute abnormalities noted on lab work or imaging.  Hospitalist spoke with patient's son who is also in agreement with discharge and he understands discharge plan.  Patient discharged by Hospital Medicine Dr Velasquez.  Patient is to follow up with PCP in the next 1-2 days for further evaluation and management patient understands that he can change his mind at any time and return and that he is encouraged to do so.  Patient has elected to sign out of the hospital against medical advice.    MDM    Patient presents for emergent evaluation of possible acute complaint that  may pose a possible threat to life and/or bodily function.    I may have ordered labs and personally reviewed them. If applicable, Labs significant for abnormalities noted above.    I may have ordered X-rays and personally reviewed them and reviewed the radiologist interpretation.  If applicable, Xray significant for findings noted above.    I may have ordered EKG and personally reviewed it.  If applicable, EKG significant for findings noted above.    I may have ordered CT scan and personally reviewed it and reviewed the radiologist interpretation.  If applicable, CT significant for findings noted above.      I had a detailed discussion with the patient and/or guardian regarding: The historical points, exam findings, and diagnostic results supporting the discharge diagnosis, lab results, pertinent radiology results, and the need for outpatient follow-up, for definitive care with a family practitioner and to return to the emergency department if symptoms worsen or persist or if there are any questions or concerns that arise at home. All questions have been answered in detail. Strict return to Emergency Department precautions have been provided.     A dictation software program was used for this note.  Please expect some simple typographical  errors in this note.                         Clinical Impression:   Final diagnoses:  [R53.83] Fatigue, unspecified type (Primary)  [I50.9] Congestive heart failure, unspecified HF chronicity, unspecified heart failure type        ED Disposition Condition    AMA Stable                Tay De La Cruz MD  04/25/23 1902       Tay De La Cruz MD  04/25/23 1942       Tay De La Cruz MD  04/25/23 1942       Tay De La Cruz MD  04/25/23 2007       Tay De La Cruz MD  04/25/23 2016

## 2023-04-25 NOTE — Clinical Note
Diagnosis: Altered mental status, unspecified altered mental status type [[id number]]  Future Attending Provider: BARON HAMILTON [[id number]]  Admitting Provider:: BARON HAMILTON [[id number]]

## 2023-04-26 NOTE — CONSULTS
[facility] - Emergency Dept  Hospital Medicine  Consult Note    Patient Name: Yobani Rios  MRN: [id number]  Admission Date: NA  Hospital Length of Stay: 0 days  Attending Physician: Maren Velasquez MD  Primary Care Provider: William Nice MD         Patient seen at 7:30 p.m. on 04/25/2023  Patient information was obtained from patient, relative(s) and ER records.     Consults  Subjective:     Principal Problem: Confusion    Chief Complaint:   Chief Complaint   Patient presents with    Fatigue     Fatigued x1 week, had cardiac stents placed on 4/18, left [facility]        HPI: 83-year-old man with CAD status post recent PCI, recent admission for pneumonia at outside hospital, presented with fatigue since his discharge.  I discussed at length on the phone with His son who lives in [place] .  His son was worried about him acting differently since discharge and cursing more than usual.  He also reports his father did not recall driving home from the hospital.  He has home health set up and is visited by family members.  The patient reports a dry cough which is improving and denies chest pain or pressure or shortness of breath.  Workup here has shown elevated troponin, baseline of his CKD, normal TSH, normal lactic acid, normal CT head.  Chest x-ray shows right middle lobe linear atelectasis versus pneumonia.  Patient is not confused currently and states he feels fine and just a little tired since his hospitalization.  Patient denies nausea, vomiting, abdominal pain, diaphoresis, fever, chills, chest pain, chest pressure, arm pain, numbness, headache, syncope.      Past Medical History:   Diagnosis Date    Cancer     prostate    Hypertension     Kidney problem     only 1 kidney functions    Liver disease     Umbilical hernia        Past Surgical History:   Procedure Laterality Date    colon repair      after prostatectomy    LYSIS OF ADHESIONS N/A 10/18/2018    Procedure: LYSIS,  ADHESIONS;  Surgeon: Rancho Mariee MD;  Location: [address];  Service: General;  Laterality: N/A;    PROSTATECTOMY      PROSTATECTOMY  2010    REPAIR OF RECURRENT INCISIONAL HERNIA N/A 10/18/2018    Procedure: REPAIR, HERNIA, INCISIONAL -multiple;  Surgeon: Rancho Mariee MD;  Location: [facility] OR [address] [place] [address];  Service: General;  Laterality: N/A;       Review of patient's allergies indicates:  No Known Allergies    No current facility-administered medications on file prior to encounter.     Current Outpatient Medications on File Prior to Encounter   Medication Sig    aspirin (ECOTRIN) 81 MG EC tablet Take 81 mg by mouth once daily.    atorvastatin (LIPITOR) 40 MG tablet Take 40 mg by mouth once daily.    BRILINTA 90 mg tablet Take 90 mg by mouth 2 (two) times daily.    docusate sodium (COLACE) 100 MG capsule Take 1 capsule (100 mg total) by mouth 2 (two) times daily.    losartan (COZAAR) 100 MG tablet Take 100 mg by mouth once daily.    amLODIPine (NORVASC) 10 MG tablet     amLODIPine (NORVASC) 5 MG tablet Take 5 mg by mouth 2 (two) times daily.    B-complex with vitamin C tablet Take 1 tablet by mouth once daily.      benzonatate (TESSALON) 100 MG capsule Take 100 mg by mouth 3 (three) times daily as needed.    clopidogreL (PLAVIX) 75 mg tablet Take 75 mg by mouth once daily.    furosemide (LASIX) 40 MG tablet Take 40 mg by mouth 2 (two) times daily.    HYDROcodone-acetaminophen (NORCO) 5-325 mg per tablet Take 1 tablet by mouth every 6 (six) hours as needed for Pain.    HYDROcodone-acetaminophen (NORCO) 5-325 mg per tablet Take 1 tablet by mouth every 8 (eight) hours as needed for Pain.    levoFLOXacin (LEVAQUIN) 500 MG tablet Take 500 mg by mouth once daily.    losartan-hydrochlorothiazide (HYZAAR) 100-12.5 mg per tablet Take 1 tablet by mouth once daily.      metoprolol tartrate (LOPRESSOR) 25 MG tablet Take 25 mg by mouth 2 (two) times daily.    multivitamin,therapeutic (THERA  ORAL) Take 1 tablet by mouth once daily.    oxyCODONE-acetaminophen (PERCOCET) 5-325 mg per tablet Take 1 tablet by mouth every 4 (four) hours as needed.    pravastatin (PRAVACHOL) 20 MG tablet Take 20 mg by mouth once daily.    rivaroxaban (XARELTO) 2.5 mg Tab Take 2.5 mg by mouth 2 (two) times daily.    vitamin D (VITAMIN D3) 1000 units Tab Take 25 mcg by mouth once daily.     Family History    None       Tobacco Use    Smoking status: Former     Types: Cigarettes     Quit date: 2/15/1972     Years since quittin.2    Smokeless tobacco: Never   Substance and Sexual Activity    Alcohol use: No    Drug use: No    Sexual activity: Never     Review of Systems Complete ROS otherwise negative other than stated in HPI.   Objective:     Vital Signs (Most Recent):  Temp: 97.4 °F (36.3 °C) (23 1535)  Pulse: 85 (23 1800)  Resp: (!) 24 (23 1800)  BP: 136/67 (23 1800)  SpO2: 97 % (23 1800)   Vital Signs (24h Range):  Temp:  [97.4 °F (36.3 °C)] 97.4 °F (36.3 °C)  Pulse:  [73-85] 85  Resp:  [18-24] 24  SpO2:  [97 %] 97 %  BP: (105-136)/(67) 136/67     Weight: 88.5 kg (195 lb)  Body mass index is 25.73 kg/m².    Physical Exam  GENERAL:  No apparent distress. Alert and oriented x 3  HEENT:  EOMI. Conjunctivae intact. Posterior pharynx clear  NECK:  Supple   LUNGS:  No respiratory distress. Clear to auscultation bilaterally with good air movement  CARDIAC:  RRR without murmur, rub or gallop  ABDOMEN:  Positive bowel sounds. Nontender and nondistended.   EXTREMITIES:  Peripheral pulses are 2+. Hands and feet are warm. Good capillary refill in fingers (< 2 seconds). No clubbing, cyanosis or edema  SKIN:  Skin color, texture and turgor normal. No rashes, ulcerations or nodules noted  NEUROLOGIC:  CN II-XII intact. Light touch sensation intact. Muscle strength 5/5 in all extremities      Significant Labs: All pertinent labs within the past 24 hours have been reviewed.  Bilirubin:   Recent Labs    Lab 04/25/23  1714   BILITOT 0.8     BMP:   Recent Labs   Lab 04/25/23  1714   *      K 3.4*      CO2 25   BUN 30*   CREATININE 1.6*   CALCIUM 9.1   MG 2.3     CBC:   Recent Labs   Lab 04/25/23  1714   WBC 11.77   HGB 15.8   HCT 48.3   *     CMP:   Recent Labs   Lab 04/25/23  1714      K 3.4*      CO2 25   *   BUN 30*   CREATININE 1.6*   CALCIUM 9.1   PROT 7.0   ALBUMIN 3.6   BILITOT 0.8   ALKPHOS 117   AST 73*   ALT 80*   ANIONGAP 11     Cardiac Markers:   Recent Labs   Lab 04/25/23  1714   *     Troponin:   Recent Labs   Lab 04/25/23  1714   TROPONINIHS 1252.2*       Significant Imaging: I have reviewed all pertinent imaging results/findings within the past 24 hours.  CXR: I have reviewed all pertinent results/findings within the past 24 hours and my personal findings are:  Right middle lobe atelectasis versus infiltrate    Assessment/Plan:     * Confusion  Patient here with family concerned about confusion since his recent discharge.  Full Workup is complete in the ED and unremarkable.  He is oxygenating well and states he feels at his baseline.  He is not confused on exam and is not having any chest pain.  Believe he is stable for discharge home.  Instructed his son and the patient to return if anything worsens.  Instructed patient to be compliant with all medications prescribed at his discharge.  He will follow-up closely with cardiology.  Discussed with ED physician          Thank you for your consult. I will sign off. Please contact us if you have any additional questions.    Maren Velasquez MD  Department of Hospital Medicine   [facility]

## 2023-04-26 NOTE — PROGRESS NOTES
Pharmacist Renal Dose Adjustment Note    Yobani Rios is a 83 y.o. male being treated with the medication Cefepime.    Patient Data:    Vital Signs (Most Recent):  Temp: 97.4 °F (36.3 °C) (04/25/23 1535)  Pulse: 85 (04/25/23 1800)  Resp: (!) 24 (04/25/23 1800)  BP: 136/67 (04/25/23 1800)  SpO2: 97 % (04/25/23 1800)   Vital Signs (72h Range):  Temp:  [97.4 °F (36.3 °C)]   Pulse:  [73-85]   Resp:  [18-24]   BP: (105-136)/(67)   SpO2:  [97 %]      Recent Labs   Lab 04/25/23  1714   CREATININE 1.6*     Serum creatinine: 1.6 mg/dL (H) 04/25/23 1714  Estimated creatinine clearance: 39.5 mL/min (A)    Cefepime 2 grams IV every 8 hours will be changed to Cefepime 2 grams IV every 12 hours due to CrCl- 30-60 ml/min.    Pharmacist's Name: Monae Villa  Pharmacist's Extension: [phone]

## 2023-04-26 NOTE — ASSESSMENT & PLAN NOTE
Patient here with family concerned about confusion since his recent discharge.  Full Workup is complete in the ED and unremarkable.  He is oxygenating well and states he feels at his baseline.  He is not confused on exam and is not having any chest pain.  Believe he is stable for discharge home.  Instructed his son and the patient to return if anything worsens.  Instructed patient to be compliant with all medications prescribed at his discharge.  He will follow-up closely with cardiology.  Discussed with ED physician

## 2023-04-26 NOTE — SUBJECTIVE & OBJECTIVE
Past Medical History:   Diagnosis Date    Cancer     prostate    Hypertension     Kidney problem     only 1 kidney functions    Liver disease     Umbilical hernia        Past Surgical History:   Procedure Laterality Date    colon repair      after prostatectomy    LYSIS OF ADHESIONS N/A 10/18/2018    Procedure: LYSIS, ADHESIONS;  Surgeon: Rancho Mariee MD;  Location: [address];  Service: General;  Laterality: N/A;    PROSTATECTOMY      PROSTATECTOMY  2010    REPAIR OF RECURRENT INCISIONAL HERNIA N/A 10/18/2018    Procedure: REPAIR, HERNIA, INCISIONAL -multiple;  Surgeon: Rancho Mariee MD;  Location: [address];  Service: General;  Laterality: N/A;       Review of patient's allergies indicates:  No Known Allergies    No current facility-administered medications on file prior to encounter.     Current Outpatient Medications on File Prior to Encounter   Medication Sig    aspirin (ECOTRIN) 81 MG EC tablet Take 81 mg by mouth once daily.    atorvastatin (LIPITOR) 40 MG tablet Take 40 mg by mouth once daily.    BRILINTA 90 mg tablet Take 90 mg by mouth 2 (two) times daily.    docusate sodium (COLACE) 100 MG capsule Take 1 capsule (100 mg total) by mouth 2 (two) times daily.    losartan (COZAAR) 100 MG tablet Take 100 mg by mouth once daily.    amLODIPine (NORVASC) 10 MG tablet     amLODIPine (NORVASC) 5 MG tablet Take 5 mg by mouth 2 (two) times daily.    B-complex with vitamin C tablet Take 1 tablet by mouth once daily.      benzonatate (TESSALON) 100 MG capsule Take 100 mg by mouth 3 (three) times daily as needed.    clopidogreL (PLAVIX) 75 mg tablet Take 75 mg by mouth once daily.    furosemide (LASIX) 40 MG tablet Take 40 mg by mouth 2 (two) times daily.    HYDROcodone-acetaminophen (NORCO) 5-325 mg per tablet Take 1 tablet by mouth every 6 (six) hours as needed for Pain.    HYDROcodone-acetaminophen (NORCO) 5-325 mg per tablet Take 1 tablet by mouth every 8 (eight) hours as needed for Pain.     levoFLOXacin (LEVAQUIN) 500 MG tablet Take 500 mg by mouth once daily.    losartan-hydrochlorothiazide (HYZAAR) 100-12.5 mg per tablet Take 1 tablet by mouth once daily.      metoprolol tartrate (LOPRESSOR) 25 MG tablet Take 25 mg by mouth 2 (two) times daily.    multivitamin,therapeutic (THERA ORAL) Take 1 tablet by mouth once daily.    oxyCODONE-acetaminophen (PERCOCET) 5-325 mg per tablet Take 1 tablet by mouth every 4 (four) hours as needed.    pravastatin (PRAVACHOL) 20 MG tablet Take 20 mg by mouth once daily.    rivaroxaban (XARELTO) 2.5 mg Tab Take 2.5 mg by mouth 2 (two) times daily.    vitamin D (VITAMIN D3) 1000 units Tab Take 25 mcg by mouth once daily.     Family History    None       Tobacco Use    Smoking status: Former     Types: Cigarettes     Quit date: 2/15/1972     Years since quittin.2    Smokeless tobacco: Never   Substance and Sexual Activity    Alcohol use: No    Drug use: No    Sexual activity: Never     Review of Systems Complete ROS otherwise negative other than stated in HPI.   Objective:     Vital Signs (Most Recent):  Temp: 97.4 °F (36.3 °C) (23 1535)  Pulse: 85 (23 1800)  Resp: (!) 24 (23 1800)  BP: 136/67 (23 1800)  SpO2: 97 % (23 1800)   Vital Signs (24h Range):  Temp:  [97.4 °F (36.3 °C)] 97.4 °F (36.3 °C)  Pulse:  [73-85] 85  Resp:  [18-24] 24  SpO2:  [97 %] 97 %  BP: (105-136)/(67) 136/67     Weight: 88.5 kg (195 lb)  Body mass index is 25.73 kg/m².    Physical Exam  GENERAL:  No apparent distress. Alert and oriented x 3  HEENT:  EOMI. Conjunctivae intact. Posterior pharynx clear  NECK:  Supple   LUNGS:  No respiratory distress. Clear to auscultation bilaterally with good air movement  CARDIAC:  RRR without murmur, rub or gallop  ABDOMEN:  Positive bowel sounds. Nontender and nondistended.   EXTREMITIES:  Peripheral pulses are 2+. Hands and feet are warm. Good capillary refill in fingers (< 2 seconds). No clubbing, cyanosis or edema  SKIN:   Skin color, texture and turgor normal. No rashes, ulcerations or nodules noted  NEUROLOGIC:  CN II-XII intact. Light touch sensation intact. Muscle strength 5/5 in all extremities      Significant Labs: All pertinent labs within the past 24 hours have been reviewed.  Bilirubin:   Recent Labs   Lab 04/25/23  1714   BILITOT 0.8     BMP:   Recent Labs   Lab 04/25/23  1714   *      K 3.4*      CO2 25   BUN 30*   CREATININE 1.6*   CALCIUM 9.1   MG 2.3     CBC:   Recent Labs   Lab 04/25/23  1714   WBC 11.77   HGB 15.8   HCT 48.3   *     CMP:   Recent Labs   Lab 04/25/23  1714      K 3.4*      CO2 25   *   BUN 30*   CREATININE 1.6*   CALCIUM 9.1   PROT 7.0   ALBUMIN 3.6   BILITOT 0.8   ALKPHOS 117   AST 73*   ALT 80*   ANIONGAP 11     Cardiac Markers:   Recent Labs   Lab 04/25/23  1714   *     Troponin:   Recent Labs   Lab 04/25/23  1714   TROPONINIHS 1252.2*       Significant Imaging: I have reviewed all pertinent imaging results/findings within the past 24 hours.  CXR: I have reviewed all pertinent results/findings within the past 24 hours and my personal findings are:  Right middle lobe atelectasis versus infiltrate

## 2023-04-26 NOTE — HPI
83-year-old man with CAD status post recent PCI, recent admission for pneumonia at outside hospital, presented with fatigue since his discharge.  I discussed at length on the phone with His son who lives in [place] .  His son was worried about him acting differently since discharge and cursing more than usual.  He also reports his father did not recall driving home from the hospital.  He has home health set up and is visited by family members.  The patient reports a dry cough which is improving and denies chest pain or pressure or shortness of breath.  Workup here has shown elevated troponin, baseline of his CKD, normal TSH, normal lactic acid, normal CT head.  Chest x-ray shows right middle lobe linear atelectasis versus pneumonia.  Patient is not confused currently and states he feels fine and just a little tired since his hospitalization.  Patient denies nausea, vomiting, abdominal pain, diaphoresis, fever, chills, chest pain, chest pressure, arm pain, numbness, headache, syncope.

## 2023-04-30 LAB — BACTERIA BLD CULT: NORMAL

## 2023-08-14 ENCOUNTER — HOSPITAL ENCOUNTER (EMERGENCY)
Facility: HOSPITAL | Age: 83
Discharge: LEFT BEFORE DISCHARGE | End: 2023-08-14
Payer: MEDICARE

## 2023-08-14 VITALS
TEMPERATURE: 98 F | DIASTOLIC BLOOD PRESSURE: 73 MMHG | BODY MASS INDEX: 25.73 KG/M2 | HEIGHT: 72 IN | RESPIRATION RATE: 20 BRPM | OXYGEN SATURATION: 95 % | HEART RATE: 57 BPM | WEIGHT: 190 LBS | SYSTOLIC BLOOD PRESSURE: 137 MMHG

## 2023-08-14 PROCEDURE — 99900041 HC LEFT WITHOUT BEING SEEN- EMERGENCY

## 2023-08-15 NOTE — ED NOTES
"Pt. Called ER at this time. States, "Somebody called me to see if I wanted to be seen today and I don't. I just want to see if I can get my copay back since I wasn't seen yesterday.". Transferred phone call to registration at this time.   "

## 2023-10-05 ENCOUNTER — HOSPITAL ENCOUNTER (EMERGENCY)
Facility: HOSPITAL | Age: 83
Discharge: HOME | End: 2023-10-05
Attending: FAMILY MEDICINE
Payer: MEDICARE

## 2023-10-05 VITALS
OXYGEN SATURATION: 98 % | TEMPERATURE: 98 F | BODY MASS INDEX: 25.18 KG/M2 | DIASTOLIC BLOOD PRESSURE: 98 MMHG | HEIGHT: 73 IN | WEIGHT: 190 LBS | HEART RATE: 83 BPM | RESPIRATION RATE: 18 BRPM | SYSTOLIC BLOOD PRESSURE: 191 MMHG

## 2023-10-05 DIAGNOSIS — S51.011A SKIN TEAR OF ELBOW WITHOUT COMPLICATION, RIGHT, INITIAL ENCOUNTER: ICD-10-CM

## 2023-10-05 DIAGNOSIS — S01.111A EYEBROW LACERATION, RIGHT, INITIAL ENCOUNTER: Primary | ICD-10-CM

## 2023-10-05 PROCEDURE — 90715 TDAP VACCINE 7 YRS/> IM: CPT | Performed by: FAMILY MEDICINE

## 2023-10-05 PROCEDURE — 25000003 PHARM REV CODE 250: Performed by: FAMILY MEDICINE

## 2023-10-05 PROCEDURE — 12001 RPR S/N/AX/GEN/TRNK 2.5CM/<: CPT | Mod: 59

## 2023-10-05 PROCEDURE — 90471 IMMUNIZATION ADMIN: CPT | Performed by: FAMILY MEDICINE

## 2023-10-05 PROCEDURE — 12011 RPR F/E/E/N/L/M 2.5 CM/<: CPT

## 2023-10-05 PROCEDURE — 99284 EMERGENCY DEPT VISIT MOD MDM: CPT | Mod: 25

## 2023-10-05 PROCEDURE — 63600175 PHARM REV CODE 636 W HCPCS: Performed by: FAMILY MEDICINE

## 2023-10-05 RX ORDER — LIDOCAINE HCL/EPINEPHRINE/PF 2%-1:200K
5 VIAL (ML) INJECTION ONCE
Status: COMPLETED | OUTPATIENT
Start: 2023-10-05 | End: 2023-10-05

## 2023-10-05 RX ADMIN — BACITRACIN ZINC, NEOMYCIN, POLYMYXIN B 1 EACH: 400; 3.5; 5 OINTMENT TOPICAL at 09:10

## 2023-10-05 RX ADMIN — TETANUS TOXOID, REDUCED DIPHTHERIA TOXOID AND ACELLULAR PERTUSSIS VACCINE, ADSORBED 0.5 ML: 5; 2.5; 8; 8; 2.5 SUSPENSION INTRAMUSCULAR at 09:10

## 2023-10-05 RX ADMIN — LIDOCAINE HYDROCHLORIDE,EPINEPHRINE BITARTRATE 5 ML: 20; .005 INJECTION, SOLUTION EPIDURAL; INFILTRATION; INTRACAUDAL; PERINEURAL at 09:10

## 2023-10-05 NOTE — DISCHARGE INSTRUCTIONS
Return in 7-10 days for suture removal.  He may return to ER, PCP office, or urgent care.  Return sooner if signs of infection occur such as fever, chills, purulent drainage.

## 2023-10-05 NOTE — ED PROVIDER NOTES
Encounter Date: 10/5/2023       History     Chief Complaint   Patient presents with    Facial Laceration     Laceration above right eye that happened last night s/p fall out of a chair. Pt states it stopped bleeding last night but started back this morning.     Patient comes our facility with a past medical history of CAD, CHF, PID, hypertension, CKD 3, history of liver disease, hyperlipidemia my history of large B-cell lymphoma.  Patient knows he takes Plavix and has a history of Brilinta, aspirin, Xarelto.  Patient is sure he does not take aspirin anymore but is unsure of Brilinta or Xarelto treatments.  Patient stated he was in his computer chair and reached down to  something and slipped.  Patient fell out of his chair struck his head against the ER with a chair.  Patient had a small laceration over his right eyebrow.  Initially it was bleeding but his partner poured cold milk on the area and it started bleeding last night.  He awoke this morning with continued bleeding.  Patient denies any loss of consciousness, head pain, neck pain from the fall.  Patient did receive a skin tear to his right elbow that has mild bloody oozing this morning.  Patient is unsure of his tetanus status.      Review of patient's allergies indicates:  No Known Allergies  Past Medical History:   Diagnosis Date    Cancer     prostate    Hypertension     Kidney problem     only 1 kidney functions    Liver disease     Umbilical hernia      Past Surgical History:   Procedure Laterality Date    colon repair      after prostatectomy    LYSIS OF ADHESIONS N/A 10/18/2018    Procedure: LYSIS, ADHESIONS;  Surgeon: Rancho Mariee MD;  Location: [facility] OR [address] [place] [address];  Service: General;  Laterality: N/A;    PROSTATECTOMY      PROSTATECTOMY  2010    REPAIR OF RECURRENT INCISIONAL HERNIA N/A 10/18/2018    Procedure: REPAIR, HERNIA, INCISIONAL -multiple;  Surgeon: Rancho Mariee MD;  Location: [facility] OR [address] [place] [address];  Service: General;   Laterality: N/A;     History reviewed. No pertinent family history.  Social History     Tobacco Use    Smoking status: Former     Current packs/day: 0.00     Types: Cigarettes     Quit date: 2/15/1972     Years since quittin.6    Smokeless tobacco: Never   Substance Use Topics    Alcohol use: No    Drug use: No     Review of Systems   Skin:  Positive for wound (Right elbow skin tear, right eyebrow laceration).   All other systems reviewed and are negative.      Physical Exam     Initial Vitals [10/05/23 0826]   BP Pulse Resp Temp SpO2   (!) 191/98 83 18 97.8 °F (36.6 °C) 98 %      MAP       --         Physical Exam    Nursing note and vitals reviewed.  Constitutional: He appears well-developed and well-nourished. He is not diaphoretic. No distress.   HENT:   Head: Normocephalic.   Nose: Nose normal.   Mouth/Throat: Oropharynx is clear and moist.   1 cm laceration to lateral aspect of right eyebrow.  Mild active bleeding, non arterial.   Eyes: Conjunctivae and EOM are normal. Pupils are equal, round, and reactive to light. Right eye exhibits no discharge. Left eye exhibits no discharge. No scleral icterus.   Neck: Neck supple. No thyromegaly present. No tracheal deviation present.   Normal range of motion.  Cardiovascular:  Normal rate, regular rhythm, normal heart sounds and intact distal pulses.           No murmur heard.  Pulmonary/Chest: Breath sounds normal. No stridor. No respiratory distress.   Abdominal: Abdomen is soft. Bowel sounds are normal. He exhibits no distension. There is no abdominal tenderness.   Musculoskeletal:         General: No tenderness or edema. Normal range of motion.      Cervical back: Normal range of motion and neck supple.     Lymphadenopathy:     He has no cervical adenopathy.   Neurological: He is alert and oriented to person, place, and time. He has normal strength. No cranial nerve deficit or sensory deficit. GCS score is 15. GCS eye subscore is 4. GCS verbal subscore is 5.  GCS motor subscore is 6.   Skin: Skin is warm and dry. Capillary refill takes less than 2 seconds. No rash and no abscess noted. No erythema. No pallor.   1 cm laceration over right eyebrow.  Laceration is superficial.  Skin tear over right elbow  1.5 x 1.5 cm   Psychiatric: He has a normal mood and affect. His behavior is normal. Judgment and thought content normal.         ED Course   Lac Repair    Date/Time: 10/5/2023 10:19 AM    Performed by: Russell Cooper MD  Authorized by: Russell Cooper MD    Consent:     Consent obtained:  Verbal    Consent given by:  Patient    Risks, benefits, and alternatives were discussed: yes      Risks discussed:  Infection, poor cosmetic result, pain, poor wound healing and need for additional repair    Alternatives discussed:  No treatment, delayed treatment, observation and referral  Universal protocol:     Procedure explained and questions answered to patient or proxy's satisfaction: yes      Relevant documents present and verified: yes      Test results available: yes      Imaging studies available: yes      Required blood products, implants, devices, and special equipment available: yes      Site/side marked: yes      Immediately prior to procedure, a time out was called: yes      Patient identity confirmed:  Verbally with patient  Anesthesia:     Anesthesia method:  Local infiltration    Local anesthetic:  Lidocaine 2% WITH epi  Laceration details:     Location:  Face    Face location:  R eyebrow    Length (cm):  1    Depth (mm):  2  Pre-procedure details:     Preparation:  Patient was prepped and draped in usual sterile fashion  Exploration:     Limited defect created (wound extended): no      Hemostasis achieved with:  Epinephrine and direct pressure    Imaging outcome: foreign body not noted      Wound exploration: wound explored through full range of motion and entire depth of wound visualized      Wound extent: no areolar tissue violation noted, no fascia  violation noted, no foreign bodies/material noted, no muscle damage noted, no nerve damage noted, no tendon damage noted, no underlying fracture noted and no vascular damage noted      Contaminated: no    Treatment:     Area cleansed with:  Saline and chlorhexidine    Amount of cleaning:  Extensive    Irrigation solution:  Sterile water    Irrigation volume:  200    Irrigation method:  Pressure wash and syringe    Visualized foreign bodies/material removed: no      Debridement:  None    Undermining:  None    Scar revision: no    Skin repair:     Repair method:  Sutures    Suture size:  4-0    Suture material:  Nylon    Suture technique:  Simple interrupted    Number of sutures:  2  Approximation:     Approximation:  Close  Repair type:     Repair type:  Simple  Post-procedure details:     Dressing:  Non-adherent dressing and sterile dressing    Procedure completion:  Tolerated    Labs Reviewed - No data to display       Imaging Results    None          Medications   Tdap (BOOSTRIX) vaccine injection 0.5 mL (0.5 mLs Intramuscular Given 10/5/23 0901)   LIDOcaine-EPINEPHrine (PF) 2%-1:200,000 injection 5 mL (5 mLs Intradermal Given by Provider 10/5/23 0939)   neomycin-bacitracnZn-polymyxnB packet (1 each Topical (Top) Given 10/5/23 0903)     Medical Decision Making  Patient had 1 cm laceration over right eyebrow.  Patient is on Plavix and possible other anticoagulants.  Patient does have history of Xarelto use.  Patient did require sutures to right elbow laceration.  Hemostasis was achieved.  No foreign bodies or debris were identified in laceration.  Patient additionally had a right elbow skin tear that was superficial.  This was cleansed and triple antibiotic was applied with sterile dressing.  Patient to follow up with his primary care physician in 7-10 days for suture removal.  Patient may also return to ER.  I informed patient to return sooner if signs of infection occur such as fevers, chills, purulent drainage.   Additionally, PCP to re-evaluate right elbow skin tear.  Patient voiced understanding with plan.  Patient had no complaints of pain.    Amount and/or Complexity of Data Reviewed  Independent Historian: parent    Risk  OTC drugs.  Prescription drug management.                               Clinical Impression:   Final diagnoses:  [S01.111A] Eyebrow laceration, right, initial encounter (Primary)  [S51.011A] Skin tear of elbow without complication, right, initial encounter        ED Disposition Condition    Discharge Stable          ED Prescriptions    None       Follow-up Information    None     Follow-up with primary care physician 7-10 days for suture removal.  Return to ER sooner if signs of infection occur as discussed.     Russell Cooper MD  10/05/23 1024

## 2023-10-18 ENCOUNTER — HOSPITAL ENCOUNTER (OUTPATIENT)
Dept: RADIOLOGY | Facility: HOSPITAL | Age: 83
Discharge: HOME | End: 2023-10-18
Attending: FAMILY MEDICINE
Payer: MEDICARE

## 2023-10-18 DIAGNOSIS — M54.50 LOWER BACK PAIN: ICD-10-CM

## 2023-10-18 DIAGNOSIS — M54.50 LOWER BACK PAIN: Primary | ICD-10-CM

## 2023-10-18 PROCEDURE — 72110 XR LUMBAR SPINE COMPLETE 5 VIEW: ICD-10-PCS | Mod: 26,,, | Performed by: RADIOLOGY

## 2023-10-18 PROCEDURE — 72110 X-RAY EXAM L-2 SPINE 4/>VWS: CPT | Mod: TC

## 2023-10-18 PROCEDURE — 72110 X-RAY EXAM L-2 SPINE 4/>VWS: CPT | Mod: 26,,, | Performed by: RADIOLOGY

## 2024-01-11 ENCOUNTER — HOSPITAL ENCOUNTER (EMERGENCY)
Facility: HOSPITAL | Age: 84
Discharge: HOME | End: 2024-01-11
Attending: STUDENT IN AN ORGANIZED HEALTH CARE EDUCATION/TRAINING PROGRAM
Payer: MEDICARE

## 2024-01-11 VITALS
TEMPERATURE: 98 F | DIASTOLIC BLOOD PRESSURE: 83 MMHG | OXYGEN SATURATION: 96 % | BODY MASS INDEX: 26.51 KG/M2 | WEIGHT: 200 LBS | HEIGHT: 73 IN | SYSTOLIC BLOOD PRESSURE: 125 MMHG | HEART RATE: 75 BPM | RESPIRATION RATE: 20 BRPM

## 2024-01-11 DIAGNOSIS — R06.02 SOB (SHORTNESS OF BREATH): Primary | ICD-10-CM

## 2024-01-11 DIAGNOSIS — R06.02 SHORTNESS OF BREATH: ICD-10-CM

## 2024-01-11 DIAGNOSIS — J18.9 PNEUMONIA OF BOTH LOWER LOBES DUE TO INFECTIOUS ORGANISM: ICD-10-CM

## 2024-01-11 LAB
ALBUMIN SERPL BCP-MCNC: 3.6 G/DL (ref 3.5–5.2)
ALP SERPL-CCNC: 139 U/L (ref 55–135)
ALT SERPL W/O P-5'-P-CCNC: 22 U/L (ref 10–44)
ANION GAP SERPL CALC-SCNC: 9 MMOL/L (ref 8–16)
AST SERPL-CCNC: 25 U/L (ref 10–40)
BASOPHILS # BLD AUTO: 0.05 K/UL (ref 0–0.2)
BASOPHILS NFR BLD: 0.6 % (ref 0–1.9)
BILIRUB SERPL-MCNC: 0.7 MG/DL (ref 0.1–1)
BILIRUB UR QL STRIP: NEGATIVE
BNP SERPL-MCNC: 1182 PG/ML (ref 0–99)
BUN SERPL-MCNC: 30 MG/DL (ref 8–23)
CALCIUM SERPL-MCNC: 8.6 MG/DL (ref 8.7–10.5)
CHLORIDE SERPL-SCNC: 108 MMOL/L (ref 95–110)
CLARITY UR: CLEAR
CO2 SERPL-SCNC: 24 MMOL/L (ref 23–29)
COLOR UR: YELLOW
CREAT SERPL-MCNC: 1.5 MG/DL (ref 0.5–1.4)
DIFFERENTIAL METHOD BLD: NORMAL
EOSINOPHIL # BLD AUTO: 0.3 K/UL (ref 0–0.5)
EOSINOPHIL NFR BLD: 4.1 % (ref 0–8)
ERYTHROCYTE [DISTWIDTH] IN BLOOD BY AUTOMATED COUNT: 14.5 % (ref 11.5–14.5)
EST. GFR  (NO RACE VARIABLE): 45.9 ML/MIN/1.73 M^2
GLUCOSE SERPL-MCNC: 136 MG/DL (ref 70–110)
GLUCOSE UR QL STRIP: NEGATIVE
HCT VFR BLD AUTO: 49.5 % (ref 40–54)
HGB BLD-MCNC: 16.4 G/DL (ref 14–18)
HGB UR QL STRIP: NEGATIVE
IMM GRANULOCYTES # BLD AUTO: 0.02 K/UL (ref 0–0.04)
IMM GRANULOCYTES NFR BLD AUTO: 0.2 % (ref 0–0.5)
INFLUENZA A, MOLECULAR: NEGATIVE
INFLUENZA B, MOLECULAR: NEGATIVE
KETONES UR QL STRIP: NEGATIVE
LACTATE SERPL-SCNC: 1.5 MMOL/L (ref 0.5–2.2)
LEUKOCYTE ESTERASE UR QL STRIP: NEGATIVE
LYMPHOCYTES # BLD AUTO: 1.8 K/UL (ref 1–4.8)
LYMPHOCYTES NFR BLD: 21.6 % (ref 18–48)
MAGNESIUM SERPL-MCNC: 2.1 MG/DL (ref 1.6–2.6)
MCH RBC QN AUTO: 30.7 PG (ref 27–31)
MCHC RBC AUTO-ENTMCNC: 33.1 G/DL (ref 32–36)
MCV RBC AUTO: 93 FL (ref 82–98)
MONOCYTES # BLD AUTO: 0.7 K/UL (ref 0.3–1)
MONOCYTES NFR BLD: 8.8 % (ref 4–15)
NEUTROPHILS # BLD AUTO: 5.3 K/UL (ref 1.8–7.7)
NEUTROPHILS NFR BLD: 64.7 % (ref 38–73)
NITRITE UR QL STRIP: NEGATIVE
NRBC BLD-RTO: 0 /100 WBC
PH UR STRIP: 6 [PH] (ref 5–8)
PLATELET # BLD AUTO: 219 K/UL (ref 150–450)
PMV BLD AUTO: 9.9 FL (ref 9.2–12.9)
POTASSIUM SERPL-SCNC: 4.2 MMOL/L (ref 3.5–5.1)
PROCALCITONIN SERPL IA-MCNC: 0.03 NG/ML
PROT SERPL-MCNC: 6.3 G/DL (ref 6–8.4)
PROT UR QL STRIP: NEGATIVE
RBC # BLD AUTO: 5.34 M/UL (ref 4.6–6.2)
SARS-COV-2 RDRP RESP QL NAA+PROBE: NEGATIVE
SODIUM SERPL-SCNC: 141 MMOL/L (ref 136–145)
SP GR UR STRIP: 1.02 (ref 1–1.03)
SPECIMEN SOURCE: NORMAL
TROPONIN I SERPL DL<=0.01 NG/ML-MCNC: 0.06 NG/ML (ref 0–0.03)
URN SPEC COLLECT METH UR: NORMAL
UROBILINOGEN UR STRIP-ACNC: NEGATIVE EU/DL
WBC # BLD AUTO: 8.2 K/UL (ref 3.9–12.7)

## 2024-01-11 PROCEDURE — 96375 TX/PRO/DX INJ NEW DRUG ADDON: CPT

## 2024-01-11 PROCEDURE — 84145 PROCALCITONIN (PCT): CPT | Performed by: STUDENT IN AN ORGANIZED HEALTH CARE EDUCATION/TRAINING PROGRAM

## 2024-01-11 PROCEDURE — 71045 X-RAY EXAM CHEST 1 VIEW: CPT | Mod: 26,,, | Performed by: RADIOLOGY

## 2024-01-11 PROCEDURE — 93005 ELECTROCARDIOGRAM TRACING: CPT

## 2024-01-11 PROCEDURE — 87502 INFLUENZA DNA AMP PROBE: CPT | Performed by: STUDENT IN AN ORGANIZED HEALTH CARE EDUCATION/TRAINING PROGRAM

## 2024-01-11 PROCEDURE — 87040 BLOOD CULTURE FOR BACTERIA: CPT | Mod: 59 | Performed by: STUDENT IN AN ORGANIZED HEALTH CARE EDUCATION/TRAINING PROGRAM

## 2024-01-11 PROCEDURE — 83880 ASSAY OF NATRIURETIC PEPTIDE: CPT | Performed by: STUDENT IN AN ORGANIZED HEALTH CARE EDUCATION/TRAINING PROGRAM

## 2024-01-11 PROCEDURE — 83735 ASSAY OF MAGNESIUM: CPT | Performed by: STUDENT IN AN ORGANIZED HEALTH CARE EDUCATION/TRAINING PROGRAM

## 2024-01-11 PROCEDURE — 36415 COLL VENOUS BLD VENIPUNCTURE: CPT | Performed by: STUDENT IN AN ORGANIZED HEALTH CARE EDUCATION/TRAINING PROGRAM

## 2024-01-11 PROCEDURE — 96365 THER/PROPH/DIAG IV INF INIT: CPT

## 2024-01-11 PROCEDURE — 25000003 PHARM REV CODE 250: Performed by: STUDENT IN AN ORGANIZED HEALTH CARE EDUCATION/TRAINING PROGRAM

## 2024-01-11 PROCEDURE — 83605 ASSAY OF LACTIC ACID: CPT | Performed by: STUDENT IN AN ORGANIZED HEALTH CARE EDUCATION/TRAINING PROGRAM

## 2024-01-11 PROCEDURE — U0002 COVID-19 LAB TEST NON-CDC: HCPCS | Performed by: STUDENT IN AN ORGANIZED HEALTH CARE EDUCATION/TRAINING PROGRAM

## 2024-01-11 PROCEDURE — 84484 ASSAY OF TROPONIN QUANT: CPT | Performed by: STUDENT IN AN ORGANIZED HEALTH CARE EDUCATION/TRAINING PROGRAM

## 2024-01-11 PROCEDURE — 99285 EMERGENCY DEPT VISIT HI MDM: CPT | Mod: 25

## 2024-01-11 PROCEDURE — 80053 COMPREHEN METABOLIC PANEL: CPT | Performed by: STUDENT IN AN ORGANIZED HEALTH CARE EDUCATION/TRAINING PROGRAM

## 2024-01-11 PROCEDURE — 81003 URINALYSIS AUTO W/O SCOPE: CPT | Performed by: STUDENT IN AN ORGANIZED HEALTH CARE EDUCATION/TRAINING PROGRAM

## 2024-01-11 PROCEDURE — 71045 X-RAY EXAM CHEST 1 VIEW: CPT | Mod: TC

## 2024-01-11 PROCEDURE — 96367 TX/PROPH/DG ADDL SEQ IV INF: CPT

## 2024-01-11 PROCEDURE — 85025 COMPLETE CBC W/AUTO DIFF WBC: CPT | Performed by: STUDENT IN AN ORGANIZED HEALTH CARE EDUCATION/TRAINING PROGRAM

## 2024-01-11 PROCEDURE — 93010 ELECTROCARDIOGRAM REPORT: CPT | Mod: ,,, | Performed by: INTERNAL MEDICINE

## 2024-01-11 PROCEDURE — 63600175 PHARM REV CODE 636 W HCPCS: Performed by: STUDENT IN AN ORGANIZED HEALTH CARE EDUCATION/TRAINING PROGRAM

## 2024-01-11 RX ORDER — ALBUTEROL SULFATE 90 UG/1
1-2 AEROSOL, METERED RESPIRATORY (INHALATION) EVERY 6 HOURS PRN
Qty: 18 G | Refills: 0 | Status: SHIPPED | OUTPATIENT
Start: 2024-01-11

## 2024-01-11 RX ORDER — AMOXICILLIN AND CLAVULANATE POTASSIUM 875; 125 MG/1; MG/1
1 TABLET, FILM COATED ORAL 2 TIMES DAILY
Qty: 20 TABLET | Refills: 0 | Status: SHIPPED | OUTPATIENT
Start: 2024-01-11 | End: 2024-01-21

## 2024-01-11 RX ORDER — FUROSEMIDE 10 MG/ML
40 INJECTION INTRAMUSCULAR; INTRAVENOUS
Status: COMPLETED | OUTPATIENT
Start: 2024-01-11 | End: 2024-01-11

## 2024-01-11 RX ADMIN — CEFTRIAXONE 1 G: 1 INJECTION, POWDER, FOR SOLUTION INTRAMUSCULAR; INTRAVENOUS at 03:01

## 2024-01-11 RX ADMIN — FUROSEMIDE 40 MG: 10 INJECTION, SOLUTION INTRAVENOUS at 02:01

## 2024-01-11 RX ADMIN — AZITHROMYCIN MONOHYDRATE 500 MG: 500 INJECTION, POWDER, LYOPHILIZED, FOR SOLUTION INTRAVENOUS at 04:01

## 2024-01-11 NOTE — DISCHARGE INSTRUCTIONS
Take antibiotics as prescribed.  Follow up with the primary care physician  May return to the ED at any time with any concerns.

## 2024-01-11 NOTE — ED NOTES
83 y.o. male to ED with c.o. shortness of breath with exertion x 1 week - patient reports gradually worsening symptoms. Patient denies chest pain, denies n/v/d, denies fever/chills, denies all other medical complaints at this time. Patient states he is already starting to feel better. Patient awake, alert, and oriented x 4. No apparent distress noted. Patient hypertensive at 161/95. MD aware. VS otherwise stable. Patient assisted onto stretcher and changed into a gown. Patient placed on cardiac monitor, continuous pulse oximetry and automatic blood pressure cuff. Bed placed in low locked position, side rails up x 2, call light is within reach of patient orientation to room and explanation of wait provided to patient, alarms set and turned on for monitor and pulse ox, awaiting MD evaluation and orders, plan of care in progress.

## 2024-01-11 NOTE — ED PROVIDER NOTES
Encounter Date: 2024       History     Chief Complaint   Patient presents with    Shortness of Breath     Progressive worsening sob over the past week. Sob worse with exertion.     83-year-old male with a history of anxiety, prostate cancer, hypertension, liver disease, umbilical hernia CHF on Lasix 40 b.i.d. He presents to ED with complaints of 1 week history of worsening dyspnea. He reports associated productive cough. Denies fever or chills. He denies associated orthopnea, or increased bilateral lower extremity edema. He denies associated chest pain, palpitations, nausea, vomiting, recent changes in urinary or bowel habits.    The history is provided by the patient. No  was used.     Review of patient's allergies indicates:  No Known Allergies  Past Medical History:   Diagnosis Date    Cancer     prostate    Hypertension     Kidney problem     only 1 kidney functions    Liver disease     Umbilical hernia      Past Surgical History:   Procedure Laterality Date    colon repair      after prostatectomy    LYSIS OF ADHESIONS N/A 10/18/2018    Procedure: LYSIS, ADHESIONS;  Surgeon: Rancho Mariee MD;  Location: [facility] OR [address] [place] [address];  Service: General;  Laterality: N/A;    PROSTATECTOMY      PROSTATECTOMY  2010    REPAIR OF RECURRENT INCISIONAL HERNIA N/A 10/18/2018    Procedure: REPAIR, HERNIA, INCISIONAL -multiple;  Surgeon: Rancho Mariee MD;  Location: [facility] OR [address] [place] [address];  Service: General;  Laterality: N/A;     History reviewed. No pertinent family history.  Social History     Tobacco Use    Smoking status: Former     Current packs/day: 0.00     Types: Cigarettes     Quit date: 2/15/1972     Years since quittin.9    Smokeless tobacco: Never   Substance Use Topics    Alcohol use: No    Drug use: No     Review of Systems   Constitutional: Negative.    HENT: Negative.     Eyes: Negative.    Respiratory:  Positive for cough and shortness of breath.    Cardiovascular: Negative.     Gastrointestinal: Negative.    Genitourinary: Negative.    Musculoskeletal: Negative.    Neurological: Negative.    All other systems reviewed and are negative.      Physical Exam     Initial Vitals   BP Pulse Resp Temp SpO2   01/11/24 1336 01/11/24 1333 01/11/24 1333 01/11/24 1333 01/11/24 1333   (!) 161/95 95 18 97.9 °F (36.6 °C) 95 %      MAP       --                Physical Exam    Nursing note and vitals reviewed.  Constitutional: He appears well-developed.   HENT:   Head: Normocephalic.   Eyes: Pupils are equal, round, and reactive to light.   Neck:   Normal range of motion.  Cardiovascular:  Normal rate.           Pulmonary/Chest: Breath sounds normal. He is in respiratory distress.   Abdominal: Abdomen is soft. Bowel sounds are normal.   Musculoskeletal:         General: Normal range of motion.      Cervical back: Normal range of motion.     Neurological: He is alert and oriented to person, place, and time. GCS score is 15. GCS eye subscore is 4. GCS verbal subscore is 5. GCS motor subscore is 6.   Skin: Skin is warm. Capillary refill takes less than 2 seconds.         ED Course   Procedures  Labs Reviewed   COMPREHENSIVE METABOLIC PANEL - Abnormal; Notable for the following components:       Result Value    Glucose 136 (*)     BUN 30 (*)     Creatinine 1.5 (*)     Calcium 8.6 (*)     Alkaline Phosphatase 139 (*)     eGFR 45.9 (*)     All other components within normal limits   TROPONIN I - Abnormal; Notable for the following components:    Troponin I 0.062 (*)     All other components within normal limits   B-TYPE NATRIURETIC PEPTIDE - Abnormal; Notable for the following components:    BNP 1,182 (*)     All other components within normal limits   INFLUENZA A & B BY MOLECULAR   CULTURE, BLOOD   CULTURE, BLOOD   CBC W/ AUTO DIFFERENTIAL   SARS-COV-2 RNA AMPLIFICATION, QUAL    Narrative:     Is the patient symptomatic?->Yes   MAGNESIUM   B-TYPE NATRIURETIC PEPTIDE   URINALYSIS, REFLEX TO URINE CULTURE     Narrative:     Preferred Collection Type->Urine, Clean Catch  Specimen Source->Urine   MAGNESIUM   LACTIC ACID, PLASMA   PROCALCITONIN     EKG Readings: (Independently Interpreted)   EKG shows sinus rhythm with first-degree AV block rate of 100 beats per minute.  No STEMI.     ECG Results              EKG 12-lead (In process)  Result time 01/11/24 13:51:11      In process by Interface, Lab In [facility] (01/11/24 13:51:11)                   Narrative:    Test Reason : R06.02,    Vent. Rate : 100 BPM     Atrial Rate : 100 BPM     P-R Int : 210 ms          QRS Dur : 154 ms      QT Int : 374 ms       P-R-T Axes : 042 -72 085 degrees     QTc Int : 482 ms    Sinus rhythm with 1st degree A-V block  Possible Left atrial enlargement  Right bundle branch block  Left anterior fascicular block   Bifascicular block   LVH with repolarization abnormality  Cannot rule out Septal infarct ,age undetermined  Abnormal ECG  When compared with ECG of 25-APR-2023 18:10,  Significant changes have occurred    Referred By:             Confirmed By:                                   Imaging Results              X-Ray Chest 1 View (Final result)  Result time 01/11/24 14:30:29      Final result by Meron Stovall MD (01/11/24 14:30:29)                   Impression:      Atelectasis versus pneumonia in the lung bases.      Electronically signed by: Meron Stovall  Date:    01/11/2024  Time:    14:30               Narrative:    EXAMINATION:  XR CHEST 1 VIEW    CLINICAL HISTORY:  shortness of breath;    TECHNIQUE:  Single frontal view of the chest was performed.    COMPARISON:  04/25/2023    FINDINGS:  Cardiomediastinal silhouette within normal limits.  Left subclavian medication port in place unchanged.  Lung volumes are low, and there are new patchy opacities in both lung bases which may be result of atelectasis or pneumonia.  The upper lung zones remain clear.  No definite pleural effusion is seen.                                     X-Rays:   Independently Interpreted Readings:   Other Readings:  Chest x-ray reveals infiltrates in bilateral lower lobes suspicious for pneumonia    Medications   azithromycin (ZITHROMAX) 500 mg in dextrose 5 % (D5W) 250 mL IVPB (Vial-Mate) (500 mg Intravenous New Bag 1/11/24 1608)   furosemide injection 40 mg (40 mg Intravenous Given 1/11/24 1456)   cefTRIAXone (ROCEPHIN) 1 g in dextrose 5 % in water (D5W) 100 mL IVPB (MB+) (0 g Intravenous Stopped 1/11/24 1605)     Medical Decision Making  83-year-old male with shortness of breath.  Differentials include viral versus bacterial pneumonia, COPD exacerbation, several others  EKG shows sinus rhythm with first-degree AV block rate of 100 beats per minute.  No STEMI.  Troponin 0.062 BNP 1180 chart review reveals previous BNP levels 800s.  Chest x-ray reveals infiltrates in bilateral lower lobes suspicious for pneumonia  Patient was treated with dose of Lasix as well as azithromycin, Rocephin  Patient was offered admission to the hospital for further management but he is declined admission.  He was advised that he may return to the ED at any time if he changes his mind. Rx amoxicillin for community-acquired pneumonia.  I discussed the patient's diagnosis, treatment plan, and plan for discharge with the patient. Patient was instructed to follow up with PCP and was given strict return precautions to the ED. The patient voiced understanding and agreed with the plan        Amount and/or Complexity of Data Reviewed  External Data Reviewed: labs, radiology and ECG.  Labs: ordered.  Radiology: ordered.    Risk  Prescription drug management.                                      Clinical Impression:  Final diagnoses:  [R06.02] Shortness of breath  [R06.02] SOB (shortness of breath) (Primary)  [J18.9] Pneumonia of both lower lobes due to infectious organism          ED Disposition Condition    Discharge Stable          ED Prescriptions       Medication Sig Dispense Start Date  End Date Auth. Provider    amoxicillin-clavulanate 875-125mg (AUGMENTIN) 875-125 mg per tablet Take 1 tablet by mouth 2 (two) times daily. for 10 days 20 tablet 1/11/2024 1/21/2024 Alessandro Claire MD          Follow-up Information       Follow up With Specialties Details Why Contact Info    William Nice MD Ophthalmology  As needed [address] [facility] [place] [facility] [id number]  [phone]      [facility] Emergency Dept Emergency Medicine  As needed [address] [facility] [id number] [phone]             Alessandro Claire MD  01/11/24 1636       Alessandro Claire MD  01/22/24 7443

## 2024-01-17 LAB
BACTERIA BLD CULT: NORMAL
BACTERIA BLD CULT: NORMAL

## 2024-01-23 ENCOUNTER — HOSPITAL ENCOUNTER (OUTPATIENT)
Facility: HOSPITAL | Age: 84
Discharge: HOME | End: 2024-01-24
Attending: STUDENT IN AN ORGANIZED HEALTH CARE EDUCATION/TRAINING PROGRAM | Admitting: STUDENT IN AN ORGANIZED HEALTH CARE EDUCATION/TRAINING PROGRAM
Payer: MEDICARE

## 2024-01-23 DIAGNOSIS — I50.43 ACUTE ON CHRONIC HEART FAILURE WITH REDUCED EJECTION FRACTION AND DIASTOLIC DYSFUNCTION: ICD-10-CM

## 2024-01-23 DIAGNOSIS — R07.9 CHEST PAIN: ICD-10-CM

## 2024-01-23 DIAGNOSIS — R06.02 SOB (SHORTNESS OF BREATH): Primary | ICD-10-CM

## 2024-01-23 DIAGNOSIS — I21.4 NSTEMI (NON-ST ELEVATED MYOCARDIAL INFARCTION): ICD-10-CM

## 2024-01-23 PROBLEM — F41.9 ANXIETY: Status: ACTIVE | Noted: 2024-01-23

## 2024-01-23 PROBLEM — I10 ESSENTIAL HYPERTENSION: Status: ACTIVE | Noted: 2024-01-23

## 2024-01-23 PROBLEM — N18.31 STAGE 3A CHRONIC KIDNEY DISEASE (CKD): Status: ACTIVE | Noted: 2024-01-23

## 2024-01-23 PROBLEM — Z90.5 SINGLE KIDNEY: Status: ACTIVE | Noted: 2024-01-23

## 2024-01-23 LAB
ALBUMIN SERPL BCP-MCNC: 3.5 G/DL (ref 3.5–5.2)
ALP SERPL-CCNC: 136 U/L (ref 55–135)
ALT SERPL W/O P-5'-P-CCNC: 29 U/L (ref 10–44)
ANION GAP SERPL CALC-SCNC: 9 MMOL/L (ref 8–16)
AST SERPL-CCNC: 23 U/L (ref 10–40)
BASOPHILS # BLD AUTO: 0.05 K/UL (ref 0–0.2)
BASOPHILS NFR BLD: 0.7 % (ref 0–1.9)
BILIRUB SERPL-MCNC: 0.4 MG/DL (ref 0.1–1)
BNP SERPL-MCNC: 1451 PG/ML (ref 0–99)
BUN SERPL-MCNC: 30 MG/DL (ref 8–23)
CALCIUM SERPL-MCNC: 8.8 MG/DL (ref 8.7–10.5)
CHLORIDE SERPL-SCNC: 111 MMOL/L (ref 95–110)
CO2 SERPL-SCNC: 23 MMOL/L (ref 23–29)
CREAT SERPL-MCNC: 1.6 MG/DL (ref 0.5–1.4)
D DIMER PPP IA.FEU-MCNC: 2.35 MG/L FEU
DIFFERENTIAL METHOD BLD: ABNORMAL
EOSINOPHIL # BLD AUTO: 0.3 K/UL (ref 0–0.5)
EOSINOPHIL NFR BLD: 3.6 % (ref 0–8)
ERYTHROCYTE [DISTWIDTH] IN BLOOD BY AUTOMATED COUNT: 14.7 % (ref 11.5–14.5)
EST. GFR  (NO RACE VARIABLE): 42.2 ML/MIN/1.73 M^2
GLUCOSE SERPL-MCNC: 98 MG/DL (ref 70–110)
HCT VFR BLD AUTO: 50.2 % (ref 40–54)
HGB BLD-MCNC: 16.5 G/DL (ref 14–18)
IMM GRANULOCYTES # BLD AUTO: 0.03 K/UL (ref 0–0.04)
IMM GRANULOCYTES NFR BLD AUTO: 0.4 % (ref 0–0.5)
INFLUENZA A, MOLECULAR: NEGATIVE
INFLUENZA B, MOLECULAR: NEGATIVE
LYMPHOCYTES # BLD AUTO: 1.3 K/UL (ref 1–4.8)
LYMPHOCYTES NFR BLD: 17.4 % (ref 18–48)
MCH RBC QN AUTO: 31 PG (ref 27–31)
MCHC RBC AUTO-ENTMCNC: 32.9 G/DL (ref 32–36)
MCV RBC AUTO: 94 FL (ref 82–98)
MONOCYTES # BLD AUTO: 0.7 K/UL (ref 0.3–1)
MONOCYTES NFR BLD: 9.6 % (ref 4–15)
NEUTROPHILS # BLD AUTO: 4.9 K/UL (ref 1.8–7.7)
NEUTROPHILS NFR BLD: 68.3 % (ref 38–73)
NRBC BLD-RTO: 0 /100 WBC
PLATELET # BLD AUTO: 234 K/UL (ref 150–450)
PMV BLD AUTO: 9.9 FL (ref 9.2–12.9)
POTASSIUM SERPL-SCNC: 4.5 MMOL/L (ref 3.5–5.1)
PROT SERPL-MCNC: 6.3 G/DL (ref 6–8.4)
RBC # BLD AUTO: 5.33 M/UL (ref 4.6–6.2)
SARS-COV-2 RDRP RESP QL NAA+PROBE: NEGATIVE
SODIUM SERPL-SCNC: 143 MMOL/L (ref 136–145)
SPECIMEN SOURCE: NORMAL
TROPONIN I SERPL DL<=0.01 NG/ML-MCNC: 0.09 NG/ML (ref 0–0.03)
TROPONIN I SERPL DL<=0.01 NG/ML-MCNC: 0.09 NG/ML (ref 0–0.03)
WBC # BLD AUTO: 7.17 K/UL (ref 3.9–12.7)

## 2024-01-23 PROCEDURE — 80053 COMPREHEN METABOLIC PANEL: CPT | Performed by: STUDENT IN AN ORGANIZED HEALTH CARE EDUCATION/TRAINING PROGRAM

## 2024-01-23 PROCEDURE — 71045 X-RAY EXAM CHEST 1 VIEW: CPT | Mod: 26,,, | Performed by: RADIOLOGY

## 2024-01-23 PROCEDURE — 96372 THER/PROPH/DIAG INJ SC/IM: CPT | Performed by: STUDENT IN AN ORGANIZED HEALTH CARE EDUCATION/TRAINING PROGRAM

## 2024-01-23 PROCEDURE — 83880 ASSAY OF NATRIURETIC PEPTIDE: CPT | Performed by: STUDENT IN AN ORGANIZED HEALTH CARE EDUCATION/TRAINING PROGRAM

## 2024-01-23 PROCEDURE — U0002 COVID-19 LAB TEST NON-CDC: HCPCS | Performed by: STUDENT IN AN ORGANIZED HEALTH CARE EDUCATION/TRAINING PROGRAM

## 2024-01-23 PROCEDURE — 36415 COLL VENOUS BLD VENIPUNCTURE: CPT | Performed by: STUDENT IN AN ORGANIZED HEALTH CARE EDUCATION/TRAINING PROGRAM

## 2024-01-23 PROCEDURE — 63600175 PHARM REV CODE 636 W HCPCS: Performed by: STUDENT IN AN ORGANIZED HEALTH CARE EDUCATION/TRAINING PROGRAM

## 2024-01-23 PROCEDURE — G0378 HOSPITAL OBSERVATION PER HR: HCPCS

## 2024-01-23 PROCEDURE — 99223 1ST HOSP IP/OBS HIGH 75: CPT | Mod: AI,,, | Performed by: STUDENT IN AN ORGANIZED HEALTH CARE EDUCATION/TRAINING PROGRAM

## 2024-01-23 PROCEDURE — 25000003 PHARM REV CODE 250: Performed by: STUDENT IN AN ORGANIZED HEALTH CARE EDUCATION/TRAINING PROGRAM

## 2024-01-23 PROCEDURE — 85379 FIBRIN DEGRADATION QUANT: CPT | Performed by: STUDENT IN AN ORGANIZED HEALTH CARE EDUCATION/TRAINING PROGRAM

## 2024-01-23 PROCEDURE — 84484 ASSAY OF TROPONIN QUANT: CPT | Mod: 91 | Performed by: STUDENT IN AN ORGANIZED HEALTH CARE EDUCATION/TRAINING PROGRAM

## 2024-01-23 PROCEDURE — 71045 X-RAY EXAM CHEST 1 VIEW: CPT | Mod: TC

## 2024-01-23 PROCEDURE — 63700000 PHARM REV CODE 250 ALT 637 W/O HCPCS: Performed by: STUDENT IN AN ORGANIZED HEALTH CARE EDUCATION/TRAINING PROGRAM

## 2024-01-23 PROCEDURE — 96375 TX/PRO/DX INJ NEW DRUG ADDON: CPT

## 2024-01-23 PROCEDURE — 84145 PROCALCITONIN (PCT): CPT | Performed by: STUDENT IN AN ORGANIZED HEALTH CARE EDUCATION/TRAINING PROGRAM

## 2024-01-23 PROCEDURE — 85025 COMPLETE CBC W/AUTO DIFF WBC: CPT | Performed by: STUDENT IN AN ORGANIZED HEALTH CARE EDUCATION/TRAINING PROGRAM

## 2024-01-23 PROCEDURE — 99285 EMERGENCY DEPT VISIT HI MDM: CPT | Mod: 25

## 2024-01-23 PROCEDURE — 93005 ELECTROCARDIOGRAM TRACING: CPT

## 2024-01-23 PROCEDURE — 87502 INFLUENZA DNA AMP PROBE: CPT | Performed by: STUDENT IN AN ORGANIZED HEALTH CARE EDUCATION/TRAINING PROGRAM

## 2024-01-23 PROCEDURE — 96365 THER/PROPH/DIAG IV INF INIT: CPT

## 2024-01-23 PROCEDURE — 93010 ELECTROCARDIOGRAM REPORT: CPT | Mod: ,,, | Performed by: INTERNAL MEDICINE

## 2024-01-23 RX ORDER — GLUCAGON 1 MG
1 KIT INJECTION
Status: DISCONTINUED | OUTPATIENT
Start: 2024-01-23 | End: 2024-01-24 | Stop reason: HOSPADM

## 2024-01-23 RX ORDER — FUROSEMIDE 10 MG/ML
40 INJECTION INTRAMUSCULAR; INTRAVENOUS
Status: COMPLETED | OUTPATIENT
Start: 2024-01-23 | End: 2024-01-23

## 2024-01-23 RX ORDER — CLOPIDOGREL BISULFATE 75 MG/1
75 TABLET ORAL DAILY
Status: DISCONTINUED | OUTPATIENT
Start: 2024-01-24 | End: 2024-01-24 | Stop reason: HOSPADM

## 2024-01-23 RX ORDER — SODIUM CHLORIDE 0.9 % (FLUSH) 0.9 %
10 SYRINGE (ML) INJECTION EVERY 12 HOURS PRN
Status: DISCONTINUED | OUTPATIENT
Start: 2024-01-23 | End: 2024-01-24 | Stop reason: HOSPADM

## 2024-01-23 RX ORDER — LOSARTAN POTASSIUM 25 MG/1
100 TABLET ORAL DAILY
Status: DISCONTINUED | OUTPATIENT
Start: 2024-01-24 | End: 2024-01-24 | Stop reason: HOSPADM

## 2024-01-23 RX ORDER — IBUPROFEN 200 MG
16 TABLET ORAL
Status: DISCONTINUED | OUTPATIENT
Start: 2024-01-23 | End: 2024-01-24 | Stop reason: HOSPADM

## 2024-01-23 RX ORDER — ONDANSETRON HYDROCHLORIDE 2 MG/ML
4 INJECTION, SOLUTION INTRAVENOUS EVERY 8 HOURS PRN
Status: DISCONTINUED | OUTPATIENT
Start: 2024-01-23 | End: 2024-01-24 | Stop reason: HOSPADM

## 2024-01-23 RX ORDER — ALUMINUM HYDROXIDE, MAGNESIUM HYDROXIDE, AND SIMETHICONE 1200; 120; 1200 MG/30ML; MG/30ML; MG/30ML
30 SUSPENSION ORAL 4 TIMES DAILY PRN
Status: DISCONTINUED | OUTPATIENT
Start: 2024-01-23 | End: 2024-01-24 | Stop reason: HOSPADM

## 2024-01-23 RX ORDER — ENOXAPARIN SODIUM 100 MG/ML
40 INJECTION SUBCUTANEOUS EVERY 12 HOURS
Status: DISCONTINUED | OUTPATIENT
Start: 2024-01-23 | End: 2024-01-24 | Stop reason: HOSPADM

## 2024-01-23 RX ORDER — NAPROXEN SODIUM 220 MG/1
81 TABLET, FILM COATED ORAL DAILY
Status: DISCONTINUED | OUTPATIENT
Start: 2024-01-24 | End: 2024-01-24 | Stop reason: HOSPADM

## 2024-01-23 RX ORDER — ATORVASTATIN CALCIUM 40 MG/1
40 TABLET, FILM COATED ORAL DAILY
Status: DISCONTINUED | OUTPATIENT
Start: 2024-01-24 | End: 2024-01-24 | Stop reason: HOSPADM

## 2024-01-23 RX ORDER — NALOXONE HCL 0.4 MG/ML
0.02 VIAL (ML) INJECTION
Status: DISCONTINUED | OUTPATIENT
Start: 2024-01-23 | End: 2024-01-24 | Stop reason: HOSPADM

## 2024-01-23 RX ORDER — ACETAMINOPHEN 325 MG/1
650 TABLET ORAL EVERY 8 HOURS PRN
Status: DISCONTINUED | OUTPATIENT
Start: 2024-01-23 | End: 2024-01-24 | Stop reason: HOSPADM

## 2024-01-23 RX ORDER — IBUPROFEN 200 MG
24 TABLET ORAL
Status: DISCONTINUED | OUTPATIENT
Start: 2024-01-23 | End: 2024-01-24 | Stop reason: HOSPADM

## 2024-01-23 RX ORDER — MIRTAZAPINE 7.5 MG/1
45 TABLET, FILM COATED ORAL NIGHTLY
Status: DISCONTINUED | OUTPATIENT
Start: 2024-01-23 | End: 2024-01-24 | Stop reason: HOSPADM

## 2024-01-23 RX ORDER — METOPROLOL TARTRATE 25 MG/1
25 TABLET, FILM COATED ORAL DAILY
Status: DISCONTINUED | OUTPATIENT
Start: 2024-01-24 | End: 2024-01-24 | Stop reason: HOSPADM

## 2024-01-23 RX ORDER — OXYCODONE HYDROCHLORIDE 5 MG/1
5 TABLET ORAL EVERY 6 HOURS PRN
Status: DISCONTINUED | OUTPATIENT
Start: 2024-01-23 | End: 2024-01-24 | Stop reason: HOSPADM

## 2024-01-23 RX ORDER — IPRATROPIUM BROMIDE AND ALBUTEROL SULFATE 2.5; .5 MG/3ML; MG/3ML
3 SOLUTION RESPIRATORY (INHALATION) EVERY 6 HOURS PRN
Status: DISCONTINUED | OUTPATIENT
Start: 2024-01-23 | End: 2024-01-24 | Stop reason: HOSPADM

## 2024-01-23 RX ORDER — PROCHLORPERAZINE EDISYLATE 5 MG/ML
5 INJECTION INTRAMUSCULAR; INTRAVENOUS EVERY 6 HOURS PRN
Status: DISCONTINUED | OUTPATIENT
Start: 2024-01-23 | End: 2024-01-24 | Stop reason: HOSPADM

## 2024-01-23 RX ORDER — LORAZEPAM 2 MG/ML
0.5 INJECTION INTRAMUSCULAR
Status: COMPLETED | OUTPATIENT
Start: 2024-01-23 | End: 2024-01-23

## 2024-01-23 RX ORDER — ALPRAZOLAM 0.25 MG/1
1 TABLET ORAL 2 TIMES DAILY PRN
Status: DISCONTINUED | OUTPATIENT
Start: 2024-01-23 | End: 2024-01-24 | Stop reason: HOSPADM

## 2024-01-23 RX ORDER — AZITHROMYCIN 250 MG/1
500 TABLET, FILM COATED ORAL DAILY
Status: DISCONTINUED | OUTPATIENT
Start: 2024-01-23 | End: 2024-01-24 | Stop reason: HOSPADM

## 2024-01-23 RX ADMIN — ENOXAPARIN SODIUM 40 MG: 40 INJECTION SUBCUTANEOUS at 07:01

## 2024-01-23 RX ADMIN — FUROSEMIDE 40 MG: 10 INJECTION, SOLUTION INTRAVENOUS at 03:01

## 2024-01-23 RX ADMIN — ALPRAZOLAM 1 MG: 0.25 TABLET ORAL at 05:01

## 2024-01-23 RX ADMIN — LORAZEPAM 0.5 MG: 2 INJECTION INTRAMUSCULAR; INTRAVENOUS at 03:01

## 2024-01-23 RX ADMIN — MIRTAZAPINE 45 MG: 7.5 TABLET ORAL at 09:01

## 2024-01-23 RX ADMIN — CEFTRIAXONE 1 G: 1 INJECTION, POWDER, FOR SOLUTION INTRAMUSCULAR; INTRAVENOUS at 06:01

## 2024-01-23 RX ADMIN — AZITHROMYCIN 500 MG: 250 TABLET, FILM COATED ORAL at 05:01

## 2024-01-23 NOTE — ED PROVIDER NOTES
Encounter Date: 2024       History     Chief Complaint   Patient presents with    Shortness of Breath     Pt. C/o SOB and weakness at home.      84-year-old male with a history of prostate cancer status post prostatectomy, hypertension, CHF, kidney disease with only 1 functioning kidney. He presents to ED with complaints of acute shortness of breath this morning at approximately 10:00 a.m. while shaving. Shortness of breath is worsened with exertion. He denies associated chest pain or palpitations. He denies associated fever or chills. He reports productive cough which is chronic.  He reports lower extremity edema which is chronic. He quit smoking cigarettes in .  Denies history of PE DVT.    The history is provided by the patient. No  was used.     Review of patient's allergies indicates:  No Known Allergies  Past Medical History:   Diagnosis Date    Cancer     prostate    Hypertension     Kidney problem     only 1 kidney functions    Liver disease     Umbilical hernia      Past Surgical History:   Procedure Laterality Date    colon repair      after prostatectomy    LYSIS OF ADHESIONS N/A 10/18/2018    Procedure: LYSIS, ADHESIONS;  Surgeon: Rancho Mariee MD;  Location: [facility] OR [address] [place] [address];  Service: General;  Laterality: N/A;    PROSTATECTOMY      PROSTATECTOMY      REPAIR OF RECURRENT INCISIONAL HERNIA N/A 10/18/2018    Procedure: REPAIR, HERNIA, INCISIONAL -multiple;  Surgeon: Rancho Mariee MD;  Location: [facility] OR [address] [place] [address];  Service: General;  Laterality: N/A;     Family History   Problem Relation Age of Onset    Heart disease Mother     Heart disease Father     Diabetes Sister      Social History     Tobacco Use    Smoking status: Former     Current packs/day: 0.00     Types: Cigarettes     Quit date: 2/15/1972     Years since quittin.9    Smokeless tobacco: Never   Substance Use Topics    Alcohol use: No    Drug use: No     Review of Systems   Constitutional:  Negative.    HENT: Negative.     Eyes: Negative.    Respiratory:  Positive for shortness of breath.    Cardiovascular: Negative.    Gastrointestinal: Negative.    Endocrine: Negative.    Genitourinary: Negative.    Musculoskeletal: Negative.    Skin: Negative.    Neurological: Negative.    Hematological: Negative.    Psychiatric/Behavioral: Negative.     All other systems reviewed and are negative.      Physical Exam     Initial Vitals [01/23/24 1338]   BP Pulse Resp Temp SpO2   (!) 143/86 76 20 97.6 °F (36.4 °C) 97 %      MAP       --         Physical Exam    Nursing note and vitals reviewed.  Constitutional: He appears well-developed.   HENT:   Head: Normocephalic.   Eyes: Pupils are equal, round, and reactive to light.   Neck: No JVD present.   Normal range of motion.  Cardiovascular:  Normal rate.           Pulmonary/Chest: Breath sounds normal. He has no wheezes. He has no rhonchi. He has no rales.   Diminished bases   Abdominal: Abdomen is soft.   Musculoskeletal:      Cervical back: Normal range of motion.     Neurological: He is alert and oriented to person, place, and time. GCS score is 15. GCS eye subscore is 4. GCS verbal subscore is 5. GCS motor subscore is 6.   Skin: Skin is warm. Capillary refill takes less than 2 seconds.   Bilateral lower extremity dependent edema   Psychiatric: He has a normal mood and affect.         ED Course   Procedures  Labs Reviewed   CBC W/ AUTO DIFFERENTIAL - Abnormal; Notable for the following components:       Result Value    RDW 14.7 (*)     Lymph % 17.4 (*)     All other components within normal limits   COMPREHENSIVE METABOLIC PANEL - Abnormal; Notable for the following components:    Chloride 111 (*)     BUN 30 (*)     Creatinine 1.6 (*)     Alkaline Phosphatase 136 (*)     eGFR 42.2 (*)     All other components within normal limits   TROPONIN I - Abnormal; Notable for the following components:    Troponin I 0.090 (*)     All other components within normal limits    B-TYPE NATRIURETIC PEPTIDE - Abnormal; Notable for the following components:    BNP 1,451 (*)     All other components within normal limits   D DIMER, QUANTITATIVE - Abnormal; Notable for the following components:    D-Dimer 2.35 (*)     All other components within normal limits   TROPONIN I - Abnormal; Notable for the following components:    Troponin I 0.092 (*)     All other components within normal limits   INFLUENZA A & B BY MOLECULAR   SARS-COV-2 RNA AMPLIFICATION, QUAL    Narrative:     Is the patient symptomatic?->Yes   PROCALCITONIN          Imaging Results               X-Ray Chest AP Portable (Final result)  Result time 01/23/24 14:16:23      Final result by Renato Sanford MD (01/23/24 14:16:23)                   Impression:      1. Findings suspicious for congestive heart failure with small bilateral pleural effusions and bibasilar dependent atelectasis.  Superimposed pulmonary infiltrate not excludable.  Correlate clinically with possible fever and/or elevated white count  2. Left subclavian Port-A-Cath.  Close interval follow-up is recommended.  This report was flagged in Epic as abnormal.      Electronically signed by: Renato Sanford  Date:    01/23/2024  Time:    14:16               Narrative:    EXAMINATION:  XR CHEST AP PORTABLE    CLINICAL HISTORY:  Chest Pain;    TECHNIQUE:  Portable view of the chest was performed.    COMPARISON:  01/11/2024.    FINDINGS:  Pulmonary hypoinflation.  There are diffuse bilateral pulmonary infiltrates suggesting pulmonary edema.  Small bilateral pleural effusions with bibasilar dependent atelectasis.  Heart size is enlarged.  Trachea midline.    Bony thorax intact left subclavian Port-A-Cath remains in satisfactory position.                                       Medications   iohexoL (OMNIPAQUE 350) injection 75 mL (has no administration in time range)   cefTRIAXone (Rocephin) 1 g in dextrose 5 % in water (D5W) 100 mL IVPB (MB+) (0 g Intravenous Stopped  1/23/24 1833)   azithromycin tablet 500 mg (500 mg Oral Given 1/23/24 1755)   ALPRAZolam tablet 1 mg (1 mg Oral Given 1/24/24 0008)   aspirin chewable tablet 81 mg (has no administration in time range)   clopidogreL tablet 75 mg (has no administration in time range)   atorvastatin tablet 40 mg (has no administration in time range)   losartan tablet 100 mg (has no administration in time range)   metoprolol tartrate (LOPRESSOR) tablet 25 mg (has no administration in time range)   mirtazapine tablet 45 mg (45 mg Oral Given 1/23/24 2129)   sodium chloride 0.9% flush 10 mL (has no administration in time range)   naloxone 0.4 mg/mL injection 0.02 mg (has no administration in time range)   glucose chewable tablet 16 g (has no administration in time range)   glucose chewable tablet 24 g (has no administration in time range)   glucagon (human recombinant) injection 1 mg (has no administration in time range)   enoxaparin injection 40 mg (40 mg Subcutaneous Given 1/23/24 1959)   ondansetron injection 4 mg (has no administration in time range)   prochlorperazine injection Soln 5 mg (has no administration in time range)   oxyCODONE immediate release tablet 5 mg (has no administration in time range)   albuterol-ipratropium 2.5 mg-0.5 mg/3 mL nebulizer solution 3 mL (has no administration in time range)   acetaminophen tablet 650 mg (has no administration in time range)   trazodone split tablet 25 mg (has no administration in time range)   aluminum-magnesium hydroxide-simethicone 200-200-20 mg/5 mL suspension 30 mL (has no administration in time range)   dextrose 10% bolus 125 mL 125 mL (has no administration in time range)   dextrose 10% bolus 250 mL 250 mL (has no administration in time range)   LORazepam injection 0.5 mg (0.5 mg Intravenous Given 1/23/24 1534)   furosemide injection 40 mg (40 mg Intravenous Given 1/23/24 1557)     Medical Decision Making  84-year-old male with shortness of breath.  Differential includes  COPD/CHF exacerbation, viral versus bacterial pneumonia, PE, others.  EKG shows paced rhythm, no STEMI.  Chest x-ray shows cardiomegaly with bilateral pleural effusion vs possible infiltrates.  COVID negative, flu negative, WBC 7.1, elevated D-dimer 2.35, elevated BNP 1450 (1180 on 1/11), troponin 0.090. BUN creatinine 30/1.6 GFR 42  Working diagnosis at this point at CHF exacerbation versus PE  We will give a dose of Lasix and then reassess  Decision is made to further evaluate to rule out a PE with a V/Q scan due to single functioning kidney low GFR, apparently unable to do V/Q scan today, and will be done tomorrow according to Radiology staff. We will admit to medicine service for further diuresing, evaluation and treatment.  We will admit to general medicine for further evaluation and management. Patient was agreeable to plan.          Amount and/or Complexity of Data Reviewed  External Data Reviewed: labs and radiology.  Labs: ordered.  Radiology: ordered.    Risk  Prescription drug management.                                      Clinical Impression:  Final diagnoses:  [R07.9] Chest pain  [I50.43] Acute on chronic heart failure with reduced ejection fraction and diastolic dysfunction  [R06.02] SOB (shortness of breath) (Primary)          ED Disposition Condition    Observation                 Alessandro Claire MD  01/24/24 1977

## 2024-01-24 VITALS
DIASTOLIC BLOOD PRESSURE: 73 MMHG | OXYGEN SATURATION: 97 % | WEIGHT: 196 LBS | HEIGHT: 73 IN | HEART RATE: 59 BPM | SYSTOLIC BLOOD PRESSURE: 135 MMHG | BODY MASS INDEX: 25.98 KG/M2 | TEMPERATURE: 96 F | RESPIRATION RATE: 18 BRPM

## 2024-01-24 LAB
ALBUMIN SERPL BCP-MCNC: 3.9 G/DL (ref 3.5–5.2)
ALP SERPL-CCNC: 138 U/L (ref 55–135)
ALT SERPL W/O P-5'-P-CCNC: 31 U/L (ref 10–44)
ANION GAP SERPL CALC-SCNC: 14 MMOL/L (ref 8–16)
ASCENDING AORTA: 3.12 CM
AST SERPL-CCNC: 24 U/L (ref 10–40)
AV INDEX (PROSTH): 0.23
AV MEAN GRADIENT: 18 MMHG
AV PEAK GRADIENT: 30 MMHG
AV REGURGITATION PRESSURE HALF TIME: 637.8 MS
AV VALVE AREA BY VELOCITY RATIO: 0.87 CM²
AV VALVE AREA: 0.93 CM²
AV VELOCITY RATIO: 0.22
BASOPHILS # BLD AUTO: 0.08 K/UL (ref 0–0.2)
BASOPHILS NFR BLD: 0.9 % (ref 0–1.9)
BILIRUB SERPL-MCNC: 0.6 MG/DL (ref 0.1–1)
BSA FOR ECHO PROCEDURE: 2.14 M2
BUN SERPL-MCNC: 29 MG/DL (ref 8–23)
CALCIUM SERPL-MCNC: 9 MG/DL (ref 8.7–10.5)
CHLORIDE SERPL-SCNC: 112 MMOL/L (ref 95–110)
CO2 SERPL-SCNC: 18 MMOL/L (ref 23–29)
CREAT SERPL-MCNC: 1.7 MG/DL (ref 0.5–1.4)
DIFFERENTIAL METHOD BLD: ABNORMAL
DOP CALC AO PEAK VEL: 2.75 M/S
DOP CALC AO VTI: 63.5 CM
DOP CALC LVOT AREA: 4 CM2
DOP CALC LVOT DIAMETER: 2.26 CM
DOP CALC LVOT PEAK VEL: 0.6 M/S
DOP CALC LVOT STROKE VOLUME: 59.34 CM3
DOP CALC MV VTI: 25.6 CM
DOP CALCLVOT PEAK VEL VTI: 14.8 CM
E WAVE DECELERATION TIME: 207.99 MSEC
E/A RATIO: 3.03
E/E' RATIO: 18.18 M/S
EJECTION FRACTION: 20 %
EOSINOPHIL # BLD AUTO: 0.4 K/UL (ref 0–0.5)
EOSINOPHIL NFR BLD: 4.5 % (ref 0–8)
ERYTHROCYTE [DISTWIDTH] IN BLOOD BY AUTOMATED COUNT: 14.7 % (ref 11.5–14.5)
EST. GFR  (NO RACE VARIABLE): 39.3 ML/MIN/1.73 M^2
FRACTIONAL SHORTENING: 21 % (ref 28–44)
GLOBAL LONGITUIDAL STRAIN: -8 %
GLUCOSE SERPL-MCNC: 95 MG/DL (ref 70–110)
HCT VFR BLD AUTO: 52.7 % (ref 40–54)
HGB BLD-MCNC: 17.6 G/DL (ref 14–18)
IMM GRANULOCYTES # BLD AUTO: 0.03 K/UL (ref 0–0.04)
IMM GRANULOCYTES NFR BLD AUTO: 0.3 % (ref 0–0.5)
INTERVENTRICULAR SEPTUM IN END SYSTOLE: 1.4 CM
INTERVENTRICULAR SEPTUM: 1.3 CM (ref 0.6–1.1)
IVC DIAMETER: 2.06 CM
LA MAJOR: 4.6 CM
LA MINOR: 4 CM
LEFT ATRIUM SIZE: 5.09 CM
LEFT ATRIUM VOLUME INDEX MOD: 17.9 ML/M2
LEFT ATRIUM VOLUME MOD: 38.15 CM3
LEFT INTERNAL DIMENSION IN SYSTOLE: 4.1 CM (ref 2.1–4)
LEFT VENTRICULAR INTERNAL DIMENSION IN DIASTOLE: 5.2 CM (ref 3.5–6)
LEFT VENTRICULAR POSTERIOR WALL IN END SYSTOLE: 1.4 CM
LV LATERAL E/E' RATIO: 12.5 M/S
LV SEPTAL E/E' RATIO: 33.33 M/S
LVOT MG: 0.9 MMHG
LVOT MV: 0.45 CM/S
LYMPHOCYTES # BLD AUTO: 2 K/UL (ref 1–4.8)
LYMPHOCYTES NFR BLD: 22.7 % (ref 18–48)
MAGNESIUM SERPL-MCNC: 2.4 MG/DL (ref 1.6–2.6)
MCH RBC QN AUTO: 31.2 PG (ref 27–31)
MCHC RBC AUTO-ENTMCNC: 33.4 G/DL (ref 32–36)
MCV RBC AUTO: 93 FL (ref 82–98)
MONOCYTES # BLD AUTO: 0.8 K/UL (ref 0.3–1)
MONOCYTES NFR BLD: 9.7 % (ref 4–15)
MV MEAN GRADIENT: 2 MMHG
MV PEAK A VEL: 0.33 M/S
MV PEAK E VEL: 1 M/S
MV PEAK GRADIENT: 5 MMHG
MV STENOSIS PRESSURE HALF TIME: 60.32 MS
MV VALVE AREA BY CONTINUITY EQUATION: 2.32 CM2
MV VALVE AREA P 1/2 METHOD: 3.65 CM2
NEUTROPHILS # BLD AUTO: 5.4 K/UL (ref 1.8–7.7)
NEUTROPHILS NFR BLD: 61.9 % (ref 38–73)
NRBC BLD-RTO: 0 /100 WBC
PISA AR MAX VEL: 4.07 M/S
PISA MRMAX VEL: 5.11 M/S
PISA TR MAX VEL: 2.36 M/S
PLATELET # BLD AUTO: 242 K/UL (ref 150–450)
PMV BLD AUTO: 10 FL (ref 9.2–12.9)
POTASSIUM SERPL-SCNC: 4.2 MMOL/L (ref 3.5–5.1)
PROCALCITONIN SERPL IA-MCNC: 0.12 NG/ML
PROT SERPL-MCNC: 6.8 G/DL (ref 6–8.4)
PV MV: 0.51 M/S
PV PEAK GRADIENT: 1 MMHG
PV PEAK VELOCITY: 0.59 M/S
RA MAJOR: 4.3 CM
RA PRESSURE ESTIMATED: 3 MMHG
RA WIDTH: 3.3 CM
RBC # BLD AUTO: 5.65 M/UL (ref 4.6–6.2)
RV TB RVSP: 5 MMHG
SODIUM SERPL-SCNC: 144 MMOL/L (ref 136–145)
STJ: 3.29 CM
TDI LATERAL: 0.08 M/S
TDI SEPTAL: 0.03 M/S
TDI: 0.06 M/S
TR MAX PG: 22 MMHG
TRICUSPID ANNULAR PLANE SYSTOLIC EXCURSION: 1.9 CM
TRICUSPID VALVE PEAK A WAVE VELOCITY: 0.26 M/S
TV PEAK E VEL: 0.4 M/S
TV REST PULMONARY ARTERY PRESSURE: 25 MMHG
WBC # BLD AUTO: 8.68 K/UL (ref 3.9–12.7)
Z-SCORE OF LEFT VENTRICULAR DIMENSION IN END DIASTOLE: -2.67
Z-SCORE OF LEFT VENTRICULAR DIMENSION IN END SYSTOLE: -0.08
Z-SCORE OF LEFT VENTRICULAR POSTERIOR WALL IN END SYSTOLE: -1.68

## 2024-01-24 PROCEDURE — 96372 THER/PROPH/DIAG INJ SC/IM: CPT | Performed by: STUDENT IN AN ORGANIZED HEALTH CARE EDUCATION/TRAINING PROGRAM

## 2024-01-24 PROCEDURE — 99238 HOSP IP/OBS DSCHRG MGMT 30/<: CPT | Mod: ,,, | Performed by: STUDENT IN AN ORGANIZED HEALTH CARE EDUCATION/TRAINING PROGRAM

## 2024-01-24 PROCEDURE — 80053 COMPREHEN METABOLIC PANEL: CPT | Performed by: STUDENT IN AN ORGANIZED HEALTH CARE EDUCATION/TRAINING PROGRAM

## 2024-01-24 PROCEDURE — 83735 ASSAY OF MAGNESIUM: CPT | Performed by: STUDENT IN AN ORGANIZED HEALTH CARE EDUCATION/TRAINING PROGRAM

## 2024-01-24 PROCEDURE — 63600175 PHARM REV CODE 636 W HCPCS: Performed by: STUDENT IN AN ORGANIZED HEALTH CARE EDUCATION/TRAINING PROGRAM

## 2024-01-24 PROCEDURE — 63700000 PHARM REV CODE 250 ALT 637 W/O HCPCS: Performed by: STUDENT IN AN ORGANIZED HEALTH CARE EDUCATION/TRAINING PROGRAM

## 2024-01-24 PROCEDURE — 96376 TX/PRO/DX INJ SAME DRUG ADON: CPT

## 2024-01-24 PROCEDURE — G0378 HOSPITAL OBSERVATION PER HR: HCPCS

## 2024-01-24 PROCEDURE — 25000003 PHARM REV CODE 250: Performed by: STUDENT IN AN ORGANIZED HEALTH CARE EDUCATION/TRAINING PROGRAM

## 2024-01-24 PROCEDURE — 85025 COMPLETE CBC W/AUTO DIFF WBC: CPT | Performed by: STUDENT IN AN ORGANIZED HEALTH CARE EDUCATION/TRAINING PROGRAM

## 2024-01-24 RX ORDER — FUROSEMIDE 10 MG/ML
40 INJECTION INTRAMUSCULAR; INTRAVENOUS ONCE
Status: COMPLETED | OUTPATIENT
Start: 2024-01-24 | End: 2024-01-24

## 2024-01-24 RX ADMIN — ASPIRIN 81 MG: 81 TABLET, CHEWABLE ORAL at 08:01

## 2024-01-24 RX ADMIN — ALPRAZOLAM 1 MG: 0.25 TABLET ORAL at 12:01

## 2024-01-24 RX ADMIN — ATORVASTATIN CALCIUM 40 MG: 40 TABLET, FILM COATED ORAL at 08:01

## 2024-01-24 RX ADMIN — ENOXAPARIN SODIUM 40 MG: 40 INJECTION SUBCUTANEOUS at 06:01

## 2024-01-24 RX ADMIN — AZITHROMYCIN 500 MG: 250 TABLET, FILM COATED ORAL at 08:01

## 2024-01-24 RX ADMIN — FUROSEMIDE 40 MG: 10 INJECTION, SOLUTION INTRAVENOUS at 08:01

## 2024-01-24 RX ADMIN — LOSARTAN POTASSIUM 100 MG: 25 TABLET, FILM COATED ORAL at 08:01

## 2024-01-24 RX ADMIN — ALPRAZOLAM 1 MG: 0.25 TABLET ORAL at 08:01

## 2024-01-24 RX ADMIN — CLOPIDOGREL BISULFATE 75 MG: 75 TABLET ORAL at 08:01

## 2024-01-24 RX ADMIN — METOPROLOL TARTRATE 25 MG: 25 TABLET, FILM COATED ORAL at 08:01

## 2024-01-24 NOTE — H&P
[facility]  History & Physical    Patient Name: Yobani Rios  MRN: [id number]  Patient Class: OP- Observation  Admission Date: 1/23/2024  Attending Physician: Manuel Zaidi MD   Primary Care Provider: William Nice MD         Patient information was obtained from patient, past medical records, and ER records.     Subjective:     Principal Problem:Shortness of breath    Chief Complaint:   Chief Complaint   Patient presents with    Shortness of Breath     Pt. C/o SOB and weakness at home.         HPI: 85 yo w/ hx of chronic HFrEF, AAA, Diffuse large b cell lymphoma, CAD, singular kidney, LESIA, HTN, CKD 3 presenting with shortness of breath. Symptoms started today when patient was shaving. He became acutely short of breath and had presyncope episode. Nothing like this before. Patient states he intermittently feels short of breath but has felt like his usual self in the days leading up to event. Coughs up phlegm that is white to yellow but this has not increased. Endorses chronic LE swelling. No chest pain. Has difficult time laying flat. No fever, chills, NVD. No sick contacts. Symptoms have now resolved. No hx of DVT/PE.     Past Medical History:   Diagnosis Date    Cancer     prostate    Hypertension     Kidney problem     only 1 kidney functions    Liver disease     Umbilical hernia        Past Surgical History:   Procedure Laterality Date    colon repair      after prostatectomy    LYSIS OF ADHESIONS N/A 10/18/2018    Procedure: LYSIS, ADHESIONS;  Surgeon: Rancho Mariee MD;  Location: [address];  Service: General;  Laterality: N/A;    PROSTATECTOMY      PROSTATECTOMY  2010    REPAIR OF RECURRENT INCISIONAL HERNIA N/A 10/18/2018    Procedure: REPAIR, HERNIA, INCISIONAL -multiple;  Surgeon: Rancho Mariee MD;  Location: [facility] OR [address] [place] [address];  Service: General;  Laterality: N/A;       Review of patient's allergies indicates:  No Known Allergies    No current  facility-administered medications on file prior to encounter.     Current Outpatient Medications on File Prior to Encounter   Medication Sig    albuterol (PROVENTIL/VENTOLIN HFA) 90 mcg/actuation inhaler Inhale 1-2 puffs into the lungs every 6 (six) hours as needed for Shortness of Breath. Rescue    amLODIPine (NORVASC) 10 MG tablet     amLODIPine (NORVASC) 5 MG tablet Take 5 mg by mouth 2 (two) times daily.    aspirin (ECOTRIN) 81 MG EC tablet Take 81 mg by mouth once daily.    atorvastatin (LIPITOR) 40 MG tablet Take 40 mg by mouth once daily.    B-complex with vitamin C tablet Take 1 tablet by mouth once daily.      benzonatate (TESSALON) 100 MG capsule Take 100 mg by mouth 3 (three) times daily as needed.    BRILINTA 90 mg tablet Take 90 mg by mouth 2 (two) times daily.    clopidogreL (PLAVIX) 75 mg tablet Take 75 mg by mouth once daily.    furosemide (LASIX) 40 MG tablet Take 40 mg by mouth 2 (two) times daily.    losartan (COZAAR) 100 MG tablet Take 100 mg by mouth once daily.    losartan-hydrochlorothiazide (HYZAAR) 100-12.5 mg per tablet Take 1 tablet by mouth once daily.      metoprolol tartrate (LOPRESSOR) 25 MG tablet Take 25 mg by mouth 2 (two) times daily.    multivitamin,therapeutic (THERA ORAL) Take 1 tablet by mouth once daily.    pravastatin (PRAVACHOL) 20 MG tablet Take 20 mg by mouth once daily.    rivaroxaban (XARELTO) 2.5 mg Tab Take 2.5 mg by mouth 2 (two) times daily.    vitamin D (VITAMIN D3) 1000 units Tab Take 25 mcg by mouth once daily.     Family History    Non-contributory       Tobacco Use    Smoking status: Former     Current packs/day: 0.00     Types: Cigarettes     Quit date: 2/15/1972     Years since quittin.9    Smokeless tobacco: Never   Substance and Sexual Activity    Alcohol use: No    Drug use: No    Sexual activity: Never     Review of Systems   All other systems reviewed and are negative.    Objective:     Vital Signs (Most Recent):  Temp: 97.6 °F (36.4 °C) (24  1338)  Pulse: 62 (01/23/24 1615)  Resp: (!) 8 (01/23/24 1615)  BP: 123/74 (01/23/24 1548)  SpO2: 98 % (01/23/24 1615) Vital Signs (24h Range):  Temp:  [97.6 °F (36.4 °C)] 97.6 °F (36.4 °C)  Pulse:  [60-76] 62  Resp:  [8-20] 8  SpO2:  [97 %-100 %] 98 %  BP: (123-143)/(74-86) 123/74     Weight: 90.7 kg (200 lb)  Body mass index is 26.39 kg/m².     Physical Exam     Vitals reviewed  General: NAD, Well developed, Well Nourished  Head: NC/AT  Eyes: EOMI, SUSSY  Cardiovascular: Pulses intact distally, Regular Rate and rhythm  Pulmonary: Normal Respiratory Rate, No respiratory distress  Gi: Soft, Non-tender  Extremities: Warm, pitting edema bilaterally  Skin: Warm, dry  Neuro: Alert, Oriented x3, No focal Deficit  Psych: Appropriate mood and affect          Significant Labs: All pertinent labs within the past 24 hours have been reviewed.    Significant Imaging: I have reviewed all pertinent imaging results/findings within the past 24 hours.  Assessment/Plan:     * Shortness of breath  Patient with acute onset SOB w/ presyncope  Has bilateral LE edema  No CP present  D-dimer elevated  Holding off on CT PE given advanced kidney disease and single kidney  Continue IV lasix  VQ scan ordered  DVT US ordered  Will start full dose AC until PE/DVT ruled out  TTE tomorrow  Start abx and check procal based of CXR findings      Anxiety  Continue home PRN xanax and mirtazapine       Stage 3a chronic kidney disease (CKD)  Creatine stable for now. BMP reviewed- noted Estimated Creatinine Clearance: 38.8 mL/min (A) (based on SCr of 1.6 mg/dL (H)). according to latest data. Based on current GFR, CKD stage is stage 3 - GFR 30-59.  Monitor UOP and serial BMP and adjust therapy as needed. Renally dose meds. Avoid nephrotoxic medications and procedures.    Single kidney  noted      Essential hypertension  Chronic, controlled. Latest blood pressure and vitals reviewed-     Temp:  [97.6 °F (36.4 °C)]   Pulse:  [60-76]   Resp:  [8-20]   BP:  (123-143)/(74-86)   SpO2:  [97 %-100 %] .   Home meds for hypertension were reviewed in epic      While in the hospital, will manage blood pressure as follows; Continue home antihypertensive regimen    Will utilize p.r.n. blood pressure medication only if patient's blood pressure greater than 180/110 and he develops symptoms such as worsening chest pain or shortness of breath.    Acute on chronic heart failure with reduced ejection fraction and diastolic dysfunction  Acute on chronic HFrEF exacerbation  Unknown EF  Continue IV diuresis  TTE tomorrow  Keep K>4, Mg>2  Tele  Daily weight  Strict I's and O's  Cardiology is not consulted  Not on CHF pathway currently  Mild troponin elevation in setting of volume overload and decreased renal function- Trended to peak. No chest pain present. Continue home ASA/Plavix/Statin      Abdominal aortic aneurysm (AAA) 30 to 34 mm in diameter  noted        VTE Risk Mitigation (From admission, onward)           Ordered     enoxaparin injection 40 mg  Every 12 hours         01/23/24 1711     IP VTE HIGH RISK PATIENT  Once         01/23/24 1711     Place sequential compression device  Until discontinued         01/23/24 1711                       On 01/23/2024, patient should be placed in hospital observation services under my care.             Manuel Zaidi MD  Department of Hospital Medicine  [facility] Emergency Dept

## 2024-01-24 NOTE — PLAN OF CARE
[facility] - Intensive Care  Discharge Final Note    Primary Care Provider: William Nice MD    Expected Discharge Date: 1/24/2024    Verbal follow up appointment with Dr Chirinos provided to patient. Demonstrated understanding by verbal feedback. Home health set up with [facility]. Someone will call him to let him know when he will be seen. Denies any other needs at this time. Nurse notified he is ok for DC from CM standpoint. Waiting for results to come back before he can leave.       Final Discharge Note (most recent)       Final Note - 01/24/24 1150          Final Note    Assessment Type Final Discharge Note     Anticipated Discharge Disposition Home-Health Care [facility]     What phone number can be called within the next 1-3 days to see how you are doing after discharge? [phone]     Hospital Resources/Appts/Education Provided Appointments scheduled and added to AVS        Post-Acute Status    Post-Acute Authorization Home Health     Home Health Status Set-up Complete/Auth obtained     Discharge Delays None known at this time                     Important Message from Medicare             Contact Info       Magno Chirinos MD   Specialty: Family Medicine    [address] [address]   Phone: [phone]       Next Steps: Go on 1/29/2024    Instructions: appointment Monday Jan 29th at 10:00am for hospital follow up    [facility]    [address] [address]   Phone: [phone]       Next Steps: Follow up    Instructions: will provide home health services

## 2024-01-24 NOTE — NURSING
D/C instructions provided and explained to patient and patient's spouse, understanding of D/C instructions verbalized. IV removed, catheter intact. Tolerated well. Telemetry monitor removed and returned to monitor room. VSS. Pt denies complaints. Transported off unit via wheelchair.

## 2024-01-24 NOTE — ASSESSMENT & PLAN NOTE
Acute on chronic HFrEF exacerbation  Unknown EF  Continue IV diuresis  TTE tomorrow  Keep K>4, Mg>2  Tele  Daily weight  Strict I's and O's  Cardiology is not consulted  Not on CHF pathway currently  Mild troponin elevation in setting of volume overload and decreased renal function- Trended to peak. No chest pain present. Continue home ASA/Plavix/Statin

## 2024-01-24 NOTE — HPI
83 yo w/ hx of chronic HFrEF, AAA, Diffuse large b cell lymphoma, CAD, singular kidney, LESIA, HTN, CKD 3 presenting with shortness of breath. Symptoms started today when patient was shaving. He became acutely short of breath and had presyncope episode. Nothing like this before. Patient states he intermittently feels short of breath but has felt like his usual self in the days leading up to event. Coughs up phlegm that is white to yellow but this has not increased. Endorses chronic LE swelling. No chest pain. Has difficult time laying flat. No fever, chills, NVD. No sick contacts. Symptoms have now resolved. No hx of DVT/PE.

## 2024-01-24 NOTE — ASSESSMENT & PLAN NOTE
Patient with acute onset SOB w/ presyncope  Has bilateral LE edema  No CP present  D-dimer elevated  Holding off on CT PE given advanced kidney disease and single kidney  VQ scan ordered  DVT US ordered  Will start full dose AC until PE/DVT ruled out  TTE tomorrow  Start abx and check procal based of CXR findings

## 2024-01-24 NOTE — ASSESSMENT & PLAN NOTE
Chronic, controlled. Latest blood pressure and vitals reviewed-     Temp:  [97.6 °F (36.4 °C)]   Pulse:  [60-76]   Resp:  [8-20]   BP: (123-143)/(74-86)   SpO2:  [97 %-100 %] .   Home meds for hypertension were reviewed in epic      While in the hospital, will manage blood pressure as follows; Continue home antihypertensive regimen    Will utilize p.r.n. blood pressure medication only if patient's blood pressure greater than 180/110 and he develops symptoms such as worsening chest pain or shortness of breath.

## 2024-01-24 NOTE — ASSESSMENT & PLAN NOTE
Patient with acute onset SOB w/ presyncope  Has bilateral LE edema  No CP present  D-dimer elevated  Holding off on CT PE given advanced kidney disease and single kidney  Continue IV lasix  VQ scan ordered  DVT US ordered  Will start full dose AC until PE/DVT ruled out  TTE tomorrow  Start abx and check procal based of CXR findings

## 2024-01-24 NOTE — SUBJECTIVE & OBJECTIVE
Past Medical History:   Diagnosis Date    Cancer     prostate    Hypertension     Kidney problem     only 1 kidney functions    Liver disease     Umbilical hernia        Past Surgical History:   Procedure Laterality Date    colon repair      after prostatectomy    LYSIS OF ADHESIONS N/A 10/18/2018    Procedure: LYSIS, ADHESIONS;  Surgeon: Rancho Mariee MD;  Location: [address];  Service: General;  Laterality: N/A;    PROSTATECTOMY      PROSTATECTOMY  2010    REPAIR OF RECURRENT INCISIONAL HERNIA N/A 10/18/2018    Procedure: REPAIR, HERNIA, INCISIONAL -multiple;  Surgeon: Rancho Mariee MD;  Location: [address];  Service: General;  Laterality: N/A;       Review of patient's allergies indicates:  No Known Allergies    No current facility-administered medications on file prior to encounter.     Current Outpatient Medications on File Prior to Encounter   Medication Sig    albuterol (PROVENTIL/VENTOLIN HFA) 90 mcg/actuation inhaler Inhale 1-2 puffs into the lungs every 6 (six) hours as needed for Shortness of Breath. Rescue    amLODIPine (NORVASC) 10 MG tablet     amLODIPine (NORVASC) 5 MG tablet Take 5 mg by mouth 2 (two) times daily.    aspirin (ECOTRIN) 81 MG EC tablet Take 81 mg by mouth once daily.    atorvastatin (LIPITOR) 40 MG tablet Take 40 mg by mouth once daily.    B-complex with vitamin C tablet Take 1 tablet by mouth once daily.      benzonatate (TESSALON) 100 MG capsule Take 100 mg by mouth 3 (three) times daily as needed.    BRILINTA 90 mg tablet Take 90 mg by mouth 2 (two) times daily.    clopidogreL (PLAVIX) 75 mg tablet Take 75 mg by mouth once daily.    furosemide (LASIX) 40 MG tablet Take 40 mg by mouth 2 (two) times daily.    losartan (COZAAR) 100 MG tablet Take 100 mg by mouth once daily.    losartan-hydrochlorothiazide (HYZAAR) 100-12.5 mg per tablet Take 1 tablet by mouth once daily.      metoprolol tartrate (LOPRESSOR) 25 MG tablet Take 25 mg by mouth 2 (two) times daily.     multivitamin,therapeutic (THERA ORAL) Take 1 tablet by mouth once daily.    pravastatin (PRAVACHOL) 20 MG tablet Take 20 mg by mouth once daily.    rivaroxaban (XARELTO) 2.5 mg Tab Take 2.5 mg by mouth 2 (two) times daily.    vitamin D (VITAMIN D3) 1000 units Tab Take 25 mcg by mouth once daily.     Family History    None       Tobacco Use    Smoking status: Former     Current packs/day: 0.00     Types: Cigarettes     Quit date: 2/15/1972     Years since quittin.9    Smokeless tobacco: Never   Substance and Sexual Activity    Alcohol use: No    Drug use: No    Sexual activity: Never     Review of Systems   All other systems reviewed and are negative.    Objective:     Vital Signs (Most Recent):  Temp: 97.6 °F (36.4 °C) (24 1338)  Pulse: 62 (24 1615)  Resp: (!) 8 (24 1615)  BP: 123/74 (24 1548)  SpO2: 98 % (24 1615) Vital Signs (24h Range):  Temp:  [97.6 °F (36.4 °C)] 97.6 °F (36.4 °C)  Pulse:  [60-76] 62  Resp:  [8-20] 8  SpO2:  [97 %-100 %] 98 %  BP: (123-143)/(74-86) 123/74     Weight: 90.7 kg (200 lb)  Body mass index is 26.39 kg/m².     Physical Exam     Vitals reviewed  General: NAD, Well developed, Well Nourished  Head: NC/AT  Eyes: EOMI, SUSSY  Cardiovascular: Pulses intact distally, Regular Rate and rhythm  Pulmonary: Normal Respiratory Rate, No respiratory distress  Gi: Soft, Non-tender  Extremities: Warm, pitting edema bilaterally  Skin: Warm, dry  Neuro: Alert, Oriented x3, No focal Deficit  Psych: Appropriate mood and affect          Significant Labs: All pertinent labs within the past 24 hours have been reviewed.    Significant Imaging: I have reviewed all pertinent imaging results/findings within the past 24 hours.

## 2024-01-24 NOTE — PLAN OF CARE
01/24/24 1039   GOODE Message   Medicare Outpatient and Observation Notification regarding financial responsibility Given to patient/caregiver;Explained to patient/caregiver;Signed/date by patient/caregiver   Date GOODE was signed 01/24/24   Time GOODE was signed 1036

## 2024-01-24 NOTE — PLAN OF CARE
[facility]  Initial Discharge Assessment       Primary Care Provider: William Nice MD    Admission Diagnosis: Chest pain [R07.9]  Acute on chronic heart failure with reduced ejection fraction and diastolic dysfunction [I50.43]    Admission Date: 1/23/2024  Expected Discharge Date: 1/24/2024    Transition of Care Barriers: None    Patient alert & oriented lives at home; his friend Josie Larsen has been staying with him for about 3 years helping him out. He has a rolling walker he uses most of the time. He has used home health in the past & thinks it was with [facility]. He would like to use them again. Preference form signed & referral sent. He uses [facility] for prescriptions. Dr Magno Chirinos is is PCP & Dr Pitt is his cardiologist. Denies any other needs at this time.      Payor: [employer]ICARE / Plan: [employer] MEDICARE HMO / Product Type: Capitation /     Extended Emergency Contact Information  Primary Emergency Contact: Yobani Rios  Mobile Phone: [phone]  Relation: Son  Preferred language: English   needed? No  Secondary Emergency Contact: michael sanders  Mobile Phone: [phone]  Relation: Other   needed? No    Discharge Plan A: Home Health  Discharge Plan B: Home Health      [facility] [address] [place] [address] - [address] [address]  Phone: [phone] Fax: [phone]      Initial Assessment (most recent)       Adult Discharge Assessment - 01/24/24 1039          Discharge Assessment    Assessment Type Discharge Planning Assessment     Confirmed/corrected address, phone number and insurance Yes     Confirmed Demographics Correct on Facesheet     Source of Information patient     When was your last doctors appointment? 01/22/24     Does patient/caregiver understand observation status Yes     Communicated AIXA with patient/caregiver Yes     Reason For Admission weakness & shortness of breath     People in Home friend(s)     Do  you expect to return to your current living situation? Yes     Do you have help at home or someone to help you manage your care at home? Yes     Who are your caregiver(s) and their phone number(s)? Josie Larsen     Prior to hospitilization cognitive status: Alert/Oriented     Current cognitive status: Alert/Oriented     Walking or Climbing Stairs Difficulty yes     Walking or Climbing Stairs ambulation difficulty, requires equipment     Mobility Management uses rolling walker     Dressing/Bathing Difficulty no     Home Accessibility stairs to enter home     Number of Stairs, Main Entrance six     Home Layout Able to live on 1st floor     Equipment Currently Used at Home walker, rolling     Readmission within 30 days? No     Patient currently being followed by outpatient case management? No     Do you currently have service(s) that help you manage your care at home? No     Do you take prescription medications? Yes     Do you have prescription coverage? Yes     Coverage [employer]     Do you have any problems affording any of your prescribed medications? No     Is the patient taking medications as prescribed? yes     Who is going to help you get home at discharge? his truck is in the ER parking lot     How do you get to doctors appointments? car, drives self     Are you on dialysis? No     Do you take coumadin? No     Discharge Plan A Home Health     Discharge Plan B Home Health     DME Needed Upon Discharge  none     Discharge Plan discussed with: Patient;Friend     Name(s) and Number(s) Josie Larsen     Transition of Care Barriers None        Physical Activity    On average, how many days per week do you engage in moderate to strenuous exercise (like a brisk walk)? 0 days     On average, how many minutes do you engage in exercise at this level? 0 min        Financial Resource Strain    How hard is it for you to pay for the very basics like food, housing, medical care, and heating? Not hard at all        Housing Stability     In the last 12 months, was there a time when you were not able to pay the mortgage or rent on time? No     In the last 12 months, how many places have you lived? 1     In the last 12 months, was there a time when you did not have a steady place to sleep or slept in a shelter (including now)? No        Transportation Needs    In the past 12 months, has lack of transportation kept you from medical appointments or from getting medications? No     In the past 12 months, has lack of transportation kept you from meetings, work, or from getting things needed for daily living? No        Food Insecurity    Within the past 12 months, you worried that your food would run out before you got the money to buy more. Never true     Within the past 12 months, the food you bought just didn't last and you didn't have money to get more. Never true        Stress    Do you feel stress - tense, restless, nervous, or anxious, or unable to sleep at night because your mind is troubled all the time - these days? Very much        Social Connections    In a typical week, how many times do you talk on the phone with family, friends, or neighbors? More than three times a week     How often do you get together with friends or relatives? Never     How often do you attend Jew or Worship services? Never     Do you belong to any clubs or organizations such as Jew groups, unions, fraternal or athletic groups, or school groups? No     Are you , , , , never , or living with a partner?         Alcohol Use    Q1: How often do you have a drink containing alcohol? Never        OTHER    Name(s) of People in Home Josie Larsen

## 2024-01-24 NOTE — ASSESSMENT & PLAN NOTE
Creatine stable for now. BMP reviewed- noted Estimated Creatinine Clearance: 38.8 mL/min (A) (based on SCr of 1.6 mg/dL (H)). according to latest data. Based on current GFR, CKD stage is stage 3 - GFR 30-59.  Monitor UOP and serial BMP and adjust therapy as needed. Renally dose meds. Avoid nephrotoxic medications and procedures.

## 2024-02-05 NOTE — HOSPITAL COURSE
Patient admitted for acute on chronic HFrEF exacerbation requiring IV diuresis. Ruled out PE with VQ scan and DVT US. Patient clinically euvolemic following IV diuresis. TTE completed for us to have on record. Patient has received maximum benefit from inpatient stay and is medically clear for discharge. Provided discharge instructions and return precautions.

## 2024-02-05 NOTE — DISCHARGE SUMMARY
[facility]  Discharge Summary      Patient Name: Yobani Rios  MRN: [id number]  KRISSY: [id number]  Patient Class: OP- Observation  Admission Date: 1/23/2024  Hospital Length of Stay: 0 days  Discharge Date and Time: 1/24/2024  2:58 PM  Attending Physician: No att. providers found   Discharging Provider: Manuel Zaidi MD  Primary Care Provider: William Nice MD    Primary Care Team: Networked reference to record PCT     HPI:   85 yo w/ hx of chronic HFrEF, AAA, Diffuse large b cell lymphoma, CAD, singular kidney, LESIA, HTN, CKD 3 presenting with shortness of breath. Symptoms started today when patient was shaving. He became acutely short of breath and had presyncope episode. Nothing like this before. Patient states he intermittently feels short of breath but has felt like his usual self in the days leading up to event. Coughs up phlegm that is white to yellow but this has not increased. Endorses chronic LE swelling. No chest pain. Has difficult time laying flat. No fever, chills, NVD. No sick contacts. Symptoms have now resolved. No hx of DVT/PE.     * No surgery found *      Hospital Course:   Patient admitted for acute on chronic HFrEF exacerbation requiring IV diuresis. Ruled out PE with VQ scan and DVT US. Patient clinically euvolemic following IV diuresis. TTE completed for us to have on record. Patient has received maximum benefit from inpatient stay and is medically clear for discharge. Provided discharge instructions and return precautions.       Goals of Care Treatment Preferences:  Code Status: Full Code      Consults:     Pulmonary  * Shortness of breath  Patient with acute onset SOB w/ presyncope  Has bilateral LE edema  No CP present  D-dimer elevated  Holding off on CT PE given advanced kidney disease and single kidney  Continue IV lasix  VQ scan ordered  DVT US ordered  Will start full dose AC until PE/DVT ruled out  TTE tomorrow  Start abx and check procal  based of CXR findings        Final Active Diagnoses:    Diagnosis Date Noted POA    PRINCIPAL PROBLEM:  Shortness of breath [R06.02] 01/23/2024 Yes    Acute on chronic heart failure with reduced ejection fraction and diastolic dysfunction [I50.43] 01/23/2024 Yes    Essential hypertension [I10] 01/23/2024 Yes    Single kidney [Z90.5] 01/23/2024 Not Applicable    Stage 3a chronic kidney disease (CKD) [N18.31] 01/23/2024 Yes    Anxiety [F41.9] 01/23/2024 Yes    Abdominal aortic aneurysm (AAA) 30 to 34 mm in diameter [I71.40] 11/01/2018 Yes      Problems Resolved During this Admission:       Discharged Condition: good    Disposition: Home or Self Care    Follow Up:   Follow-up Information       Magno Chirinos MD. Go on 1/29/2024.    Specialty: Family Medicine  Why: appointment Monday Jan 29th at 10:00am for hospital follow up  Contact information:  [address] [place] [address]  [phone]               [facility] Follow up.    Why: will provide home health services  Contact information:  [address] [address]  [phone]                         Patient Instructions:      Diet Cardiac     Notify your health care provider if you experience any of the following:  difficulty breathing or increased cough     SUBSEQUENT HOME HEALTH ORDERS     Order Specific Question Answer Comments   What Home Health Agency is the patient currently using? Other/External      Activity as tolerated       Significant Diagnostic Studies:   Recent Results (from the past 336 hour(s))   COVID-19 Rapid Screening    Collection Time: 01/23/24  1:47 PM   Result Value Ref Range    SARS-CoV-2 RNA, Amplification, Qual Negative Negative   Influenza A & B by Molecular    Collection Time: 01/23/24  1:48 PM    Specimen: Nasopharyngeal Swab   Result Value Ref Range    Influenza A, Molecular Negative Negative    Influenza B, Molecular Negative Negative    Flu A & B Source Nasal Swab    CBC auto  differential    Collection Time: 01/23/24  1:51 PM   Result Value Ref Range    WBC 7.17 3.90 - 12.70 K/uL    RBC 5.33 4.60 - 6.20 M/uL    Hemoglobin 16.5 14.0 - 18.0 g/dL    Hematocrit 50.2 40.0 - 54.0 %    MCV 94 82 - 98 fL    MCH 31.0 27.0 - 31.0 pg    MCHC 32.9 32.0 - 36.0 g/dL    RDW 14.7 (H) 11.5 - 14.5 %    Platelets 234 150 - 450 K/uL    MPV 9.9 9.2 - 12.9 fL    Immature Granulocytes 0.4 0.0 - 0.5 %    Gran # (ANC) 4.9 1.8 - 7.7 K/uL    Immature Grans (Abs) 0.03 0.00 - 0.04 K/uL    Lymph # 1.3 1.0 - 4.8 K/uL    Mono # 0.7 0.3 - 1.0 K/uL    Eos # 0.3 0.0 - 0.5 K/uL    Baso # 0.05 0.00 - 0.20 K/uL    nRBC 0 0 /100 WBC    Gran % 68.3 38.0 - 73.0 %    Lymph % 17.4 (L) 18.0 - 48.0 %    Mono % 9.6 4.0 - 15.0 %    Eosinophil % 3.6 0.0 - 8.0 %    Basophil % 0.7 0.0 - 1.9 %    Differential Method Automated    Comprehensive metabolic panel    Collection Time: 01/23/24  1:51 PM   Result Value Ref Range    Sodium 143 136 - 145 mmol/L    Potassium 4.5 3.5 - 5.1 mmol/L    Chloride 111 (H) 95 - 110 mmol/L    CO2 23 23 - 29 mmol/L    Glucose 98 70 - 110 mg/dL    BUN 30 (H) 8 - 23 mg/dL    Creatinine 1.6 (H) 0.5 - 1.4 mg/dL    Calcium 8.8 8.7 - 10.5 mg/dL    Total Protein 6.3 6.0 - 8.4 g/dL    Albumin 3.5 3.5 - 5.2 g/dL    Total Bilirubin 0.4 0.1 - 1.0 mg/dL    Alkaline Phosphatase 136 (H) 55 - 135 U/L    AST 23 10 - 40 U/L    ALT 29 10 - 44 U/L    eGFR 42.2 (A) >60 mL/min/1.73 m^2    Anion Gap 9 8 - 16 mmol/L   Troponin I #1    Collection Time: 01/23/24  1:51 PM   Result Value Ref Range    Troponin I 0.090 (H) 0.000 - 0.026 ng/mL   BNP    Collection Time: 01/23/24  1:51 PM   Result Value Ref Range    BNP 1,451 (H) 0 - 99 pg/mL   D dimer, quantitative    Collection Time: 01/23/24  1:51 PM   Result Value Ref Range    D-Dimer 2.35 (H) <0.50 mg/L FEU   Troponin I #2    Collection Time: 01/23/24  4:47 PM   Result Value Ref Range    Troponin I 0.092 (H) 0.000 - 0.026 ng/mL   Procalcitonin    Collection Time: 01/23/24  6:34 PM    Result Value Ref Range    Procalcitonin 0.12 <0.25 ng/mL   CBC Auto Differential    Collection Time: 01/24/24  4:38 AM   Result Value Ref Range    WBC 8.68 3.90 - 12.70 K/uL    RBC 5.65 4.60 - 6.20 M/uL    Hemoglobin 17.6 14.0 - 18.0 g/dL    Hematocrit 52.7 40.0 - 54.0 %    MCV 93 82 - 98 fL    MCH 31.2 (H) 27.0 - 31.0 pg    MCHC 33.4 32.0 - 36.0 g/dL    RDW 14.7 (H) 11.5 - 14.5 %    Platelets 242 150 - 450 K/uL    MPV 10.0 9.2 - 12.9 fL    Immature Granulocytes 0.3 0.0 - 0.5 %    Gran # (ANC) 5.4 1.8 - 7.7 K/uL    Immature Grans (Abs) 0.03 0.00 - 0.04 K/uL    Lymph # 2.0 1.0 - 4.8 K/uL    Mono # 0.8 0.3 - 1.0 K/uL    Eos # 0.4 0.0 - 0.5 K/uL    Baso # 0.08 0.00 - 0.20 K/uL    nRBC 0 0 /100 WBC    Gran % 61.9 38.0 - 73.0 %    Lymph % 22.7 18.0 - 48.0 %    Mono % 9.7 4.0 - 15.0 %    Eosinophil % 4.5 0.0 - 8.0 %    Basophil % 0.9 0.0 - 1.9 %    Differential Method Automated    Comprehensive Metabolic Panel    Collection Time: 01/24/24  4:38 AM   Result Value Ref Range    Sodium 144 136 - 145 mmol/L    Potassium 4.2 3.5 - 5.1 mmol/L    Chloride 112 (H) 95 - 110 mmol/L    CO2 18 (L) 23 - 29 mmol/L    Glucose 95 70 - 110 mg/dL    BUN 29 (H) 8 - 23 mg/dL    Creatinine 1.7 (H) 0.5 - 1.4 mg/dL    Calcium 9.0 8.7 - 10.5 mg/dL    Total Protein 6.8 6.0 - 8.4 g/dL    Albumin 3.9 3.5 - 5.2 g/dL    Total Bilirubin 0.6 0.1 - 1.0 mg/dL    Alkaline Phosphatase 138 (H) 55 - 135 U/L    AST 24 10 - 40 U/L    ALT 31 10 - 44 U/L    eGFR 39.3 (A) >60 mL/min/1.73 m^2    Anion Gap 14 8 - 16 mmol/L   Magnesium    Collection Time: 01/24/24  4:38 AM   Result Value Ref Range    Magnesium 2.4 1.6 - 2.6 mg/dL   Echo    Collection Time: 01/24/24  2:07 PM   Result Value Ref Range    BSA 2.14 m2    LVOT stroke volume 59.34 cm3    LVOT diameter 2.26 cm    LVOT area 4.0 cm2    MV Peak E Antwon 1.00 m/s    TDI LATERAL 0.08 m/s    TDI SEPTAL 0.03 m/s    E/E' ratio 18.18 m/s    MV Peak A Antwon 0.33 m/s    TR Max Antwon 2.36 m/s    E/A ratio 3.03     E wave  deceleration time 207.99 msec    LV SEPTAL E/E' RATIO 33.33 m/s    LV LATERAL E/E' RATIO 12.50 m/s    LVOT peak antwon 0.60 m/s    Left Ventricular Outflow Tract Mean Velocity 0.45 cm/s    Left Ventricular Outflow Tract Mean Gradient 0.90 mmHg    LA size 5.09 cm    LA volume (mod) 38.15 cm3    LA Volume Index (Mod) 17.9 mL/m2    AV regurgitation pressure 1/2 time [id number] ms    AR Max Antwon 4.07 m/s    AV mean gradient 18 mmHg    AV peak gradient 30 mmHg    Ao peak antwon 2.75 m/s    Ao VTI 63.50 cm    LVOT peak VTI 14.80 cm    AV valve area 0.93 cm²    AV Velocity Ratio 0.22     AV index (prosthetic) 0.23     ADI by Velocity Ratio 0.87 cm²    Mr max antwon 5.11 m/s    MV mean gradient 2 mmHg    MV peak gradient 5 mmHg    MV stenosis pressure 1/2 time 60.32 ms    MV valve area p 1/2 method 3.65 cm2    MV valve area by continuity eq 2.32 cm2    MV VTI 25.6 cm    Tricuspid valve peak A wave velocity 0.83635691063401 m/s    TV peak E antwon 0.4 m/s    Triscuspid Valve Regurgitation Peak Gradient 22 mmHg    PV PEAK VELOCITY 0.59 m/s    PV peak gradient 1 mmHg    Pulmonary Valve Mean Velocity 0.51 m/s    STJ 3.29 cm    Ascending aorta 3.12 cm    IVC diameter 2.06 cm    Mean e' 0.06 m/s    EF 20 %    GLS -8.0 %    TAPSE 1.90 cm    TV resting pulmonary artery pressure 25 mmHg    RV TB RVSP 5 mmHg    Est. RA pres 3 mmHg    LVIDd 5.2 3.5 - 6.0 cm    LVIDs 4.1 (A) 2.1 - 4.0 cm    IVS 1.3 (A) 0.6 - 1.1 cm    IVSES 1.40 cm    LVPWS 1.40 cm    FS 21 (A) 28 - 44 %    Left Atrium Minor Axis 4.0 cm    Left Atrium Major Axis 4.6 cm    RA Major Axis 4.3 cm    RA Width 3.3 cm    ZLVPWS -1.68     ZLVIDS -0.08     ZLVIDD -2.67      Imaging Results              NM Lung Scan Ventilation Perfusion (Final result)  Result time 01/24/24 13:13:22      Final result by Meron Stovall MD (01/24/24 13:13:22)                   Impression:      Low probability for acute pulmonary embolus.      Electronically signed by: Meron Santiago  Sia  Date:    01/24/2024  Time:    13:13               Narrative:    EXAMINATION:  NM LUNG VENTILATION AND PERFUSION IMAGING    CLINICAL HISTORY:  Pulmonary embolism (PE) suspected, positive D-dimer;    TECHNIQUE:  Thirty-six mCi of Tc-99m-DTPA were placed in the nebulizer. Following the inhalation Tc-99m-DTPA in aerosol and the subsequent IV administration of 4.8 mCi of Tc-99m-MAA, multiple images of the thorax were obtained in various projections.    COMPARISON:  Chest x-ray yesterday    FINDINGS:  There are no moderate or large sized segmental mismatches in ventilation and perfusion.  Overall, perfusion appears more homogeneous than ventilation.                                        X-Ray Chest AP Portable (Final result)  Result time 01/23/24 14:16:23      Final result by Renato Sanford MD (01/23/24 14:16:23)                   Impression:      1. Findings suspicious for congestive heart failure with small bilateral pleural effusions and bibasilar dependent atelectasis.  Superimposed pulmonary infiltrate not excludable.  Correlate clinically with possible fever and/or elevated white count  2. Left subclavian Port-A-Cath.  Close interval follow-up is recommended.  This report was flagged in Epic as abnormal.      Electronically signed by: Renato Sanford  Date:    01/23/2024  Time:    14:16               Narrative:    EXAMINATION:  XR CHEST AP PORTABLE    CLINICAL HISTORY:  Chest Pain;    TECHNIQUE:  Portable view of the chest was performed.    COMPARISON:  01/11/2024.    FINDINGS:  Pulmonary hypoinflation.  There are diffuse bilateral pulmonary infiltrates suggesting pulmonary edema.  Small bilateral pleural effusions with bibasilar dependent atelectasis.  Heart size is enlarged.  Trachea midline.    Bony thorax intact left subclavian Port-A-Cath remains in satisfactory position.                                    Microbiology Results (last 7 days)       Procedure Component Value Units Date/Time     Influenza A & B by Molecular [[id number]] Collected: 01/23/24 1348    Order Status: Completed Specimen: Nasopharyngeal Swab Updated: 01/23/24 1431     Influenza A, Molecular Negative     Influenza B, Molecular Negative     Flu A & B Source Nasal Swab              Pending Diagnostic Studies:       None           Medications:  Reconciled Home Medications:      Medication List        CONTINUE taking these medications      albuterol 90 mcg/actuation inhaler  Commonly known as: PROVENTIL/VENTOLIN HFA  Inhale 1-2 puffs into the lungs every 6 (six) hours as needed for Shortness of Breath. Rescue     aspirin 81 MG EC tablet  Commonly known as: ECOTRIN  Take 81 mg by mouth once daily.     atorvastatin 40 MG tablet  Commonly known as: LIPITOR  Take 40 mg by mouth once daily.     B-complex with vitamin C tablet  Commonly known as: Z-Bec or Equiv  Take 1 tablet by mouth once daily.     benzonatate 100 MG capsule  Commonly known as: TESSALON  Take 100 mg by mouth 3 (three) times daily as needed.     clopidogreL 75 mg tablet  Commonly known as: PLAVIX  Take 75 mg by mouth once daily.     furosemide 40 MG tablet  Commonly known as: LASIX  Take 40 mg by mouth 2 (two) times daily.     losartan 100 MG tablet  Commonly known as: COZAAR  Take 100 mg by mouth once daily.     metoprolol tartrate 25 MG tablet  Commonly known as: LOPRESSOR  Take 25 mg by mouth 2 (two) times daily.     pravastatin 20 MG tablet  Commonly known as: PRAVACHOL  Take 20 mg by mouth once daily.     THERA ORAL  Take 1 tablet by mouth once daily.     vitamin D 1000 units Tab  Commonly known as: VITAMIN D3  Take 25 mcg by mouth once daily.            ASK your doctor about these medications      * amLODIPine 10 MG tablet  Commonly known as: NORVASC     * amLODIPine 5 MG tablet  Commonly known as: NORVASC  Take 5 mg by mouth 2 (two) times daily.     BRILINTA 90 mg tablet  Generic drug: ticagrelor  Take 90 mg by mouth 2 (two) times daily.      losartan-hydrochlorothiazide 100-12.5 mg 100-12.5 mg Tab  Commonly known as: HYZAAR  Take 1 tablet by mouth once daily.     XARELTO 2.5 mg Tab  Generic drug: rivaroxaban  Take 2.5 mg by mouth 2 (two) times daily.           * This list has 2 medication(s) that are the same as other medications prescribed for you. Read the directions carefully, and ask your doctor or other care provider to review them with you.                  Indwelling Lines/Drains at time of discharge:   Lines/Drains/Airways       None                   Time spent on the discharge of patient: 15 minutes         Manuel Zaidi MD  Department of Hospital Medicine  [place] - Intensive Care

## 2024-02-22 ENCOUNTER — LAB VISIT (OUTPATIENT)
Dept: LAB | Facility: HOSPITAL | Age: 84
End: 2024-02-22
Attending: FAMILY MEDICINE
Payer: MEDICARE

## 2024-02-22 DIAGNOSIS — T38.5X5A: ICD-10-CM

## 2024-02-22 DIAGNOSIS — N18.31 STAGE 3A CHRONIC KIDNEY DISEASE (CKD): Primary | ICD-10-CM

## 2024-02-22 DIAGNOSIS — I12.0 HYP CHR KIDNEY DISEASE W STAGE 5 CHR KIDNEY DISEASE OR ESRD: ICD-10-CM

## 2024-02-22 LAB
ALBUMIN SERPL BCP-MCNC: 3.7 G/DL (ref 3.5–5.2)
ALP SERPL-CCNC: 117 U/L (ref 55–135)
ALT SERPL W/O P-5'-P-CCNC: 26 U/L (ref 10–44)
ANION GAP SERPL CALC-SCNC: 11 MMOL/L (ref 8–16)
AST SERPL-CCNC: 28 U/L (ref 10–40)
BASOPHILS # BLD AUTO: 0.06 K/UL (ref 0–0.2)
BASOPHILS NFR BLD: 0.7 % (ref 0–1.9)
BILIRUB SERPL-MCNC: 0.7 MG/DL (ref 0.1–1)
BNP SERPL-MCNC: 1170 PG/ML (ref 0–99)
BUN SERPL-MCNC: 33 MG/DL (ref 8–23)
CALCIUM SERPL-MCNC: 8.7 MG/DL (ref 8.7–10.5)
CHLORIDE SERPL-SCNC: 109 MMOL/L (ref 95–110)
CO2 SERPL-SCNC: 21 MMOL/L (ref 23–29)
CREAT SERPL-MCNC: 1.5 MG/DL (ref 0.5–1.4)
DIFFERENTIAL METHOD BLD: ABNORMAL
EOSINOPHIL # BLD AUTO: 0.4 K/UL (ref 0–0.5)
EOSINOPHIL NFR BLD: 5.2 % (ref 0–8)
ERYTHROCYTE [DISTWIDTH] IN BLOOD BY AUTOMATED COUNT: 14.5 % (ref 11.5–14.5)
EST. GFR  (NO RACE VARIABLE): 45.6 ML/MIN/1.73 M^2
GLUCOSE SERPL-MCNC: 102 MG/DL (ref 70–110)
HCT VFR BLD AUTO: 50.6 % (ref 40–54)
HGB BLD-MCNC: 16.8 G/DL (ref 14–18)
IMM GRANULOCYTES # BLD AUTO: 0.03 K/UL (ref 0–0.04)
IMM GRANULOCYTES NFR BLD AUTO: 0.4 % (ref 0–0.5)
LYMPHOCYTES # BLD AUTO: 1.5 K/UL (ref 1–4.8)
LYMPHOCYTES NFR BLD: 18.5 % (ref 18–48)
MAGNESIUM SERPL-MCNC: 2.5 MG/DL (ref 1.6–2.6)
MCH RBC QN AUTO: 31.6 PG (ref 27–31)
MCHC RBC AUTO-ENTMCNC: 33.2 G/DL (ref 32–36)
MCV RBC AUTO: 95 FL (ref 82–98)
MONOCYTES # BLD AUTO: 0.7 K/UL (ref 0.3–1)
MONOCYTES NFR BLD: 9 % (ref 4–15)
NEUTROPHILS # BLD AUTO: 5.3 K/UL (ref 1.8–7.7)
NEUTROPHILS NFR BLD: 66.2 % (ref 38–73)
NRBC BLD-RTO: 0 /100 WBC
PLATELET # BLD AUTO: 214 K/UL (ref 150–450)
PMV BLD AUTO: 10.2 FL (ref 9.2–12.9)
POTASSIUM SERPL-SCNC: 4.4 MMOL/L (ref 3.5–5.1)
PROT SERPL-MCNC: 6 G/DL (ref 6–8.4)
RBC # BLD AUTO: 5.31 M/UL (ref 4.6–6.2)
SODIUM SERPL-SCNC: 141 MMOL/L (ref 136–145)
TSH SERPL DL<=0.005 MIU/L-ACNC: 3.33 UIU/ML (ref 0.4–4)
WBC # BLD AUTO: 8.04 K/UL (ref 3.9–12.7)

## 2024-02-22 PROCEDURE — 80061 LIPID PANEL: CPT

## 2024-02-22 PROCEDURE — 83735 ASSAY OF MAGNESIUM: CPT | Performed by: FAMILY MEDICINE

## 2024-02-22 PROCEDURE — 83880 ASSAY OF NATRIURETIC PEPTIDE: CPT | Performed by: FAMILY MEDICINE

## 2024-02-22 PROCEDURE — 36415 COLL VENOUS BLD VENIPUNCTURE: CPT

## 2024-02-22 PROCEDURE — 85025 COMPLETE CBC W/AUTO DIFF WBC: CPT | Performed by: FAMILY MEDICINE

## 2024-02-22 PROCEDURE — 80053 COMPREHEN METABOLIC PANEL: CPT | Performed by: FAMILY MEDICINE

## 2024-02-22 PROCEDURE — 36415 COLL VENOUS BLD VENIPUNCTURE: CPT | Performed by: FAMILY MEDICINE

## 2024-02-22 PROCEDURE — 84443 ASSAY THYROID STIM HORMONE: CPT | Performed by: FAMILY MEDICINE

## 2024-02-23 LAB
CHOLEST SERPL-MCNC: 128 MG/DL (ref 120–199)
CHOLEST/HDLC SERPL: 3.9 {RATIO} (ref 2–5)
HDLC SERPL-MCNC: 33 MG/DL (ref 40–75)
HDLC SERPL: 25.8 % (ref 20–50)
LDLC SERPL CALC-MCNC: 79.6 MG/DL (ref 63–159)
NONHDLC SERPL-MCNC: 95 MG/DL
TRIGL SERPL-MCNC: 77 MG/DL (ref 30–150)

## 2024-03-12 ENCOUNTER — HOSPITAL ENCOUNTER (EMERGENCY)
Facility: HOSPITAL | Age: 84
Discharge: HOME | End: 2024-03-13
Attending: EMERGENCY MEDICINE
Payer: MEDICARE

## 2024-03-12 DIAGNOSIS — I50.9 CHRONIC CONGESTIVE HEART FAILURE, UNSPECIFIED HEART FAILURE TYPE: ICD-10-CM

## 2024-03-12 DIAGNOSIS — F41.9 ANXIETY: ICD-10-CM

## 2024-03-12 DIAGNOSIS — R55 SYNCOPE: Primary | ICD-10-CM

## 2024-03-12 LAB
BASOPHILS # BLD AUTO: 0.05 K/UL (ref 0–0.2)
BASOPHILS NFR BLD: 0.6 % (ref 0–1.9)
DIFFERENTIAL METHOD BLD: ABNORMAL
EOSINOPHIL # BLD AUTO: 0.2 K/UL (ref 0–0.5)
EOSINOPHIL NFR BLD: 2.4 % (ref 0–8)
ERYTHROCYTE [DISTWIDTH] IN BLOOD BY AUTOMATED COUNT: 13.8 % (ref 11.5–14.5)
HCT VFR BLD AUTO: 48.1 % (ref 40–54)
HGB BLD-MCNC: 16.2 G/DL (ref 14–18)
IMM GRANULOCYTES # BLD AUTO: 0.03 K/UL (ref 0–0.04)
IMM GRANULOCYTES NFR BLD AUTO: 0.3 % (ref 0–0.5)
LYMPHOCYTES # BLD AUTO: 1.3 K/UL (ref 1–4.8)
LYMPHOCYTES NFR BLD: 14.7 % (ref 18–48)
MCH RBC QN AUTO: 32 PG (ref 27–31)
MCHC RBC AUTO-ENTMCNC: 33.7 G/DL (ref 32–36)
MCV RBC AUTO: 95 FL (ref 82–98)
MONOCYTES # BLD AUTO: 0.7 K/UL (ref 0.3–1)
MONOCYTES NFR BLD: 8.1 % (ref 4–15)
NEUTROPHILS # BLD AUTO: 6.5 K/UL (ref 1.8–7.7)
NEUTROPHILS NFR BLD: 73.9 % (ref 38–73)
NRBC BLD-RTO: 0 /100 WBC
PLATELET # BLD AUTO: 217 K/UL (ref 150–450)
PMV BLD AUTO: 10.2 FL (ref 9.2–12.9)
RBC # BLD AUTO: 5.07 M/UL (ref 4.6–6.2)
WBC # BLD AUTO: 8.84 K/UL (ref 3.9–12.7)

## 2024-03-12 PROCEDURE — 80053 COMPREHEN METABOLIC PANEL: CPT | Performed by: EMERGENCY MEDICINE

## 2024-03-12 PROCEDURE — 87389 HIV-1 AG W/HIV-1&-2 AB AG IA: CPT | Performed by: EMERGENCY MEDICINE

## 2024-03-12 PROCEDURE — 83605 ASSAY OF LACTIC ACID: CPT | Performed by: EMERGENCY MEDICINE

## 2024-03-12 PROCEDURE — 84484 ASSAY OF TROPONIN QUANT: CPT | Performed by: EMERGENCY MEDICINE

## 2024-03-12 PROCEDURE — 93005 ELECTROCARDIOGRAM TRACING: CPT

## 2024-03-12 PROCEDURE — 86803 HEPATITIS C AB TEST: CPT | Performed by: EMERGENCY MEDICINE

## 2024-03-12 PROCEDURE — 85025 COMPLETE CBC W/AUTO DIFF WBC: CPT | Performed by: EMERGENCY MEDICINE

## 2024-03-12 PROCEDURE — 99285 EMERGENCY DEPT VISIT HI MDM: CPT | Mod: 25

## 2024-03-12 PROCEDURE — 82962 GLUCOSE BLOOD TEST: CPT

## 2024-03-12 PROCEDURE — 83735 ASSAY OF MAGNESIUM: CPT | Performed by: EMERGENCY MEDICINE

## 2024-03-12 PROCEDURE — 93010 ELECTROCARDIOGRAM REPORT: CPT | Mod: ,,, | Performed by: INTERNAL MEDICINE

## 2024-03-13 VITALS
RESPIRATION RATE: 13 BRPM | HEIGHT: 73 IN | SYSTOLIC BLOOD PRESSURE: 137 MMHG | DIASTOLIC BLOOD PRESSURE: 78 MMHG | OXYGEN SATURATION: 95 % | TEMPERATURE: 98 F | BODY MASS INDEX: 25.18 KG/M2 | HEART RATE: 65 BPM | WEIGHT: 190 LBS

## 2024-03-13 LAB
ALBUMIN SERPL BCP-MCNC: 3.4 G/DL (ref 3.5–5.2)
ALP SERPL-CCNC: 124 U/L (ref 55–135)
ALT SERPL W/O P-5'-P-CCNC: 32 U/L (ref 10–44)
ANION GAP SERPL CALC-SCNC: 11 MMOL/L (ref 8–16)
AST SERPL-CCNC: 41 U/L (ref 10–40)
BILIRUB SERPL-MCNC: 0.6 MG/DL (ref 0.1–1)
BNP SERPL-MCNC: 1281 PG/ML (ref 0–99)
BUN SERPL-MCNC: 41 MG/DL (ref 8–23)
CALCIUM SERPL-MCNC: 8.5 MG/DL (ref 8.7–10.5)
CHLORIDE SERPL-SCNC: 108 MMOL/L (ref 95–110)
CO2 SERPL-SCNC: 19 MMOL/L (ref 23–29)
CREAT SERPL-MCNC: 1.6 MG/DL (ref 0.5–1.4)
EST. GFR  (NO RACE VARIABLE): 42.2 ML/MIN/1.73 M^2
GLUCOSE SERPL-MCNC: 111 MG/DL (ref 70–110)
HCV AB SERPL QL IA: NORMAL
HIV 1+2 AB+HIV1 P24 AG SERPL QL IA: NORMAL
LACTATE SERPL-SCNC: 1.1 MMOL/L (ref 0.5–2.2)
MAGNESIUM SERPL-MCNC: 2.2 MG/DL (ref 1.6–2.6)
OHS QRS DURATION: 156 MS
OHS QTC CALCULATION: 505 MS
POCT GLUCOSE: 108 MG/DL (ref 70–110)
POTASSIUM SERPL-SCNC: 4.3 MMOL/L (ref 3.5–5.1)
PROT SERPL-MCNC: 5.9 G/DL (ref 6–8.4)
SODIUM SERPL-SCNC: 138 MMOL/L (ref 136–145)
TROPONIN I SERPL DL<=0.01 NG/ML-MCNC: 0.06 NG/ML (ref 0–0.03)

## 2024-03-13 PROCEDURE — 83880 ASSAY OF NATRIURETIC PEPTIDE: CPT | Performed by: EMERGENCY MEDICINE

## 2024-03-13 PROCEDURE — 36415 COLL VENOUS BLD VENIPUNCTURE: CPT | Performed by: EMERGENCY MEDICINE

## 2024-03-13 NOTE — DISCHARGE INSTRUCTIONS
I would strongly suggest you not take your Xanax in the future or you discussed with your primary care provider minimizing your dose.  This appears to affect you strongly when you take it.  Please follow up with your cardiologist and your primary care provider as soon as possible as you still has a fluid buildup in your chest from your heart failure.

## 2024-03-13 NOTE — ED PROVIDER NOTES
"Encounter Date: 3/12/2024       History     Chief Complaint   Patient presents with    Syncope     Pt was getting ready for bed, states he had sudden onset of weakness and "everything went black so I sat in my computer chair before I fell." + cardiac hx.      84-year-old male presents with an episode of syncope.  States he felt anxious all day long he has issues with anxiety.  Wife states that he took 1 of his anxiety pills while at dinner and then shortly thereafter they went home to get ready for bed the patient states he felt like he was going to pass out sat down in his computer chair.  States he feels fine now.  He sleepy but states it is due to his anxiety pill in the fact that it is nearly 1 in the morning.  He denies any chest pain or difficulty breathing does not need oxygen at home.  Was just recently admitted to this facility for dyspnea.    The history is provided by the patient and the spouse. No  was used.     Review of patient's allergies indicates:  No Known Allergies  Past Medical History:   Diagnosis Date    Cancer     prostate    Hypertension     Kidney problem     only 1 kidney functions    Liver disease     Umbilical hernia      Past Surgical History:   Procedure Laterality Date    colon repair      after prostatectomy    LYSIS OF ADHESIONS N/A 10/18/2018    Procedure: LYSIS, ADHESIONS;  Surgeon: Rancho Mariee MD;  Location: [address];  Service: General;  Laterality: N/A;    PROSTATECTOMY      PROSTATECTOMY  2010    REPAIR OF RECURRENT INCISIONAL HERNIA N/A 10/18/2018    Procedure: REPAIR, HERNIA, INCISIONAL -multiple;  Surgeon: Rancho Mariee MD;  Location: [address];  Service: General;  Laterality: N/A;     Family History   Problem Relation Age of Onset    Heart disease Mother     Heart disease Father     Diabetes Sister      Social History     Tobacco Use    Smoking status: Former     Current packs/day: 0.00     Types: Cigarettes     Quit date: " 2/15/1972     Years since quittin.1    Smokeless tobacco: Never   Substance Use Topics    Alcohol use: No    Drug use: No     Review of Systems   Constitutional:  Negative for fever.   HENT:  Negative for sore throat.    Respiratory:  Negative for shortness of breath.    Cardiovascular:  Negative for chest pain.   Gastrointestinal:  Negative for nausea.   Genitourinary:  Negative for dysuria.   Musculoskeletal:  Negative for back pain.   Skin:  Negative for rash.   Neurological:  Positive for syncope. Negative for weakness.   Hematological:  Does not bruise/bleed easily.   All other systems reviewed and are negative.      Physical Exam     Initial Vitals [24 2316]   BP Pulse Resp Temp SpO2   129/71 78 16 97.8 °F (36.6 °C) (!) 92 %      MAP       --         Physical Exam    Nursing note and vitals reviewed.  Constitutional: He appears well-developed and well-nourished.   HENT:   Head: Normocephalic and atraumatic.   Eyes: EOM are normal. Pupils are equal, round, and reactive to light.   Neck:   Normal range of motion.  Cardiovascular:  Normal rate and regular rhythm.           Pulmonary/Chest: Breath sounds normal. No respiratory distress.   Abdominal: Abdomen is soft.   Musculoskeletal:         General: Normal range of motion.      Cervical back: Normal range of motion.     Neurological: He is alert and oriented to person, place, and time. GCS score is 15. GCS eye subscore is 4. GCS verbal subscore is 5. GCS motor subscore is 6.   Skin: Skin is warm. Capillary refill takes less than 2 seconds.         ED Course   Procedures  Labs Reviewed   CBC W/ AUTO DIFFERENTIAL - Abnormal; Notable for the following components:       Result Value    MCH 32.0 (*)     Gran % 73.9 (*)     Lymph % 14.7 (*)     All other components within normal limits   COMPREHENSIVE METABOLIC PANEL - Abnormal; Notable for the following components:    CO2 19 (*)     Glucose 111 (*)     BUN 41 (*)     Creatinine 1.6 (*)     Calcium 8.5 (*)      Total Protein 5.9 (*)     Albumin 3.4 (*)     AST 41 (*)     eGFR 42.2 (*)     All other components within normal limits   TROPONIN I - Abnormal; Notable for the following components:    Troponin I 0.057 (*)     All other components within normal limits   B-TYPE NATRIURETIC PEPTIDE - Abnormal; Notable for the following components:    BNP 1,281 (*)     All other components within normal limits   LACTIC ACID, PLASMA   MAGNESIUM   B-TYPE NATRIURETIC PEPTIDE   HIV 1 / 2 ANTIBODY   HEPATITIS C ANTIBODY   POCT GLUCOSE     EKG Readings: (Independently Interpreted)   EKG done at 11:30 p.m. on the 12th of March shows a rate of 65, IN interval 228, QRS duration 156, .  My interpretation of the EKGs that this is a sinus rhythm with first-degree AV block without any findings concerning for acute ischemia at this time.       Imaging Results              X-Ray Chest AP Portable (Final result)  Result time 03/13/24 00:33:30      Final result by John Watson MD (03/13/24 00:33:30)                   Impression:      CHF/volume overload.      Electronically signed by: John Watson  Date:    03/13/2024  Time:    00:33               Narrative:    EXAMINATION:  XR CHEST AP PORTABLE    CLINICAL HISTORY:  syncope;    TECHNIQUE:  Single frontal view of the chest was performed.    COMPARISON:  01/23/2024    FINDINGS:  Enlarged cardiac silhouette with pulmonary vascular congestion.  Moderate left and small right pleural effusions with mild adjacent airspace disease and/or atelectasis.  Left chest wall port.                                       Medications - No data to display  Medical Decision Making  Per chart patient is on p.r.n. Xanax and mirtazapine for anxiety.  Patient states he feels fine at this point.  He is just sleepy and would like to go home to sleep in his own bed.  States he does not feel like he is having any trouble breathing.  Is slightly elevated BNP and troponin are actually lower than they were  last month when he was discharged from this hospital.  Patient has no interest in being admitted to this hospital again.  States he would like to go home and get some rest especially given that his wife is currently trying to sleep in a chair next to him.  Patient states he has follow up appointment with his physicians soon.  I did encourage him to baby hold off on the Xanax in the future or try to take half of what he took his it seems to affect him pretty strongly.  I did encourage him to speak to his physician who wrote for the Xanax about potentially changing this medication.    Patient was ambulatory with his walker throughout the department.  Ambulated to the vehicle when he was discharged without difficulty.  Was able to get into the vehicle without difficulty.    Amount and/or Complexity of Data Reviewed  Labs: ordered.  Radiology: ordered.                                      Clinical Impression:  Final diagnoses:  [R55] Syncope (Primary)  [I50.9] Chronic congestive heart failure, unspecified heart failure type  [F41.9] Anxiety          ED Disposition Condition    Discharge Stable          ED Prescriptions    None       Follow-up Information    None          Laney Aguila MD  03/13/24 0326

## 2024-03-14 ENCOUNTER — TELEPHONE (OUTPATIENT)
Dept: UROLOGY | Facility: CLINIC | Age: 84
End: 2024-03-14
Payer: MEDICARE

## 2024-03-14 NOTE — TELEPHONE ENCOUNTER
----- Message from Martina Patel sent at 3/14/2024  2:59 PM CDT -----  Type:  Needs Medical Advice    Who Called: pt   Symptoms (please be specific): Kidney disease    Would the patient rather a call back or a response via [facility]?  Call back   Best Call Back Number: [phone]  Additional Information: pt is requesting to get seen for kidney disease and pt needs to know how to treat it

## 2024-03-14 NOTE — TELEPHONE ENCOUNTER
Spoke with patient, informed we received message for appt for kidney disease, informed patient that is treated by nephrology. Patient had no idea who placed that call and he knows nothing about kidney disease, he suspects it must be something from his PCP he is not aware of.

## 2024-07-01 ENCOUNTER — HOSPITAL ENCOUNTER (INPATIENT)
Facility: HOSPITAL | Age: 84
LOS: 1 days | Discharge: HOME | DRG: 389 | End: 2024-07-02
Attending: EMERGENCY MEDICINE | Admitting: INTERNAL MEDICINE
Payer: MEDICARE

## 2024-07-01 DIAGNOSIS — I10 ESSENTIAL HYPERTENSION: ICD-10-CM

## 2024-07-01 DIAGNOSIS — R10.9 ABDOMINAL PAIN, UNSPECIFIED ABDOMINAL LOCATION: Primary | ICD-10-CM

## 2024-07-01 DIAGNOSIS — R06.02 SHORTNESS OF BREATH: ICD-10-CM

## 2024-07-01 DIAGNOSIS — R07.9 CHEST PAIN: ICD-10-CM

## 2024-07-01 DIAGNOSIS — K56.609 SMALL BOWEL OBSTRUCTION: ICD-10-CM

## 2024-07-01 PROBLEM — N17.9 ACUTE RENAL FAILURE SUPERIMPOSED ON STAGE 3 CHRONIC KIDNEY DISEASE: Status: ACTIVE | Noted: 2024-01-23

## 2024-07-01 PROBLEM — R65.20 SEVERE SEPSIS: Status: ACTIVE | Noted: 2024-07-01

## 2024-07-01 PROBLEM — R91.8 PULMONARY NODULES: Status: ACTIVE | Noted: 2024-07-01

## 2024-07-01 PROBLEM — A41.9 SEVERE SEPSIS: Status: ACTIVE | Noted: 2024-07-01

## 2024-07-01 PROBLEM — I50.42 CHRONIC COMBINED SYSTOLIC AND DIASTOLIC HEART FAILURE: Status: ACTIVE | Noted: 2024-01-23

## 2024-07-01 PROBLEM — K40.90 LEFT INGUINAL HERNIA: Status: ACTIVE | Noted: 2024-07-01

## 2024-07-01 PROBLEM — N18.30 ACUTE RENAL FAILURE SUPERIMPOSED ON STAGE 3 CHRONIC KIDNEY DISEASE: Status: ACTIVE | Noted: 2024-01-23

## 2024-07-01 PROBLEM — K86.9 PANCREATIC LESION: Status: ACTIVE | Noted: 2024-07-01

## 2024-07-01 LAB
ALBUMIN SERPL BCP-MCNC: 4.2 G/DL (ref 3.5–5.2)
ALP SERPL-CCNC: 143 U/L (ref 55–135)
ALT SERPL W/O P-5'-P-CCNC: 27 U/L (ref 10–44)
ANION GAP SERPL CALC-SCNC: 9 MMOL/L (ref 8–16)
AST SERPL-CCNC: 27 U/L (ref 10–40)
BASOPHILS # BLD AUTO: 0.06 K/UL (ref 0–0.2)
BASOPHILS NFR BLD: 0.4 % (ref 0–1.9)
BILIRUB SERPL-MCNC: 0.8 MG/DL (ref 0.1–1)
BILIRUB UR QL STRIP: NEGATIVE
BNP SERPL-MCNC: 181 PG/ML (ref 0–99)
BUN SERPL-MCNC: 56 MG/DL (ref 8–23)
CALCIUM SERPL-MCNC: 9.1 MG/DL (ref 8.7–10.5)
CHLORIDE SERPL-SCNC: 99 MMOL/L (ref 95–110)
CLARITY UR: CLEAR
CO2 SERPL-SCNC: 31 MMOL/L (ref 23–29)
COLOR UR: YELLOW
CREAT SERPL-MCNC: 2.1 MG/DL (ref 0.5–1.4)
DIFFERENTIAL METHOD BLD: ABNORMAL
EOSINOPHIL # BLD AUTO: 0 K/UL (ref 0–0.5)
EOSINOPHIL NFR BLD: 0.2 % (ref 0–8)
ERYTHROCYTE [DISTWIDTH] IN BLOOD BY AUTOMATED COUNT: 12.9 % (ref 11.5–14.5)
EST. GFR  (NO RACE VARIABLE): 30.5 ML/MIN/1.73 M^2
GLUCOSE SERPL-MCNC: 133 MG/DL (ref 70–110)
GLUCOSE SERPL-MCNC: 167 MG/DL (ref 70–110)
GLUCOSE UR QL STRIP: NEGATIVE
HCT VFR BLD AUTO: 52.8 % (ref 40–54)
HGB BLD-MCNC: 17.7 G/DL (ref 14–18)
HGB UR QL STRIP: NEGATIVE
IMM GRANULOCYTES # BLD AUTO: 0.08 K/UL (ref 0–0.04)
IMM GRANULOCYTES NFR BLD AUTO: 0.5 % (ref 0–0.5)
INR PPP: 1 (ref 0.8–1.2)
KETONES UR QL STRIP: NEGATIVE
LACTATE SERPL-SCNC: 1.1 MMOL/L (ref 0.5–1.9)
LACTATE SERPL-SCNC: 1.5 MMOL/L (ref 0.5–1.9)
LEUKOCYTE ESTERASE UR QL STRIP: NEGATIVE
LYMPHOCYTES # BLD AUTO: 1.2 K/UL (ref 1–4.8)
LYMPHOCYTES NFR BLD: 7.6 % (ref 18–48)
MAGNESIUM SERPL-MCNC: 2.4 MG/DL (ref 1.6–2.6)
MCH RBC QN AUTO: 32.2 PG (ref 27–31)
MCHC RBC AUTO-ENTMCNC: 33.5 G/DL (ref 32–36)
MCV RBC AUTO: 96 FL (ref 82–98)
MONOCYTES # BLD AUTO: 0.5 K/UL (ref 0.3–1)
MONOCYTES NFR BLD: 3.2 % (ref 4–15)
NEUTROPHILS # BLD AUTO: 14 K/UL (ref 1.8–7.7)
NEUTROPHILS NFR BLD: 88.1 % (ref 38–73)
NITRITE UR QL STRIP: NEGATIVE
NRBC BLD-RTO: 0 /100 WBC
PH UR STRIP: 6 [PH] (ref 5–8)
PLATELET # BLD AUTO: 224 K/UL (ref 150–450)
PMV BLD AUTO: 9.7 FL (ref 9.2–12.9)
POTASSIUM SERPL-SCNC: 4.6 MMOL/L (ref 3.5–5.1)
PROT SERPL-MCNC: 7.1 G/DL (ref 6–8.4)
PROT UR QL STRIP: NEGATIVE
PROTHROMBIN TIME: 11.3 SEC (ref 9–12.5)
RBC # BLD AUTO: 5.49 M/UL (ref 4.6–6.2)
SODIUM SERPL-SCNC: 139 MMOL/L (ref 136–145)
SP GR UR STRIP: 1.01 (ref 1–1.03)
TROPONIN I SERPL HS-MCNC: 36.4 PG/ML (ref 0–14.9)
TROPONIN I SERPL HS-MCNC: 45.8 PG/ML (ref 0–14.9)
TROPONIN I SERPL HS-MCNC: 52.8 PG/ML (ref 0–14.9)
URN SPEC COLLECT METH UR: NORMAL
UROBILINOGEN UR STRIP-ACNC: NEGATIVE EU/DL
WBC # BLD AUTO: 15.84 K/UL (ref 3.9–12.7)

## 2024-07-01 PROCEDURE — 63600175 PHARM REV CODE 636 W HCPCS: Performed by: NURSE PRACTITIONER

## 2024-07-01 PROCEDURE — 97116 GAIT TRAINING THERAPY: CPT

## 2024-07-01 PROCEDURE — 36415 COLL VENOUS BLD VENIPUNCTURE: CPT | Performed by: NURSE PRACTITIONER

## 2024-07-01 PROCEDURE — 94799 UNLISTED PULMONARY SVC/PX: CPT

## 2024-07-01 PROCEDURE — 83735 ASSAY OF MAGNESIUM: CPT | Performed by: EMERGENCY MEDICINE

## 2024-07-01 PROCEDURE — 94761 N-INVAS EAR/PLS OXIMETRY MLT: CPT

## 2024-07-01 PROCEDURE — 99222 1ST HOSP IP/OBS MODERATE 55: CPT | Mod: ,,, | Performed by: SURGERY

## 2024-07-01 PROCEDURE — 80053 COMPREHEN METABOLIC PANEL: CPT | Performed by: EMERGENCY MEDICINE

## 2024-07-01 PROCEDURE — 25000003 PHARM REV CODE 250: Performed by: NURSE PRACTITIONER

## 2024-07-01 PROCEDURE — 87040 BLOOD CULTURE FOR BACTERIA: CPT | Performed by: NURSE PRACTITIONER

## 2024-07-01 PROCEDURE — 99285 EMERGENCY DEPT VISIT HI MDM: CPT | Mod: 25

## 2024-07-01 PROCEDURE — 81003 URINALYSIS AUTO W/O SCOPE: CPT | Performed by: STUDENT IN AN ORGANIZED HEALTH CARE EDUCATION/TRAINING PROGRAM

## 2024-07-01 PROCEDURE — 97161 PT EVAL LOW COMPLEX 20 MIN: CPT

## 2024-07-01 PROCEDURE — 83880 ASSAY OF NATRIURETIC PEPTIDE: CPT | Performed by: EMERGENCY MEDICINE

## 2024-07-01 PROCEDURE — 85025 COMPLETE CBC W/AUTO DIFF WBC: CPT | Performed by: EMERGENCY MEDICINE

## 2024-07-01 PROCEDURE — 99900031 HC PATIENT EDUCATION (STAT)

## 2024-07-01 PROCEDURE — 93005 ELECTROCARDIOGRAM TRACING: CPT | Performed by: GENERAL PRACTICE

## 2024-07-01 PROCEDURE — 99900035 HC TECH TIME PER 15 MIN (STAT)

## 2024-07-01 PROCEDURE — 85610 PROTHROMBIN TIME: CPT | Performed by: EMERGENCY MEDICINE

## 2024-07-01 PROCEDURE — 84484 ASSAY OF TROPONIN QUANT: CPT | Performed by: EMERGENCY MEDICINE

## 2024-07-01 PROCEDURE — 84484 ASSAY OF TROPONIN QUANT: CPT | Mod: 91 | Performed by: NURSE PRACTITIONER

## 2024-07-01 PROCEDURE — 83605 ASSAY OF LACTIC ACID: CPT | Performed by: NURSE PRACTITIONER

## 2024-07-01 PROCEDURE — 93010 ELECTROCARDIOGRAM REPORT: CPT | Mod: ,,, | Performed by: GENERAL PRACTICE

## 2024-07-01 PROCEDURE — 82962 GLUCOSE BLOOD TEST: CPT

## 2024-07-01 PROCEDURE — 12000002 HC ACUTE/MED SURGE SEMI-PRIVATE ROOM

## 2024-07-01 RX ORDER — SODIUM,POTASSIUM PHOSPHATES 280-250MG
2 POWDER IN PACKET (EA) ORAL
Status: DISCONTINUED | OUTPATIENT
Start: 2024-07-01 | End: 2024-07-02 | Stop reason: HOSPADM

## 2024-07-01 RX ORDER — ONDANSETRON HYDROCHLORIDE 2 MG/ML
4 INJECTION, SOLUTION INTRAVENOUS EVERY 8 HOURS PRN
Status: DISCONTINUED | OUTPATIENT
Start: 2024-07-01 | End: 2024-07-02 | Stop reason: HOSPADM

## 2024-07-01 RX ORDER — MIRTAZAPINE 45 MG/1
45 TABLET, FILM COATED ORAL NIGHTLY
COMMUNITY

## 2024-07-01 RX ORDER — MORPHINE SULFATE 2 MG/ML
2 INJECTION, SOLUTION INTRAMUSCULAR; INTRAVENOUS EVERY 4 HOURS PRN
Status: DISCONTINUED | OUTPATIENT
Start: 2024-07-01 | End: 2024-07-02 | Stop reason: HOSPADM

## 2024-07-01 RX ORDER — ALPRAZOLAM 0.5 MG/1
0.5 TABLET ORAL 2 TIMES DAILY PRN
Status: DISCONTINUED | OUTPATIENT
Start: 2024-07-01 | End: 2024-07-02 | Stop reason: HOSPADM

## 2024-07-01 RX ORDER — GLUCAGON 1 MG
1 KIT INJECTION
Status: DISCONTINUED | OUTPATIENT
Start: 2024-07-01 | End: 2024-07-02 | Stop reason: HOSPADM

## 2024-07-01 RX ORDER — METOPROLOL TARTRATE 25 MG/1
25 TABLET, FILM COATED ORAL DAILY
Status: DISCONTINUED | OUTPATIENT
Start: 2024-07-01 | End: 2024-07-02 | Stop reason: HOSPADM

## 2024-07-01 RX ORDER — MIRTAZAPINE 15 MG/1
45 TABLET, FILM COATED ORAL NIGHTLY
Status: DISCONTINUED | OUTPATIENT
Start: 2024-07-01 | End: 2024-07-02 | Stop reason: HOSPADM

## 2024-07-01 RX ORDER — ALPRAZOLAM 1 MG/1
0.5-1 TABLET ORAL 2 TIMES DAILY PRN
COMMUNITY

## 2024-07-01 RX ORDER — SIMETHICONE 80 MG
1 TABLET,CHEWABLE ORAL 4 TIMES DAILY PRN
Status: DISCONTINUED | OUTPATIENT
Start: 2024-07-01 | End: 2024-07-02 | Stop reason: HOSPADM

## 2024-07-01 RX ORDER — IBUPROFEN 200 MG
24 TABLET ORAL
Status: DISCONTINUED | OUTPATIENT
Start: 2024-07-01 | End: 2024-07-02 | Stop reason: HOSPADM

## 2024-07-01 RX ORDER — AMLODIPINE BESYLATE 5 MG/1
5 TABLET ORAL 2 TIMES DAILY
Status: DISCONTINUED | OUTPATIENT
Start: 2024-07-01 | End: 2024-07-02 | Stop reason: HOSPADM

## 2024-07-01 RX ORDER — CITALOPRAM 20 MG/1
20 TABLET, FILM COATED ORAL EVERY MORNING
COMMUNITY
Start: 2024-06-21

## 2024-07-01 RX ORDER — CITALOPRAM 20 MG/1
20 TABLET, FILM COATED ORAL EVERY MORNING
Status: DISCONTINUED | OUTPATIENT
Start: 2024-07-02 | End: 2024-07-02 | Stop reason: HOSPADM

## 2024-07-01 RX ORDER — SODIUM CHLORIDE 0.9 % (FLUSH) 0.9 %
10 SYRINGE (ML) INJECTION EVERY 12 HOURS PRN
Status: DISCONTINUED | OUTPATIENT
Start: 2024-07-01 | End: 2024-07-02 | Stop reason: HOSPADM

## 2024-07-01 RX ORDER — TALC
6 POWDER (GRAM) TOPICAL NIGHTLY PRN
Status: DISCONTINUED | OUTPATIENT
Start: 2024-07-01 | End: 2024-07-02 | Stop reason: HOSPADM

## 2024-07-01 RX ORDER — SODIUM CHLORIDE 9 MG/ML
INJECTION, SOLUTION INTRAVENOUS CONTINUOUS
Status: DISCONTINUED | OUTPATIENT
Start: 2024-07-01 | End: 2024-07-02

## 2024-07-01 RX ORDER — IPRATROPIUM BROMIDE AND ALBUTEROL SULFATE 2.5; .5 MG/3ML; MG/3ML
3 SOLUTION RESPIRATORY (INHALATION) EVERY 4 HOURS PRN
Status: DISCONTINUED | OUTPATIENT
Start: 2024-07-01 | End: 2024-07-02 | Stop reason: HOSPADM

## 2024-07-01 RX ORDER — SPIRONOLACTONE 25 MG/1
12.5 TABLET ORAL 2 TIMES DAILY
COMMUNITY

## 2024-07-01 RX ORDER — ACETAMINOPHEN 325 MG/1
650 TABLET ORAL EVERY 8 HOURS PRN
Status: DISCONTINUED | OUTPATIENT
Start: 2024-07-01 | End: 2024-07-02 | Stop reason: HOSPADM

## 2024-07-01 RX ORDER — ATORVASTATIN CALCIUM 40 MG/1
40 TABLET, FILM COATED ORAL DAILY
Status: DISCONTINUED | OUTPATIENT
Start: 2024-07-01 | End: 2024-07-02 | Stop reason: HOSPADM

## 2024-07-01 RX ORDER — LANOLIN ALCOHOL/MO/W.PET/CERES
800 CREAM (GRAM) TOPICAL
Status: DISCONTINUED | OUTPATIENT
Start: 2024-07-01 | End: 2024-07-02 | Stop reason: HOSPADM

## 2024-07-01 RX ORDER — IBUPROFEN 200 MG
16 TABLET ORAL
Status: DISCONTINUED | OUTPATIENT
Start: 2024-07-01 | End: 2024-07-02 | Stop reason: HOSPADM

## 2024-07-01 RX ORDER — HYDROCODONE BITARTRATE AND ACETAMINOPHEN 5; 325 MG/1; MG/1
1 TABLET ORAL EVERY 6 HOURS PRN
COMMUNITY
Start: 2024-06-18

## 2024-07-01 RX ORDER — NALOXONE HCL 0.4 MG/ML
0.02 VIAL (ML) INJECTION
Status: DISCONTINUED | OUTPATIENT
Start: 2024-07-01 | End: 2024-07-02 | Stop reason: HOSPADM

## 2024-07-01 RX ORDER — PROCHLORPERAZINE EDISYLATE 5 MG/ML
5 INJECTION INTRAMUSCULAR; INTRAVENOUS EVERY 6 HOURS PRN
Status: DISCONTINUED | OUTPATIENT
Start: 2024-07-01 | End: 2024-07-02 | Stop reason: HOSPADM

## 2024-07-01 RX ORDER — CYPROHEPTADINE HYDROCHLORIDE 4 MG/1
4 TABLET ORAL 2 TIMES DAILY
COMMUNITY
Start: 2024-06-03

## 2024-07-01 RX ADMIN — ATORVASTATIN CALCIUM 40 MG: 40 TABLET, FILM COATED ORAL at 05:07

## 2024-07-01 RX ADMIN — MIRTAZAPINE 45 MG: 15 TABLET, FILM COATED ORAL at 09:07

## 2024-07-01 RX ADMIN — AMLODIPINE BESYLATE 5 MG: 5 TABLET ORAL at 09:07

## 2024-07-01 RX ADMIN — SODIUM CHLORIDE: 9 INJECTION, SOLUTION INTRAVENOUS at 09:07

## 2024-07-01 RX ADMIN — METOPROLOL TARTRATE 25 MG: 25 TABLET, FILM COATED ORAL at 05:07

## 2024-07-01 RX ADMIN — PIPERACILLIN SODIUM AND TAZOBACTAM SODIUM 4.5 G: 4; .5 INJECTION, POWDER, LYOPHILIZED, FOR SOLUTION INTRAVENOUS at 09:07

## 2024-07-01 NOTE — ASSESSMENT & PLAN NOTE
Patient with acute kidney injury/acute renal failure likely due to pre-renal azotemia due to IVVD.  Baseline creatinine  1.6  - Labs reviewed- Renal function/electrolytes with Estimated Creatinine Clearance: 29.6 mL/min (A) (based on SCr of 2.1 mg/dL (H)). according to latest data. Monitor urine output and serial BMP and adjust therapy as needed. Avoid nephrotoxins and renally dose meds for GFR listed above.

## 2024-07-01 NOTE — ASSESSMENT & PLAN NOTE
Consult General Surgery  Keep NPO  Gastrointestinal decompression via NGT to LIS if symptoms return.  Will hold off for now since his pain and nausea have subsided.  KUB daily  Pain control   Antiemetics as needed  Hold chronic anticoagulants in the event surgical intervention is needed.

## 2024-07-01 NOTE — ASSESSMENT & PLAN NOTE
Patient is identified as having Combined Systolic and Diastolic heart failure that is Chronic. CHF is currently controlled. Latest ECHO performed and demonstrates- Results for orders placed during the hospital encounter of 01/23/24    Echo    Interpretation Summary    Left Ventricle: The left ventricle is normal in size. Mildly increased wall thickness. Severe global hypokinesis present. There is severely reduced systolic function with a visually estimated ejection fraction of 20 - 25%. Ejection fraction by visual approximation is 20%. Global longitudinal strain is -8%. Reduced. There is diastolic dysfunction.    Right Ventricle: Normal right ventricular cavity size. Systolic function is reduced.TAPSE is 1.90 cm.    Left Atrium: Left atrium is moderately dilated.    Aortic Valve: The aortic valve is a trileaflet valve. Moderately restricted motion. There is severe stenosis. Aortic valve area by VTI is 0.93 cm². Aortic valve peak velocity is 2.75 m/s. Mean gradient is 18 mmHg. The dimensionless index is 0.23. There is mild aortic regurgitation.    Mitral Valve: There is moderate regurgitation.    IVC/SVC: Normal venous pressure at 3 mmHg.    Pericardium: There is a small effusion. No indication of cardiac tamponade.  . Continue Beta Blocker and monitor clinical status closely. Monitor on telemetry. Patient is off CHF pathway.  Monitor strict Is&Os and daily weights.  Cardiology is not consulted. Continue to stress to patient importance of self efficacy and  on diet for CHF. Last BNP reviewed- and noted below   Recent Labs   Lab 07/01/24  7169   *   .    Hold chronic diuretics for now 2/2 ISABELL.  Gentle hydration with caution.

## 2024-07-01 NOTE — ASSESSMENT & PLAN NOTE
Chronic, controlled. Latest blood pressure and vitals reviewed-     Temp:  [97.5 °F (36.4 °C)]   Pulse:  []   Resp:  [13-25]   BP: (102-128)/(58-76)   SpO2:  [93 %-100 %] .   Home meds for hypertension were reviewed and noted below.   Hypertension Medications               furosemide (LASIX) 40 MG tablet Take 40 mg by mouth 2 (two) times daily.    losartan (COZAAR) 100 MG tablet Take 100 mg by mouth once daily.    metoprolol tartrate (LOPRESSOR) 25 MG tablet Take 25 mg by mouth once daily.    spironolactone (ALDACTONE) 25 MG tablet Take 12.5 mg by mouth 2 (two) times daily.            While in the hospital, will manage blood pressure as follows; Adjust home antihypertensive regimen as follows- Hold chronic ARB and diuretics in the setting of ISABELL and resume when appropriate.    Will utilize p.r.n. blood pressure medication only if patient's blood pressure greater than 180/110 and he develops symptoms such as worsening chest pain or shortness of breath.

## 2024-07-01 NOTE — ASSESSMENT & PLAN NOTE
Seen on CT-contains a portion of bladder  PVR  Consult general surgery  Hold chronic anticoagulants in the event surgical intervention is needed.

## 2024-07-01 NOTE — CONSULTS
GENERAL SURGERY  INPATIENT CONSULT    REASON FOR CONSULT: Small-bowel obstruction    HPI: Yobani Rios is a 84 y.o. male brought to the emergency room by EMS with severe cramping left lower quadrant abdominal pain. Patient reports he was in his normal state of health until yesterday evening.  He developed intermittent cramping pain which was very severe mostly in the left lower abdomen. Due to severity of pain he presented to the emergency room. It was found to have leukocytosis of 15.8 K and a creatinine 2.1 up from his baseline around 1.6. UA was negative. Troponin mildly elevated and trended.  Lactic acid normal.  Underwent CT imaging which showed concerns for bowel obstruction. He was started on broad-spectrum antibiotics and fluids.  General surgery was consulted for evaluation of obstruction. At my visit patient was feeling very well. No more abdominal cramping or pain. No nausea or vomiting.  Has passed flatus and had a bowel movement. It was urinating without issue. Patient reports no previous similar episodes.  He did have a history of robotic prostatectomy which was complicated with bowel injury and prolonged hospitalization.    I have reviewed the patient's chart including prior progress notes, procedures and testing.     ROS:   Review of Systems    PROBLEM LIST:  Patient Active Problem List   Diagnosis    Renal mass    Small bowel obstruction    Retroperitoneal mass    Abdominal aortic aneurysm (AAA) 30 to 34 mm in diameter    Confusion    Shortness of breath    Chronic combined systolic and diastolic heart failure    Essential hypertension    Single kidney    Acute renal failure superimposed on stage 3 chronic kidney disease    Anxiety    Pancreatic lesion    Pulmonary nodules    Left inguinal hernia    Severe sepsis         HISTORY  Past Medical History:   Diagnosis Date    Cancer     prostate    Hypertension     Kidney problem     only 1 kidney functions    Liver disease     Umbilical hernia         Past Surgical History:   Procedure Laterality Date    colon repair      after prostatectomy    LYSIS OF ADHESIONS N/A 10/18/2018    Procedure: LYSIS, ADHESIONS;  Surgeon: Rancho Mariee MD;  Location: [address];  Service: General;  Laterality: N/A;    PROSTATECTOMY      PROSTATECTOMY  2010    REPAIR OF RECURRENT INCISIONAL HERNIA N/A 10/18/2018    Procedure: REPAIR, HERNIA, INCISIONAL -multiple;  Surgeon: Rancho Mariee MD;  Location: [address];  Service: General;  Laterality: N/A;       Social History     Tobacco Use    Smoking status: Former     Current packs/day: 0.00     Types: Cigarettes     Quit date: 2/15/1972     Years since quittin.4    Smokeless tobacco: Never   Substance Use Topics    Alcohol use: No    Drug use: No       Family History   Problem Relation Name Age of Onset    Heart disease Mother      Heart disease Father      Diabetes Sister           MEDS:  No current facility-administered medications on file prior to encounter.     Current Outpatient Medications on File Prior to Encounter   Medication Sig Dispense Refill    ALPRAZolam (XANAX) 1 MG tablet Take 0.5-1 mg by mouth 2 (two) times daily as needed.      aspirin (ECOTRIN) 81 MG EC tablet Take 81 mg by mouth once daily.      atorvastatin (LIPITOR) 40 MG tablet Take 40 mg by mouth once daily.      B-complex with vitamin C tablet Take 1 tablet by mouth once daily.        citalopram (CELEXA) 20 MG tablet Take 20 mg by mouth every morning.      clopidogreL (PLAVIX) 75 mg tablet Take 75 mg by mouth once daily.      cyproheptadine (PERIACTIN) 4 mg tablet Take 4 mg by mouth 2 (two) times daily.      furosemide (LASIX) 40 MG tablet Take 40 mg by mouth 2 (two) times daily.      HYDROcodone-acetaminophen (NORCO) 5-325 mg per tablet Take 1 tablet by mouth every 6 (six) hours.      losartan (COZAAR) 100 MG tablet Take 100 mg by mouth once daily.      metoprolol tartrate (LOPRESSOR) 25 MG tablet Take 25 mg by mouth once  daily.      mirtazapine (REMERON) 45 MG tablet Take 45 mg by mouth every evening.      multivitamin,therapeutic (THERA ORAL) Take 1 tablet by mouth once daily.      spironolactone (ALDACTONE) 25 MG tablet Take 12.5 mg by mouth 2 (two) times daily.      vitamin D (VITAMIN D3) 1000 units Tab Take 25 mcg by mouth once daily.      albuterol (PROVENTIL/VENTOLIN HFA) 90 mcg/actuation inhaler Inhale 1-2 puffs into the lungs every 6 (six) hours as needed for Shortness of Breath. Rescue 18 g 0    [DISCONTINUED] amLODIPine (NORVASC) 10 MG tablet       [DISCONTINUED] amLODIPine (NORVASC) 5 MG tablet Take 5 mg by mouth 2 (two) times daily.      [DISCONTINUED] benzonatate (TESSALON) 100 MG capsule Take 100 mg by mouth 3 (three) times daily as needed.      [DISCONTINUED] BRILINTA 90 mg tablet Take 90 mg by mouth 2 (two) times daily.      [DISCONTINUED] losartan-hydrochlorothiazide (HYZAAR) 100-12.5 mg per tablet Take 1 tablet by mouth once daily.        [DISCONTINUED] pravastatin (PRAVACHOL) 20 MG tablet Take 20 mg by mouth once daily.      [DISCONTINUED] rivaroxaban (XARELTO) 2.5 mg Tab Take 2.5 mg by mouth 2 (two) times daily.         ALLERGIES:  Review of patient's allergies indicates:  No Known Allergies      VITALS:  Temp:  [97.5 °F (36.4 °C)] 97.5 °F (36.4 °C)  Pulse:  [] 99  Resp:  [13-25] 16  SpO2:  [93 %-100 %] 96 %  BP: (102-128)/(58-76) 107/66    No intake/output data recorded.      PHYSICAL EXAM:  Physical Exam  Vitals reviewed.   Constitutional:       General: He is not in acute distress.     Appearance: Normal appearance. He is well-developed.   HENT:      Head: Normocephalic and atraumatic.   Eyes:      General: No scleral icterus.  Neck:      Trachea: No tracheal deviation.   Cardiovascular:      Rate and Rhythm: Normal rate and regular rhythm.      Pulses: Normal pulses.   Pulmonary:      Effort: Pulmonary effort is normal. No respiratory distress.      Breath sounds: Normal breath sounds.   Abdominal:       General: There is no distension.      Palpations: Abdomen is soft.      Tenderness: There is no abdominal tenderness.      Comments: Appropriate bowel sounds   Musculoskeletal:         General: No swelling or tenderness. Normal range of motion.      Cervical back: Normal range of motion and neck supple. No rigidity.   Skin:     General: Skin is warm and dry.      Coloration: Skin is not jaundiced.      Findings: No erythema.   Neurological:      General: No focal deficit present.      Mental Status: He is alert and oriented to person, place, and time. He is not disoriented.      Motor: No weakness or abnormal muscle tone.   Psychiatric:         Mood and Affect: Mood normal.         Behavior: Behavior normal.         Thought Content: Thought content normal.         Judgment: Judgment normal.           LABS:  Lab Results   Component Value Date    WBC 15.84 (H) 07/01/2024    RBC 5.49 07/01/2024    HGB 17.7 07/01/2024    HCT 52.8 07/01/2024     07/01/2024     Lab Results   Component Value Date     (H) 07/01/2024     07/01/2024    K 4.6 07/01/2024    CL 99 07/01/2024    CO2 31 (H) 07/01/2024    BUN 56 (H) 07/01/2024    CREATININE 2.1 (H) 07/01/2024    CALCIUM 9.1 07/01/2024     Lab Results   Component Value Date    ALT 27 07/01/2024    AST 27 07/01/2024    ALKPHOS 143 (H) 07/01/2024    BILITOT 0.8 07/01/2024     Lab Results   Component Value Date    MG 2.4 07/01/2024    PHOS 2.9 10/26/2018       STUDIES:  Images and reports were personally reviewed.    CT abdomen pelvis  FINDINGS:  The visualized lung bases demonstrate atelectatic change, there is appearance of bronchiectatic change.  There is a small pulmonary nodule of the lingular segment measuring approximately 3.8 mm.  Aortic and coronary arterial atherosclerotic calcification noted.     There is mild fluid-filled configuration of the visualized distal thoracic esophagus, nonspecific however may relate to reflux or stasis.     There is moderate  distention of the stomach with fluid and air.  There are dilated small bowel loops throughout the abdomen and pelvis, the distal small bowel is not dilated and the pattern is consistent with obstruction.  It is difficult to definitively identify the point of transition, it could potentially be at the mid abdomen anteriorly just deep to the level of postoperative change of the anterior abdominal wall superiorly.  Difficult to definitively delineate however.  The findings may relate to a high-grade partial small bowel obstruction or early complete small bowel obstruction.  Close clinical and historical correlation is needed.  There is no evidence for pneumatosis and there is no evidence for portal venous air or mesenteric venous air, there is no free intraperitoneal air.     The appendix is identified, it does not appear inflamed.  There is no evidence for inflammatory or obstructive process of the colon.     There is a hypodensity of the left lobe of the liver, it appears stable and measures 9.4 Hounsfield units.  The gallbladder appears incompletely distended, there is no pericholecystic inflammatory change.  There is atrophic change of the pancreas.  There is a small lesion of the pancreas at the body of the pancreas as seen on axial image 79 measuring 10.5 mm.  Pancreatic protocol follow-up is recommended.  There is no peripancreatic inflammatory change.     There is no evidence for acute process of the spleen or adrenal glands.  Atrophic change of the right kidney is noted.  There is an exophytic lesion at the mid to lower pole of the right kidney measuring approximately 3.8 cm in 25 Hounsfield units, cannot be characterized as a cyst, ultrasound follow-up is recommended.     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     Previously identified periaortic retroperitoneal soft tissue abnormality is significantly improved with only mild remaining right-sided periaortic soft tissue abnormality difficult to accurately measure.  Infrarenal abdominal aortic aneurysm is again noted measuring approximately 4.1 cm, compared to the measurement of 3.9 cm on the prior study.     There is a left inguinal hernia which includes a portion of the urinary bladder, the urinary bladder otherwise appears unremarkable for degree of distention.  Penile prosthesis is noted.     The osseous structures demonstrate chronic change.     Impression:     Abnormal small bowel pattern consistent with small bowel obstructive process as discussed above, the pattern is concerning for either a high-grade partial obstruction or early complete obstruction.     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     Indeterminate lesion of the right kidney, ultrasound follow-up is recommended.     Small pancreatic lesion.  Dedicated pancreatic protocol MRI follow-up is recommended.     Distention of the esophagus with fluid may relate to reflux and or stasis.     3.8 mm pulmonary nodule at the left lung base.  For a solid nodule <6 mm, Fleischner Society 2017 guidelines recommend no routine follow up for a low risk patient, or follow-up with non-contrast chest CT at 12 months in a high risk patient.     Infrarenal abdominal aortic aneurysm, mildly increased in size, follow-up is recommended.     Left inguinal hernia including a portion of the urinary bladder.     Additional findings as above.      ASSESSMENT & PLAN:  84 y.o. male with suspected small-bowel  obstruction  -presentation and imaging consistent with small-bowel obstruction however the patient has now passed flatus and had bowel movement and pain has resolved  -suspect obstruction has spontaneously resolved  -allow for clear liquid diet today, if tolerated regular diet for breakfast   -will repeat abdominal x-ray in the morning as well  -if patient develops any nausea, vomiting worsening pain make NPO and place NG tube  -etiology of leukocytosis and elevated creatinine may be related to volume depletion, resuscitated and trend  -troponin slowly up trending, cardiology consult if needed

## 2024-07-01 NOTE — ED PROVIDER NOTES
Encounter Date: 2024       History     Chief Complaint   Patient presents with    Abdominal Pain     LUQ - improved after aspirin and sl ntg     HPI    Patient is an 85yo M w PMH s/f prostate ca s/p prostatectomy, CAD, HFrEF (LVEF 20-25% 2024), AAA, diffuse large B cell lymphoma, singular kidney with CDK 3, LESIA, HTN who presents endorsing intermittent LUQ pain x1d.  He explains that this began around 9pm last night.  Describes the pain as sharp, localized to LUQ, non-radiating, self-limited to several seconds shortly followed by nausea and small volume NBNB emesis.  Denies fever/chills, dyspnea, chest pain, other abdominal pain, flank pain, urinary symptoms, leg swelling.    Review of patient's allergies indicates:  No Known Allergies  Past Medical History:   Diagnosis Date    Cancer     prostate    Hypertension     Kidney problem     only 1 kidney functions    Liver disease     Umbilical hernia      Past Surgical History:   Procedure Laterality Date    colon repair      after prostatectomy    LYSIS OF ADHESIONS N/A 10/18/2018    Procedure: LYSIS, ADHESIONS;  Surgeon: Rancho Mariee MD;  Location: [facility] [place] [facility] OR [address] [place] [address];  Service: General;  Laterality: N/A;    PROSTATECTOMY      PROSTATECTOMY      REPAIR OF RECURRENT INCISIONAL HERNIA N/A 10/18/2018    Procedure: REPAIR, HERNIA, INCISIONAL -multiple;  Surgeon: Rancho Mariee MD;  Location: [facility] [place] [facility] OR [address] [place] [address];  Service: General;  Laterality: N/A;     Family History   Problem Relation Name Age of Onset    Heart disease Mother      Heart disease Father      Diabetes Sister       Social History     Tobacco Use    Smoking status: Former     Current packs/day: 0.00     Types: Cigarettes     Quit date: 2/15/1972     Years since quittin.4    Smokeless tobacco: Never   Substance Use Topics    Alcohol use: No    Drug use: No     Review of Systems  As stated in HPI    Physical Exam     Initial Vitals [24 0412]   BP Pulse Resp Temp SpO2   128/76 93 16  97.5 °F (36.4 °C) 100 %      MAP       --         Physical Exam    Vitals reviewed.  Constitutional: He appears well-developed. He is not diaphoretic. No distress.   Awake, alert, conversant, in NAD   HENT:   Nose: Nose normal.   MM dry   Eyes: Conjunctivae are normal.   Neck: Neck supple.   Cardiovascular:  Normal rate, regular rhythm and normal heart sounds.           Pulmonary/Chest: Breath sounds normal. No respiratory distress. He has no wheezes. He has no rhonchi. He has no rales.   Abdominal: Abdomen is soft. There is abdominal tenderness (LUQ).   No CVA TTP There is no rebound and no guarding.   Musculoskeletal:         General: Edema (trace, bilateral) present.      Cervical back: Neck supple.     Neurological: He is alert and oriented to person, place, and time.   Skin: Skin is warm and dry.         ED Course   Procedures  Labs Reviewed   CBC W/ AUTO DIFFERENTIAL - Abnormal; Notable for the following components:       Result Value    WBC 15.84 (*)     MCH 32.2 (*)     Gran # (ANC) 14.0 (*)     Immature Grans (Abs) 0.08 (*)     Gran % 88.1 (*)     Lymph % 7.6 (*)     Mono % 3.2 (*)     All other components within normal limits   COMPREHENSIVE METABOLIC PANEL - Abnormal; Notable for the following components:    CO2 31 (*)     Glucose 167 (*)     BUN 56 (*)     Creatinine 2.1 (*)     Alkaline Phosphatase 143 (*)     eGFR 30.5 (*)     All other components within normal limits   B-TYPE NATRIURETIC PEPTIDE - Abnormal; Notable for the following components:     (*)     All other components within normal limits   TROPONIN I HIGH SENSITIVITY - Abnormal; Notable for the following components:    Troponin I High Sensitivity 36.4 (*)     All other components within normal limits   MAGNESIUM   PROTIME-INR   URINALYSIS, REFLEX TO URINE CULTURE    Narrative:     Specimen Source->Urine   TROPONIN I HIGH SENSITIVITY        ECG Results              EKG 12-lead (In process)        Collection Time Result Time QRS  Duration [facility] QTC Calculation    07/01/24 04:14:39 07/01/24 05:11:33 162 490                     In process by Interface, Lab In [facility] (07/01/24 05:11:41)                   Narrative:    Test Reason : R07.9,    Vent. Rate : 076 BPM     Atrial Rate : 076 BPM     P-R Int : 200 ms          QRS Dur : 162 ms      QT Int : 436 ms       P-R-T Axes : 017 269 074 degrees     QTc Int : 490 ms    Sinus rhythm with marked sinus arrythmia  Right bundle branch block  Septal infarct (cited on or before 12-MAR-2024)  Abnormal ECG  When compared with ECG of 12-MAR-2024 23:30,  Questionable change in initial forces of Septal leads  Nonspecific T wave abnormality no longer evident in Inferior leads  T wave inversion less evident in Anterior-lateral leads    Referred By: [email or web address]   SELF           Confirmed By:                                   Imaging Results               CT Abdomen Pelvis  Without Contrast (Final result)  Result time 07/01/24 05:55:38      Final result by Manuel Cooper MD (07/01/24 05:55:38)                   Impression:      Abnormal small bowel pattern consistent with small bowel obstructive process as discussed above, the pattern is concerning for either a high-grade partial obstruction or early complete obstruction.    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    Indeterminate lesion of the right kidney, ultrasound follow-up is recommended.    Small pancreatic lesion.  Dedicated pancreatic protocol MRI follow-up is recommended.    Distention of the esophagus with fluid may relate to reflux and or stasis.    3.8 mm pulmonary nodule at the left lung base.  For a solid nodule <6 mm, Fleischner Society 2017 guidelines recommend no routine follow up for a low risk patient, or follow-up with  non-contrast chest CT at 12 months in a high risk patient.    Infrarenal abdominal aortic aneurysm, mildly increased in size, follow-up is recommended.    Left inguinal hernia including a portion of the urinary bladder.    Additional findings as above.    This report was flagged in Epic as abnormal.      Electronically signed by: Manuel Cooper  Date:    07/01/2024  Time:    05:55               Narrative:    EXAMINATION:  CT ABDOMEN PELVIS WITHOUT CONTRAST    CLINICAL HISTORY:  Flank pain, kidney stone suspected;Intermittent LUQ pain;    TECHNIQUE:  Low dose axial images, sagittal and coronal reformations were obtained from the lung bases to the pubic symphysis.  Intravenous contrast and oral contrast was not utilized, this diminishes the sensitivity of the examination.    COMPARISON:  None    FINDINGS:  The visualized lung bases demonstrate atelectatic change, there is appearance of bronchiectatic change.  There is a small pulmonary nodule of the lingular segment measuring approximately 3.8 mm.  Aortic and coronary arterial atherosclerotic calcification noted.    There is mild fluid-filled configuration of the visualized distal thoracic esophagus, nonspecific however may relate to reflux or stasis.    There is moderate distention of the stomach with fluid and air.  There are dilated small bowel loops throughout the abdomen and pelvis, the distal small bowel is not dilated and the pattern is consistent with obstruction.  It is difficult to definitively identify the point of transition, it could potentially be at the mid abdomen anteriorly just deep to the level of postoperative change of the anterior abdominal wall superiorly.  Difficult to definitively delineate however.  The findings may relate to a high-grade partial small bowel obstruction or early complete small bowel obstruction.  Close clinical and historical correlation is needed.  There is no evidence for pneumatosis and there is no evidence for portal  venous air or mesenteric venous air, there is no free intraperitoneal air.    The appendix is identified, it does not appear inflamed.  There is no evidence for inflammatory or obstructive process of the colon.    There is a hypodensity of the left lobe of the liver, it appears stable and measures 9.4 Hounsfield units.  The gallbladder appears incompletely distended, there is no pericholecystic inflammatory change.  There is atrophic change of the pancreas.  There is a small lesion of the pancreas at the body of the pancreas as seen on axial image 79 measuring 10.5 mm.  Pancreatic protocol follow-up is recommended.  There is no peripancreatic inflammatory change.    There is no evidence for acute process of the spleen or adrenal glands.  Atrophic change of the right kidney is noted.  There is an exophytic lesion at the mid to lower pole of the right kidney measuring approximately 3.8 cm in 25 Hounsfield units, cannot be characterized as a cyst, ultrasound follow-up is recommended.    The left kidney demonstrates pelvocaliceal dilatation, the left ureter appears normal in caliber without evidence for ureteral calculus, the findings are consistent with UPJ obstruction, although less distended than on the prior study, there is perinephric stranding, and therefore correlation for acute change or infectious etiology is needed.    Previously identified periaortic retroperitoneal soft tissue abnormality is significantly improved with only mild remaining right-sided periaortic soft tissue abnormality difficult to accurately measure.  Infrarenal abdominal aortic aneurysm is again noted measuring approximately 4.1 cm, compared to the measurement of 3.9 cm on the prior study.    There is a left inguinal hernia which includes a portion of the urinary bladder, the urinary bladder otherwise appears unremarkable for degree of distention.  Penile prosthesis is noted.    The osseous structures demonstrate chronic change.                                        X-Ray Chest AP Portable (Final result)  Result time 07/01/24 05:04:19      Final result by Manuel Cooper MD (07/01/24 05:04:19)                   Impression:      Atelectatic change, there is no radiographic evidence for superimposed acute intrathoracic process.      Electronically signed by: Manuel Cooper  Date:    07/01/2024  Time:    05:04               Narrative:    EXAMINATION:  XR CHEST AP PORTABLE    CLINICAL HISTORY:  LUQ pain;    TECHNIQUE:  Single frontal view of the chest was performed.    COMPARISON:  Chest radiograph March 13, 2024    FINDINGS:  Single portable chest view is submitted.  Left-sided subcutaneous port central venous catheter is noted.  The distal tip extends to the expected location of the SVC.    There is diminished depth of inspiration and minimal rotation, when accounting for position and technique and depth of inspiration, the appearance of the cardiomediastinal silhouette is stable.    Basilar atelectatic changes are noted, mild, left greater than right.  There is no evidence for superimposed confluent infiltrate or consolidation, significant pleural effusion or pneumothorax.    The osseous structures demonstrate chronic change.                                       Medications - No data to display  Medical Decision Making  Amount and/or Complexity of Data Reviewed  Radiology: ordered.      Patient is an 83yo M w PMH s/f prostate ca s/p prostatectomy, CAD, HFrEF (LVEF 20-25% 1/2024), AAA, diffuse large B cell lymphoma, singular kidney with CDK 3, LESIA, HTN who presents endorsing intermittent LUQ pain x1d.  Afebrile and HDS.  Well appearing on exam but with intermittent waves of LLQ pain and associated TTP, no rebound/guarding, no CVA TTP.  Lungs clear in anterior fields.  EKG shows NSR, RBBB, no concerning ST segment elevations/depressions, not significant adverse change when compared to prior.  CXR with mild atelectasis, no other acute cardiopulmonary  abnormality.  CBC with leukocytosis to 16, no anemia.  CMP with ISABELL to 2.1 from 1.5, bicarb 31 in setting of known LESIA, no other clinically significant abnormality.  Trace elevation in BNP to 181, troponin 36.    DDX includes but not limited to nephrolithiasis, pyelonephritis, gastritis, SBO, CAP, ACS.  Received  and Zofran with EMS.  Pending CTAP without contrast in setting of ISABELL and single kidney and repeat troponin.    Maki Flowers MD  [employer] EM PGY3  7/1/2024 5:54      Update    UA negative for blood, nitrites, leuk esterase, not suggestive of UTI.  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  Patient reports improvement in nausea, waves of abdominal pain are decreasing in frequency.  Consulted to Hospital Medicine for admission, HDS, repeat troponin pending.    Maki Flowers MD  [employer] EM PGY3  7/1/2024 7:08                                    Clinical Impression:  Final diagnoses:  [R07.9] Chest pain  [R10.9] Abdominal pain, unspecified abdominal location (Primary)  [K56.609] Small bowel obstruction                 Maki Flowers MD  Resident  07/01/24 0741

## 2024-07-01 NOTE — H&P
[facility] - Emergency Dept  Hospital Medicine  History & Physical    Patient Name: Yobani Rios  MRN: [id number]  Patient Class: IP- Inpatient  Admission Date: 7/1/2024  Attending Physician: Jr Queen MD   Primary Care Provider: Justine Barragan FNP         Patient information was obtained from patient, past medical records, and ER records.     Subjective:     Principal Problem:Small bowel obstruction    Chief Complaint:   Chief Complaint   Patient presents with    Abdominal Pain     LUQ - improved after aspirin and sl ntg        HPI: Yobani Rios is an 84-year-old male with a past medical history significant for prostate cancer status post prostatectomy, coronary artery disease, systolic heart failure, AAA, diffuse large cell lymphoma, CKD 3, obstructive sleep apnea, and hypertension who presented to the emergency department with left upper quadrant abdominal pain which began last night around 9:00 p.m..  Sharp nonradiating pain was accompanied by nausea and vomiting.  Denies recent fever, chest pain, diarrhea, dysuria or any other symptoms.  On assessment this morning patient says his pain is much better and his nausea has resolved.  CT abdomen and pelvis was consistent with small-bowel obstruction.  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  Labs are significant for leukocytosis and ISABELL.  Urinalysis is negative for infection.  Troponin is elevated but appears to be consistent with most recent troponins.  Denies chest pain, no ST elevation on EKG.  Patient takes Xarelto, Brilinta, and aspirin daily-last dose yesterday morning (6/30).  He has admitted to the service of hospital Medicine for continued monitoring and treatment.      Past Medical History:   Diagnosis Date    Cancer     prostate    Hypertension      Kidney problem     only 1 kidney functions    Liver disease     Umbilical hernia        Past Surgical History:   Procedure Laterality Date    colon repair      after prostatectomy    LYSIS OF ADHESIONS N/A 10/18/2018    Procedure: LYSIS, ADHESIONS;  Surgeon: Rancho Mariee MD;  Location: [address];  Service: General;  Laterality: N/A;    PROSTATECTOMY      PROSTATECTOMY  2010    REPAIR OF RECURRENT INCISIONAL HERNIA N/A 10/18/2018    Procedure: REPAIR, HERNIA, INCISIONAL -multiple;  Surgeon: Rancho Mariee MD;  Location: [address];  Service: General;  Laterality: N/A;       Review of patient's allergies indicates:  No Known Allergies    No current facility-administered medications on file prior to encounter.     Current Outpatient Medications on File Prior to Encounter   Medication Sig    ALPRAZolam (XANAX) 1 MG tablet Take 0.5-1 mg by mouth 2 (two) times daily as needed.    aspirin (ECOTRIN) 81 MG EC tablet Take 81 mg by mouth once daily.    atorvastatin (LIPITOR) 40 MG tablet Take 40 mg by mouth once daily.    B-complex with vitamin C tablet Take 1 tablet by mouth once daily.      citalopram (CELEXA) 20 MG tablet Take 20 mg by mouth every morning.    clopidogreL (PLAVIX) 75 mg tablet Take 75 mg by mouth once daily.    cyproheptadine (PERIACTIN) 4 mg tablet Take 4 mg by mouth 2 (two) times daily.    furosemide (LASIX) 40 MG tablet Take 40 mg by mouth 2 (two) times daily.    HYDROcodone-acetaminophen (NORCO) 5-325 mg per tablet Take 1 tablet by mouth every 6 (six) hours.    losartan (COZAAR) 100 MG tablet Take 100 mg by mouth once daily.    metoprolol tartrate (LOPRESSOR) 25 MG tablet Take 25 mg by mouth once daily.    mirtazapine (REMERON) 45 MG tablet Take 45 mg by mouth every evening.    multivitamin,therapeutic (THERA ORAL) Take 1 tablet by mouth once daily.    spironolactone (ALDACTONE) 25 MG tablet Take 12.5 mg by mouth 2 (two) times daily.    vitamin D (VITAMIN D3) 1000 units Tab Take  25 mcg by mouth once daily.    albuterol (PROVENTIL/VENTOLIN HFA) 90 mcg/actuation inhaler Inhale 1-2 puffs into the lungs every 6 (six) hours as needed for Shortness of Breath. Rescue    [DISCONTINUED] amLODIPine (NORVASC) 10 MG tablet     [DISCONTINUED] amLODIPine (NORVASC) 5 MG tablet Take 5 mg by mouth 2 (two) times daily.    [DISCONTINUED] benzonatate (TESSALON) 100 MG capsule Take 100 mg by mouth 3 (three) times daily as needed.    [DISCONTINUED] BRILINTA 90 mg tablet Take 90 mg by mouth 2 (two) times daily.    [DISCONTINUED] losartan-hydrochlorothiazide (HYZAAR) 100-12.5 mg per tablet Take 1 tablet by mouth once daily.      [DISCONTINUED] pravastatin (PRAVACHOL) 20 MG tablet Take 20 mg by mouth once daily.    [DISCONTINUED] rivaroxaban (XARELTO) 2.5 mg Tab Take 2.5 mg by mouth 2 (two) times daily.     Family History       Problem Relation (Age of Onset)    Diabetes Sister    Heart disease Mother, Father          Tobacco Use    Smoking status: Former     Current packs/day: 0.00     Types: Cigarettes     Quit date: 2/15/1972     Years since quittin.4    Smokeless tobacco: Never   Substance and Sexual Activity    Alcohol use: No    Drug use: No    Sexual activity: Never     Review of Systems   Constitutional:  Negative for chills and fever.   Gastrointestinal:  Positive for abdominal distention, abdominal pain, nausea and vomiting.   Genitourinary:  Negative for dysuria and flank pain.   Musculoskeletal:  Negative for back pain and neck pain.   Neurological:  Negative for syncope and headaches.     Objective:     Vital Signs (Most Recent):  Temp: 97.5 °F (36.4 °C) (24 0412)  Pulse: 99 (24 0932)  Resp: 16 (24 0942)  BP: 107/66 (24 0932)  SpO2: 96 % (24 0947) Vital Signs (24h Range):  Temp:  [97.5 °F (36.4 °C)] 97.5 °F (36.4 °C)  Pulse:  [] 99  Resp:  [13-25] 16  SpO2:  [93 %-100 %] 96 %  BP: (102-128)/(58-76) 107/66     Weight: 86.2 kg (190 lb)  Body mass index is 25.07  kg/m².     Physical Exam  Constitutional:       General: He is not in acute distress.     Appearance: He is well-developed. He is not toxic-appearing.   HENT:      Head: Normocephalic and atraumatic.   Eyes:      Conjunctiva/sclera: Conjunctivae normal.      Pupils: Pupils are equal, round, and reactive to light.   Cardiovascular:      Rate and Rhythm: Regular rhythm. Tachycardia present.      Heart sounds: Normal heart sounds.   Pulmonary:      Effort: Pulmonary effort is normal. No respiratory distress.      Breath sounds: Normal breath sounds.   Abdominal:      General: Bowel sounds are decreased. There is distension.      Palpations: Abdomen is soft.      Tenderness: There is abdominal tenderness (mild LUQ). There is no guarding.   Musculoskeletal:         General: Normal range of motion.      Cervical back: Normal range of motion and neck supple.      Right lower leg: No edema.      Left lower leg: No edema.   Skin:     General: Skin is warm and dry.   Neurological:      General: No focal deficit present.      Mental Status: He is alert and oriented to person, place, and time.   Psychiatric:         Mood and Affect: Mood normal.         Behavior: Behavior normal.         Thought Content: Thought content normal.              CRANIAL NERVES     CN III, IV, VI   Pupils are equal, round, and reactive to light.       Significant Labs: All pertinent labs within the past 24 hours have been reviewed.  CBC:   Recent Labs   Lab 07/01/24 0419   WBC 15.84*   HGB 17.7   HCT 52.8        CMP:   Recent Labs   Lab 07/01/24 0419      K 4.6   CL 99   CO2 31*   *   BUN 56*   CREATININE 2.1*   CALCIUM 9.1   PROT 7.1   ALBUMIN 4.2   BILITOT 0.8   ALKPHOS 143*   AST 27   ALT 27   ANIONGAP 9     Troponin:   Recent Labs   Lab 07/01/24 0419 07/01/24  0613   TROPONINIHS 36.4* 45.8*     Urine Studies:   Recent Labs   Lab 07/01/24  0609   COLORU Yellow   APPEARANCEUA Clear   PHUR 6.0   SPECGRAV 1.015   PROTEINUA  Negative   GLUCUA Negative   KETONESU Negative   BILIRUBINUA Negative   OCCULTUA Negative   NITRITE Negative   UROBILINOGEN Negative   LEUKOCYTESUR Negative       Significant Imaging: I have reviewed all pertinent imaging results/findings within the past 24 hours.  CT abd/pelvis:    Abnormal small bowel pattern consistent with small bowel obstructive process as discussed above, the pattern is concerning for either a high-grade partial obstruction or early complete obstruction.  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     Indeterminate lesion of the right kidney, ultrasound follow-up is recommended.     Small pancreatic lesion.  Dedicated pancreatic protocol MRI follow-up is recommended.     Distention of the esophagus with fluid may relate to reflux and or stasis.     3.8 mm pulmonary nodule at the left lung base.  For a solid nodule <6 mm, Fleischner Society 2017 guidelines recommend no routine follow up for a low risk patient, or follow-up with non-contrast chest CT at 12 months in a high risk patient.     Infrarenal abdominal aortic aneurysm, mildly increased in size, follow-up is recommended.     Left inguinal hernia including a portion of the urinary bladder.      CXR: I have reviewed all pertinent results/findings within the past 24 hours and my personal findings are:  no acute findings  Assessment/Plan:     * Small bowel obstruction  Consult General Surgery  Keep NPO  Gastrointestinal decompression via NGT to LIS if symptoms return.  Will hold off for now since his pain and nausea have subsided.  KUB daily  Pain control   Antiemetics as needed  Hold chronic anticoagulants in the event surgical intervention is needed.        Acute renal failure superimposed on stage 3 chronic kidney  disease  Patient with acute kidney injury/acute renal failure likely due to pre-renal azotemia due to IVVD.  Baseline creatinine  1.6  - Labs reviewed- Renal function/electrolytes with Estimated Creatinine Clearance: 29.6 mL/min (A) (based on SCr of 2.1 mg/dL (H)). according to latest data. Monitor urine output and serial BMP and adjust therapy as needed. Avoid nephrotoxins and renally dose meds for GFR listed above.    Left inguinal hernia  Seen on CT-contains a portion of bladder  PVR  Consult general surgery  Hold chronic anticoagulants in the event surgical intervention is needed.      Pulmonary nodules  Seen on CT-need OP follow up with repeat CT 12 mos      Pancreatic lesion  Seen on CT.  MRI with pancreatic protocol needed once renal function improves.        Anxiety  Continue chronic xanax      Essential hypertension  Chronic, controlled. Latest blood pressure and vitals reviewed-     Temp:  [97.5 °F (36.4 °C)]   Pulse:  []   Resp:  [13-25]   BP: (102-128)/(58-76)   SpO2:  [93 %-100 %] .   Home meds for hypertension were reviewed and noted below.   Hypertension Medications               furosemide (LASIX) 40 MG tablet Take 40 mg by mouth 2 (two) times daily.    losartan (COZAAR) 100 MG tablet Take 100 mg by mouth once daily.    metoprolol tartrate (LOPRESSOR) 25 MG tablet Take 25 mg by mouth once daily.    spironolactone (ALDACTONE) 25 MG tablet Take 12.5 mg by mouth 2 (two) times daily.            While in the hospital, will manage blood pressure as follows; Adjust home antihypertensive regimen as follows- Hold chronic ARB and diuretics in the setting of ISABELL and resume when appropriate.    Will utilize p.r.n. blood pressure medication only if patient's blood pressure greater than 180/110 and he develops symptoms such as worsening chest pain or shortness of breath.    Chronic combined systolic and diastolic heart failure  Patient is identified as having Combined Systolic and Diastolic heart failure  that is Chronic. CHF is currently controlled. Latest ECHO performed and demonstrates- Results for orders placed during the hospital encounter of 01/23/24    Echo    Interpretation Summary    Left Ventricle: The left ventricle is normal in size. Mildly increased wall thickness. Severe global hypokinesis present. There is severely reduced systolic function with a visually estimated ejection fraction of 20 - 25%. Ejection fraction by visual approximation is 20%. Global longitudinal strain is -8%. Reduced. There is diastolic dysfunction.    Right Ventricle: Normal right ventricular cavity size. Systolic function is reduced.TAPSE is 1.90 cm.    Left Atrium: Left atrium is moderately dilated.    Aortic Valve: The aortic valve is a trileaflet valve. Moderately restricted motion. There is severe stenosis. Aortic valve area by VTI is 0.93 cm². Aortic valve peak velocity is 2.75 m/s. Mean gradient is 18 mmHg. The dimensionless index is 0.23. There is mild aortic regurgitation.    Mitral Valve: There is moderate regurgitation.    IVC/SVC: Normal venous pressure at 3 mmHg.    Pericardium: There is a small effusion. No indication of cardiac tamponade.  . Continue Beta Blocker and monitor clinical status closely. Monitor on telemetry. Patient is off CHF pathway.  Monitor strict Is&Os and daily weights.  Cardiology is not consulted. Continue to stress to patient importance of self efficacy and  on diet for CHF. Last BNP reviewed- and noted below   Recent Labs   Lab 07/01/24  0419   *   .    Hold chronic diuretics for now 2/2 ISABELL.  Gentle hydration with caution.        Abdominal aortic aneurysm (AAA) 30 to 34 mm in diameter  Infrarenal abdominal aortic aneurysm, mildly increased in size seen on CT  Will need OP follow up      Severe Sepsis         This patient does have evidence of infective focus  My overall impression is severe sepsis.  Source: Abdominal  Antibiotics given-   Antibiotics (72h ago, onward)      Start      Stop Route Frequency Ordered    07/01/24 0915  piperacillin-tazobactam 4.5 g in dextrose 5 % 100 mL IVPB (ready to mix)         -- IV Every 8 hours (non-standard times) 07/01/24 0802          Latest lactate reviewed-  Recent Labs   Lab 07/01/24  0855   LACTATE 1.5     Organ dysfunction indicated by Acute kidney injury    Fluid challenge Contraindicated- Fluid bolus is contraindicated in this patient due to Congestive Heart Failure     Post- resuscitation assessment No - Post resuscitation assessment not needed       VTE Risk Mitigation (From admission, onward)           Ordered     Reason for No Pharmacological VTE Prophylaxis  Once        Comments: Pending general surgery eval   Question:  Reasons:  Answer:  Risk of Bleeding    07/01/24 0813     IP VTE HIGH RISK PATIENT  Once         07/01/24 0813     Place sequential compression device  Until discontinued         07/01/24 0813                            RUCHI Andino  Department of Hospital Medicine  [facility] - Emergency Dept

## 2024-07-01 NOTE — ASSESSMENT & PLAN NOTE
Infrarenal abdominal aortic aneurysm, mildly increased in size seen on CT  Will need OP follow up

## 2024-07-01 NOTE — PROGRESS NOTES
Pt reports having normal bowel movement in toilet. Pt ambulates without assistance to restroom. Dr. Judge came to bedside. Pt started on clears, instructed to stop if nausea or abdominal pain resumes.

## 2024-07-01 NOTE — SUBJECTIVE & OBJECTIVE
Past Medical History:   Diagnosis Date    Cancer     prostate    Hypertension     Kidney problem     only 1 kidney functions    Liver disease     Umbilical hernia        Past Surgical History:   Procedure Laterality Date    colon repair      after prostatectomy    LYSIS OF ADHESIONS N/A 10/18/2018    Procedure: LYSIS, ADHESIONS;  Surgeon: Rancho Mariee MD;  Location: [address];  Service: General;  Laterality: N/A;    PROSTATECTOMY      PROSTATECTOMY  2010    REPAIR OF RECURRENT INCISIONAL HERNIA N/A 10/18/2018    Procedure: REPAIR, HERNIA, INCISIONAL -multiple;  Surgeon: Rancho Mariee MD;  Location: [address];  Service: General;  Laterality: N/A;       Review of patient's allergies indicates:  No Known Allergies    No current facility-administered medications on file prior to encounter.     Current Outpatient Medications on File Prior to Encounter   Medication Sig    ALPRAZolam (XANAX) 1 MG tablet Take 0.5-1 mg by mouth 2 (two) times daily as needed.    aspirin (ECOTRIN) 81 MG EC tablet Take 81 mg by mouth once daily.    atorvastatin (LIPITOR) 40 MG tablet Take 40 mg by mouth once daily.    B-complex with vitamin C tablet Take 1 tablet by mouth once daily.      citalopram (CELEXA) 20 MG tablet Take 20 mg by mouth every morning.    clopidogreL (PLAVIX) 75 mg tablet Take 75 mg by mouth once daily.    cyproheptadine (PERIACTIN) 4 mg tablet Take 4 mg by mouth 2 (two) times daily.    furosemide (LASIX) 40 MG tablet Take 40 mg by mouth 2 (two) times daily.    HYDROcodone-acetaminophen (NORCO) 5-325 mg per tablet Take 1 tablet by mouth every 6 (six) hours.    losartan (COZAAR) 100 MG tablet Take 100 mg by mouth once daily.    metoprolol tartrate (LOPRESSOR) 25 MG tablet Take 25 mg by mouth once daily.    mirtazapine (REMERON) 45 MG tablet Take 45 mg by mouth every evening.    multivitamin,therapeutic (THERA ORAL) Take 1 tablet by mouth once daily.    spironolactone (ALDACTONE) 25 MG tablet Take  12.5 mg by mouth 2 (two) times daily.    vitamin D (VITAMIN D3) 1000 units Tab Take 25 mcg by mouth once daily.    albuterol (PROVENTIL/VENTOLIN HFA) 90 mcg/actuation inhaler Inhale 1-2 puffs into the lungs every 6 (six) hours as needed for Shortness of Breath. Rescue    [DISCONTINUED] amLODIPine (NORVASC) 10 MG tablet     [DISCONTINUED] amLODIPine (NORVASC) 5 MG tablet Take 5 mg by mouth 2 (two) times daily.    [DISCONTINUED] benzonatate (TESSALON) 100 MG capsule Take 100 mg by mouth 3 (three) times daily as needed.    [DISCONTINUED] BRILINTA 90 mg tablet Take 90 mg by mouth 2 (two) times daily.    [DISCONTINUED] losartan-hydrochlorothiazide (HYZAAR) 100-12.5 mg per tablet Take 1 tablet by mouth once daily.      [DISCONTINUED] pravastatin (PRAVACHOL) 20 MG tablet Take 20 mg by mouth once daily.    [DISCONTINUED] rivaroxaban (XARELTO) 2.5 mg Tab Take 2.5 mg by mouth 2 (two) times daily.     Family History       Problem Relation (Age of Onset)    Diabetes Sister    Heart disease Mother, Father          Tobacco Use    Smoking status: Former     Current packs/day: 0.00     Types: Cigarettes     Quit date: 2/15/1972     Years since quittin.4    Smokeless tobacco: Never   Substance and Sexual Activity    Alcohol use: No    Drug use: No    Sexual activity: Never     Review of Systems   Constitutional:  Negative for chills and fever.   Gastrointestinal:  Positive for abdominal distention, abdominal pain, nausea and vomiting.   Genitourinary:  Negative for dysuria and flank pain.   Musculoskeletal:  Negative for back pain and neck pain.   Neurological:  Negative for syncope and headaches.     Objective:     Vital Signs (Most Recent):  Temp: 97.5 °F (36.4 °C) (24 0412)  Pulse: 99 (24 0932)  Resp: 16 (24 0942)  BP: 107/66 (24 0932)  SpO2: 96 % (24 0947) Vital Signs (24h Range):  Temp:  [97.5 °F (36.4 °C)] 97.5 °F (36.4 °C)  Pulse:  [] 99  Resp:  [13-25] 16  SpO2:  [93 %-100 %] 96  %  BP: (102-128)/(58-76) 107/66     Weight: 86.2 kg (190 lb)  Body mass index is 25.07 kg/m².     Physical Exam  Constitutional:       General: He is not in acute distress.     Appearance: He is well-developed. He is not toxic-appearing.   HENT:      Head: Normocephalic and atraumatic.   Eyes:      Conjunctiva/sclera: Conjunctivae normal.      Pupils: Pupils are equal, round, and reactive to light.   Cardiovascular:      Rate and Rhythm: Regular rhythm. Tachycardia present.      Heart sounds: Normal heart sounds.   Pulmonary:      Effort: Pulmonary effort is normal. No respiratory distress.      Breath sounds: Normal breath sounds.   Abdominal:      General: Bowel sounds are decreased. There is distension.      Palpations: Abdomen is soft.      Tenderness: There is abdominal tenderness (mild LUQ). There is no guarding.   Musculoskeletal:         General: Normal range of motion.      Cervical back: Normal range of motion and neck supple.      Right lower leg: No edema.      Left lower leg: No edema.   Skin:     General: Skin is warm and dry.   Neurological:      General: No focal deficit present.      Mental Status: He is alert and oriented to person, place, and time.   Psychiatric:         Mood and Affect: Mood normal.         Behavior: Behavior normal.         Thought Content: Thought content normal.              CRANIAL NERVES     CN III, IV, VI   Pupils are equal, round, and reactive to light.       Significant Labs: All pertinent labs within the past 24 hours have been reviewed.  CBC:   Recent Labs   Lab 07/01/24 0419   WBC 15.84*   HGB 17.7   HCT 52.8        CMP:   Recent Labs   Lab 07/01/24 0419      K 4.6   CL 99   CO2 31*   *   BUN 56*   CREATININE 2.1*   CALCIUM 9.1   PROT 7.1   ALBUMIN 4.2   BILITOT 0.8   ALKPHOS 143*   AST 27   ALT 27   ANIONGAP 9     Troponin:   Recent Labs   Lab 07/01/24 0419 07/01/24  0613   TROPONINIHS 36.4* 45.8*     Urine Studies:   Recent Labs   Lab  07/01/24  0609   COLORU Yellow   APPEARANCEUA Clear   PHUR 6.0   SPECGRAV 1.015   PROTEINUA Negative   GLUCUA Negative   KETONESU Negative   BILIRUBINUA Negative   OCCULTUA Negative   NITRITE Negative   UROBILINOGEN Negative   LEUKOCYTESUR Negative       Significant Imaging: I have reviewed all pertinent imaging results/findings within the past 24 hours.  CT abd/pelvis:    Abnormal small bowel pattern consistent with small bowel obstructive process as discussed above, the pattern is concerning for either a high-grade partial obstruction or early complete obstruction.  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     Indeterminate lesion of the right kidney, ultrasound follow-up is recommended.     Small pancreatic lesion.  Dedicated pancreatic protocol MRI follow-up is recommended.     Distention of the esophagus with fluid may relate to reflux and or stasis.     3.8 mm pulmonary nodule at the left lung base.  For a solid nodule <6 mm, Fleischner Society 2017 guidelines recommend no routine follow up for a low risk patient, or follow-up with non-contrast chest CT at 12 months in a high risk patient.     Infrarenal abdominal aortic aneurysm, mildly increased in size, follow-up is recommended.     Left inguinal hernia including a portion of the urinary bladder.      CXR: I have reviewed all pertinent results/findings within the past 24 hours and my personal findings are:  no acute findings

## 2024-07-01 NOTE — ASSESSMENT & PLAN NOTE
This patient does have evidence of infective focus  My overall impression is  severe sepsis .  Source: Abdominal  Antibiotics given-   Antibiotics (72h ago, onward)      Start     Stop Route Frequency Ordered    07/01/24 0915  piperacillin-tazobactam 4.5 g in dextrose 5 % 100 mL IVPB (ready to mix)         -- IV Every 8 hours (non-standard times) 07/01/24 0802          Latest lactate reviewed-  Recent Labs   Lab 07/01/24  0855   LACTATE 1.5     Organ dysfunction indicated by Acute kidney injury    Fluid challenge Contraindicated- Fluid bolus is contraindicated in this patient due to Congestive Heart Failure     Post- resuscitation assessment No - Post resuscitation assessment not needed       Will Not start Pressors- Levophed for MAP of 65  Source control achieved by: IV abx  Zosyn dc'd on 7/2/24

## 2024-07-01 NOTE — HPI
Yobani Rios is an 84-year-old male with a past medical history significant for prostate cancer status post prostatectomy, coronary artery disease, systolic heart failure, AAA, diffuse large cell lymphoma, CKD 3, obstructive sleep apnea, and hypertension who presented to the emergency department with left upper quadrant abdominal pain which began last night around 9:00 p.m..  Sharp nonradiating pain was accompanied by nausea and vomiting.  Denies recent fever, chest pain, diarrhea, dysuria or any other symptoms.  On assessment this morning patient says his pain is much better and his nausea has resolved.  CT abdomen and pelvis was consistent with small-bowel obstruction.  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  Labs are significant for leukocytosis and ISABELL.  Urinalysis is negative for infection.  Troponin is elevated but appears to be consistent with most recent troponins.  Denies chest pain, no ST elevation on EKG.  Patient takes Xarelto, Brilinta, and aspirin daily-last dose yesterday morning (6/30).  He has admitted to the service of hospital Medicine for continued monitoring and treatment.On 7/2/24, pt with hypophosphatemia and hypocalcemia. Pt tx with oral sodium phosphate and tums. Pt had a KUB this morning that is negative for SBO. Pt had a BM last night and this morning. Pt tolerated po intake well. I discussed pts case with Dr Pollack. Ok to dc pt home. Pts condition improved and pt stable. Pt will be discharged to his home with his family. Pt is to follow up with his PCP in 7-10 days

## 2024-07-01 NOTE — PT/OT/SLP EVAL
Physical Therapy Evaluation and Discharge Note    Patient Name:  Yobani Rios   MRN:  [id number]    Recommendations:     Discharge Recommendations: No Therapy Indicated  Discharge Equipment Recommendations: none   Barriers to discharge: None    Assessment:     Yobani Rios is a 84 y.o. male admitted with a medical diagnosis of Small bowel obstruction. .  At this time, patient is functioning at his prior level of function and does not require further acute PT services.     Recent Surgery: * No surgery found *      Plan:     During this hospitalization, patient does not require further acute PT services.  Please re-consult if situation changes.      Subjective     Chief Complaint: None  . No pain at time of PT eval.  Patient/Family Comments/goals: Return home with his girlfriend  Pain/Comfort:  Pain Rating 1: 0/10    Patients cultural, spiritual, Church conflicts given the current situation:      Living Environment:  Pt lives wit his girlfriend in a [facility] [address] railing  Prior to admission, patients level of function was independent .  Equipment used at home: none.  DME owned (not currently used): rolling walker.  Upon discharge, patient will have assistance from his girlfriend.    Objective:     Communicated with nurse  prior to session.  Patient found supine with   upon PT entry to room.    General Precautions: Standard,      Orthopedic Precautions:    Braces:    Respiratory Status: Room air    Exams:  Cognitive Exam:  Patient is oriented to Person, Place, Time, and Situation  RLE ROM: WFL  RLE Strength: WFL  LLE ROM: WFL  LLE Strength: WFL    Functional Mobility:  Bed Mobility:     Supine to Sit: modified independence  Sit to Supine: modified independence  Transfers:     Sit to Stand:  modified independence with no AD  Gait: 50 ft No AD, 50 ft with RW and Supervision . Increased stability with RW. He did  Require verbal cueing for negotiating turns with RW    AM-PAC 6 CLICK MOBILITY  Total Score:         Treatment and Education:  Pt was encouraged to use RW at  home for increased stability.     AM-PAC 6 CLICK MOBILITY  Total Score:      Patient left supine with all lines intact and call button in reach.    GOALS:   Multidisciplinary Problems       Physical Therapy Goals       Not on file                    History:     Past Medical History:   Diagnosis Date    Cancer     prostate    Hypertension     Kidney problem     only 1 kidney functions    Liver disease     Umbilical hernia        Past Surgical History:   Procedure Laterality Date    colon repair      after prostatectomy    LYSIS OF ADHESIONS N/A 10/18/2018    Procedure: LYSIS, ADHESIONS;  Surgeon: Rancho Mariee MD;  Location: [address];  Service: General;  Laterality: N/A;    PROSTATECTOMY      PROSTATECTOMY  2010    REPAIR OF RECURRENT INCISIONAL HERNIA N/A 10/18/2018    Procedure: REPAIR, HERNIA, INCISIONAL -multiple;  Surgeon: Rancho Mariee MD;  Location: [address];  Service: General;  Laterality: N/A;       Time Tracking:     PT Received On: 07/01/24  PT Start Time: 0927     PT Stop Time: 0941  PT Total Time (min): 14 min     Billable Minutes: Evaluation 5 minutes  and Gait Training 9 minutes      07/01/2024

## 2024-07-02 VITALS
HEIGHT: 73 IN | OXYGEN SATURATION: 97 % | TEMPERATURE: 97 F | HEART RATE: 45 BPM | WEIGHT: 190.69 LBS | SYSTOLIC BLOOD PRESSURE: 116 MMHG | BODY MASS INDEX: 25.27 KG/M2 | DIASTOLIC BLOOD PRESSURE: 71 MMHG | RESPIRATION RATE: 17 BRPM

## 2024-07-02 PROBLEM — K56.609 SMALL BOWEL OBSTRUCTION: Status: RESOLVED | Noted: 2018-10-17 | Resolved: 2024-07-02

## 2024-07-02 LAB
ALBUMIN SERPL BCP-MCNC: 3.5 G/DL (ref 3.5–5.2)
ALP SERPL-CCNC: 119 U/L (ref 55–135)
ALT SERPL W/O P-5'-P-CCNC: 19 U/L (ref 10–44)
ANION GAP SERPL CALC-SCNC: 9 MMOL/L (ref 8–16)
AST SERPL-CCNC: 31 U/L (ref 10–40)
BASOPHILS # BLD AUTO: 0.07 K/UL (ref 0–0.2)
BASOPHILS NFR BLD: 0.8 % (ref 0–1.9)
BILIRUB SERPL-MCNC: 1.1 MG/DL (ref 0.1–1)
BUN SERPL-MCNC: 44 MG/DL (ref 8–23)
CALCIUM SERPL-MCNC: 7.9 MG/DL (ref 8.7–10.5)
CHLORIDE SERPL-SCNC: 107 MMOL/L (ref 95–110)
CO2 SERPL-SCNC: 23 MMOL/L (ref 23–29)
CREAT SERPL-MCNC: 1.7 MG/DL (ref 0.5–1.4)
DIFFERENTIAL METHOD BLD: ABNORMAL
EOSINOPHIL # BLD AUTO: 0.7 K/UL (ref 0–0.5)
EOSINOPHIL NFR BLD: 8 % (ref 0–8)
ERYTHROCYTE [DISTWIDTH] IN BLOOD BY AUTOMATED COUNT: 12.9 % (ref 11.5–14.5)
EST. GFR  (NO RACE VARIABLE): 39.3 ML/MIN/1.73 M^2
GLUCOSE SERPL-MCNC: 128 MG/DL (ref 70–110)
HCT VFR BLD AUTO: 49.9 % (ref 40–54)
HGB BLD-MCNC: 16.3 G/DL (ref 14–18)
IMM GRANULOCYTES # BLD AUTO: 0.04 K/UL (ref 0–0.04)
IMM GRANULOCYTES NFR BLD AUTO: 0.4 % (ref 0–0.5)
LYMPHOCYTES # BLD AUTO: 2 K/UL (ref 1–4.8)
LYMPHOCYTES NFR BLD: 22.3 % (ref 18–48)
MAGNESIUM SERPL-MCNC: 2.3 MG/DL (ref 1.6–2.6)
MCH RBC QN AUTO: 31.9 PG (ref 27–31)
MCHC RBC AUTO-ENTMCNC: 32.7 G/DL (ref 32–36)
MCV RBC AUTO: 98 FL (ref 82–98)
MONOCYTES # BLD AUTO: 0.7 K/UL (ref 0.3–1)
MONOCYTES NFR BLD: 8.3 % (ref 4–15)
NEUTROPHILS # BLD AUTO: 5.4 K/UL (ref 1.8–7.7)
NEUTROPHILS NFR BLD: 60.2 % (ref 38–73)
NRBC BLD-RTO: 0 /100 WBC
PHOSPHATE SERPL-MCNC: 2.2 MG/DL (ref 2.7–4.5)
PLATELET # BLD AUTO: 194 K/UL (ref 150–450)
PMV BLD AUTO: 10.1 FL (ref 9.2–12.9)
POTASSIUM SERPL-SCNC: 4.7 MMOL/L (ref 3.5–5.1)
PROT SERPL-MCNC: 5.9 G/DL (ref 6–8.4)
RBC # BLD AUTO: 5.11 M/UL (ref 4.6–6.2)
SODIUM SERPL-SCNC: 139 MMOL/L (ref 136–145)
WBC # BLD AUTO: 8.93 K/UL (ref 3.9–12.7)

## 2024-07-02 PROCEDURE — 84100 ASSAY OF PHOSPHORUS: CPT | Performed by: NURSE PRACTITIONER

## 2024-07-02 PROCEDURE — 99900035 HC TECH TIME PER 15 MIN (STAT)

## 2024-07-02 PROCEDURE — 25000003 PHARM REV CODE 250: Performed by: NURSE PRACTITIONER

## 2024-07-02 PROCEDURE — 80053 COMPREHEN METABOLIC PANEL: CPT | Performed by: NURSE PRACTITIONER

## 2024-07-02 PROCEDURE — 94760 N-INVAS EAR/PLS OXIMETRY 1: CPT

## 2024-07-02 PROCEDURE — 36415 COLL VENOUS BLD VENIPUNCTURE: CPT | Performed by: NURSE PRACTITIONER

## 2024-07-02 PROCEDURE — 85025 COMPLETE CBC W/AUTO DIFF WBC: CPT | Performed by: NURSE PRACTITIONER

## 2024-07-02 PROCEDURE — 63600175 PHARM REV CODE 636 W HCPCS: Performed by: NURSE PRACTITIONER

## 2024-07-02 PROCEDURE — 97535 SELF CARE MNGMENT TRAINING: CPT

## 2024-07-02 PROCEDURE — 99900031 HC PATIENT EDUCATION (STAT)

## 2024-07-02 PROCEDURE — 97165 OT EVAL LOW COMPLEX 30 MIN: CPT

## 2024-07-02 PROCEDURE — 83735 ASSAY OF MAGNESIUM: CPT | Performed by: NURSE PRACTITIONER

## 2024-07-02 RX ORDER — CALCIUM GLUCONATE 20 MG/ML
1 INJECTION, SOLUTION INTRAVENOUS ONCE
Status: DISCONTINUED | OUTPATIENT
Start: 2024-07-02 | End: 2024-07-02 | Stop reason: HOSPADM

## 2024-07-02 RX ORDER — CALCIUM CARBONATE 200(500)MG
1000 TABLET,CHEWABLE ORAL ONCE
Status: COMPLETED | OUTPATIENT
Start: 2024-07-02 | End: 2024-07-02

## 2024-07-02 RX ORDER — METOPROLOL SUCCINATE 25 MG/1
12.5 TABLET, EXTENDED RELEASE ORAL DAILY
Qty: 45 TABLET | Refills: 3 | Status: SHIPPED | OUTPATIENT
Start: 2024-07-02 | End: 2025-07-02

## 2024-07-02 RX ADMIN — CITALOPRAM HYDROBROMIDE 20 MG: 20 TABLET ORAL at 08:07

## 2024-07-02 RX ADMIN — PIPERACILLIN SODIUM AND TAZOBACTAM SODIUM 4.5 G: 4; .5 INJECTION, POWDER, LYOPHILIZED, FOR SOLUTION INTRAVENOUS at 04:07

## 2024-07-02 RX ADMIN — AMLODIPINE BESYLATE 5 MG: 5 TABLET ORAL at 08:07

## 2024-07-02 RX ADMIN — METOPROLOL TARTRATE 25 MG: 25 TABLET, FILM COATED ORAL at 08:07

## 2024-07-02 RX ADMIN — CALCIUM CARBONATE 1000 MG: 500 TABLET, CHEWABLE ORAL at 12:07

## 2024-07-02 RX ADMIN — POTASSIUM & SODIUM PHOSPHATES POWDER PACK 280-160-250 MG 2 PACKET: 280-160-250 PACK at 11:07

## 2024-07-02 RX ADMIN — ATORVASTATIN CALCIUM 40 MG: 40 TABLET, FILM COATED ORAL at 08:07

## 2024-07-02 NOTE — PLAN OF CARE
Problem: Adult Inpatient Plan of Care  Goal: Absence of Hospital-Acquired Illness or Injury  Outcome: Adequate for Care Transition

## 2024-07-02 NOTE — PLAN OF CARE
Problem: Adult Inpatient Plan of Care  Goal: Plan of Care Review  Outcome: Met  Goal: Patient-Specific Goal (Individualized)  Outcome: Met  Goal: Absence of Hospital-Acquired Illness or Injury  Outcome: Met  Goal: Optimal Comfort and Wellbeing  Outcome: Met  Goal: Readiness for Transition of Care  Outcome: Met     Problem: Sepsis/Septic Shock  Goal: Optimal Coping  Outcome: Met  Goal: Absence of Bleeding  Outcome: Met  Goal: Blood Glucose Level Within Targeted Range  Outcome: Met  Goal: Absence of Infection Signs and Symptoms  Outcome: Met  Goal: Optimal Nutrition Intake  Outcome: Met     Problem: Acute Kidney Injury/Impairment  Goal: Fluid and Electrolyte Balance  Outcome: Met  Goal: Improved Oral Intake  Outcome: Met  Goal: Effective Renal Function  Outcome: Met

## 2024-07-02 NOTE — CARE UPDATE
07/02/24 0741   Patient Assessment/Suction   Level of Consciousness (AVPU) alert   Respiratory Effort Normal;Unlabored   Expansion/Accessory Muscles/Retractions no use of accessory muscles;no retractions;expansion symmetric   All Lung Fields Breath Sounds clear;diminished   Rhythm/Pattern, Respiratory unlabored;depth regular;pattern regular   Cough Frequency no cough   PRE-TX-O2   Device (Oxygen Therapy) room air   SpO2 95 %   Pulse Oximetry Type Intermittent   $ Pulse Oximetry - Single Charge Pulse Oximetry - Single   Pulse (!) 50   Resp 16   Aerosol Therapy   $ Aerosol Therapy Charges PRN treatment not required   Education   $ Education 15 min;Other (see comment)  (sats)

## 2024-07-02 NOTE — PLAN OF CARE
Discharge orders and chart reviewed. No other discharge needs noted at this time. Pt is clear for discharge from case management. Pt is discharging to home.    PCP appointment scheduled within 7 days per PCP clinic request - appt added to AVS    Family to transport home     07/02/24 1320   Final Note   Assessment Type Final Discharge Note   Anticipated Discharge Disposition Home   What phone number can be called within the next 1-3 days to see how you are doing after discharge? [phone]   Hospital Resources/Appts/Education Provided Appointments scheduled and added to AVS

## 2024-07-02 NOTE — PLAN OF CARE
Discharge orders and chart reviewed. No other discharge needs noted at this time. Pt is clear for discharge from case management. Pt is discharging to home.    PCP appointment scheduled within 7 days per PCP clinic request - appt added to AVS    Family to transport home     07/02/24 1315   Final Note   Assessment Type Final Discharge Note   Anticipated Discharge Disposition Home   What phone number can be called within the next 1-3 days to see how you are doing after discharge? [phone]   Hospital Resources/Appts/Education Provided Appointments scheduled and added to AVS

## 2024-07-02 NOTE — HOSPITAL COURSE
Yobani Rios is an 84-year-old male with a past medical history significant for prostate cancer status post prostatectomy, coronary artery disease, systolic heart failure, AAA, diffuse large cell lymphoma, CKD 3, obstructive sleep apnea, and hypertension who presented to the emergency department with left upper quadrant abdominal pain which began last night around 9:00 p.m..  Sharp nonradiating pain was accompanied by nausea and vomiting.  Denies recent fever, chest pain, diarrhea, dysuria or any other symptoms.  On assessment this morning patient says his pain is much better and his nausea has resolved.  CT abdomen and pelvis was consistent with small-bowel obstruction.  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  Labs are significant for leukocytosis and ISABELL.  Urinalysis is negative for infection.  Troponin is elevated but appears to be consistent with most recent troponins.  Denies chest pain, no ST elevation on EKG.  Patient takes Xarelto, Brilinta, and aspirin daily-last dose yesterday morning (6/30).  He has admitted to the service of hospital Medicine for continued monitoring and treatment. On 7/2/24, pt found to be hypophosphatemia and  hypocalcemia. Pt is being tx with sodium phosphate and tums. pt had a KUB that is negative for SBO. Pt had a BM last night and this morning. Pt tolerated po intake. No c/o abdominal pain, nausea or vomiting. I discussed pts case with Dr Pollack. Pt is stable for discharge. Pts condition improved and pt is stable. Pt is to follow up with his PCP in 1 week.

## 2024-07-02 NOTE — ASSESSMENT & PLAN NOTE
Chronic, controlled. Latest blood pressure and vitals reviewed-     Temp:  [97.3 °F (36.3 °C)-98.1 °F (36.7 °C)]   Pulse:  [45-64]   Resp:  [16-17]   BP: (116-164)/(66-83)   SpO2:  [95 %-98 %] .   Home meds for hypertension were reviewed and noted below.   Hypertension Medications               furosemide (LASIX) 40 MG tablet Take 40 mg by mouth 2 (two) times daily.    losartan (COZAAR) 100 MG tablet Take 100 mg by mouth once daily.    metoprolol tartrate (LOPRESSOR) 25 MG tablet Take 25 mg by mouth once daily.    spironolactone (ALDACTONE) 25 MG tablet Take 12.5 mg by mouth 2 (two) times daily.            While in the hospital, will manage blood pressure as follows; Adjust home antihypertensive regimen as follows- Hold chronic ARB and diuretics in the setting of ISABELL and resume when appropriate.    Will utilize p.r.n. blood pressure medication only if patient's blood pressure greater than 180/110 and he develops symptoms such as worsening chest pain or shortness of breath.

## 2024-07-02 NOTE — NURSING
Discharge instructions reviewed with.  IV removed without difficulty, catheter intact.  Pt left unit in stable condition via wheelchair to personal transportation.

## 2024-07-02 NOTE — ASSESSMENT & PLAN NOTE
Patient is identified as having Combined Systolic and Diastolic heart failure that is Chronic. CHF is currently controlled. Latest ECHO performed and demonstrates- Results for orders placed during the hospital encounter of 01/23/24    Echo    Interpretation Summary    Left Ventricle: The left ventricle is normal in size. Mildly increased wall thickness. Severe global hypokinesis present. There is severely reduced systolic function with a visually estimated ejection fraction of 20 - 25%. Ejection fraction by visual approximation is 20%. Global longitudinal strain is -8%. Reduced. There is diastolic dysfunction.    Right Ventricle: Normal right ventricular cavity size. Systolic function is reduced.TAPSE is 1.90 cm.    Left Atrium: Left atrium is moderately dilated.    Aortic Valve: The aortic valve is a trileaflet valve. Moderately restricted motion. There is severe stenosis. Aortic valve area by VTI is 0.93 cm². Aortic valve peak velocity is 2.75 m/s. Mean gradient is 18 mmHg. The dimensionless index is 0.23. There is mild aortic regurgitation.    Mitral Valve: There is moderate regurgitation.    IVC/SVC: Normal venous pressure at 3 mmHg.    Pericardium: There is a small effusion. No indication of cardiac tamponade.  . Continue Beta Blocker and monitor clinical status closely. Monitor on telemetry. Patient is off CHF pathway.  Monitor strict Is&Os and daily weights.  Cardiology is not consulted. Continue to stress to patient importance of self efficacy and  on diet for CHF. Last BNP reviewed- and noted below   Recent Labs   Lab 07/01/24  [id number]   *     .    Hold chronic diuretics for now 2/2 ISABELL.  Gentle hydration with caution.

## 2024-07-02 NOTE — DISCHARGE SUMMARY
[facility] Medicine  Discharge Summary      Patient Name: Yobani Rios  MRN: [id number]  [facility]: [id number]  Patient Class: IP- Inpatient  Admission Date: 7/1/2024  Hospital Length of Stay: 1 days  Discharge Date and Time:  07/02/2024 1:02 PM  Attending Physician: Benjy Pollack MD   Discharging Provider: Mary Garcia NP  Primary Care Provider: Justine Barragan FNP    Primary Care Team: Networked reference to record PCT     HPI:   Yobani Rios is an 84-year-old male with a past medical history significant for prostate cancer status post prostatectomy, coronary artery disease, systolic heart failure, AAA, diffuse large cell lymphoma, CKD 3, obstructive sleep apnea, and hypertension who presented to the emergency department with left upper quadrant abdominal pain which began last night around 9:00 p.m..  Sharp nonradiating pain was accompanied by nausea and vomiting.  Denies recent fever, chest pain, diarrhea, dysuria or any other symptoms.  On assessment this morning patient says his pain is much better and his nausea has resolved.  CT abdomen and pelvis was consistent with small-bowel obstruction.  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  Labs are significant for leukocytosis and ISABELL.  Urinalysis is negative for infection.  Troponin is elevated but appears to be consistent with most recent troponins.  Denies chest pain, no ST elevation on EKG.  Patient takes Xarelto, Brilinta, and aspirin daily-last dose yesterday morning (6/30).  He has admitted to the service of hospital Medicine for continued monitoring and treatment.On 7/2/24, pt with hypophosphatemia and hypocalcemia. Pt tx with oral sodium phosphate and tums. Pt had a KUB this morning that is negative for SBO. Pt had a BM last night and this morning.  Pt tolerated po intake well. I discussed pts case with Dr Pollack. Ok to dc pt home. Pts condition improved and pt stable. Pt will be discharged to his home with his family. Pt is to follow up with his PCP in 7-10 days       * No surgery found *      Hospital Course:   Yobani Rios is an 84-year-old male with a past medical history significant for prostate cancer status post prostatectomy, coronary artery disease, systolic heart failure, AAA, diffuse large cell lymphoma, CKD 3, obstructive sleep apnea, and hypertension who presented to the emergency department with left upper quadrant abdominal pain which began last night around 9:00 p.m..  Sharp nonradiating pain was accompanied by nausea and vomiting.  Denies recent fever, chest pain, diarrhea, dysuria or any other symptoms.  On assessment this morning patient says his pain is much better and his nausea has resolved.  CT abdomen and pelvis was consistent with small-bowel obstruction.  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  Labs are significant for leukocytosis and ISABELL.  Urinalysis is negative for infection.  Troponin is elevated but appears to be consistent with most recent troponins.  Denies chest pain, no ST elevation on EKG.  Patient takes Xarelto, Brilinta, and aspirin daily-last dose yesterday morning (6/30).  He has admitted to the service of hospital Medicine for continued monitoring and treatment. On 7/2/24, pt found to be hypophosphatemia and  hypocalcemia. Pt is being tx with sodium phosphate and tums. pt had a KUB that is negative for SBO. Pt had a BM last night and this morning. Pt tolerated po intake. No c/o abdominal pain, nausea or vomiting. I discussed pts case with Dr Pollack. Pt is stable for discharge. Pts condition improved and pt is stable. Pt is to follow up with  his PCP in 1 week.      Goals of Care Treatment Preferences:  Code Status: Full Code      Consults:   Consults (From admission, onward)          Status Ordering Provider     Inpatient consult to General Surgery  Once        Provider:  Yuniel Judge Jr., MD    Completed RICK STAHL            Cardiac/Vascular  Essential hypertension  Chronic, controlled. Latest blood pressure and vitals reviewed-     Temp:  [97.3 °F (36.3 °C)-98.1 °F (36.7 °C)]   Pulse:  [45-64]   Resp:  [16-17]   BP: (116-164)/(66-83)   SpO2:  [95 %-98 %] .   Home meds for hypertension were reviewed and noted below.   Hypertension Medications               furosemide (LASIX) 40 MG tablet Take 40 mg by mouth 2 (two) times daily.    losartan (COZAAR) 100 MG tablet Take 100 mg by mouth once daily.    metoprolol tartrate (LOPRESSOR) 25 MG tablet Take 25 mg by mouth once daily.    spironolactone (ALDACTONE) 25 MG tablet Take 12.5 mg by mouth 2 (two) times daily.            While in the hospital, will manage blood pressure as follows; Adjust home antihypertensive regimen as follows- Hold chronic ARB and diuretics in the setting of ISABELL and resume when appropriate.    Will utilize p.r.n. blood pressure medication only if patient's blood pressure greater than 180/110 and he develops symptoms such as worsening chest pain or shortness of breath.    Chronic combined systolic and diastolic heart failure  Patient is identified as having Combined Systolic and Diastolic heart failure that is Chronic. CHF is currently controlled. Latest ECHO performed and demonstrates- Results for orders placed during the hospital encounter of 01/23/24    Echo    Interpretation Summary    Left Ventricle: The left ventricle is normal in size. Mildly increased wall thickness. Severe global hypokinesis present. There is severely reduced systolic function with a visually estimated ejection fraction of 20 - 25%. Ejection fraction by visual approximation is 20%. Global  longitudinal strain is -8%. Reduced. There is diastolic dysfunction.    Right Ventricle: Normal right ventricular cavity size. Systolic function is reduced.TAPSE is 1.90 cm.    Left Atrium: Left atrium is moderately dilated.    Aortic Valve: The aortic valve is a trileaflet valve. Moderately restricted motion. There is severe stenosis. Aortic valve area by VTI is 0.93 cm². Aortic valve peak velocity is 2.75 m/s. Mean gradient is 18 mmHg. The dimensionless index is 0.23. There is mild aortic regurgitation.    Mitral Valve: There is moderate regurgitation.    IVC/SVC: Normal venous pressure at 3 mmHg.    Pericardium: There is a small effusion. No indication of cardiac tamponade.  . Continue Beta Blocker and monitor clinical status closely. Monitor on telemetry. Patient is off CHF pathway.  Monitor strict Is&Os and daily weights.  Cardiology is not consulted. Continue to stress to patient importance of self efficacy and  on diet for CHF. Last BNP reviewed- and noted below   Recent Labs   Lab 07/01/24  0419   *     .    Hold chronic diuretics for now 2/2 ISABELL.  Gentle hydration with caution.        Abdominal aortic aneurysm (AAA) 30 to 34 mm in diameter  Infrarenal abdominal aortic aneurysm, mildly increased in size seen on CT  Will need OP follow up      Renal/  Acute renal failure superimposed on stage 3 chronic kidney disease  Patient with acute kidney injury/acute renal failure likely due to pre-renal azotemia due to IVVD.  Baseline creatinine  1.6  - Labs reviewed- Renal function/electrolytes with Estimated Creatinine Clearance: 36.6 mL/min (A) (based on SCr of 1.7 mg/dL (H)). according to latest data. Monitor urine output and serial BMP and adjust therapy as needed. Avoid nephrotoxins and renally dose meds for GFR listed above. Avoid using NSAIDs,      ID  Severe sepsis  This patient does have evidence of infective focus  My overall impression is  severe sepsis .  Source: Abdominal  Antibiotics  given-   Antibiotics (72h ago, onward)      Start     Stop Route Frequency Ordered    07/01/24 0915  piperacillin-tazobactam 4.5 g in dextrose 5 % 100 mL IVPB (ready to mix)         -- IV Every 8 hours (non-standard times) 07/01/24 0802          Latest lactate reviewed-  Recent Labs   Lab 07/01/24  0855   LACTATE 1.5     Organ dysfunction indicated by Acute kidney injury    Fluid challenge Contraindicated- Fluid bolus is contraindicated in this patient due to Congestive Heart Failure     Post- resuscitation assessment No - Post resuscitation assessment not needed       Will Not start Pressors- Levophed for MAP of 65  Source control achieved by: IV abx  Zosyn dc'd on 7/2/24    GI  Pancreatic lesion  Seen on CT.  MRI with pancreatic protocol needed once renal function improves.          Final Active Diagnoses:    Diagnosis Date Noted POA    Pancreatic lesion [K86.9] 07/01/2024 Yes    Pulmonary nodules [R91.8] 07/01/2024 Yes    Left inguinal hernia [K40.90] 07/01/2024 Yes    Severe sepsis [A41.9, R65.20] 07/01/2024 Yes    Acute renal failure superimposed on stage 3 chronic kidney disease [N17.9, N18.30] 01/23/2024 Yes    Essential hypertension [I10] 01/23/2024 Yes    Chronic combined systolic and diastolic heart failure [I50.42] 01/23/2024 Yes    Anxiety [F41.9] 01/23/2024 Yes    Abdominal aortic aneurysm (AAA) 30 to 34 mm in diameter [I71.40] 11/01/2018 Yes      Problems Resolved During this Admission:    Diagnosis Date Noted Date Resolved POA    PRINCIPAL PROBLEM:  Small bowel obstruction [K56.609] 10/17/2018 07/02/2024 Yes       Discharged Condition: stable    Disposition: Home or Self Care    Follow Up:   Follow-up Information       Justine Barragan FNP Follow up in 1 week(s).    Specialty: Family Medicine  Why: follow up in 7 days- Small pancreatic lesion.  Dedicated pancreatic protocol MRI follow-up is recommended.  Contact information:  [address] [place] [address] [place] [address] [phone]                            Patient Instructions:      Diet Cardiac     Notify your health care provider if you experience any of the following:  temperature >100.4     Notify your health care provider if you experience any of the following:  persistent nausea and vomiting or diarrhea     Notify your health care provider if you experience any of the following:  severe uncontrolled pain     Activity as tolerated   Order Comments: Use assistive device to assist with ambulation       Significant Diagnostic Studies: Labs: BMP:   Recent Labs   Lab 07/01/24 0419 07/02/24 0512   * 128*    139   K 4.6 4.7   CL 99 107   CO2 31* 23   BUN 56* 44*   CREATININE 2.1* 1.7*   CALCIUM 9.1 7.9*   MG 2.4 2.3    and CBC   Recent Labs   Lab 07/01/24 0419 07/02/24 0512   WBC 15.84* 8.93   HGB 17.7 16.3   HCT 52.8 49.9    194       Pending Diagnostic Studies:       None           Medications:  Reconciled Home Medications:      Medication List        START taking these medications      metoprolol succinate 25 MG 24 hr tablet  Commonly known as: TOPROL-XL  Take 0.5 tablets (12.5 mg total) by mouth once daily.            CONTINUE taking these medications      albuterol 90 mcg/actuation inhaler  Commonly known as: PROVENTIL/VENTOLIN HFA  Inhale 1-2 puffs into the lungs every 6 (six) hours as needed for Shortness of Breath. Rescue     ALPRAZolam 1 MG tablet  Commonly known as: XANAX  Take 0.5-1 mg by mouth 2 (two) times daily as needed.     aspirin 81 MG EC tablet  Commonly known as: ECOTRIN  Take 81 mg by mouth once daily.     atorvastatin 40 MG tablet  Commonly known as: LIPITOR  Take 40 mg by mouth once daily.     B-complex with vitamin C tablet  Commonly known as: Z-Bec or Equiv  Take 1 tablet by mouth once daily.     citalopram 20 MG tablet  Commonly known as: CeleXA  Take 20 mg by mouth every morning.     clopidogreL 75 mg tablet  Commonly known as: PLAVIX  Take 75 mg by mouth once daily.     cyproheptadine 4 mg tablet  Commonly  known as: PERIACTIN  Take 4 mg by mouth 2 (two) times daily.     furosemide 40 MG tablet  Commonly known as: LASIX  Take 40 mg by mouth 2 (two) times daily.     HYDROcodone-acetaminophen 5-325 mg per tablet  Commonly known as: NORCO  Take 1 tablet by mouth every 6 (six) hours.     losartan 100 MG tablet  Commonly known as: COZAAR  Take 100 mg by mouth once daily.     mirtazapine 45 MG tablet  Commonly known as: REMERON  Take 45 mg by mouth every evening.     spironolactone 25 MG tablet  Commonly known as: ALDACTONE  Take 12.5 mg by mouth 2 (two) times daily.     THERA ORAL  Take 1 tablet by mouth once daily.     vitamin D 1000 units Tab  Commonly known as: VITAMIN D3  Take 25 mcg by mouth once daily.            STOP taking these medications      metoprolol tartrate 25 MG tablet  Commonly known as: LOPRESSOR              Indwelling Lines/Drains at time of discharge:   Lines/Drains/Airways       None                   Time spent on the discharge of patient:  > 30  minutes         Mary Garcia NP  Department of Hospital Medicine  [facility]

## 2024-07-02 NOTE — CARE UPDATE
07/01/24 2146   Patient Assessment/Suction   Level of Consciousness (AVPU) alert   Respiratory Effort Normal;Unlabored   Expansion/Accessory Muscles/Retractions no use of accessory muscles;no retractions;expansion symmetric   Rhythm/Pattern, Respiratory unlabored;depth regular   Cough Frequency no cough   PRE-TX-O2   Device (Oxygen Therapy) room air   SpO2 96 %   Pulse Oximetry Type Intermittent   $ Pulse Oximetry - Multiple Charge Pulse Oximetry - Multiple   Pulse 64   Resp 17   Education   $ Education 15 min;Other (see comment)  (SATS)

## 2024-07-02 NOTE — ASSESSMENT & PLAN NOTE
Patient with acute kidney injury/acute renal failure likely due to pre-renal azotemia due to IVVD.  Baseline creatinine  1.6  - Labs reviewed- Renal function/electrolytes with Estimated Creatinine Clearance: 36.6 mL/min (A) (based on SCr of 1.7 mg/dL (H)). according to latest data. Monitor urine output and serial BMP and adjust therapy as needed. Avoid nephrotoxins and renally dose meds for GFR listed above. Avoid using NSAIDs,

## 2024-07-02 NOTE — NURSING
Monitor room called stating pt's heart rate dropped to 42, pt sinus rhythm 43.  Pt asymptomatic.  MD notified.

## 2024-07-02 NOTE — PT/OT/SLP EVAL
Occupational Therapy   Evaluation and Discharge Note    Name: Yobani Rios  MRN: [id number]  Admitting Diagnosis: Small bowel obstruction  Recent Surgery: * No surgery found *      Recommendations:     Discharge Recommendations: No Therapy Indicated  Discharge Equipment Recommendations: none  Barriers to discharge:  None    Assessment:     Yobani Rios is a 84 y.o. male with a medical diagnosis of Small bowel obstruction. At this time, patient is functioning at their prior level of function and does not require further acute OT services.     Plan:     During this hospitalization, patient does not require further acute OT services.  Please re-consult if situation changes.    Plan of Care Reviewed with: patient    Subjective     Chief Complaint: None stated   Patient/Family Comments/goals: to go home    Occupational Profile:  Living Environment: Pt lives with girlfriend in a [facility] with 5STE and bilateral rails.   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  Patient found supine with telemetry upon OT entry to room.    General Precautions: Standard, fall  Orthopedic Precautions: N/A  Braces: N/A  Respiratory Status: Room air     Occupational Performance:    Bed Mobility:    Patient completed Supine to Sit with independence  Patient completed Sit to Supine with independence    Functional Mobility/Transfers:  Patient completed Sit <> Stand Transfer with supervision  with  no assistive device   Functional Mobility: Pt ambulated around room with SBA and no AD. Pt experienced no LOB/SOB.    Activities of Daily Living:  Grooming: stand by assistance to complete oral care while standing at  sink with no AD.   Lower Body Dressing: supervision to adjust socks on BLE while seated EOB.   Toileting: supervision while standing for hygiene and clothing management.     Cognitive/Visual Perceptual:  Cognitive/Psychosocial Skills:     -       Follows Commands/attention:Follows multistep  commands  -       Communication: clear/fluent  -       Memory: No Deficits noted  -       Safety awareness/insight to disability: intact   -       Mood/Affect/Coping skills/emotional control: Appropriate to situation, Cooperative, and Pleasant    Physical Exam:  Balance:    -       Seated and Standing balance with supervision and no AD  Upper Extremity Range of Motion:     -       Right Upper Extremity: WFL  -       Left Upper Extremity: WFL  Upper Extremity Strength:    -       Right Upper Extremity: WFL  -       Left Upper Extremity: WFL   Strength:    -       Right Upper Extremity: WFL  -       Left Upper Extremity: WFL  Fine Motor Coordination:    -       Intact  Gross motor coordination:   WFL    AMPAC 6 Click ADL:  AMPAC Total Score: 24    Treatment & Education:  Pt educated on role of OT/POC, importance of OOB/EOB activity, use of call bell, and safety during ADLs, transfers, and functional mobility.      Patient left HOB elevated with all lines intact, call button in reach, and bed alarm on    GOALS:   Multidisciplinary Problems       Occupational Therapy Goals       Not on file                    History:     Past Medical History:   Diagnosis Date    Cancer     prostate    Hypertension     Kidney problem     only 1 kidney functions    Liver disease     Umbilical hernia          Past Surgical History:   Procedure Laterality Date    colon repair      after prostatectomy    LYSIS OF ADHESIONS N/A 10/18/2018    Procedure: LYSIS, ADHESIONS;  Surgeon: Rancho Mariee MD;  Location: [facility] OR [address] [place] [address];  Service: General;  Laterality: N/A;    PROSTATECTOMY      PROSTATECTOMY  2010    REPAIR OF RECURRENT INCISIONAL HERNIA  N/A 10/18/2018    Procedure: REPAIR, HERNIA, INCISIONAL -multiple;  Surgeon: Rancho Mariee MD;  Location: [facility] OR [address] [place] [address];  Service: General;  Laterality: N/A;       Time Tracking:     OT Date of Treatment: 07/02/24  OT Start Time: 0917  OT Stop Time: 0929  OT Total Time (min): 12 min    Billable Minutes:Evaluation 4  Self Care/Home Management 8    7/2/2024

## 2024-07-02 NOTE — NURSING
Nurses Note -- 4 Eyes      7/2/2024   3:37 AM      Skin assessed during: Admit      [x] No Altered Skin Integrity Present    []Prevention Measures Documented      [] Yes- Altered Skin Integrity Present or Discovered   [] LDA Added if Not in Epic (Describe Wound)   [] New Altered Skin Integrity was Present on Admit and Documented in LDA   [] Wound Image Taken    Wound Care Consulted? No    Attending Nurse:  Joaquin Garcia RN/Staff Member:  MIKA Fang

## 2024-07-03 NOTE — PLAN OF CARE
[facility]  Initial Discharge Assessment       Primary Care Provider: Justine Barragan FNP    Admission Diagnosis: Abdominal pain, unspecified abdominal location [R10.9]    Admission Date: 7/1/2024  Expected Discharge Date: 7/2/2024         Payor: HU[employer] MANAGED MEDICARE / Plan: HU[employer] MEDICARE HMO / Product Type: Capitation /     Extended Emergency Contact Information  Primary Emergency Contact: Yobani Rios  Home Phone: [phone]  Mobile Phone: [phone]  Relation: Son  Preferred language: English   needed? No  Secondary Emergency Contact: michael webber  Mobile Phone: [phone]  Relation: Other   needed? No    Discharge Plan A: (P) Home  Discharge Plan B: (P) Home with family      [facility] [address] [place] [id number]  Phone: [phone] Fax: [phone]

## 2024-07-06 LAB — BACTERIA BLD CULT: NORMAL

## 2024-07-08 NOTE — PLAN OF CARE
Through communication with [facility], the Inpatient order is being changed to observation as the payer will not authorize Inpatient and the facility agrees not to appeal or challenge the change in status. The account will be changed to Observation for billing purposes.       Nicole Anderson MD  Physician Advisor

## 2024-07-09 ENCOUNTER — PATIENT OUTREACH (OUTPATIENT)
Dept: ADMINISTRATIVE | Facility: CLINIC | Age: 84
End: 2024-07-09

## 2024-07-09 NOTE — PROGRESS NOTES
C3 nurse spoke with Yobani Rios for a TCC post hospital discharge follow up call. Pt states that he has a hospital follow up with RUCHI Aguilera today, 07/09/24 @ 1400.

## 2024-07-15 NOTE — PHYSICIAN QUERY
Due to the conflicting clinical picture, please clinically validate the diagnosis of Sepsis/Severe Sepsis. If validated, please provide additional clinical support for the diagnosis:   The condition is not confirmed and/or it has been ruled out - symptoms felt to be due to the small bowel obstruction, not sepsis

## 2024-07-20 LAB
OHS QRS DURATION: 162 MS
OHS QTC CALCULATION: 490 MS

## 2024-07-26 ENCOUNTER — TELEPHONE (OUTPATIENT)
Dept: CARDIAC REHAB | Facility: CLINIC | Age: 84
End: 2024-07-26

## 2024-07-26 NOTE — TELEPHONE ENCOUNTER
Received message from Mariaa Castillo about enrolling patient into cardiac rehab.  He lives in [place].  Letter regarding Phase II cardiac rehab was sent to patient, along with telephone # for [facility].    Payal Guardado RN  Cardiac Rehab Nurse

## 2024-09-30 PROBLEM — N17.9 ACUTE RENAL FAILURE SUPERIMPOSED ON STAGE 3 CHRONIC KIDNEY DISEASE: Status: RESOLVED | Noted: 2024-01-23 | Resolved: 2024-09-30

## 2024-09-30 PROBLEM — N18.30 ACUTE RENAL FAILURE SUPERIMPOSED ON STAGE 3 CHRONIC KIDNEY DISEASE: Status: RESOLVED | Noted: 2024-01-23 | Resolved: 2024-09-30

## 2024-09-30 PROBLEM — R65.20 SEVERE SEPSIS: Status: RESOLVED | Noted: 2024-07-01 | Resolved: 2024-09-30

## 2024-09-30 PROBLEM — A41.9 SEVERE SEPSIS: Status: RESOLVED | Noted: 2024-07-01 | Resolved: 2024-09-30

## 2024-10-06 ENCOUNTER — HOSPITAL ENCOUNTER (EMERGENCY)
Facility: HOSPITAL | Age: 84
Discharge: HOME | End: 2024-10-06
Attending: STUDENT IN AN ORGANIZED HEALTH CARE EDUCATION/TRAINING PROGRAM
Payer: MEDICARE

## 2024-10-06 VITALS
HEART RATE: 66 BPM | WEIGHT: 195 LBS | HEIGHT: 72 IN | BODY MASS INDEX: 26.41 KG/M2 | RESPIRATION RATE: 23 BRPM | OXYGEN SATURATION: 97 % | DIASTOLIC BLOOD PRESSURE: 60 MMHG | TEMPERATURE: 96 F | SYSTOLIC BLOOD PRESSURE: 138 MMHG

## 2024-10-06 DIAGNOSIS — N32.3 BLADDER DIVERTICULUM: ICD-10-CM

## 2024-10-06 DIAGNOSIS — K40.90 LEFT INGUINAL HERNIA: Primary | ICD-10-CM

## 2024-10-06 DIAGNOSIS — R10.9 ABDOMINAL PAIN: ICD-10-CM

## 2024-10-06 LAB
ALBUMIN SERPL BCP-MCNC: 3.6 G/DL (ref 3.5–5.2)
ALP SERPL-CCNC: 136 U/L (ref 55–135)
ALT SERPL W/O P-5'-P-CCNC: 34 U/L (ref 10–44)
ANION GAP SERPL CALC-SCNC: 13 MMOL/L (ref 8–16)
AST SERPL-CCNC: 33 U/L (ref 10–40)
BASOPHILS # BLD AUTO: 0.07 K/UL (ref 0–0.2)
BASOPHILS NFR BLD: 0.8 % (ref 0–1.9)
BILIRUB SERPL-MCNC: 0.5 MG/DL (ref 0.1–1)
BILIRUB UR QL STRIP: NEGATIVE
BUN SERPL-MCNC: 29 MG/DL (ref 8–23)
CALCIUM SERPL-MCNC: 9.3 MG/DL (ref 8.7–10.5)
CHLORIDE SERPL-SCNC: 108 MMOL/L (ref 95–110)
CLARITY UR: CLEAR
CO2 SERPL-SCNC: 22 MMOL/L (ref 23–29)
COLOR UR: YELLOW
CREAT SERPL-MCNC: 1.5 MG/DL (ref 0.5–1.4)
DIFFERENTIAL METHOD BLD: ABNORMAL
EOSINOPHIL # BLD AUTO: 0.4 K/UL (ref 0–0.5)
EOSINOPHIL NFR BLD: 4.1 % (ref 0–8)
ERYTHROCYTE [DISTWIDTH] IN BLOOD BY AUTOMATED COUNT: 12.1 % (ref 11.5–14.5)
EST. GFR  (NO RACE VARIABLE): 45.6 ML/MIN/1.73 M^2
GLUCOSE SERPL-MCNC: 178 MG/DL (ref 70–110)
GLUCOSE UR QL STRIP: NEGATIVE
HCT VFR BLD AUTO: 51.2 % (ref 40–54)
HGB BLD-MCNC: 17.2 G/DL (ref 14–18)
HGB UR QL STRIP: NEGATIVE
IMM GRANULOCYTES # BLD AUTO: 0.03 K/UL (ref 0–0.04)
IMM GRANULOCYTES NFR BLD AUTO: 0.3 % (ref 0–0.5)
KETONES UR QL STRIP: NEGATIVE
LACTATE SERPL-SCNC: 1.5 MMOL/L (ref 0.5–2.2)
LEUKOCYTE ESTERASE UR QL STRIP: NEGATIVE
LIPASE SERPL-CCNC: <3 U/L (ref 4–60)
LYMPHOCYTES # BLD AUTO: 2.6 K/UL (ref 1–4.8)
LYMPHOCYTES NFR BLD: 29.4 % (ref 18–48)
MCH RBC QN AUTO: 31.7 PG (ref 27–31)
MCHC RBC AUTO-ENTMCNC: 33.6 G/DL (ref 32–36)
MCV RBC AUTO: 95 FL (ref 82–98)
MONOCYTES # BLD AUTO: 0.6 K/UL (ref 0.3–1)
MONOCYTES NFR BLD: 7 % (ref 4–15)
NEUTROPHILS # BLD AUTO: 5.2 K/UL (ref 1.8–7.7)
NEUTROPHILS NFR BLD: 58.4 % (ref 38–73)
NITRITE UR QL STRIP: NEGATIVE
NRBC BLD-RTO: 0 /100 WBC
PH UR STRIP: 5 [PH] (ref 5–8)
PLATELET # BLD AUTO: 211 K/UL (ref 150–450)
PMV BLD AUTO: 9.2 FL (ref 9.2–12.9)
POTASSIUM SERPL-SCNC: 4.5 MMOL/L (ref 3.5–5.1)
PROT SERPL-MCNC: 6.7 G/DL (ref 6–8.4)
PROT UR QL STRIP: NEGATIVE
RBC # BLD AUTO: 5.42 M/UL (ref 4.6–6.2)
SODIUM SERPL-SCNC: 143 MMOL/L (ref 136–145)
SP GR UR STRIP: 1.01 (ref 1–1.03)
TROPONIN I SERPL DL<=0.01 NG/ML-MCNC: 0.04 NG/ML (ref 0–0.03)
URN SPEC COLLECT METH UR: NORMAL
UROBILINOGEN UR STRIP-ACNC: NEGATIVE EU/DL
WBC # BLD AUTO: 8.94 K/UL (ref 3.9–12.7)

## 2024-10-06 PROCEDURE — 25500020 PHARM REV CODE 255: Performed by: STUDENT IN AN ORGANIZED HEALTH CARE EDUCATION/TRAINING PROGRAM

## 2024-10-06 PROCEDURE — 63600175 PHARM REV CODE 636 W HCPCS: Performed by: STUDENT IN AN ORGANIZED HEALTH CARE EDUCATION/TRAINING PROGRAM

## 2024-10-06 PROCEDURE — 81003 URINALYSIS AUTO W/O SCOPE: CPT | Performed by: STUDENT IN AN ORGANIZED HEALTH CARE EDUCATION/TRAINING PROGRAM

## 2024-10-06 PROCEDURE — 99285 EMERGENCY DEPT VISIT HI MDM: CPT | Mod: 25

## 2024-10-06 PROCEDURE — 83605 ASSAY OF LACTIC ACID: CPT | Performed by: STUDENT IN AN ORGANIZED HEALTH CARE EDUCATION/TRAINING PROGRAM

## 2024-10-06 PROCEDURE — 96375 TX/PRO/DX INJ NEW DRUG ADDON: CPT

## 2024-10-06 PROCEDURE — 74177 CT ABD & PELVIS W/CONTRAST: CPT | Mod: TC

## 2024-10-06 PROCEDURE — 85025 COMPLETE CBC W/AUTO DIFF WBC: CPT | Performed by: STUDENT IN AN ORGANIZED HEALTH CARE EDUCATION/TRAINING PROGRAM

## 2024-10-06 PROCEDURE — 94760 N-INVAS EAR/PLS OXIMETRY 1: CPT

## 2024-10-06 PROCEDURE — 74177 CT ABD & PELVIS W/CONTRAST: CPT | Mod: 26,,, | Performed by: RADIOLOGY

## 2024-10-06 PROCEDURE — 84484 ASSAY OF TROPONIN QUANT: CPT | Performed by: STUDENT IN AN ORGANIZED HEALTH CARE EDUCATION/TRAINING PROGRAM

## 2024-10-06 PROCEDURE — 96374 THER/PROPH/DIAG INJ IV PUSH: CPT | Mod: 59

## 2024-10-06 PROCEDURE — 83690 ASSAY OF LIPASE: CPT | Performed by: STUDENT IN AN ORGANIZED HEALTH CARE EDUCATION/TRAINING PROGRAM

## 2024-10-06 PROCEDURE — 80053 COMPREHEN METABOLIC PANEL: CPT | Performed by: STUDENT IN AN ORGANIZED HEALTH CARE EDUCATION/TRAINING PROGRAM

## 2024-10-06 RX ORDER — HYDRALAZINE HYDROCHLORIDE 20 MG/ML
10 INJECTION INTRAMUSCULAR; INTRAVENOUS
Status: COMPLETED | OUTPATIENT
Start: 2024-10-06 | End: 2024-10-06

## 2024-10-06 RX ORDER — MORPHINE SULFATE 2 MG/ML
6 INJECTION, SOLUTION INTRAMUSCULAR; INTRAVENOUS
Status: COMPLETED | OUTPATIENT
Start: 2024-10-06 | End: 2024-10-06

## 2024-10-06 RX ORDER — HYDROMORPHONE HYDROCHLORIDE 1 MG/ML
1 INJECTION, SOLUTION INTRAMUSCULAR; INTRAVENOUS; SUBCUTANEOUS
Status: COMPLETED | OUTPATIENT
Start: 2024-10-06 | End: 2024-10-06

## 2024-10-06 RX ORDER — FENTANYL CITRATE 50 UG/ML
75 INJECTION, SOLUTION INTRAMUSCULAR; INTRAVENOUS
Status: COMPLETED | OUTPATIENT
Start: 2024-10-06 | End: 2024-10-06

## 2024-10-06 RX ORDER — ONDANSETRON HYDROCHLORIDE 2 MG/ML
4 INJECTION, SOLUTION INTRAVENOUS
Status: COMPLETED | OUTPATIENT
Start: 2024-10-06 | End: 2024-10-06

## 2024-10-06 RX ADMIN — HYDROMORPHONE HYDROCHLORIDE 1 MG: 1 INJECTION, SOLUTION INTRAMUSCULAR; INTRAVENOUS; SUBCUTANEOUS at 12:10

## 2024-10-06 RX ADMIN — MORPHINE SULFATE 6 MG: 2 INJECTION, SOLUTION INTRAMUSCULAR; INTRAVENOUS at 12:10

## 2024-10-06 RX ADMIN — HYDRALAZINE HYDROCHLORIDE 10 MG: 20 INJECTION INTRAMUSCULAR; INTRAVENOUS at 12:10

## 2024-10-06 RX ADMIN — ONDANSETRON 4 MG: 2 INJECTION INTRAMUSCULAR; INTRAVENOUS at 12:10

## 2024-10-06 RX ADMIN — IOHEXOL 100 ML: 350 INJECTION, SOLUTION INTRAVENOUS at 01:10

## 2024-10-06 RX ADMIN — FENTANYL CITRATE 75 MCG: 100 INJECTION, SOLUTION INTRAMUSCULAR; INTRAVENOUS at 12:10

## 2024-10-06 NOTE — DISCHARGE INSTRUCTIONS
Follow up with Urology for further evaluation of bladder diverticulum extended into left inguinal hernia    Follow up with General surgery for further evaluation of left inguinal hernia

## 2024-10-06 NOTE — RESPIRATORY THERAPY
10/06/24 1229   Patient Assessment/Suction   Level of Consciousness (AVPU) alert   Respiratory Effort Normal;Unlabored   Expansion/Accessory Muscles/Retractions no retractions;no use of accessory muscles   Rhythm/Pattern, Respiratory depth regular;pattern regular;unlabored   Cough Frequency no cough   PRE-TX-O2   Device (Oxygen Therapy) room air   SpO2 95 %   Pulse Oximetry Type Continuous   $ Pulse Oximetry - Single Charge Pulse Oximetry - Single   Pulse (!) 55   Resp 18

## 2024-10-06 NOTE — ED PROVIDER NOTES
"Encounter Date: 10/6/2024       History     Chief Complaint   Patient presents with    Testicle Pain     Patient reports testicular pain x 60-90 minutes.  Patient states his testicle retracted into abdomen and it will not come back down.  Patient feels dizzy.     84-year-old male with significant history of hypertension, systolic and diastolic heart failure, liver disease, prostate cancer status post prostatectomy, diffuse large B-cell lymphoma, AAA, CKD 3 with single kidney, repaired umbilical hernia, left inguinal hernia. He presents to ED with chief complaints of increased left inguinal bulge with the associated pain with onset about an hour prior to arrival. Tells me he was sitting in his recliner then suddenly noticed spontaneous increased swelling of his left groin. He thought that his testicle went up into his groin. He admits to chronic left inguinal hernia but tells me that "it has not ever gotten this big  and painful in the past". He denies trauma. He reports associated nausea dizziness.         The history is provided by the patient. No  was used.     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     Past Medical History:   Diagnosis Date    Cancer     prostate    Hypertension     Kidney problem     only 1 kidney functions    Liver disease     Umbilical hernia      Past Surgical History:   Procedure Laterality Date    colon repair      after prostatectomy    LYSIS OF ADHESIONS N/A 10/18/2018    Procedure: LYSIS, ADHESIONS;  Surgeon: Rancho Mariee MD;  Location: [facility] OR [address] [place] [address];  Service: General;  Laterality: N/A;    PROSTATECTOMY      PROSTATECTOMY  2010    REPAIR OF RECURRENT INCISIONAL HERNIA N/A 10/18/2018    Procedure: REPAIR, HERNIA, INCISIONAL -multiple;  Surgeon: Rancho Mariee MD;  Location: [facility] OR [address] [place] [address];  " Service: General;  Laterality: N/A;     Family History   Problem Relation Name Age of Onset    Heart disease Mother      Heart disease Father      Diabetes Sister       Social History     Tobacco Use    Smoking status: Former     Current packs/day: 0.00     Types: Cigarettes     Quit date: 2/15/1972     Years since quittin.6    Smokeless tobacco: Never   Substance Use Topics    Alcohol use: No    Drug use: No     Review of Systems   Constitutional: Negative.    HENT: Negative.     Eyes: Negative.    Respiratory: Negative.     Cardiovascular: Negative.    Gastrointestinal: Negative.         Tender Left inguinal hernia   Endocrine: Negative.    Genitourinary: Negative.    Musculoskeletal: Negative.    Skin: Negative.    Neurological: Negative.    Hematological: Negative.    Psychiatric/Behavioral: Negative.     All other systems reviewed and are negative.      Physical Exam     Initial Vitals [10/06/24 1152]   BP Pulse Resp Temp SpO2   (!) 205/84 (!) 55 20 96.1 °F (35.6 °C) 97 %      MAP       --         Physical Exam    Nursing note and vitals reviewed.  Constitutional: He appears well-developed and well-nourished.   Eyes: Pupils are equal, round, and reactive to light.   Neck: Neck supple. No JVD present.   Normal range of motion.  Cardiovascular:  Normal rate.           Pulmonary/Chest: Breath sounds normal. No respiratory distress.   Abdominal: Abdomen is soft. Bowel sounds are normal.   Large stuck left inguinal hernia, with normal-appearing surrounding skin.  Deep midline abdominal surgical scar with normal-appearing surrounding skin   Genitourinary:    Penis normal.      Genitourinary Comments: Both testicles nontender, in normal lie in normal-appearing scrotal sac     Musculoskeletal:      Cervical back: Normal range of motion and neck supple.     Neurological: He is alert and oriented to person, place, and time. He has normal strength. GCS score is 15. GCS eye subscore is 4. GCS verbal subscore is 5. GCS  motor subscore is 6.   Skin: Skin is warm. Capillary refill takes less than 2 seconds.   Psychiatric: He has a normal mood and affect.         ED Course   Procedures  Labs Reviewed   CBC W/ AUTO DIFFERENTIAL - Abnormal       Result Value    WBC 8.94      RBC 5.42      Hemoglobin 17.2      Hematocrit 51.2      MCV 95      MCH 31.7 (*)     MCHC 33.6      RDW 12.1      Platelets 211      MPV 9.2      Immature Granulocytes 0.3      Gran # (ANC) 5.2      Immature Grans (Abs) 0.03      Lymph # 2.6      Mono # 0.6      Eos # 0.4      Baso # 0.07      nRBC 0      Gran % 58.4      Lymph % 29.4      Mono % 7.0      Eosinophil % 4.1      Basophil % 0.8      Differential Method Automated     COMPREHENSIVE METABOLIC PANEL - Abnormal    Sodium 143      Potassium 4.5      Chloride 108      CO2 22 (*)     Glucose 178 (*)     BUN 29 (*)     Creatinine 1.5 (*)     Calcium 9.3      Total Protein 6.7      Albumin 3.6      Total Bilirubin 0.5      Alkaline Phosphatase 136 (*)     AST 33      ALT 34      eGFR 45.6 (*)     Anion Gap 13     LIPASE - Abnormal    Lipase <3 (*)    TROPONIN I - Abnormal    Troponin I 0.045 (*)    URINALYSIS, REFLEX TO URINE CULTURE    Specimen UA Urine, Unspecified      Color, UA Yellow      Appearance, UA Clear      pH, UA 5.0      Specific Gravity, UA 1.010      Protein, UA Negative      Glucose, UA Negative      Ketones, UA Negative      Bilirubin (UA) Negative      Occult Blood UA Negative      Nitrite, UA Negative      Urobilinogen, UA Negative      Leukocytes, UA Negative      Narrative:     Preferred Collection Type->Urine, Clean Catch  Specimen Source->Urine   LACTIC ACID, PLASMA    Lactate (Lactic Acid) 1.5            Imaging Results              CT Abdomen Pelvis With IV Contrast NO Oral Contrast (Final result)  Result time 10/06/24 14:19:03      Final result by Eliel Verdugo MD (10/06/24 14:19:03)                   Impression:      1. There is a urinary bladder diverticulum now extending into the  patient's redemonstrated fat containing left inguinal hernia which also contains a small amount of fluid.  No bowel loops within the hernia.  2. No acute process.  Stable chronic/incidental findings redemonstrated as discussed above.      Electronically signed by: Eliel Verdugo  Date:    10/06/2024  Time:    14:19               Narrative:    EXAMINATION:  CT ABDOMEN PELVIS WITH IV CONTRAST    CLINICAL HISTORY:  Stuck left inguinal hernia;    TECHNIQUE:  Low dose axial images, sagittal and coronal reformations were obtained from the lung bases to the pubic symphysis following the IV administration of 100 mL of Omnipaque 350 contrast.    COMPARISON:  Abdominal x-ray 07/02/2024.  CT 07/01/2024.    FINDINGS:  Lower thorax: Limited imaging of the heart demonstrates dense coronary calcifications as well as aortic valve calcifications.  No pericardial effusion identified.    Lung bases: Atelectasis.  No consolidation or pleural effusion.    Liver: Liver is normal in size and attenuation.  Redemonstrated left hepatic lobe cyst.  No enhancing liver mass identified.  The main portal vein is patent.    Gallbladder: No calcified gallstones. No pericholecystic inflammatory change.    Bile Ducts: No evidence of intra or extra hepatic biliary ductal dilation.    Pancreas: Markedly atrophic appearance of the pancreas.  Similar appearing suspected small cystic lesion involving the anterior pancreatic body/neck region measuring 10 mm, unchanged from 07/01/2024.    Stomach: Limited imaging of the esophagus demonstrates fluid layering within which can be seen in the setting of dysmotility or reflux.  The stomach demonstrates no focal abnormality.    Spleen: Normal in size and attenuation.  No focal lesion.    Adrenals: Adrenal glands appear within normal limits.    Kidneys: Severely atrophic right kidney redemonstrated.  Parapelvic left renal cysts and cortical based simple cyst of the right kidney redemonstrated.  No solid renal mass  identified.  No hydronephrosis.    Bladder: Small urinary bladder diverticulum redemonstrated no extending into the left inguinal hernia which contains fat as well.  No other focal abnormality.    Reproductive: Penile prosthesis noted.    Bowel/Mesentery: Mild stool within the colon.  Scattered fluid-filled loops of small bowel that evidence of any bowel obstruction or focal inflammatory changes.  No ascites or pneumoperitoneum.  No evidence of appendicitis.    Lymph nodes: No pathologically enlarged lymph nodes.    Vascular: Redemonstrated partially thrombosed infrarenal abdominal aortic aneurysm again measuring approximately 4.0 cm.    Abdominal Wall/Soft Tissues: Fat containing left inguinal hernia as well as a small amount of fluid within the hernia and a portion of the bladder diverticulum discussed above.  Postoperative changes to the right inguinal region.  Midline incisional scarring of the abdominal wall.    Bones: No bony destructive changes or acute findings.                                       Medications   ondansetron injection 4 mg (4 mg Intravenous Given 10/6/24 1213)   morphine injection 6 mg (6 mg Intravenous Given 10/6/24 1213)   hydrALAZINE injection 10 mg (10 mg Intravenous Given 10/6/24 1216)   fentaNYL 50 mcg/mL injection 75 mcg (75 mcg Intravenous Given 10/6/24 1229)   HYDROmorphone injection 1 mg (1 mg Intravenous Given 10/6/24 1245)   iohexoL (OMNIPAQUE 350) injection 100 mL (100 mLs Intravenous Given 10/6/24 1349)     Medical Decision Making  84-year-old presents with increased swelling tenderness of chronic left inguinal hernia. See H&P above for detail  Unable to reduce hernia on attempt x3 after morphine 6 mg/fentanyl 75 mcg/Dilaudid 1 mg  Screening labs including CBC, CMP show findings similar to patient was baseline  CT shows urinary bladder diverticulum now extending into the patient's redemonstrated fat containing left inguinal hernia which also contains a small amount of fluid.    UA without evidence of infection.   Hernia was reduced on the 4th attempt.  Patient was seen and reevaluated. Patient's symptoms seem to be stable. I discussed the patient's diagnosis, treatment plan, and plan for discharge with the patient. Patient was given referral and instructed to follow up with general surgery, and was given strict return precautions to the ED. The patient voiced understanding and agreed with the plan          Amount and/or Complexity of Data Reviewed  Labs: ordered.  Radiology: ordered.    Risk  Prescription drug management.                                      Clinical Impression:  Final diagnoses:  [R10.9] Abdominal pain  [K40.90] Left inguinal hernia (Primary)  [N32.3] Bladder diverticulum - Extending into left inguinal hernia          ED Disposition Condition    Discharge Stable          ED Prescriptions    None       Follow-up Information       Follow up With Specialties Details Why Contact Info    Justine Barragan, [facility]  As needed [address] [place] [address] [phone]               Alessandro Claire MD  10/09/24 [id number]

## 2024-10-08 ENCOUNTER — OFFICE VISIT (OUTPATIENT)
Dept: SURGERY | Facility: CLINIC | Age: 84
End: 2024-10-08
Payer: MEDICARE

## 2024-10-08 VITALS
SYSTOLIC BLOOD PRESSURE: 127 MMHG | HEIGHT: 72 IN | HEART RATE: 68 BPM | DIASTOLIC BLOOD PRESSURE: 72 MMHG | OXYGEN SATURATION: 96 % | WEIGHT: 195.13 LBS | BODY MASS INDEX: 26.43 KG/M2

## 2024-10-08 DIAGNOSIS — K40.90 LEFT INGUINAL HERNIA: Primary | ICD-10-CM

## 2024-10-08 PROCEDURE — 99999 PR PBB SHADOW E&M-EST. PATIENT-LVL IV: CPT | Mod: PBBFAC,,, | Performed by: SPECIALIST

## 2024-10-08 NOTE — PROGRESS NOTES
Subjective     Patient ID: Yobani Rios  is a 84 y.o. male     Chief Complaint:   Chief Complaint   Patient presents with    Follow-up     Left inguinal hernia       Vitals:    10/08/24 1401   BP: 127/72   BP Location: Right arm   Patient Position: Sitting   Pulse: 68   SpO2: 96%   Weight: 88.5 kg (195 lb 1.7 oz)   Height: 6' (1.829 m)       HPI     Follow-up     Additional comments: Left inguinal hernia           Last edited by Gelacio Venegas MA on 10/8/2024  2:01 PM.      Pleasant 84-year-old male who presents with referral from the ER for evaluation for reducible left inguinal hernia.  He presented to the emergency room with a bulge and pain.  He they were able to reduce in the emergency room he was referred for evaluation for same.  He has a significant coronary history with an ejection fraction of 20-25% with aortic stenosis rated as severe by his last echo earlier this year.  He reports no problems since it was reduced in the emergency room.  No pain or discomfort at site no more bulge.  No change in bowel or bladder habits  Past Medical History:   Diagnosis Date    Cancer     prostate    Hypertension     Kidney problem     only 1 kidney functions    Liver disease     Umbilical hernia      Past Surgical History:   Procedure Laterality Date    colon repair      after prostatectomy    LYSIS OF ADHESIONS N/A 10/18/2018    Procedure: LYSIS, ADHESIONS;  Surgeon: Rancho Mariee MD;  Location: [facility] OR [address] [place] [address];  Service: General;  Laterality: N/A;    PROSTATECTOMY      PROSTATECTOMY  2010    REPAIR OF RECURRENT INCISIONAL HERNIA N/A 10/18/2018    Procedure: REPAIR, HERNIA, INCISIONAL -multiple;  Surgeon: Rancho Mariee MD;  Location: [facility] OR [address] [place] [address];  Service: General;  Laterality: N/A;     Family History   Problem Relation Name Age of Onset    Heart disease Mother      Heart disease Father      Diabetes Sister       Past Surgical History:   Procedure Laterality Date    colon repair      after  prostatectomy    LYSIS OF ADHESIONS N/A 10/18/2018    Procedure: LYSIS, ADHESIONS;  Surgeon: Rancho Mariee MD;  Location: [facility] OR [address] [place] [address];  Service: General;  Laterality: N/A;    PROSTATECTOMY      PROSTATECTOMY      REPAIR OF RECURRENT INCISIONAL HERNIA N/A 10/18/2018    Procedure: REPAIR, HERNIA, INCISIONAL -multiple;  Surgeon: Rancho Mariee MD;  Location: [facility] OR [address] [place] [address];  Service: General;  Laterality: N/A;     Social History     Socioeconomic History    Marital status:    Tobacco Use    Smoking status: Former     Current packs/day: 0.00     Types: Cigarettes     Quit date: 2/15/1972     Years since quittin.6    Smokeless tobacco: Never   Substance and Sexual Activity    Alcohol use: No    Drug use: No    Sexual activity: Never     Social Drivers of Health     Financial Resource Strain: Low Risk  (2024)    Overall Financial Resource Strain (CARDIA)     Difficulty of Paying Living Expenses: Not hard at all   Food Insecurity: No Food Insecurity (2024)    Hunger Vital Sign     Worried About Running Out of Food in the Last Year: Never true     Ran Out of Food in the Last Year: Never true   Transportation Needs: No Transportation Needs (2024)    PRAPARE - Transportation     Lack of Transportation (Medical): No     Lack of Transportation (Non-Medical): No   Physical Activity: Inactive (2024)    Exercise Vital Sign     Days of Exercise per Week: 0 days     Minutes of Exercise per Session: 0 min   Stress: Stress Concern Present (2024)    [employer] - Occupational Stress Questionnaire     Feeling of Stress : Very much   Housing Stability: Low Risk  (2024)    Housing Stability Vital Sign     Unable to Pay for Housing in the Last Year: No     Number of Places Lived in the Last Year: 1     Unstable Housing in the Last Year: No        Current Outpatient Medications:     albuterol (PROVENTIL/VENTOLIN HFA) 90 mcg/actuation inhaler, Inhale  1-2 puffs into the lungs every 6 (six) hours as needed for Shortness of Breath. Rescue, Disp: 18 g, Rfl: 0    ALPRAZolam (XANAX) 1 MG tablet, Take 0.5-1 mg by mouth 2 (two) times daily as needed., Disp: , Rfl:     aspirin (ECOTRIN) 81 MG EC tablet, Take 81 mg by mouth once daily., Disp: , Rfl:     atorvastatin (LIPITOR) 40 MG tablet, Take 40 mg by mouth once daily., Disp: , Rfl:     B-complex with vitamin C tablet, Take 1 tablet by mouth once daily.  , Disp: , Rfl:     citalopram (CELEXA) 20 MG tablet, Take 20 mg by mouth every morning., Disp: , Rfl:     clopidogreL (PLAVIX) 75 mg tablet, Take 75 mg by mouth once daily., Disp: , Rfl:     cyproheptadine (PERIACTIN) 4 mg tablet, Take 4 mg by mouth 2 (two) times daily., Disp: , Rfl:     furosemide (LASIX) 40 MG tablet, Take 40 mg by mouth 2 (two) times daily., Disp: , Rfl:     HYDROcodone-acetaminophen (NORCO) 5-325 mg per tablet, Take 1 tablet by mouth every 6 (six) hours as needed for Pain., Disp: , Rfl:     losartan (COZAAR) 100 MG tablet, Take 100 mg by mouth once daily., Disp: , Rfl:     metoprolol succinate (TOPROL-XL) 25 MG 24 hr tablet, Take 0.5 tablets (12.5 mg total) by mouth once daily., Disp: 45 tablet, Rfl: 3    mirtazapine (REMERON) 45 MG tablet, Take 45 mg by mouth every evening., Disp: , Rfl:     multivitamin,therapeutic (THERA ORAL), Take 1 tablet by mouth once daily., Disp: , Rfl:     spironolactone (ALDACTONE) 25 MG tablet, Take 12.5 mg by mouth 2 (two) times daily., Disp: , Rfl:     vitamin D (VITAMIN D3) 1000 units Tab, Take 25 mcg by mouth once daily., Disp: , Rfl:    Review of patient's allergies indicates:   Allergen Reactions    Vincristine Other (See Comments)     Other Reaction(s): Neuropathy    neuropathy, 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            Review of Systems   Gastrointestinal:         Left groin pain and bulge.  Hernia that was reduced in the emergency room      I have reviewed the following:     Details / Date     []   Labs     []   Micro     []   Pathology     []   Imaging     []   Cardiology Procedures     [x]   Other ED note reviewed reviewed        Objective         Physical Exam  Vitals and nursing note reviewed.   Constitutional:       Appearance: Normal appearance. He is normal weight.   HENT:      Head: Normocephalic and atraumatic.      Nose: Nose normal.      Mouth/Throat:      Mouth: Mucous membranes are moist.   Eyes:      Extraocular Movements: Extraocular movements intact.      Pupils: Pupils are equal, round, and reactive to light.   Cardiovascular:      Rate and Rhythm: Normal rate and regular rhythm.   Abdominal:      General: Abdomen is flat.      Palpations: Abdomen is soft.          Comments: Left groin discomfort on exam but no appreciable defect noted on Valsalva or coughing   Genitourinary:     Penis: Normal.       Testes: Normal.   Musculoskeletal:         General: Normal range of motion.      Cervical back: Normal range of motion.   Skin:     General: Skin is warm.   Neurological:      General: No focal deficit present.      Mental Status: He is alert and oriented to person, place, and time. Mental status is at baseline.   Psychiatric:         Mood and Affect: Mood normal.         Behavior: Behavior normal.        Assessment and Plan     1. Left inguinal hernia  -     Ambulatory referral/consult to General Surgery     Evidence of hernia by history and exam by ED physician but no hernia on exam.  Tenderness in the left inguinal canal.  No hernia on Valsalva.  Patient with significant coronary history with the congestive heart failure with ejection fraction of 20% with aortic stenosis rated as severe by last echo earlier this year.  Patient is a very high risk for elective repair.  Explained this to the patient he states he wants to wait and not considering elective repair at this time.  He has been given instructions on what to watch for as far as incarceration or strangulation symptoms.  He states he  will return should he decided to electively proceed this starting with a cardiac risk assessment by his cardiologist.  Otherwise to return to the emergency room she developed an acute situation.  Patient states he understands and agrees with the plan  No orders of the defined types were placed in this encounter.       An After Visit Summary was printed and given to the patient.       Brian Pitre,MD [facility] 2nd Floor General Surgery  [address] [place] [place] [address]  [phone]     This note was created using [employer] direct voice recognition software. Note may have occasional typographical errors that may not have been identified and edited despite initial review prior to signing.     Patient instructed that best way to communicate with my office staff is for patient to get on the [facility] patient portal to expedite communication and communication issues that may occur.  Patient was given instructions on how to get on the portal.  I encouraged patient to obtain portal access as well.  Ultimately it is up to the patient to obtain access.  Patient voiced understanding.

## 2025-01-24 ENCOUNTER — HOSPITAL ENCOUNTER (EMERGENCY)
Facility: HOSPITAL | Age: 85
Discharge: HOME | End: 2025-01-25
Attending: EMERGENCY MEDICINE
Payer: MEDICARE

## 2025-01-24 DIAGNOSIS — R07.9 CHEST PAIN: ICD-10-CM

## 2025-01-24 DIAGNOSIS — R07.89 CHEST WALL PAIN: ICD-10-CM

## 2025-01-24 DIAGNOSIS — I44.2 THIRD DEGREE HEART BLOCK: Primary | ICD-10-CM

## 2025-01-24 LAB
ALBUMIN SERPL BCP-MCNC: 3.7 G/DL (ref 3.5–5.2)
ALP SERPL-CCNC: 139 U/L (ref 40–150)
ALT SERPL W/O P-5'-P-CCNC: 33 U/L (ref 10–44)
ANION GAP SERPL CALC-SCNC: 8 MMOL/L (ref 8–16)
AST SERPL-CCNC: 32 U/L (ref 10–40)
BASOPHILS # BLD AUTO: 0.05 K/UL (ref 0–0.2)
BASOPHILS NFR BLD: 0.5 % (ref 0–1.9)
BILIRUB SERPL-MCNC: 0.7 MG/DL (ref 0.1–1)
BNP SERPL-MCNC: 95 PG/ML (ref 0–99)
BUN SERPL-MCNC: 47 MG/DL (ref 8–23)
CALCIUM SERPL-MCNC: 9.3 MG/DL (ref 8.7–10.5)
CHLORIDE SERPL-SCNC: 108 MMOL/L (ref 95–110)
CO2 SERPL-SCNC: 24 MMOL/L (ref 23–29)
CREAT SERPL-MCNC: 2.1 MG/DL (ref 0.5–1.4)
DIFFERENTIAL METHOD BLD: ABNORMAL
EOSINOPHIL # BLD AUTO: 0.3 K/UL (ref 0–0.5)
EOSINOPHIL NFR BLD: 2.8 % (ref 0–8)
ERYTHROCYTE [DISTWIDTH] IN BLOOD BY AUTOMATED COUNT: 12.8 % (ref 11.5–14.5)
EST. GFR  (NO RACE VARIABLE): 30.3 ML/MIN/1.73 M^2
GLUCOSE SERPL-MCNC: 124 MG/DL (ref 70–110)
HCT VFR BLD AUTO: 50.2 % (ref 40–54)
HCV AB SERPL QL IA: NORMAL
HGB BLD-MCNC: 17 G/DL (ref 14–18)
HIV 1+2 AB+HIV1 P24 AG SERPL QL IA: NORMAL
IMM GRANULOCYTES # BLD AUTO: 0.08 K/UL (ref 0–0.04)
IMM GRANULOCYTES NFR BLD AUTO: 0.8 % (ref 0–0.5)
LYMPHOCYTES # BLD AUTO: 2.3 K/UL (ref 1–4.8)
LYMPHOCYTES NFR BLD: 22.8 % (ref 18–48)
MCH RBC QN AUTO: 31.6 PG (ref 27–31)
MCHC RBC AUTO-ENTMCNC: 33.9 G/DL (ref 32–36)
MCV RBC AUTO: 93 FL (ref 82–98)
MONOCYTES # BLD AUTO: 0.8 K/UL (ref 0.3–1)
MONOCYTES NFR BLD: 7.4 % (ref 4–15)
NEUTROPHILS # BLD AUTO: 6.7 K/UL (ref 1.8–7.7)
NEUTROPHILS NFR BLD: 65.7 % (ref 38–73)
NRBC BLD-RTO: 0 /100 WBC
PLATELET # BLD AUTO: 256 K/UL (ref 150–450)
PMV BLD AUTO: 9.3 FL (ref 9.2–12.9)
POTASSIUM SERPL-SCNC: 5.2 MMOL/L (ref 3.5–5.1)
PROT SERPL-MCNC: 6.8 G/DL (ref 6–8.4)
RBC # BLD AUTO: 5.38 M/UL (ref 4.6–6.2)
SODIUM SERPL-SCNC: 140 MMOL/L (ref 136–145)
TROPONIN I SERPL DL<=0.01 NG/ML-MCNC: 0.05 NG/ML (ref 0–0.03)
TROPONIN I SERPL DL<=0.01 NG/ML-MCNC: 0.14 NG/ML (ref 0–0.03)
WBC # BLD AUTO: 10.15 K/UL (ref 3.9–12.7)

## 2025-01-24 PROCEDURE — 73130 X-RAY EXAM OF HAND: CPT | Mod: TC,RT

## 2025-01-24 PROCEDURE — 86803 HEPATITIS C AB TEST: CPT | Performed by: EMERGENCY MEDICINE

## 2025-01-24 PROCEDURE — 93005 ELECTROCARDIOGRAM TRACING: CPT

## 2025-01-24 PROCEDURE — 93010 ELECTROCARDIOGRAM REPORT: CPT | Mod: ,,, | Performed by: INTERNAL MEDICINE

## 2025-01-24 PROCEDURE — 84484 ASSAY OF TROPONIN QUANT: CPT | Mod: 91 | Performed by: EMERGENCY MEDICINE

## 2025-01-24 PROCEDURE — 93010 ELECTROCARDIOGRAM REPORT: CPT | Mod: 76,,, | Performed by: INTERNAL MEDICINE

## 2025-01-24 PROCEDURE — 83880 ASSAY OF NATRIURETIC PEPTIDE: CPT | Performed by: EMERGENCY MEDICINE

## 2025-01-24 PROCEDURE — 70450 CT HEAD/BRAIN W/O DYE: CPT | Mod: TC

## 2025-01-24 PROCEDURE — 25000003 PHARM REV CODE 250: Performed by: EMERGENCY MEDICINE

## 2025-01-24 PROCEDURE — 36415 COLL VENOUS BLD VENIPUNCTURE: CPT | Performed by: EMERGENCY MEDICINE

## 2025-01-24 PROCEDURE — 96361 HYDRATE IV INFUSION ADD-ON: CPT

## 2025-01-24 PROCEDURE — 63600175 PHARM REV CODE 636 W HCPCS: Performed by: EMERGENCY MEDICINE

## 2025-01-24 PROCEDURE — 71045 X-RAY EXAM CHEST 1 VIEW: CPT | Mod: TC

## 2025-01-24 PROCEDURE — 72125 CT NECK SPINE W/O DYE: CPT | Mod: 26,,, | Performed by: RADIOLOGY

## 2025-01-24 PROCEDURE — 72125 CT NECK SPINE W/O DYE: CPT | Mod: TC

## 2025-01-24 PROCEDURE — 73130 X-RAY EXAM OF HAND: CPT | Mod: 26,RT,, | Performed by: RADIOLOGY

## 2025-01-24 PROCEDURE — 70450 CT HEAD/BRAIN W/O DYE: CPT | Mod: 26,,, | Performed by: RADIOLOGY

## 2025-01-24 PROCEDURE — 85025 COMPLETE CBC W/AUTO DIFF WBC: CPT | Performed by: EMERGENCY MEDICINE

## 2025-01-24 PROCEDURE — 96375 TX/PRO/DX INJ NEW DRUG ADDON: CPT

## 2025-01-24 PROCEDURE — 80053 COMPREHEN METABOLIC PANEL: CPT | Performed by: EMERGENCY MEDICINE

## 2025-01-24 PROCEDURE — 87389 HIV-1 AG W/HIV-1&-2 AB AG IA: CPT | Performed by: EMERGENCY MEDICINE

## 2025-01-24 PROCEDURE — 99285 EMERGENCY DEPT VISIT HI MDM: CPT | Mod: 25

## 2025-01-24 PROCEDURE — 73130 X-RAY EXAM OF HAND: CPT | Mod: 26,LT,, | Performed by: RADIOLOGY

## 2025-01-24 PROCEDURE — 71045 X-RAY EXAM CHEST 1 VIEW: CPT | Mod: 26,,, | Performed by: RADIOLOGY

## 2025-01-24 PROCEDURE — 73130 X-RAY EXAM OF HAND: CPT | Mod: TC,LT

## 2025-01-24 PROCEDURE — 96374 THER/PROPH/DIAG INJ IV PUSH: CPT

## 2025-01-24 RX ORDER — ASPIRIN 325 MG
325 TABLET, DELAYED RELEASE (ENTERIC COATED) ORAL
Status: COMPLETED | OUTPATIENT
Start: 2025-01-24 | End: 2025-01-24

## 2025-01-24 RX ORDER — CEFAZOLIN SODIUM 1 G/3ML
1 INJECTION, POWDER, FOR SOLUTION INTRAMUSCULAR; INTRAVENOUS
Status: COMPLETED | OUTPATIENT
Start: 2025-01-24 | End: 2025-01-24

## 2025-01-24 RX ORDER — ATROPINE SULFATE 0.1 MG/ML
0.5 INJECTION INTRAVENOUS
Status: COMPLETED | OUTPATIENT
Start: 2025-01-24 | End: 2025-01-24

## 2025-01-24 RX ADMIN — ATROPINE SULFATE 0.5 MG: 0.1 INJECTION, SOLUTION ENDOTRACHEAL; INTRAMUSCULAR; INTRAVENOUS; SUBCUTANEOUS at 04:01

## 2025-01-24 RX ADMIN — SODIUM CHLORIDE 500 ML: 9 INJECTION, SOLUTION INTRAVENOUS at 04:01

## 2025-01-24 RX ADMIN — CEFAZOLIN 1 G: 330 INJECTION, POWDER, FOR SOLUTION INTRAMUSCULAR; INTRAVENOUS at 04:01

## 2025-01-24 RX ADMIN — ASPIRIN 325 MG: 325 TABLET, COATED ORAL at 04:01
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
## 2025-01-24 NOTE — ED NOTES
[place]  Jean locked patients hoang ($1,330), credit card, and pocket knife in secure safe.

## 2025-01-24 NOTE — ED NOTES
Patients son, Yobani, contacted via phone. Phone given to patient and patient was able to speak to him.

## 2025-01-25 VITALS
HEIGHT: 72 IN | SYSTOLIC BLOOD PRESSURE: 133 MMHG | HEART RATE: 86 BPM | BODY MASS INDEX: 23.03 KG/M2 | TEMPERATURE: 98 F | DIASTOLIC BLOOD PRESSURE: 69 MMHG | OXYGEN SATURATION: 98 % | RESPIRATION RATE: 16 BRPM | WEIGHT: 170 LBS

## 2025-01-25 NOTE — ED NOTES
Call to [facility] supervisor to inquire about transfer time. Pt resting quietly. VSS. Call light in reach.

## 2025-01-25 NOTE — ED NOTES
"Pt and visitors updated on POC. Spoke with patient's son "Yobani" and updated him on POC, admission and transfer info. All questions answered. Pt resting quietly. VSS. Denies any needs.   "

## 2025-01-25 NOTE — ED NOTES
Dressing to L elbow changed. Nonadherent dressing applied and reinforced with ABD pads and gauze. Pt tolerated well. Updated on POC.

## 2025-01-25 NOTE — ED NOTES
Pt assisted to reposition in bed. POC updated. Verb understanding. Call light in reach. Denies any needs.

## 2025-01-27 LAB
OHS QRS DURATION: 160 MS
OHS QRS DURATION: 168 MS
OHS QTC CALCULATION: 404 MS
OHS QTC CALCULATION: 508 MS

## 2025-07-11 VITALS
BODY MASS INDEX: 24.52 KG/M2 | HEART RATE: 89 BPM | RESPIRATION RATE: 18 BRPM | OXYGEN SATURATION: 96 % | HEIGHT: 72 IN | DIASTOLIC BLOOD PRESSURE: 56 MMHG | WEIGHT: 181 LBS | SYSTOLIC BLOOD PRESSURE: 100 MMHG | TEMPERATURE: 98 F

## 2025-07-11 PROCEDURE — 99284 EMERGENCY DEPT VISIT MOD MDM: CPT | Mod: 25

## 2025-07-11 PROCEDURE — 74018 RADEX ABDOMEN 1 VIEW: CPT | Mod: TC

## 2025-07-11 PROCEDURE — 74018 RADEX ABDOMEN 1 VIEW: CPT | Mod: 26,,, | Performed by: RADIOLOGY

## 2025-07-12 ENCOUNTER — HOSPITAL ENCOUNTER (EMERGENCY)
Facility: HOSPITAL | Age: 85
Discharge: HOME | End: 2025-07-12
Attending: EMERGENCY MEDICINE
Payer: MEDICARE

## 2025-07-12 DIAGNOSIS — K59.00 CONSTIPATION: ICD-10-CM

## 2025-07-12 DIAGNOSIS — K52.89 STERCORAL COLITIS: ICD-10-CM

## 2025-07-12 DIAGNOSIS — K64.9 HEMORRHOIDS, UNSPECIFIED HEMORRHOID TYPE: ICD-10-CM

## 2025-07-12 DIAGNOSIS — K59.00 ACUTE CONSTIPATION: Primary | ICD-10-CM

## 2025-07-12 PROCEDURE — 25000003 PHARM REV CODE 250: Performed by: EMERGENCY MEDICINE

## 2025-07-12 RX ORDER — HYDROCORTISONE 25 MG/G
CREAM TOPICAL 2 TIMES DAILY
Qty: 30 G | Refills: 0 | Status: SHIPPED | OUTPATIENT
Start: 2025-07-12 | End: 2025-07-22

## 2025-07-12 RX ORDER — CEFDINIR 300 MG/1
300 CAPSULE ORAL 2 TIMES DAILY
Qty: 14 CAPSULE | Refills: 0 | Status: SHIPPED | OUTPATIENT
Start: 2025-07-12 | End: 2025-07-19

## 2025-07-12 RX ADMIN — LACTULOSE 20 G: 20 SOLUTION ORAL at 01:07

## 2025-07-12 NOTE — ED NOTES
Fleets enema performed. Patient only able to tolerate approximately 1/2 bottle before leaking fluid. Patient asked to try and hold fluid in for 2-3 minutes. Patient able to get to bedside commode and have successful passage of large hard stool ball with formed stool behind it and stating much relief.

## 2025-07-12 NOTE — ED NOTES
D/c instructions provided to patient. Diet explained and encouraged increase in PO fluids over next 24-36 hours. RX given with instructions for use. Follow up explained. All questions answered. Patient states he is feeling much better after passing large BM. Escorted to lobby to complete d/c process with registration.

## 2025-07-12 NOTE — ED NOTES
Upon arrival to room patient asking to go to restroom to have a BM. States last BM yesterday and feels like rocks. Patient unable to have BM after attempt. Does state he put vaseline and placed his finger in rectum and stool ball feels hard.

## 2025-07-13 NOTE — ED PROVIDER NOTES
"   History     Chief Complaint   Patient presents with    Constipation     Since yesterday morning- reports that he takes stool softner everyday- attempted to disimpact manually with no relief- states that he feels like he has "rocks" stuck      HPI:  Yobani Rios is a 85 y.o. male with PMH as below who presents to the [facility] emergency department for evaluation of constipation x2 days, not alleviated by taking his daily stool softener. He tried to disimpact himself prior to arrival unsuccessfully.       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      Past Medical History:   Diagnosis Date    Cancer     prostate    Hypertension     Kidney problem     only 1 kidney functions    Liver disease     Umbilical hernia      Past Surgical History:   Procedure Laterality Date    colon repair      after prostatectomy    LYSIS OF ADHESIONS N/A 10/18/2018    Procedure: LYSIS, ADHESIONS;  Surgeon: Rancho Mariee MD;  Location: [facility] OR [address] [place] [address];  Service: General;  Laterality: N/A;    PROSTATECTOMY      PROSTATECTOMY  2010    REPAIR OF RECURRENT INCISIONAL HERNIA N/A 10/18/2018    Procedure: REPAIR, HERNIA, INCISIONAL -multiple;  Surgeon: Rancho Mariee MD;  Location: [facility] OR [address] [place] [address];  Service: General;  Laterality: N/A;       Family History   Problem Relation Name Age of Onset    Heart disease Mother      Heart disease Father      Diabetes Sister       Social History     Tobacco Use    Smoking status: Former     Current packs/day: 0.00     Types: Cigarettes     Quit date: 2/15/1972     Years since quittin.4    Smokeless tobacco: Never   Substance and Sexual Activity    Alcohol use: No    Drug use: No    Sexual activity: Never      Review of Systems     Review of Systems   Constitutional: Negative.    HENT: Negative.   "   Eyes: Negative.    Respiratory: Negative.     Cardiovascular: Negative.    Gastrointestinal:  Positive for blood in stool (after self-disimpaction attempt) and constipation. Negative for abdominal pain.   Endocrine: Negative.    Genitourinary: Negative.    Musculoskeletal: Negative.    Skin: Negative.    Allergic/Immunologic: Negative.    Neurological: Negative.    Hematological: Negative.    Psychiatric/Behavioral: Negative.     All other systems reviewed and are negative.       Physical Exam     Initial Vitals [07/11/25 2259]   BP Pulse Resp Temp SpO2   (!) 100/56 89 18 98 °F (36.7 °C) 96 %      MAP       --          Nursing notes and vital signs reviewed.  Constitutional: Patient is in moderate distress.   Head: Normocephalic. Atraumatic.   Eyes:  Conjunctivae are not pale. No scleral icterus.   ENT: Mucous membranes moist.   Neck: Supple.   Cardiovascular: Regular rate. Regular rhythm.   Pulmonary: No respiratory distress.   Abdominal: Soft. Non-distended. Nontender.   Rectal Exam: There is an active, tender, large hemorrhoid present. No other masses or external lesions. There is marked rectal tenderness.  Brown stool and some maroon blood on exam glove.  No large impaction in the rectal vault, but distal stool palpated.   Musculoskeletal: Moves all extremities. No obvious deformities.   Skin: Warm and dry.   Neurological:  Alert, awake, and appropriate. Normal speech. No acute lateralizing neurologic deficits appreciated.   Psychiatric: Normal affect.       ED Course   Procedures  Vitals:    07/11/25 2259   BP: (!) 100/56   Pulse: 89   Resp: 18   Temp: 98 °F (36.7 °C)   SpO2: 96%   Weight: 82.1 kg (181 lb)   Height: 6' (1.829 m)     Lab Results Interpreted as Abnormal:  Labs Reviewed - No data to display   All Lab Results:  Results for orders placed or performed during the hospital encounter of 01/24/25   EKG 12-lead    Collection Time: 01/24/25  3:43 PM   Result Value Ref Range    QRS Duration 160 ms    [facility]  QTC Calculation 404 ms   Hepatitis C Antibody    Collection Time: 01/24/25  4:26 PM   Result Value Ref Range    Hepatitis C Ab Non-reactive Non-reactive   HIV 1/2 Ag/Ab (4th Gen)    Collection Time: 01/24/25  4:26 PM   Result Value Ref Range    HIV 1/2 Ag/Ab Non-reactive Non-reactive   CBC auto differential    Collection Time: 01/24/25  4:26 PM   Result Value Ref Range    WBC 10.15 3.90 - 12.70 K/uL    RBC 5.38 4.60 - 6.20 M/uL    Hemoglobin 17.0 14.0 - 18.0 g/dL    Hematocrit 50.2 40.0 - 54.0 %    MCV 93 82 - 98 fL    MCH 31.6 (H) 27.0 - 31.0 pg    MCHC 33.9 32.0 - 36.0 g/dL    RDW 12.8 11.5 - 14.5 %    Platelets 256 150 - 450 K/uL    MPV 9.3 9.2 - 12.9 fL    Immature Granulocytes 0.8 (H) 0.0 - 0.5 %    Gran # (ANC) 6.7 1.8 - 7.7 K/uL    Immature Grans (Abs) 0.08 (H) 0.00 - 0.04 K/uL    Lymph # 2.3 1.0 - 4.8 K/uL    Mono # 0.8 0.3 - 1.0 K/uL    Eos # 0.3 0.0 - 0.5 K/uL    Baso # 0.05 0.00 - 0.20 K/uL    nRBC 0 0 /100 WBC    Gran % 65.7 38.0 - 73.0 %    Lymph % 22.8 18.0 - 48.0 %    Mono % 7.4 4.0 - 15.0 %    Eosinophil % 2.8 0.0 - 8.0 %    Basophil % 0.5 0.0 - 1.9 %    Differential Method Automated    Comprehensive metabolic panel    Collection Time: 01/24/25  4:26 PM   Result Value Ref Range    Sodium 140 136 - 145 mmol/L    Potassium 5.2 (H) 3.5 - 5.1 mmol/L    Chloride 108 95 - 110 mmol/L    CO2 24 23 - 29 mmol/L    Glucose 124 (H) 70 - 110 mg/dL    BUN 47 (H) 8 - 23 mg/dL    Creatinine 2.1 (H) 0.5 - 1.4 mg/dL    Calcium 9.3 8.7 - 10.5 mg/dL    Total Protein 6.8 6.0 - 8.4 g/dL    Albumin 3.7 3.5 - 5.2 g/dL    Total Bilirubin 0.7 0.1 - 1.0 mg/dL    Alkaline Phosphatase 139 40 - 150 U/L    AST 32 10 - 40 U/L    ALT 33 10 - 44 U/L    eGFR 30.3 (A) >60 mL/min/1.73 m^2    Anion Gap 8 8 - 16 mmol/L   Troponin I #1    Collection Time: 01/24/25  4:26 PM   Result Value Ref Range    Troponin I 0.047 (H) 0.000 - 0.026 ng/mL   BNP    Collection Time: 01/24/25  4:26 PM   Result Value Ref Range    BNP 95 0 - 99 pg/mL    Troponin I #2    Collection Time: 01/24/25  7:41 PM   Result Value Ref Range    Troponin I 0.141 (H) 0.000 - 0.026 ng/mL   EKG 12-lead    Collection Time: 01/24/25  9:21 PM   Result Value Ref Range    QRS Duration 168 ms    [facility]HS QTC Calculation 508 ms     Imaging Results              X-Ray Abdomen AP 1 View (KUB) (Final result)  Result time 07/11/25 23:53:26      Final result by Ac Llanes DO (07/11/25 23:53:26)                   Impression:      No acute abnormality.      Electronically signed by: Ac Llanes  Date:    07/11/2025  Time:    23:53               Narrative:    EXAMINATION:  XR ABDOMEN AP 1 VIEW    CLINICAL HISTORY:  Constipation, unspecified    TECHNIQUE:  AP View(s) of the abdomen was performed.    COMPARISON:  07/02/2024.    FINDINGS:  Pacer leads noted.  There is a normal, nonobstructive bowel gas pattern.  There is a moderate volume of stool.  Free air cannot be excluded on this supine radiograph. There is no abnormal calcification. The visualized osseous structures demonstrate degenerative changes.  The visualized lung bases are clear.  Penile prostheses partially imaged.                                       The emergency physician reviewed the vital signs / test results outlined above.     ED Discussion        Patient had a large bowel movement prior to discharge with significant relief.     Due to rectal tenderness and blood, sent antibiotic to pharmacy for stercoral colitis.     Patient's evaluation in the ED does not suggest any emergent or life-threatening medical conditions requiring immediate intervention beyond what was provided in the ED, and I believe patient is safe for discharge. Regardless, an unremarkable evaluation in the ED does not preclude the development or presence of a serious or life-threatening condition. As such, patient was given return instructions for any change or worsening of symptoms.       ED Medication(s) Administered:  Medications   lactulose 20 gram/30  mL solution Soln 20 g (20 g Oral Given 7/12/25 0105)       Prescription Management: I performed a review of the patient's current Rx medication list as input by nursing staff.    Discharge Medication List as of 7/12/2025 12:54 AM        START taking these medications    Details   lactulose (CHRONULAC) 20 gram/30 mL Soln Take 30 mLs (20 g total) by mouth 2 (two) times daily as needed (constipation)., Starting Sat 7/12/2025, Until Sat 7/19/2025 at 2359, Normal           CONTINUE these medications which have NOT CHANGED    Details   albuterol (PROVENTIL/VENTOLIN HFA) 90 mcg/actuation inhaler Inhale 1-2 puffs into the lungs every 6 (six) hours as needed for Shortness of Breath. Rescue, Starting Thu 1/11/2024, Print      ALPRAZolam (XANAX) 1 MG tablet Take 0.5-1 mg by mouth 2 (two) times daily as needed., Historical Med      aspirin (ECOTRIN) 81 MG EC tablet Take 81 mg by mouth once daily., Starting Thu 4/20/2023, Historical Med      atorvastatin (LIPITOR) 40 MG tablet Take 40 mg by mouth once daily., Starting Mon 2/6/2023, Historical Med      B-complex with vitamin C tablet Take 1 tablet by mouth once daily.  , Historical Med      citalopram (CELEXA) 20 MG tablet Take 20 mg by mouth every morning., Starting Fri 6/21/2024, Historical Med      clopidogreL (PLAVIX) 75 mg tablet Take 75 mg by mouth once daily., Starting Wed 3/29/2023, Historical Med      cyproheptadine (PERIACTIN) 4 mg tablet Take 4 mg by mouth 2 (two) times daily., Starting Mon 6/3/2024, Historical Med      furosemide (LASIX) 40 MG tablet Take 40 mg by mouth 2 (two) times daily., Starting Mon 4/24/2023, Historical Med      HYDROcodone-acetaminophen (NORCO) 5-325 mg per tablet Take 1 tablet by mouth every 6 (six) hours as needed for Pain., Starting Tue 6/18/2024, Historical Med      losartan (COZAAR) 100 MG tablet Take 100 mg by mouth once daily., Starting Mon 3/20/2023, Historical Med      metoprolol succinate (TOPROL-XL) 25 MG 24 hr tablet Take 0.5  tablets (12.5 mg total) by mouth once daily., Starting Tue 7/2/2024, Until Wed 7/2/2025, Normal      mirtazapine (REMERON) 45 MG tablet Take 45 mg by mouth every evening., Historical Med      multivitamin,therapeutic (THERA ORAL) Take 1 tablet by mouth once daily., Starting Tue 4/18/2023, Historical Med      spironolactone (ALDACTONE) 25 MG tablet Take 12.5 mg by mouth 2 (two) times daily., Historical Med      vitamin D (VITAMIN D3) 1000 units Tab Take 25 mcg by mouth once daily., Starting Tue 4/18/2023, Historical Med               Follow-up Information       Justine Barragan FNP. Schedule an appointment as soon as possible for a visit in 2 days.    Specialty: Family Medicine  Contact information:  [address] [place] [address]  [phone]               [facility] Emergency Dept.    Specialty: Emergency Medicine  Why: As needed, If symptoms worsen  Contact information:  [address] [facility] [address] [phone]                          Clinical Impression       ICD-10-CM ICD-9-CM   1. Acute constipation  K59.00 564.00   2. Constipation  K59.00 564.00   3. Stercoral colitis  K52.89 558.9   4. Hemorrhoids, unspecified hemorrhoid type  K64.9 455.6      ED Disposition Condition    Discharge Stable                Jimenez Mcclellan MD  07/13/25 0534

## 2025-07-20 ENCOUNTER — ANESTHESIA EVENT (OUTPATIENT)
Dept: SURGERY | Facility: HOSPITAL | Age: 85
End: 2025-07-20
Payer: MEDICARE

## 2025-07-20 ENCOUNTER — ANESTHESIA (OUTPATIENT)
Dept: SURGERY | Facility: HOSPITAL | Age: 85
End: 2025-07-20
Payer: MEDICARE

## 2025-07-20 ENCOUNTER — HOSPITAL ENCOUNTER (EMERGENCY)
Facility: HOSPITAL | Age: 85
Discharge: OTHER INSTITUTION | End: 2025-07-20
Attending: EMERGENCY MEDICINE
Payer: MEDICARE

## 2025-07-20 ENCOUNTER — HOSPITAL ENCOUNTER (INPATIENT)
Facility: HOSPITAL | Age: 85
LOS: 3 days | Discharge: HOME HEALTH | DRG: 352 | End: 2025-07-23
Attending: STUDENT IN AN ORGANIZED HEALTH CARE EDUCATION/TRAINING PROGRAM | Admitting: STUDENT IN AN ORGANIZED HEALTH CARE EDUCATION/TRAINING PROGRAM
Payer: MEDICARE

## 2025-07-20 VITALS
DIASTOLIC BLOOD PRESSURE: 70 MMHG | SYSTOLIC BLOOD PRESSURE: 149 MMHG | WEIGHT: 170 LBS | OXYGEN SATURATION: 96 % | HEART RATE: 80 BPM | BODY MASS INDEX: 22.53 KG/M2 | HEIGHT: 73 IN | TEMPERATURE: 98 F | RESPIRATION RATE: 20 BRPM

## 2025-07-20 DIAGNOSIS — K40.90 INGUINAL HERNIA: Primary | ICD-10-CM

## 2025-07-20 DIAGNOSIS — Z01.818 PRE-OP TESTING: ICD-10-CM

## 2025-07-20 DIAGNOSIS — R07.9 CHEST PAIN: ICD-10-CM

## 2025-07-20 DIAGNOSIS — R33.8 ACUTE URINARY RETENTION: ICD-10-CM

## 2025-07-20 DIAGNOSIS — K40.30 INCARCERATED LEFT INGUINAL HERNIA: Primary | ICD-10-CM

## 2025-07-20 PROBLEM — E78.00 HYPERCHOLESTEROLEMIA: Status: ACTIVE | Noted: 2025-07-20

## 2025-07-20 PROBLEM — I25.10 CAD (CORONARY ARTERY DISEASE): Status: ACTIVE | Noted: 2025-07-20

## 2025-07-20 LAB
ABSOLUTE EOSINOPHIL (OHS): 0.28 K/UL
ABSOLUTE MONOCYTE (OHS): 0.86 K/UL (ref 0.3–1)
ABSOLUTE NEUTROPHIL COUNT (OHS): 7.11 K/UL (ref 1.8–7.7)
ALBUMIN SERPL BCP-MCNC: 3.4 G/DL (ref 3.5–5.2)
ALP SERPL-CCNC: 160 UNIT/L (ref 40–150)
ALT SERPL W/O P-5'-P-CCNC: 23 UNIT/L (ref 10–44)
ANION GAP (OHS): 10 MMOL/L (ref 8–16)
AST SERPL-CCNC: 27 UNIT/L (ref 11–45)
BASOPHILS # BLD AUTO: 0.05 K/UL
BASOPHILS NFR BLD AUTO: 0.5 %
BILIRUB SERPL-MCNC: 0.7 MG/DL (ref 0.1–1)
BUN SERPL-MCNC: 35 MG/DL (ref 8–23)
CALCIUM SERPL-MCNC: 8.9 MG/DL (ref 8.7–10.5)
CHLORIDE SERPL-SCNC: 111 MMOL/L (ref 95–110)
CO2 SERPL-SCNC: 22 MMOL/L (ref 23–29)
CREAT SERPL-MCNC: 1.7 MG/DL (ref 0.5–1.4)
ERYTHROCYTE [DISTWIDTH] IN BLOOD BY AUTOMATED COUNT: 12 % (ref 11.5–14.5)
GFR SERPLBLD CREATININE-BSD FMLA CKD-EPI: 39 ML/MIN/1.73/M2
GLUCOSE SERPL-MCNC: 149 MG/DL (ref 70–110)
HCT VFR BLD AUTO: 45.9 % (ref 40–54)
HGB BLD-MCNC: 15.3 GM/DL (ref 14–18)
IMM GRANULOCYTES # BLD AUTO: 0.05 K/UL (ref 0–0.04)
IMM GRANULOCYTES NFR BLD AUTO: 0.5 % (ref 0–0.5)
LACTATE SERPL-SCNC: 1.4 MMOL/L (ref 0.5–2.2)
LYMPHOCYTES # BLD AUTO: 2.54 K/UL (ref 1–4.8)
MCH RBC QN AUTO: 31.2 PG (ref 27–31)
MCHC RBC AUTO-ENTMCNC: 33.3 G/DL (ref 32–36)
MCV RBC AUTO: 94 FL (ref 82–98)
NUCLEATED RBC (/100WBC) (OHS): 0 /100 WBC
PLATELET # BLD AUTO: 264 K/UL (ref 150–450)
PMV BLD AUTO: 9.4 FL (ref 9.2–12.9)
POTASSIUM SERPL-SCNC: 4.2 MMOL/L (ref 3.5–5.1)
PROT SERPL-MCNC: 6.9 GM/DL (ref 6–8.4)
RBC # BLD AUTO: 4.91 M/UL (ref 4.6–6.2)
RELATIVE EOSINOPHIL (OHS): 2.6 %
RELATIVE LYMPHOCYTE (OHS): 23.3 % (ref 18–48)
RELATIVE MONOCYTE (OHS): 7.9 % (ref 4–15)
RELATIVE NEUTROPHIL (OHS): 65.2 % (ref 38–73)
SODIUM SERPL-SCNC: 143 MMOL/L (ref 136–145)
WBC # BLD AUTO: 10.89 K/UL (ref 3.9–12.7)

## 2025-07-20 PROCEDURE — 93010 ELECTROCARDIOGRAM REPORT: CPT | Mod: ,,, | Performed by: INTERNAL MEDICINE

## 2025-07-20 PROCEDURE — 71000033 HC RECOVERY, INTIAL HOUR: Performed by: STUDENT IN AN ORGANIZED HEALTH CARE EDUCATION/TRAINING PROGRAM

## 2025-07-20 PROCEDURE — 25000003 PHARM REV CODE 250: Performed by: STUDENT IN AN ORGANIZED HEALTH CARE EDUCATION/TRAINING PROGRAM

## 2025-07-20 PROCEDURE — 36000707: Performed by: STUDENT IN AN ORGANIZED HEALTH CARE EDUCATION/TRAINING PROGRAM

## 2025-07-20 PROCEDURE — 96375 TX/PRO/DX INJ NEW DRUG ADDON: CPT

## 2025-07-20 PROCEDURE — 63600175 PHARM REV CODE 636 W HCPCS: Performed by: ANESTHESIOLOGY

## 2025-07-20 PROCEDURE — 11000001 HC ACUTE MED/SURG PRIVATE ROOM

## 2025-07-20 PROCEDURE — C1729 CATH, DRAINAGE: HCPCS | Performed by: STUDENT IN AN ORGANIZED HEALTH CARE EDUCATION/TRAINING PROGRAM

## 2025-07-20 PROCEDURE — 99285 EMERGENCY DEPT VISIT HI MDM: CPT | Mod: 25

## 2025-07-20 PROCEDURE — 37000009 HC ANESTHESIA EA ADD 15 MINS: Performed by: STUDENT IN AN ORGANIZED HEALTH CARE EDUCATION/TRAINING PROGRAM

## 2025-07-20 PROCEDURE — 74176 CT ABD & PELVIS W/O CONTRAST: CPT | Mod: TC

## 2025-07-20 PROCEDURE — 63600175 PHARM REV CODE 636 W HCPCS: Performed by: STUDENT IN AN ORGANIZED HEALTH CARE EDUCATION/TRAINING PROGRAM

## 2025-07-20 PROCEDURE — 80053 COMPREHEN METABOLIC PANEL: CPT | Performed by: EMERGENCY MEDICINE

## 2025-07-20 PROCEDURE — 83605 ASSAY OF LACTIC ACID: CPT | Performed by: EMERGENCY MEDICINE

## 2025-07-20 PROCEDURE — 74176 CT ABD & PELVIS W/O CONTRAST: CPT | Mod: 26,,, | Performed by: RADIOLOGY

## 2025-07-20 PROCEDURE — 0YU60JZ SUPPLEMENT LEFT INGUINAL REGION WITH SYNTHETIC SUBSTITUTE, OPEN APPROACH: ICD-10-PCS | Performed by: STUDENT IN AN ORGANIZED HEALTH CARE EDUCATION/TRAINING PROGRAM

## 2025-07-20 PROCEDURE — 49505 PRP I/HERN INIT REDUC >5 YR: CPT | Mod: LT,,, | Performed by: STUDENT IN AN ORGANIZED HEALTH CARE EDUCATION/TRAINING PROGRAM

## 2025-07-20 PROCEDURE — 96376 TX/PRO/DX INJ SAME DRUG ADON: CPT

## 2025-07-20 PROCEDURE — 63600175 PHARM REV CODE 636 W HCPCS: Performed by: EMERGENCY MEDICINE

## 2025-07-20 PROCEDURE — C1781 MESH (IMPLANTABLE): HCPCS | Performed by: STUDENT IN AN ORGANIZED HEALTH CARE EDUCATION/TRAINING PROGRAM

## 2025-07-20 PROCEDURE — 25000003 PHARM REV CODE 250: Performed by: EMERGENCY MEDICINE

## 2025-07-20 PROCEDURE — 36000706: Performed by: STUDENT IN AN ORGANIZED HEALTH CARE EDUCATION/TRAINING PROGRAM

## 2025-07-20 PROCEDURE — 93005 ELECTROCARDIOGRAM TRACING: CPT | Performed by: INTERNAL MEDICINE

## 2025-07-20 PROCEDURE — 99223 1ST HOSP IP/OBS HIGH 75: CPT | Mod: 57,,, | Performed by: STUDENT IN AN ORGANIZED HEALTH CARE EDUCATION/TRAINING PROGRAM

## 2025-07-20 PROCEDURE — 25000003 PHARM REV CODE 250: Performed by: ANESTHESIOLOGY

## 2025-07-20 PROCEDURE — 96374 THER/PROPH/DIAG INJ IV PUSH: CPT

## 2025-07-20 PROCEDURE — 85025 COMPLETE CBC W/AUTO DIFF WBC: CPT | Performed by: EMERGENCY MEDICINE

## 2025-07-20 PROCEDURE — 37000008 HC ANESTHESIA 1ST 15 MINUTES: Performed by: STUDENT IN AN ORGANIZED HEALTH CARE EDUCATION/TRAINING PROGRAM

## 2025-07-20 DEVICE — BARD MESH, 3" X 6" (7.6 CM X 15 CM)
Type: IMPLANTABLE DEVICE | Site: GROIN | Status: FUNCTIONAL
Brand: BARD

## 2025-07-20 RX ORDER — BUPIVACAINE 13.3 MG/ML
INJECTION, SUSPENSION, LIPOSOMAL INFILTRATION
Status: DISCONTINUED | OUTPATIENT
Start: 2025-07-20 | End: 2025-07-20 | Stop reason: HOSPADM

## 2025-07-20 RX ORDER — IBUPROFEN 200 MG
16 TABLET ORAL
Status: DISCONTINUED | OUTPATIENT
Start: 2025-07-20 | End: 2025-07-23 | Stop reason: HOSPADM

## 2025-07-20 RX ORDER — LANOLIN ALCOHOL/MO/W.PET/CERES
800 CREAM (GRAM) TOPICAL
Status: DISCONTINUED | OUTPATIENT
Start: 2025-07-20 | End: 2025-07-23 | Stop reason: HOSPADM

## 2025-07-20 RX ORDER — ETOMIDATE 2 MG/ML
INJECTION INTRAVENOUS
Status: DISCONTINUED | OUTPATIENT
Start: 2025-07-20 | End: 2025-07-20

## 2025-07-20 RX ORDER — ONDANSETRON HYDROCHLORIDE 2 MG/ML
4 INJECTION, SOLUTION INTRAVENOUS EVERY 8 HOURS PRN
Status: DISCONTINUED | OUTPATIENT
Start: 2025-07-20 | End: 2025-07-23 | Stop reason: HOSPADM

## 2025-07-20 RX ORDER — SODIUM CHLORIDE 0.9 % (FLUSH) 0.9 %
10 SYRINGE (ML) INJECTION
Status: DISCONTINUED | OUTPATIENT
Start: 2025-07-20 | End: 2025-07-23 | Stop reason: HOSPADM

## 2025-07-20 RX ORDER — HYDROMORPHONE HYDROCHLORIDE 1 MG/ML
0.5 INJECTION, SOLUTION INTRAMUSCULAR; INTRAVENOUS; SUBCUTANEOUS
Refills: 0 | Status: COMPLETED | OUTPATIENT
Start: 2025-07-20 | End: 2025-07-20

## 2025-07-20 RX ORDER — BUPIVACAINE HYDROCHLORIDE AND EPINEPHRINE 2.5; 5 MG/ML; UG/ML
INJECTION, SOLUTION EPIDURAL; INFILTRATION; INTRACAUDAL; PERINEURAL
Status: DISCONTINUED | OUTPATIENT
Start: 2025-07-20 | End: 2025-07-20 | Stop reason: HOSPADM

## 2025-07-20 RX ORDER — DIPHENHYDRAMINE HYDROCHLORIDE 50 MG/ML
12.5 INJECTION, SOLUTION INTRAMUSCULAR; INTRAVENOUS
Status: DISCONTINUED | OUTPATIENT
Start: 2025-07-20 | End: 2025-07-23 | Stop reason: HOSPADM

## 2025-07-20 RX ORDER — CEFAZOLIN SODIUM 1 G/3ML
INJECTION, POWDER, FOR SOLUTION INTRAMUSCULAR; INTRAVENOUS
Status: DISCONTINUED | OUTPATIENT
Start: 2025-07-20 | End: 2025-07-20

## 2025-07-20 RX ORDER — TALC
9 POWDER (GRAM) TOPICAL NIGHTLY PRN
Status: DISCONTINUED | OUTPATIENT
Start: 2025-07-20 | End: 2025-07-23 | Stop reason: HOSPADM

## 2025-07-20 RX ORDER — SUCCINYLCHOLINE CHLORIDE 20 MG/ML
INJECTION INTRAMUSCULAR; INTRAVENOUS
Status: DISCONTINUED | OUTPATIENT
Start: 2025-07-20 | End: 2025-07-20

## 2025-07-20 RX ORDER — GLUCAGON 1 MG
1 KIT INJECTION
Status: DISCONTINUED | OUTPATIENT
Start: 2025-07-20 | End: 2025-07-23 | Stop reason: HOSPADM

## 2025-07-20 RX ORDER — MORPHINE SULFATE 2 MG/ML
4 INJECTION, SOLUTION INTRAMUSCULAR; INTRAVENOUS
Refills: 0 | Status: COMPLETED | OUTPATIENT
Start: 2025-07-20 | End: 2025-07-20

## 2025-07-20 RX ORDER — ASPIRIN 81 MG/1
81 TABLET ORAL DAILY
Status: DISCONTINUED | OUTPATIENT
Start: 2025-07-21 | End: 2025-07-23 | Stop reason: HOSPADM

## 2025-07-20 RX ORDER — AMOXICILLIN 250 MG
1 CAPSULE ORAL 2 TIMES DAILY PRN
Status: DISCONTINUED | OUTPATIENT
Start: 2025-07-20 | End: 2025-07-23 | Stop reason: HOSPADM

## 2025-07-20 RX ORDER — ROCURONIUM BROMIDE 10 MG/ML
INJECTION, SOLUTION INTRAVENOUS
Status: DISCONTINUED | OUTPATIENT
Start: 2025-07-20 | End: 2025-07-20

## 2025-07-20 RX ORDER — ONDANSETRON HYDROCHLORIDE 2 MG/ML
4 INJECTION, SOLUTION INTRAVENOUS
Status: COMPLETED | OUTPATIENT
Start: 2025-07-20 | End: 2025-07-20

## 2025-07-20 RX ORDER — SODIUM,POTASSIUM PHOSPHATES 280-250MG
2 POWDER IN PACKET (EA) ORAL
Status: DISCONTINUED | OUTPATIENT
Start: 2025-07-20 | End: 2025-07-23 | Stop reason: HOSPADM

## 2025-07-20 RX ORDER — SODIUM CHLORIDE, SODIUM LACTATE, POTASSIUM CHLORIDE, CALCIUM CHLORIDE 600; 310; 30; 20 MG/100ML; MG/100ML; MG/100ML; MG/100ML
INJECTION, SOLUTION INTRAVENOUS CONTINUOUS PRN
Status: DISCONTINUED | OUTPATIENT
Start: 2025-07-20 | End: 2025-07-20

## 2025-07-20 RX ORDER — HYDROCODONE BITARTRATE AND ACETAMINOPHEN 5; 325 MG/1; MG/1
1 TABLET ORAL EVERY 6 HOURS PRN
Refills: 0 | Status: DISCONTINUED | OUTPATIENT
Start: 2025-07-20 | End: 2025-07-23 | Stop reason: HOSPADM

## 2025-07-20 RX ORDER — LACTULOSE 10 G/15ML
20 SOLUTION ORAL; RECTAL 2 TIMES DAILY PRN
COMMUNITY
Start: 2025-07-12

## 2025-07-20 RX ORDER — OXYCODONE HYDROCHLORIDE 5 MG/1
5 TABLET ORAL
Status: DISCONTINUED | OUTPATIENT
Start: 2025-07-20 | End: 2025-07-23

## 2025-07-20 RX ORDER — ACETAMINOPHEN 325 MG/1
650 TABLET ORAL EVERY 4 HOURS PRN
Status: DISCONTINUED | OUTPATIENT
Start: 2025-07-20 | End: 2025-07-23 | Stop reason: HOSPADM

## 2025-07-20 RX ORDER — ENOXAPARIN SODIUM 100 MG/ML
40 INJECTION SUBCUTANEOUS EVERY 24 HOURS
Status: DISCONTINUED | OUTPATIENT
Start: 2025-07-20 | End: 2025-07-23 | Stop reason: HOSPADM

## 2025-07-20 RX ORDER — FAMOTIDINE 10 MG/ML
INJECTION, SOLUTION INTRAVENOUS
Status: DISCONTINUED | OUTPATIENT
Start: 2025-07-20 | End: 2025-07-20

## 2025-07-20 RX ORDER — FENTANYL CITRATE 50 UG/ML
25 INJECTION, SOLUTION INTRAMUSCULAR; INTRAVENOUS EVERY 5 MIN PRN
Status: DISCONTINUED | OUTPATIENT
Start: 2025-07-20 | End: 2025-07-23

## 2025-07-20 RX ORDER — ATORVASTATIN CALCIUM 40 MG/1
40 TABLET, FILM COATED ORAL DAILY
Status: DISCONTINUED | OUTPATIENT
Start: 2025-07-21 | End: 2025-07-23 | Stop reason: HOSPADM

## 2025-07-20 RX ORDER — MEPERIDINE HYDROCHLORIDE 50 MG/ML
12.5 INJECTION INTRAMUSCULAR; INTRAVENOUS; SUBCUTANEOUS ONCE AS NEEDED
Refills: 0 | Status: COMPLETED | OUTPATIENT
Start: 2025-07-20 | End: 2025-07-20

## 2025-07-20 RX ORDER — FENTANYL CITRATE 50 UG/ML
INJECTION, SOLUTION INTRAMUSCULAR; INTRAVENOUS
Status: DISCONTINUED | OUTPATIENT
Start: 2025-07-20 | End: 2025-07-20

## 2025-07-20 RX ORDER — IBUPROFEN 200 MG
24 TABLET ORAL
Status: DISCONTINUED | OUTPATIENT
Start: 2025-07-20 | End: 2025-07-23 | Stop reason: HOSPADM

## 2025-07-20 RX ORDER — NALOXONE HCL 0.4 MG/ML
0.02 VIAL (ML) INJECTION
Status: DISCONTINUED | OUTPATIENT
Start: 2025-07-20 | End: 2025-07-23 | Stop reason: HOSPADM

## 2025-07-20 RX ORDER — SODIUM CHLORIDE 9 MG/ML
1000 INJECTION, SOLUTION INTRAVENOUS
Status: COMPLETED | OUTPATIENT
Start: 2025-07-20 | End: 2025-07-20

## 2025-07-20 RX ORDER — LIDOCAINE HYDROCHLORIDE 20 MG/ML
INJECTION, SOLUTION EPIDURAL; INFILTRATION; INTRACAUDAL; PERINEURAL
Status: DISCONTINUED | OUTPATIENT
Start: 2025-07-20 | End: 2025-07-20

## 2025-07-20 RX ORDER — ONDANSETRON HYDROCHLORIDE 2 MG/ML
INJECTION, SOLUTION INTRAVENOUS
Status: DISCONTINUED | OUTPATIENT
Start: 2025-07-20 | End: 2025-07-20

## 2025-07-20 RX ORDER — LOSARTAN POTASSIUM 50 MG/1
50 TABLET ORAL DAILY
Status: DISCONTINUED | OUTPATIENT
Start: 2025-07-21 | End: 2025-07-23 | Stop reason: HOSPADM

## 2025-07-20 RX ORDER — ONDANSETRON HYDROCHLORIDE 2 MG/ML
4 INJECTION, SOLUTION INTRAVENOUS DAILY PRN
Status: DISCONTINUED | OUTPATIENT
Start: 2025-07-20 | End: 2025-07-23 | Stop reason: HOSPADM

## 2025-07-20 RX ORDER — CLOPIDOGREL BISULFATE 75 MG/1
75 TABLET ORAL DAILY
Status: DISCONTINUED | OUTPATIENT
Start: 2025-07-21 | End: 2025-07-23 | Stop reason: HOSPADM

## 2025-07-20 RX ADMIN — ONDANSETRON 4 MG: 2 INJECTION INTRAMUSCULAR; INTRAVENOUS at 10:07

## 2025-07-20 RX ADMIN — FENTANYL CITRATE 50 MCG: 50 INJECTION, SOLUTION INTRAMUSCULAR; INTRAVENOUS at 10:07

## 2025-07-20 RX ADMIN — LIDOCAINE HYDROCHLORIDE 100 MG: 20 INJECTION, SOLUTION INTRAVENOUS at 09:07

## 2025-07-20 RX ADMIN — MORPHINE SULFATE 4 MG: 2 INJECTION, SOLUTION INTRAMUSCULAR; INTRAVENOUS at 02:07

## 2025-07-20 RX ADMIN — ONDANSETRON 4 MG: 2 INJECTION INTRAMUSCULAR; INTRAVENOUS at 03:07

## 2025-07-20 RX ADMIN — OXYCODONE HYDROCHLORIDE 5 MG: 5 TABLET ORAL at 11:07

## 2025-07-20 RX ADMIN — ROCURONIUM BROMIDE 5 MG: 10 INJECTION, SOLUTION INTRAVENOUS at 09:07

## 2025-07-20 RX ADMIN — SODIUM CHLORIDE 1000 ML: 9 INJECTION, SOLUTION INTRAVENOUS at 05:07

## 2025-07-20 RX ADMIN — Medication 140 MG: at 09:07

## 2025-07-20 RX ADMIN — ETOMIDATE 14 MG: 2 INJECTION, SOLUTION INTRAVENOUS at 09:07

## 2025-07-20 RX ADMIN — SODIUM CHLORIDE, SODIUM LACTATE, POTASSIUM CHLORIDE, AND CALCIUM CHLORIDE: .6; .31; .03; .02 INJECTION, SOLUTION INTRAVENOUS at 09:07

## 2025-07-20 RX ADMIN — ROCURONIUM BROMIDE 25 MG: 10 INJECTION, SOLUTION INTRAVENOUS at 10:07

## 2025-07-20 RX ADMIN — HYDROMORPHONE HYDROCHLORIDE 0.5 MG: 1 INJECTION, SOLUTION INTRAMUSCULAR; INTRAVENOUS; SUBCUTANEOUS at 03:07

## 2025-07-20 RX ADMIN — ONDANSETRON 4 MG: 2 INJECTION INTRAMUSCULAR; INTRAVENOUS at 02:07

## 2025-07-20 RX ADMIN — CEFAZOLIN 2 G: 330 INJECTION, POWDER, FOR SOLUTION INTRAMUSCULAR; INTRAVENOUS at 10:07

## 2025-07-20 RX ADMIN — FENTANYL CITRATE 50 MCG: 50 INJECTION, SOLUTION INTRAMUSCULAR; INTRAVENOUS at 09:07

## 2025-07-20 RX ADMIN — MEPERIDINE HYDROCHLORIDE 12.5 MG: 50 INJECTION INTRAMUSCULAR; INTRAVENOUS; SUBCUTANEOUS at 11:07

## 2025-07-20 RX ADMIN — SUGAMMADEX 200 MG: 100 INJECTION, SOLUTION INTRAVENOUS at 10:07

## 2025-07-20 RX ADMIN — MORPHINE SULFATE 4 MG: 2 INJECTION, SOLUTION INTRAMUSCULAR; INTRAVENOUS at 03:07

## 2025-07-20 RX ADMIN — FAMOTIDINE 20 MG: 10 INJECTION, SOLUTION INTRAVENOUS at 10:07

## 2025-07-20 NOTE — ED PROVIDER NOTES
Encounter Date: 7/20/2025       History     Chief Complaint   Patient presents with    Testicle Pain     X 1 hour       85-year-old male, here from home via private vehicle for evaluation and treatment of the sudden onset of left testicular pain which radiates into the groin and left lower quadrant of the abdomen.  Patient states symptoms started when he was sitting at his computer at home, about 1 hour prior to arrival.  Denies any trauma, no over exertion or straining.  Does have a history of inguinal hernia.      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     Past Medical History:   Diagnosis Date    Cancer     prostate    Hypertension     Kidney problem     only 1 kidney functions    Liver disease     Umbilical hernia      Past Surgical History:   Procedure Laterality Date    colon repair      after prostatectomy    LYSIS OF ADHESIONS N/A 10/18/2018    Procedure: LYSIS, ADHESIONS;  Surgeon: Rancho Mariee MD;  Location: [address];  Service: General;  Laterality: N/A;    PROSTATECTOMY      PROSTATECTOMY  2010    REPAIR OF RECURRENT INCISIONAL HERNIA N/A 10/18/2018    Procedure: REPAIR, HERNIA, INCISIONAL -multiple;  Surgeon: Rancho Mariee MD;  Location: [address];  Service: General;  Laterality: N/A;    REPAIR, HERNIA, INGUINAL N/A 7/20/2025    Procedure: REPAIR, INGUINAL HERNIA;  Surgeon: Dima Díaz MD;  Location: [facility];  Service: General;  Laterality: N/A;     Family History   Problem Relation Name Age of Onset    Heart disease Mother      Heart disease Father      Diabetes Sister       Social History[1]  Review of Systems   Gastrointestinal:  Positive for abdominal pain.   Genitourinary:  Positive for testicular pain. Negative for scrotal swelling.   All other systems reviewed and are negative.      Physical Exam     Initial  Vitals [07/20/25 1436]   BP Pulse Resp Temp SpO2   (!) 149/70 88 20 98.1 °F (36.7 °C) 99 %      MAP       --         Physical Exam    Nursing note and vitals reviewed.  Constitutional: He appears well-developed and well-nourished. He appears distressed (In order to pain).   HENT:   Head: Normocephalic and atraumatic.   Nose: Nose normal. Mouth/Throat: Oropharynx is clear and moist. No oropharyngeal exudate.   Eyes: Conjunctivae and EOM are normal. Pupils are equal, round, and reactive to light. No scleral icterus.   Neck: Neck supple. No JVD present.   Normal range of motion.  Cardiovascular:  Normal rate, regular rhythm, normal heart sounds and intact distal pulses.           No murmur heard.  Pulmonary/Chest: Breath sounds normal. No stridor. No respiratory distress. He has no wheezes. He has no rhonchi. He has no rales.   Abdominal: Abdomen is soft. Bowel sounds are normal. He exhibits mass. He exhibits no distension. There is abdominal tenderness (In the lower abdomen, just left of midline, there is a baseball sized mass consistent with a hernia.  At present the patient is in too much pain for any attempts at reduction.).   Genitourinary:    Penis normal.      Genitourinary Comments: Both testicles are palpated in the scrotum.  Scrotum not discolored or swollen.     Musculoskeletal:         General: No tenderness or edema. Normal range of motion.      Cervical back: Normal range of motion and neck supple.     Neurological: He is alert and oriented to person, place, and time. He has normal strength. No cranial nerve deficit or sensory deficit. GCS score is 15. GCS eye subscore is 4. GCS verbal subscore is 5. GCS motor subscore is 6.   Skin: Skin is warm and dry. Capillary refill takes less than 2 seconds. No rash noted. No erythema.   Psychiatric: He has a normal mood and affect. His behavior is normal.         ED Course   Procedures  Labs Reviewed   COMPREHENSIVE METABOLIC PANEL - Abnormal       Result Value     Sodium 143      Potassium 4.2      Chloride 111 (*)     CO2 22 (*)     Glucose 149 (*)     BUN 35 (*)     Creatinine 1.7 (*)     Calcium 8.9      Protein Total 6.9      Albumin 3.4 (*)     Bilirubin Total 0.7       (*)     AST 27      ALT 23      Anion Gap 10      eGFR 39 (*)    CBC WITH DIFFERENTIAL - Abnormal    WBC 10.89      RBC 4.91      HGB 15.3      HCT 45.9      MCV 94      MCH 31.2 (*)     MCHC 33.3      RDW 12.0      Platelet Count 264      MPV 9.4      Nucleated RBC 0      Neut % 65.2      Lymph % 23.3      Mono % 7.9      Eos % 2.6      Basophil % 0.5      Imm Grans % 0.5      Neut # 7.11      Lymph # 2.54      Mono # 0.86      Eos # 0.28      Baso # 0.05      Imm Grans # 0.05 (*)    LACTIC ACID, PLASMA - Normal    Lactic Acid Level 1.4      Narrative:     Falsely low lactic acid results can be found in samples containing >=13.0 mg/dL total bilirubin and/or >=3.5 mg/dL direct bilirubin.    CBC W/ AUTO DIFFERENTIAL    Narrative:     The following orders were created for panel order CBC auto differential.  Procedure                               Abnormality         Status                     ---------                               -----------         ------                     CBC with Differential[[id number]]       Abnormal            Final result                 Please view results for these tests on the individual orders.          Imaging Results               CT Abdomen Pelvis  Without Contrast (Final result)  Result time 07/20/25 16:26:10      Final result by Gene Leo MD (07/20/25 16:26:10)                   Impression:      The previously described left inguinal hernia now contains a bowel loop which appears focally dilated raising the question of some degree of incarceration.  The bowel loop measures as much as 2.9 cm.  The more proximal bowel does not appear to be significantly dilated suggesting the absence of a associated marked small-bowel obstruction.    Multiple chronic  findings including and a fusiform infrarenal abdominal aortic aneurysm with a 4.2 cm diameter.  There is no evidence of acute rupture or hemorrhage.    Right renal atrophy and a hemorrhagic right renal cysts are noted.  The bladder is partially distended and the tip extends into the neck of the hernia.      Electronically signed by: Gene Leo MD  Date:    07/20/2025  Time:    16:26               Narrative:    EXAMINATION:  CT ABDOMEN PELVIS WITHOUT CONTRAST    CLINICAL HISTORY:  Abdominal pain, acute, nonlocalized;Left inguinal mass;    TECHNIQUE:  Low dose axial images, sagittal and coronal reformations were obtained from the lung bases to the pubic symphysis.  Oral contrast was not administered.    COMPARISON:  October 6, 2024 CT scan    FINDINGS:  Bibasal scarring and atelectasis are evident.  Extensive coronary artery calcifications are present and there is evidence of prior cardiac pacer placement.  Noncontrast evaluation of the liver and spleen are unremarkable except for a presumed cyst in the lateral segment left lobe of the liver measuring 16 mm.  This is unchanged relative the previous exam.  The adrenal glands are unremarkable.  There is fatty atrophy of the pancreas.  The right kidney is markedly atrophic and there is an exophytic 24 mm in diameter hyperdense mass emanating from the right mid kidney.  This is smaller than was seen previously and likely represents a hemorrhagic cyst.  In the left kidney there are parapelvic cysts and probable additional cortical cyst formation.  These are incompletely evaluated on this noncontrast scan.  No definite left-sided hydroureter is seen.  There is extensive aortoiliac atherosclerosis with a fusiform infrarenal abdominal aortic aneurysm being demonstrated.  The maximal external diameter is 4.2 x 4.2 cm.  This has minimally enlarged relative the previous exam when it measured 4.0 cm.  There is no CT evidence of rupture.  The left common iliac is also  aneurysmal measuring 17 mm in the right ectatic at 14 mm.  There is a left inguinal hernia which contains a portion of the bladder as well as a loop of bowel.  The loop of bowel is mildly dilated and there is some increased density in the adjacent fat suggesting regional inflammation.  Do not see evidence of a harrison bowel obstruction.  The region the appendix is unremarkable.  Partially visualized is a penile prosthesis.  The left inguinal hernia also contains fat.  Degenerative changes are seen in the spine. This report was flagged in Epic as abnormal.                                    X-Rays:   Independently Interpreted Readings:   Other Readings:  CT abdomen pelvis, done without contrast secondary to the patient's renal status, was personally reviewed by me.  There is a left inguinal hernia containing a bowel loop which appears to be focally dilated, with increased fat densities adjacent suggesting regional inflammation and at least some degree of incarceration.  There is also a portion of the bladder contained in the hernia.    Incidental findings of fusiform infrarenal abdominal aortic aneurysm measuring 4.2 cm without evidence of rupture or hemorrhage, atrophic right kidney, exophytic 24 mm hyperdense mass emanating from the right mid kidney, possibly hemorrhagic cyst.  Left kidney shows parapelvic cysts no definite left hydroureter.    Medications   morphine injection 4 mg (4 mg Intravenous Given 7/20/25 1457)   ondansetron injection 4 mg (4 mg Intravenous Given 7/20/25 1459)   ondansetron injection 4 mg (4 mg Intravenous Given 7/20/25 1517)   morphine injection 4 mg (4 mg Intravenous Given 7/20/25 1515)   HYDROmorphone injection 0.5 mg (0.5 mg Intravenous Given 7/20/25 1532)   0.9% NaCl infusion (1,000 mLs Intravenous Handoff 7/20/25 1807)     Medical Decision Making  Differential includes testicular torsion, hydrocele, varicocele, hernia, incarcerated hernia, neoplasm, etc.    Labs unremarkable, CT shows a  likely incarcerated left inguinal hernia.  Attempts at reduction were unsuccessful.  Patient will need General surgery services which are not available at this facility.  He would rather go to [facility] or [facility] if able.  Request for transfer initiated on behalf of the patient.    5:30 p.m.-I spoke to Dr. Díaz, general surgeon on-call at [facility] who has agreed to accept the patient for an ED to ED transfer..  Transportation now being arranged.    Amount and/or Complexity of Data Reviewed  Labs: ordered.  Radiology: ordered.    Risk  Prescription drug management.                                          Clinical Impression:  Final diagnoses:  [K40.30] Incarcerated left inguinal hernia (Primary)          ED Disposition Condition    Transfer to Another Facility Stable                      [1]   Social History  Tobacco Use    Smoking status: Former     Current packs/day: 0.00     Types: Cigarettes     Quit date: 2/15/1972     Years since quittin.4    Smokeless tobacco: Never   Substance Use Topics    Alcohol use: No    Drug use: No        Heriberto Gomes MD  25 [phone]

## 2025-07-21 PROBLEM — R33.8 ACUTE URINARY RETENTION: Status: ACTIVE | Noted: 2025-07-21

## 2025-07-21 PROBLEM — N18.30 CKD (CHRONIC KIDNEY DISEASE) STAGE 3, GFR 30-59 ML/MIN: Status: ACTIVE | Noted: 2025-07-21

## 2025-07-21 LAB
ABSOLUTE EOSINOPHIL (SMH): 0.25 K/UL
ABSOLUTE MONOCYTE (SMH): 1.14 K/UL (ref 0.3–1)
ABSOLUTE NEUTROPHIL COUNT (SMH): 10.2 K/UL (ref 1.8–7.7)
ANION GAP (SMH): 5 MMOL/L (ref 8–16)
BASOPHILS # BLD AUTO: 0.03 K/UL
BASOPHILS NFR BLD AUTO: 0.2 %
BUN SERPL-MCNC: 30 MG/DL (ref 8–23)
CALCIUM SERPL-MCNC: 8.3 MG/DL (ref 8.7–10.5)
CHLORIDE SERPL-SCNC: 114 MMOL/L (ref 95–110)
CO2 SERPL-SCNC: 23 MMOL/L (ref 23–29)
CREAT SERPL-MCNC: 1.5 MG/DL (ref 0.5–1.4)
ERYTHROCYTE [DISTWIDTH] IN BLOOD BY AUTOMATED COUNT: 12.2 % (ref 11.5–14.5)
GFR SERPLBLD CREATININE-BSD FMLA CKD-EPI: 45 ML/MIN/1.73/M2
GLUCOSE SERPL-MCNC: 122 MG/DL (ref 70–110)
HCT VFR BLD AUTO: 42.1 % (ref 40–54)
HGB BLD-MCNC: 13.9 GM/DL (ref 14–18)
IMM GRANULOCYTES # BLD AUTO: 0.06 K/UL (ref 0–0.04)
IMM GRANULOCYTES NFR BLD AUTO: 0.5 % (ref 0–0.5)
LYMPHOCYTES # BLD AUTO: 1.51 K/UL (ref 1–4.8)
MAGNESIUM SERPL-MCNC: 2.5 MG/DL (ref 1.6–2.6)
MCH RBC QN AUTO: 31.3 PG (ref 27–31)
MCHC RBC AUTO-ENTMCNC: 33 G/DL (ref 32–36)
MCV RBC AUTO: 95 FL (ref 82–98)
NUCLEATED RBC (/100WBC) (SMH): 0 /100 WBC
OHS QRS DURATION: 158 MS
OHS QTC CALCULATION: 517 MS
PHOSPHATE SERPL-MCNC: 2.8 MG/DL (ref 2.7–4.5)
PLATELET # BLD AUTO: 237 K/UL (ref 150–450)
PMV BLD AUTO: 9.4 FL (ref 9.2–12.9)
POTASSIUM SERPL-SCNC: 4.2 MMOL/L (ref 3.5–5.1)
RBC # BLD AUTO: 4.44 M/UL (ref 4.6–6.2)
RELATIVE EOSINOPHIL (SMH): 1.9 % (ref 0–8)
RELATIVE LYMPHOCYTE (SMH): 11.4 % (ref 18–48)
RELATIVE MONOCYTE (SMH): 8.6 % (ref 4–15)
RELATIVE NEUTROPHIL (SMH): 77.4 % (ref 38–73)
SODIUM SERPL-SCNC: 142 MMOL/L (ref 136–145)
WBC # BLD AUTO: 13.2 K/UL (ref 3.9–12.7)

## 2025-07-21 PROCEDURE — 84100 ASSAY OF PHOSPHORUS: CPT | Performed by: STUDENT IN AN ORGANIZED HEALTH CARE EDUCATION/TRAINING PROGRAM

## 2025-07-21 PROCEDURE — 25000003 PHARM REV CODE 250: Performed by: STUDENT IN AN ORGANIZED HEALTH CARE EDUCATION/TRAINING PROGRAM

## 2025-07-21 PROCEDURE — 94761 N-INVAS EAR/PLS OXIMETRY MLT: CPT

## 2025-07-21 PROCEDURE — 25000003 PHARM REV CODE 250: Performed by: ANESTHESIOLOGY

## 2025-07-21 PROCEDURE — 11000001 HC ACUTE MED/SURG PRIVATE ROOM

## 2025-07-21 PROCEDURE — 51798 US URINE CAPACITY MEASURE: CPT

## 2025-07-21 PROCEDURE — 36415 COLL VENOUS BLD VENIPUNCTURE: CPT | Performed by: STUDENT IN AN ORGANIZED HEALTH CARE EDUCATION/TRAINING PROGRAM

## 2025-07-21 PROCEDURE — 85025 COMPLETE CBC W/AUTO DIFF WBC: CPT | Performed by: STUDENT IN AN ORGANIZED HEALTH CARE EDUCATION/TRAINING PROGRAM

## 2025-07-21 PROCEDURE — 83735 ASSAY OF MAGNESIUM: CPT | Performed by: STUDENT IN AN ORGANIZED HEALTH CARE EDUCATION/TRAINING PROGRAM

## 2025-07-21 PROCEDURE — 63600175 PHARM REV CODE 636 W HCPCS: Performed by: STUDENT IN AN ORGANIZED HEALTH CARE EDUCATION/TRAINING PROGRAM

## 2025-07-21 PROCEDURE — 80048 BASIC METABOLIC PNL TOTAL CA: CPT | Performed by: STUDENT IN AN ORGANIZED HEALTH CARE EDUCATION/TRAINING PROGRAM

## 2025-07-21 RX ORDER — TAMSULOSIN HYDROCHLORIDE 0.4 MG/1
0.4 CAPSULE ORAL DAILY
Status: DISCONTINUED | OUTPATIENT
Start: 2025-07-21 | End: 2025-07-23 | Stop reason: HOSPADM

## 2025-07-21 RX ADMIN — CLOPIDOGREL BISULFATE 75 MG: 75 TABLET, FILM COATED ORAL at 09:07

## 2025-07-21 RX ADMIN — LOSARTAN POTASSIUM 50 MG: 50 TABLET, FILM COATED ORAL at 09:07

## 2025-07-21 RX ADMIN — ASPIRIN 81 MG: 81 TABLET ORAL at 09:07

## 2025-07-21 RX ADMIN — HYDROCODONE BITARTRATE AND ACETAMINOPHEN 1 TABLET: 5; 325 TABLET ORAL at 03:07

## 2025-07-21 RX ADMIN — ENOXAPARIN SODIUM 40 MG: 100 INJECTION SUBCUTANEOUS at 04:07

## 2025-07-21 RX ADMIN — HYDROCODONE BITARTRATE AND ACETAMINOPHEN 1 TABLET: 5; 325 TABLET ORAL at 05:07

## 2025-07-21 RX ADMIN — OXYCODONE HYDROCHLORIDE 5 MG: 5 TABLET ORAL at 09:07

## 2025-07-21 RX ADMIN — TAMSULOSIN HYDROCHLORIDE 0.4 MG: 0.4 CAPSULE ORAL at 09:07

## 2025-07-21 RX ADMIN — ATORVASTATIN CALCIUM 40 MG: 40 TABLET, FILM COATED ORAL at 09:07

## 2025-07-21 NOTE — SUBJECTIVE & OBJECTIVE
Interval History:  Postop day 1 status post left inguinal hernia repair.  Patient reports discomfort to his left inguinal region however controlled with pain medication.  He did have an episode of urinary retention.  A in and out catheter was performed with improvement.  He will be monitored overnight given his history of CKD 3 with solitary kidney and prostate cancer with resection to evaluate for subsequent episodes of urinary retention.    Review of Systems   Constitutional:  Negative for chills and fever.   Respiratory:  Negative for shortness of breath and wheezing.    Gastrointestinal:  Negative for abdominal pain, nausea and vomiting.   Genitourinary:         Left inguinal pain     Objective:     Vital Signs (Most Recent):  Temp: 97.8 °F (36.6 °C) (07/21/25 1208)  Pulse: 70 (07/21/25 1208)  Resp: 18 (07/21/25 1208)  BP: 126/67 (07/21/25 1208)  SpO2: (!) 94 % (07/21/25 1208) Vital Signs (24h Range):  Temp:  [97.2 °F (36.2 °C)-98.1 °F (36.7 °C)] 97.8 °F (36.6 °C)  Pulse:  [69-98] 70  Resp:  [16-22] 18  SpO2:  [93 %-100 %] 94 %  BP: (112-174)/() 126/67     Weight: 79.7 kg (175 lb 11.3 oz)  Body mass index is 23.18 kg/m².    Intake/Output Summary (Last 24 hours) at 7/21/2025 1641  Last data filed at 7/21/2025 1200  Gross per 24 hour   Intake 630 ml   Output 900 ml   Net -270 ml         Physical Exam  Constitutional:       General: He is not in acute distress.     Appearance: Normal appearance. He is not ill-appearing or toxic-appearing.   HENT:      Head: Normocephalic.      Mouth/Throat:      Mouth: Mucous membranes are moist.      Pharynx: Oropharynx is clear.   Cardiovascular:      Rate and Rhythm: Normal rate and regular rhythm.      Pulses: Normal pulses.      Heart sounds: Normal heart sounds.   Pulmonary:      Effort: Pulmonary effort is normal.      Breath sounds: Normal breath sounds.   Abdominal:      General: Bowel sounds are normal. There is no distension.      Tenderness: There is no abdominal  tenderness. There is no guarding.      Comments: Incision noted to left lower quadrant near inguinal region, glue is intact.  Mild hematoma noted slight tenderness to palpation   Skin:     General: Skin is warm and dry.      Capillary Refill: Capillary refill takes less than 2 seconds.   Neurological:      Mental Status: He is alert and oriented to person, place, and time.   Psychiatric:         Mood and Affect: Mood normal.               Significant Labs: All pertinent labs within the past 24 hours have been reviewed.  Recent Lab Results         07/21/25  0423        Anion Gap 5       Baso # 0.03       Basophil % 0.2       BUN 30       Calcium 8.3       Chloride 114       CO2 23       Creatinine 1.5       eGFR 45       Eos # 0.25       Eos % 1.9       Glucose 122       Hematocrit 42.1       Hemoglobin 13.9       Immature Grans (Abs) 0.06  Comment: Mild elevation in immature granulocytes is non specific and can be seen in a variety of conditions including stress response, acute inflammation, trauma and pregnancy. Correlation with other laboratory and clinical findings is essential.       Immature Granulocytes 0.5       Lymph # 1.51       LYMPH % 11.4       Magnesium  2.5       MCH 31.3       MCHC 33.0       MCV 95       Mono # 1.14       Mono % 8.6       MPV 9.4       Neut # 10.2       Neut % 77.4       nRBC 0       Phosphorus Level 2.8       Platelet Count 237       Potassium 4.2       RBC 4.44       RDW 12.2       Sodium 142       WBC 13.20               Significant Imaging: I have reviewed all pertinent imaging results/findings within the past 24 hours.

## 2025-07-21 NOTE — CONSULTS
[facility]  General Surgery  Consult Note    Inpatient consult to General Surgery  Consult performed by: Dima Díaz MD  Consult ordered by: Ziggy Gannon MD        Subjective:     Chief Complaint/Reason for Admission:  Transfer for an incarcerated inguinal hernia    History of Present Illness:  This is an 85-year-old male who presented to an outside facility concerns of an incarcerated inguinal hernia.  It was reported that the hernia was unable to be reduced.  Patient has a history of robotic prostatectomy complicated by bowel injury.  Upon presentation, the hernia was reduced.    Current Facility-Administered Medications on File Prior to Encounter   Medication    [COMPLETED] 0.9% NaCl infusion    [COMPLETED] HYDROmorphone injection 0.5 mg    [COMPLETED] morphine injection 4 mg    [COMPLETED] morphine injection 4 mg    [COMPLETED] ondansetron injection 4 mg    [COMPLETED] ondansetron injection 4 mg     Current Outpatient Medications on File Prior to Encounter   Medication Sig    aspirin (ECOTRIN) 81 MG EC tablet Take 81 mg by mouth once daily.    lactulose (CHRONULAC) 10 gram/15 mL solution Take 20 g by mouth 2 (two) times daily as needed.    albuterol (PROVENTIL/VENTOLIN HFA) 90 mcg/actuation inhaler Inhale 1-2 puffs into the lungs every 6 (six) hours as needed for Shortness of Breath. Rescue    ALPRAZolam (XANAX) 1 MG tablet Take 0.5-1 mg by mouth 2 (two) times daily as needed.    atorvastatin (LIPITOR) 40 MG tablet Take 40 mg by mouth once daily.    B-complex with vitamin C tablet Take 1 tablet by mouth once daily.      [] cefdinir (OMNICEF) 300 MG capsule Take 1 capsule (300 mg total) by mouth 2 (two) times daily. for 7 days    citalopram (CELEXA) 20 MG tablet Take 20 mg by mouth every morning.    clopidogreL (PLAVIX) 75 mg tablet Take 75 mg by mouth once daily.    cyproheptadine (PERIACTIN) 4 mg tablet Take 4 mg by mouth 2 (two) times daily.    furosemide (LASIX) 40 MG tablet Take  40 mg by mouth 2 (two) times daily.    HYDROcodone-acetaminophen (NORCO) 5-325 mg per tablet Take 1 tablet by mouth every 6 (six) hours as needed for Pain.    hydrocortisone (ANUSOL-HC) 2.5 % rectal cream Place rectally 2 (two) times daily. for 10 days    [] lactulose (CHRONULAC) 20 gram/30 mL Soln Take 30 mLs (20 g total) by mouth 2 (two) times daily as needed (constipation).    losartan (COZAAR) 100 MG tablet Take 100 mg by mouth once daily.    metoprolol succinate (TOPROL-XL) 25 MG 24 hr tablet Take 0.5 tablets (12.5 mg total) by mouth once daily.    mirtazapine (REMERON) 45 MG tablet Take 45 mg by mouth every evening.    multivitamin,therapeutic (THERA ORAL) Take 1 tablet by mouth once daily.    spironolactone (ALDACTONE) 25 MG tablet Take 12.5 mg by mouth 2 (two) times daily.    vitamin D (VITAMIN D3) 1000 units Tab Take 25 mcg by mouth once daily.       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       Past Medical History:   Diagnosis Date    Cancer     prostate    Hypertension     Kidney problem     only 1 kidney functions    Liver disease     Umbilical hernia      Past Surgical History:   Procedure Laterality Date    colon repair      after prostatectomy    LYSIS OF ADHESIONS N/A 10/18/2018    Procedure: LYSIS, ADHESIONS;  Surgeon: Rancho Mariee MD;  Location: [address];  Service: General;  Laterality: N/A;    PROSTATECTOMY      PROSTATECTOMY      REPAIR OF RECURRENT INCISIONAL HERNIA N/A 10/18/2018    Procedure: REPAIR, HERNIA, INCISIONAL -multiple;  Surgeon: Rancho Mariee MD;  Location: [address];  Service: General;  Laterality: N/A;     Family History       Problem Relation (Age of Onset)    Diabetes Sister    Heart disease Mother, Father          Tobacco Use    Smoking status: Former     Current packs/day: 0.00      Types: Cigarettes     Quit date: 2/15/1972     Years since quittin.4    Smokeless tobacco: Never   Substance and Sexual Activity    Alcohol use: No    Drug use: No    Sexual activity: Never     Review of Systems   Constitutional: Negative.  Negative for fatigue and fever.   HENT: Negative.     Eyes: Negative.    Respiratory: Negative.  Negative for shortness of breath.    Cardiovascular: Negative.  Negative for chest pain.   Gastrointestinal: Negative.    Endocrine: Negative.    Genitourinary:  Positive for scrotal swelling and testicular pain.   Musculoskeletal: Negative.    Skin: Negative.    Allergic/Immunologic: Negative.    Neurological: Negative.    Hematological: Negative.    Psychiatric/Behavioral: Negative.       Objective:     Vital Signs (Most Recent):  Temp: 97.9 °F (36.6 °C) (25)  Pulse: 79 (25)  Resp: 18 (25)  BP: 128/64 (25)  SpO2: 96 % (25) Vital Signs (24h Range):  Temp:  [97.9 °F (36.6 °C)-98.1 °F (36.7 °C)] 97.9 °F (36.6 °C)  Pulse:  [68-88] 79  Resp:  [14-20] 18  SpO2:  [95 %-99 %] 96 %  BP: (128-174)/(64-79) 128/64     Weight: 79.7 kg (175 lb 11.3 oz)  Body mass index is 23.18 kg/m².      Intake/Output Summary (Last 24 hours) at 2025 4787  Last data filed at 2025  Gross per 24 hour   Intake --   Output 25 ml   Net -25 ml       Physical Exam  Constitutional:       General: He is not in acute distress.     Appearance: Normal appearance. He is not ill-appearing, toxic-appearing or diaphoretic.   HENT:      Head: Normocephalic.      Nose: Nose normal.   Eyes:      Conjunctiva/sclera: Conjunctivae normal.   Cardiovascular:      Rate and Rhythm: Normal rate and regular rhythm.   Pulmonary:      Effort: Pulmonary effort is normal.   Abdominal:      Palpations: Abdomen is soft.   Genitourinary:     Comments: Reducible left inguinal hernia  Musculoskeletal:         General: Normal range of motion.      Cervical back: Normal range  "of motion.   Skin:     General: Skin is warm.   Neurological:      General: No focal deficit present.      Mental Status: He is alert.   Psychiatric:         Mood and Affect: Mood normal.         Significant Labs:  CBC:   Recent Labs   Lab 07/20/25  1501   WBC 10.89   RBC 4.91   HGB 15.3   HCT 45.9      MCV 94   MCH 31.2*   MCHC 33.3     CMP:   Recent Labs   Lab 07/20/25  1501   *   CALCIUM 8.9   ALBUMIN 3.4*   PROT 6.9      K 4.2   CO2 22*   *   BUN 35*   CREATININE 1.7*   ALKPHOS 160*   ALT 23   AST 27   BILITOT 0.7     Coagulation: No results for input(s): "PT", "INR", "APTT" in the last 48 hours.  Lactic Acid:   Recent Labs   Lab 07/20/25  1540   LACTATE 1.4       Significant Diagnostics:  CT was reviewed    Assessment/Plan:     Active Diagnoses:    Diagnosis Date Noted POA    PRINCIPAL PROBLEM:  Left inguinal hernia [K40.90] 07/01/2024 Yes    CAD (coronary artery disease) [I25.10] 07/20/2025 Yes    Hypercholesterolemia [E78.00] 07/20/2025 Yes    Essential hypertension [I10] 01/23/2024 Yes      Problems Resolved During this Admission:     \  85-year-old male transferred for an incarcerated left inguinal hernia.  The hernia spontaneously reduced or was reduced by the time he arrived.  Recommend continued plan to proceed to the OR for operative repair  Consent was obtained  Thank you for your consult. I will follow-up with patient. Please contact us if you have any additional questions.    Dima Díaz MD  General Surgery  [facility]  "

## 2025-07-21 NOTE — ASSESSMENT & PLAN NOTE
Patient's blood pressure range in the last 24 hours was: BP  Min: 128/64  Max: 174/79.The patient's inpatient anti-hypertensive regimen is listed below:  Current Antihypertensives  losartan tablet 50 mg, Daily, Oral    Plan  - BP is controlled, no changes needed to their regimen

## 2025-07-21 NOTE — HPI
85 year old male with comorbid conditions of CAD s/p PCI/stenting, hypertension, hypercholesterolemia, h/o prostate cancer s/p prostatectomy, CKD3, one functioning kidney, h/o third degree AV block s/p pacemaker presented to [facility] due to sudden onset left sided inguinal pain which started today around 1:30 PM.  CT imaging revealed left inguinal hernia with focally dilated bowel loop concerning for incarceration, fusiform 4.2 cm abdominal aortic aneurysm, right renal atrophy with hemorrhagic cyst.  Patient was given pain medications and transferred to [facility] for surgery.  Dr. Pittman notified - plan to take patient to [facility].

## 2025-07-21 NOTE — PROGRESS NOTES
"[facility]  Adult Nutrition   Progress Note (Initial Assessment)     Admit Date: 7/20/2025   Total duration of encounter: 1 day     SUMMARY   Recommendations  1. Advance diet as tolerated to Regular. 2. Recommend Boost + with meals, vanilla.    Nutrition Goals:   Goal #1: Advance diet as tolerated by RD follow up      Goal #2: PO intake will meet greater than or equal to 50% of estimated needs by RD follow up  Nutrition Goal Status #2: new    Nutrition Interventions: Medical food supplement therapy      Nutrition Diagnosis   Inadequate protein energy intake related to Altered GI function as evidence by Intake <50% estimated needs, Inability to advance PO diet (post-op)  Status: New    Dietitian Rounds Brief  MST 3. Patient reported 14-23 lb weight loss with poor po intake. Patient states usual weight 1 year ago was 190 lbs. 8%, 15 lbs in 1 year per Epic. Not significant per AAIM criteria. Patient on Full liquid diet, ate 50% of tray. Pt agreed to try Vanilla Boost + with meals. RD to follow for diet advancement and status change PRN.        Malnutrition Assessment      No evidence of malnutrition at this time.                                 Diet order:   Diet Full Liquid  Dietary nutrition supplements By Mouth; All Meals; Boost Plus Nutritional Drink - Very Vanilla                 Evaluation of Received Nutrient/Fluid Intake  Energy Calories Required: not meeting needs  Protein Required: not meeting needs  Fluid Required: not meeting needs  Tolerance: tolerating     % Intake of Estimated Energy Needs: 25 - 50 %  % Meal Intake: 25 - 50 %      Intake/Output Summary (Last 24 hours) at 7/21/2025 1649  Last data filed at 7/21/2025 1200  Gross per 24 hour   Intake 630 ml   Output 900 ml   Net -270 ml        Anthropometrics  Height: 6' 1" (185.4 cm)  Height (inches): 73 in  Height Method: Stated  Weight: 79.7 kg (175 lb 11.3 oz)  Weight (lb): 175.71 lb  Weight Method: Bed Scale  Ideal Body Weight (IBW), " Male: 184 lb  % Ideal Body Weight, Male (lb): 95.49 %  BMI (Calculated): 23.2  BMI Grade: 18.5-24.9 - normal       Estimated/Assessed Needs  Weight Used For Calorie Calculations: 79.7 kg (175 lb 11.3 oz)  Energy Calorie Requirements (kcal): 2724-1145 kcal/day (25-30 kcal/kg)  Energy Need Method: Kcal/kg  Protein Requirements: 119-159 gm/day (1.5-2.0 gm'kg)  Weight Used For Protein Calculations: 79.7 kg (175 lb 11.3 oz)     Estimated Fluid Requirement Method: RDA Method  RDA Method (mL): 1992  CHO Requirement: n/a    Reason for Assessment  General Information Comments: Patient admitted for left inguinal hernia and is post-op \  hernia repaired with mesh 7/20/25.    Final diagnoses:  [K40.90] Inguinal hernia     Past Medical History:   Diagnosis Date    Cancer     prostate    Hypertension     Kidney problem     only 1 kidney functions    Liver disease     Umbilical hernia         Nutrition/Diet History  Nutrition Intake History: Poor intake prior to admit.  Food Preferences: Deferred. Okay for Boost + with mealls  Spiritual, Cultural Beliefs, Mosque Practices, Values that Affect Care: no  Food Allergies: NKFA  Factors Affecting Nutritional Intake: decreased appetite  Nutrition-related SDOH: None Identified    Nutrition Risk Screen        MST Score: 3  Have you recently lost weight without trying?: Yes: 14-23 lbs  Weight loss score: 2  Have you been eating poorly because of a decreased appetite?: Yes  Appetite score: 1       Weight History:  Wt Readings from Last 10 Encounters:   07/20/25 79.7 kg (175 lb 11.3 oz)   07/20/25 77.1 kg (170 lb)   07/11/25 82.1 kg (181 lb)   01/24/25 77.1 kg (170 lb)   10/08/24 88.5 kg (195 lb 1.7 oz)   10/06/24 88.5 kg (195 lb)   07/01/24 86.5 kg (190 lb 11.2 oz)   03/12/24 86.2 kg (190 lb)   01/24/24 88.9 kg (196 lb)   01/11/24 90.7 kg (200 lb)        Lab/Procedures/Meds:   Pertinent Labs Reviewed: reviewed  Pertinent Medications Reviewed: reviewed  Medications:Pertinent Medications  Reviewed  Scheduled Meds:   aspirin  81 mg Oral Daily    atorvastatin  40 mg Oral Daily    clopidogreL  75 mg Oral Daily    enoxparin  40 mg Subcutaneous Daily    losartan  50 mg Oral Daily    tamsulosin  0.4 mg Oral Daily     Continuous Infusions:  PRN Meds:.  Current Facility-Administered Medications:     acetaminophen, 650 mg, Oral, Q4H PRN    dextrose 50%, 12.5 g, Intravenous, PRN    dextrose 50%, 12.5 g, Intravenous, PRN    dextrose 50%, 25 g, Intravenous, PRN    diphenhydrAMINE, 12.5 mg, Intravenous, Q15 Min PRN    fentaNYL, 25 mcg, Intravenous, Q5 Min PRN    glucagon (human recombinant), 1 mg, Intramuscular, PRN    glucagon (human recombinant), 1 mg, Intramuscular, PRN    glucose, 16 g, Oral, PRN    glucose, 24 g, Oral, PRN    HYDROcodone-acetaminophen, 1 tablet, Oral, Q6H PRN    magnesium oxide, 800 mg, Oral, PRN    magnesium oxide, 800 mg, Oral, PRN    melatonin, 9 mg, Oral, Nightly PRN    naloxone, 0.02 mg, Intravenous, PRN    ondansetron, 4 mg, Intravenous, Q8H PRN    ondansetron, 4 mg, Intravenous, Daily PRN    oxyCODONE, 5 mg, Oral, Q3H PRN    potassium bicarbonate, 35 mEq, Oral, PRN    potassium bicarbonate, 50 mEq, Oral, PRN    potassium bicarbonate, 60 mEq, Oral, PRN    potassium, sodium phosphates, 2 packet, Oral, PRN    potassium, sodium phosphates, 2 packet, Oral, PRN    potassium, sodium phosphates, 2 packet, Oral, PRN    senna-docusate, 1 tablet, Oral, BID PRN    sodium chloride 0.9%, 10 mL, Intravenous, PRN    Labs: Pertinent Labs Reviewed  Clinical Chemistry:  Recent Labs   Lab 07/20/25  1501 07/21/25  0423    142   K 4.2 4.2   * 114*   CO2 22* 23   * 122*   BUN 35* 30*   CREATININE 1.7* 1.5*   CALCIUM 8.9 8.3*   PROT 6.9  --    ALBUMIN 3.4*  --    BILITOT 0.7  --    ALKPHOS 160*  --    AST 27  --    ALT 23  --    ANIONGAP 10 5*   MG  --  2.5   PHOS  --  2.8     CBC:   Recent Labs   Lab 07/21/25  0423   WBC 13.20*   RBC 4.44*   HGB 13.9*   HCT 42.1      MCV 95   MCH  "31.3*   MCHC 33.0     Lipid Panel:  No results for input(s): "CHOL", "HDL", "LDLCALC", "TRIG", "CHOLHDL" in the last 168 hours.  Cardiac Profile:  No results for input(s): "BNP", "CPK", "CPKMB", "TROPONINI", "CKTOTAL" in the last 168 hours.  Inflammatory Labs:  No results for input(s): "CRP" in the last 168 hours.  Diabetes:  No results for input(s): "HGBA1C", "POCTGLUCOSE" in the last 168 hours.  Thyroid & Parathyroid:  No results for input(s): "TSH", "FREET4", "U9SDWFG", "B1QUETO", "THYROIDAB" in the last 168 hours.    Monitor and Evaluation  Monitor and Evaluation: Food and beverage intake, Diet order     Discharge Planning  Nutrition Discharge Planning: General healthy diet, Oral supplement regimen (comments)    Nutrition Risk  Level of Risk/Frequency of Follow-up:  (2 x / week)     Nutrition Follow-Up  RD Follow-up?: Yes            "

## 2025-07-21 NOTE — SUBJECTIVE & OBJECTIVE
Past Medical History:   Diagnosis Date    Cancer     prostate    Hypertension     Kidney problem     only 1 kidney functions    Liver disease     Umbilical hernia        Past Surgical History:   Procedure Laterality Date    colon repair      after prostatectomy    LYSIS OF ADHESIONS N/A 10/18/2018    Procedure: LYSIS, ADHESIONS;  Surgeon: Rancho Mariee MD;  Location: [address];  Service: General;  Laterality: N/A;    PROSTATECTOMY      PROSTATECTOMY      REPAIR OF RECURRENT INCISIONAL HERNIA N/A 10/18/2018    Procedure: REPAIR, HERNIA, INCISIONAL -multiple;  Surgeon: Rancho Mariee MD;  Location: [address];  Service: General;  Laterality: N/A;       Review of patient's allergies indicates:   Allergen Reactions    Vincristine Other (See Comments)     Other Reaction(s): Neuropathy    neuropathy, 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       Current Facility-Administered Medications on File Prior to Encounter   Medication    [COMPLETED] 0.9% NaCl infusion    [COMPLETED] HYDROmorphone injection 0.5 mg    [COMPLETED] morphine injection 4 mg    [COMPLETED] morphine injection 4 mg    [COMPLETED] ondansetron injection 4 mg    [COMPLETED] ondansetron injection 4 mg     Current Outpatient Medications on File Prior to Encounter   Medication Sig    lactulose (CHRONULAC) 10 gram/15 mL solution Take 20 g by mouth 2 (two) times daily as needed.    albuterol (PROVENTIL/VENTOLIN HFA) 90 mcg/actuation inhaler Inhale 1-2 puffs into the lungs every 6 (six) hours as needed for Shortness of Breath. Rescue    ALPRAZolam (XANAX) 1 MG tablet Take 0.5-1 mg by mouth 2 (two) times daily as needed.    aspirin (ECOTRIN) 81 MG EC tablet Take 81 mg by mouth once daily.    atorvastatin (LIPITOR) 40 MG tablet Take 40 mg by mouth once daily.    B-complex with vitamin C tablet Take 1 tablet by mouth once daily.      [] cefdinir (OMNICEF) 300 MG capsule Take 1 capsule (300 mg total) by mouth 2 (two) times  daily. for 7 days    citalopram (CELEXA) 20 MG tablet Take 20 mg by mouth every morning.    clopidogreL (PLAVIX) 75 mg tablet Take 75 mg by mouth once daily.    cyproheptadine (PERIACTIN) 4 mg tablet Take 4 mg by mouth 2 (two) times daily.    furosemide (LASIX) 40 MG tablet Take 40 mg by mouth 2 (two) times daily.    HYDROcodone-acetaminophen (NORCO) 5-325 mg per tablet Take 1 tablet by mouth every 6 (six) hours as needed for Pain.    hydrocortisone (ANUSOL-HC) 2.5 % rectal cream Place rectally 2 (two) times daily. for 10 days    [] lactulose (CHRONULAC) 20 gram/30 mL Soln Take 30 mLs (20 g total) by mouth 2 (two) times daily as needed (constipation).    losartan (COZAAR) 100 MG tablet Take 100 mg by mouth once daily.    metoprolol succinate (TOPROL-XL) 25 MG 24 hr tablet Take 0.5 tablets (12.5 mg total) by mouth once daily.    mirtazapine (REMERON) 45 MG tablet Take 45 mg by mouth every evening.    multivitamin,therapeutic (THERA ORAL) Take 1 tablet by mouth once daily.    spironolactone (ALDACTONE) 25 MG tablet Take 12.5 mg by mouth 2 (two) times daily.    vitamin D (VITAMIN D3) 1000 units Tab Take 25 mcg by mouth once daily.     Family History       Problem Relation (Age of Onset)    Diabetes Sister    Heart disease Mother, Father          Tobacco Use    Smoking status: Former     Current packs/day: 0.00     Types: Cigarettes     Quit date: 2/15/1972     Years since quittin.4    Smokeless tobacco: Never   Substance and Sexual Activity    Alcohol use: No    Drug use: No    Sexual activity: Never     Review of Systems   Constitutional:  Negative for chills, fatigue and fever.   HENT:  Negative for hearing loss, rhinorrhea, sinus pain and sore throat.    Respiratory:  Negative for cough and shortness of breath.    Cardiovascular:  Positive for palpitations. Negative for chest pain and leg swelling.   Gastrointestinal:  Positive for abdominal pain, nausea and vomiting. Negative for diarrhea.        Left  pelvic pain    Endocrine: Negative for polyuria.   Genitourinary:  Negative for dysuria and hematuria.   Musculoskeletal:  Negative for back pain.   Skin:  Negative for pallor and rash.   Neurological:  Negative for seizures and headaches.   Psychiatric/Behavioral:  Negative for confusion.      Objective:     Vital Signs (Most Recent):  Temp: 97.9 °F (36.6 °C) (07/20/25 2004)  Pulse: 79 (07/20/25 2004)  Resp: 18 (07/20/25 2004)  BP: 128/64 (07/20/25 2004)  SpO2: 96 % (07/20/25 2004) Vital Signs (24h Range):  Temp:  [97.9 °F (36.6 °C)-98.1 °F (36.7 °C)] 97.9 °F (36.6 °C)  Pulse:  [68-88] 79  Resp:  [14-20] 18  SpO2:  [95 %-99 %] 96 %  BP: (128-174)/(64-79) 128/64     Weight: 79.7 kg (175 lb 11.3 oz)  Body mass index is 23.18 kg/m².     Physical Exam  Constitutional:       General: He is not in acute distress.     Appearance: Normal appearance. He is not toxic-appearing.   HENT:      Head: Normocephalic and atraumatic.      Nose: No congestion or rhinorrhea.   Eyes:      General: No scleral icterus.     Extraocular Movements: Extraocular movements intact.      Pupils: Pupils are equal, round, and reactive to light.   Cardiovascular:      Rate and Rhythm: Normal rate.      Pulses: Normal pulses.      Heart sounds: Normal heart sounds. No murmur heard.  Pulmonary:      Effort: Pulmonary effort is normal. No respiratory distress.      Breath sounds: Normal breath sounds. No wheezing or rales.   Abdominal:      General: There is no distension.      Palpations: Abdomen is soft.      Tenderness: There is no abdominal tenderness.      Comments: No palpable inguinal hernias noted or tenderness    Musculoskeletal:      Right lower leg: No edema.      Left lower leg: No edema.   Skin:     General: Skin is warm and dry.      Coloration: Skin is not jaundiced.      Findings: No bruising or lesion.   Neurological:      General: No focal deficit present.      Mental Status: He is alert and oriented to person, place, and time.       "Cranial Nerves: No cranial nerve deficit.      Motor: No weakness.              CRANIAL NERVES     CN III, IV, VI   Pupils are equal, round, and reactive to light.       Significant Labs: All pertinent labs within the past 24 hours have been reviewed.  A1C:   Recent Labs   Lab 01/26/25  0401   HGBA1C 6.3*     ABGs: No results for input(s): "PH", "PCO2", "HCO3", "POCSATURATED", "BE", "TOTALHB", "COHB", "METHB", "O2HB", "POCFIO2", "PO2" in the last 48 hours.  Bilirubin:   Recent Labs   Lab 07/20/25  1501   BILITOT 0.7     Blood Culture: No results for input(s): "LABBLOO" in the last 48 hours.  BMP:   Recent Labs   Lab 07/20/25  1501   *      K 4.2   *   CO2 22*   BUN 35*   CREATININE 1.7*   CALCIUM 8.9     CBC:   Recent Labs   Lab 07/20/25  1501   WBC 10.89   HGB 15.3   HCT 45.9        CMP:   Recent Labs   Lab 07/20/25  1501      K 4.2   *   CO2 22*   *   BUN 35*   CREATININE 1.7*   CALCIUM 8.9   PROT 6.9   ALBUMIN 3.4*   BILITOT 0.7   ALKPHOS 160*   AST 27   ALT 23   ANIONGAP 10     Cardiac Markers: No results for input(s): "CKMB", "MYOGLOBIN", "BNP", "TROPISTAT" in the last 48 hours.  Coagulation: No results for input(s): "PT", "INR", "APTT" in the last 48 hours.  Lactic Acid:   Recent Labs   Lab 07/20/25  1540   LACTATE 1.4     Lipase: No results for input(s): "LIPASE" in the last 48 hours.  Lipid Panel: No results for input(s): "CHOL", "HDL", "LDLCALC", "TRIG", "CHOLHDL" in the last 48 hours.  Magnesium: No results for input(s): "MG" in the last 48 hours.  TSH: No results for input(s): "TSH" in the last 4320 hours.  Urine Studies: No results for input(s): "COLORU", "APPEARANCEUA", "PHUR", "SPECGRAV", "PROTEINUA", "GLUCUA", "KETONESU", "BILIRUBINUA", "OCCULTUA", "NITRITE", "UROBILINOGEN", "LEUKOCYTESUR", "RBCUA", "WBCUA", "BACTERIA", "SQUAMEPITHEL", "HYALINECASTS" in the last 48 hours.    Invalid input(s): "WRIGHTSUR"    Significant Imaging: I have reviewed all pertinent " imaging results/findings within the past 24 hours.

## 2025-07-21 NOTE — CARE UPDATE
07/21/25 0742   Patient Assessment/Suction   Level of Consciousness (AVPU) alert   Respiratory Effort Normal   Expansion/Accessory Muscles/Retractions no use of accessory muscles   All Lung Fields Breath Sounds clear   PRE-TX-O2   Device (Oxygen Therapy) room air   SpO2 (!) 94 %   Pulse Oximetry Type Intermittent   $ Pulse Oximetry - Multiple Charge Pulse Oximetry - Multiple   Pulse 69   Resp 18

## 2025-07-21 NOTE — PROGRESS NOTES
[facility] Medicine  Progress Note    Patient Name: Yobani Rios  MRN: [id number]  Patient Class: IP- Inpatient   Admission Date: 7/20/2025  Length of Stay: 1 days  Attending Physician: Jr Queen MD  Primary Care Provider: Justine Barragan FNP        Subjective     Principal Problem:Left inguinal hernia        HPI:  85 year old male with comorbid conditions of CAD s/p PCI/stenting, hypertension, hypercholesterolemia, h/o prostate cancer s/p prostatectomy, CKD3, one functioning kidney, h/o third degree AV block s/p pacemaker presented to [facility] due to sudden onset left sided inguinal pain which started today around 1:30 PM.  CT imaging revealed left inguinal hernia with focally dilated bowel loop concerning for incarceration, fusiform 4.2 cm abdominal aortic aneurysm, right renal atrophy with hemorrhagic cyst.  Patient was given pain medications and transferred to [facility] for surgery.  Dr. Pittman notified - plan to take patient to OR tonight.      Overview/Hospital Course:  This is an 85-year-old male with a past medical history of CAD, PCI with stenting, hypertension, hyperlipidemia, history of prostate cancer status post prostatectomy CKD 3 with solitary kidney who presented [facility] for the evaluation of left-sided inguinal pain.  He was transferred and admitted to the hospital medicine service at [facility].  CT imaging revealed left inguinal hernia with focally dilated loop bowel concerning for incarceration with a fusiform 4.2 cm abdominal aortic aneurysm and right renal atrophy with hemorrhagic cyst.  Patient was evaluated by General surgery.  Patient was taken to the OR with Dr. Díaz on 07/20/2025 where he had a left inguinal hernia repair.  Patient did well postoperatively however he did have an episode of urinary retention with overnight 100 cc in his bladder.  A in and out catheter was placed and urine was removed.  Patient was monitored  overnight for further urinary retention.    Interval History:  Postop day 1 status post left inguinal hernia repair.  Patient reports discomfort to his left inguinal region however controlled with pain medication.  He did have an episode of urinary retention.  A in and out catheter was performed with improvement.  He will be monitored overnight given his history of CKD 3 with solitary kidney and prostate cancer with resection to evaluate for subsequent episodes of urinary retention.    Review of Systems   Constitutional:  Negative for chills and fever.   Respiratory:  Negative for shortness of breath and wheezing.    Gastrointestinal:  Negative for abdominal pain, nausea and vomiting.   Genitourinary:         Left inguinal pain     Objective:     Vital Signs (Most Recent):  Temp: 97.8 °F (36.6 °C) (07/21/25 1208)  Pulse: 70 (07/21/25 1208)  Resp: 18 (07/21/25 1208)  BP: 126/67 (07/21/25 1208)  SpO2: (!) 94 % (07/21/25 1208) Vital Signs (24h Range):  Temp:  [97.2 °F (36.2 °C)-98.1 °F (36.7 °C)] 97.8 °F (36.6 °C)  Pulse:  [69-98] 70  Resp:  [16-22] 18  SpO2:  [93 %-100 %] 94 %  BP: (112-174)/() 126/67     Weight: 79.7 kg (175 lb 11.3 oz)  Body mass index is 23.18 kg/m².    Intake/Output Summary (Last 24 hours) at 7/21/2025 1641  Last data filed at 7/21/2025 1200  Gross per 24 hour   Intake 630 ml   Output 900 ml   Net -270 ml         Physical Exam  Constitutional:       General: He is not in acute distress.     Appearance: Normal appearance. He is not ill-appearing or toxic-appearing.   HENT:      Head: Normocephalic.      Mouth/Throat:      Mouth: Mucous membranes are moist.      Pharynx: Oropharynx is clear.   Cardiovascular:      Rate and Rhythm: Normal rate and regular rhythm.      Pulses: Normal pulses.      Heart sounds: Normal heart sounds.   Pulmonary:      Effort: Pulmonary effort is normal.      Breath sounds: Normal breath sounds.   Abdominal:      General: Bowel sounds are normal. There is no  distension.      Tenderness: There is no abdominal tenderness. There is no guarding.      Comments: Incision noted to left lower quadrant near inguinal region, glue is intact.  Mild hematoma noted slight tenderness to palpation   Skin:     General: Skin is warm and dry.      Capillary Refill: Capillary refill takes less than 2 seconds.   Neurological:      Mental Status: He is alert and oriented to person, place, and time.   Psychiatric:         Mood and Affect: Mood normal.               Significant Labs: All pertinent labs within the past 24 hours have been reviewed.  Recent Lab Results         07/21/25  0423        Anion Gap 5       Baso # 0.03       Basophil % 0.2       BUN 30       Calcium 8.3       Chloride 114       CO2 23       Creatinine 1.5       eGFR 45       Eos # 0.25       Eos % 1.9       Glucose 122       Hematocrit 42.1       Hemoglobin 13.9       Immature Grans (Abs) 0.06  Comment: Mild elevation in immature granulocytes is non specific and can be seen in a variety of conditions including stress response, acute inflammation, trauma and pregnancy. Correlation with other laboratory and clinical findings is essential.       Immature Granulocytes 0.5       Lymph # 1.51       LYMPH % 11.4       Magnesium  2.5       MCH 31.3       MCHC 33.0       MCV 95       Mono # 1.14       Mono % 8.6       MPV 9.4       Neut # 10.2       Neut % 77.4       nRBC 0       Phosphorus Level 2.8       Platelet Count 237       Potassium 4.2       RBC 4.44       RDW 12.2       Sodium 142       WBC 13.20               Significant Imaging: I have reviewed all pertinent imaging results/findings within the past 24 hours.      Assessment & Plan  Left inguinal hernia  Patient presented with inguinal hernia seen on CT, currently symptoms have resolved.  Plan for OR today.    Postop day 1 surgical repair with general surgery  PTOT ordered  Pain control    Essential hypertension  Patient's blood pressure range in the last 24 hours was:  BP  Min: 112/65  Max: 174/79.The patient's inpatient anti-hypertensive regimen is listed below:  Current Antihypertensives  losartan tablet 50 mg, Daily, Oral    Plan  - BP is controlled, no changes needed to their regimen    CAD (coronary artery disease)  Patient with known CAD s/p stent placement, which is controlled Will continue ASA, Plavix, and Statin and monitor for S/Sx of angina/ACS. Continue to monitor on telemetry.   Hypercholesterolemia  Continue statin    Acute urinary retention  Patient with the acute urinary retention requiring in and out catheterization  Has history of prostate cancer and CKD secondary to solitary kidney  Continue Flomax  Bladder scan PRN  If retention continues recommend Villasenor placement with follow up to Urology    CKD (chronic kidney disease) stage 3, GFR 30-59 ml/min  Creatine stable for now. BMP reviewed- noted Estimated Creatinine Clearance: 40.6 mL/min (A) (based on SCr of 1.5 mg/dL (H)). according to latest data. Based on current GFR, CKD stage is stage 3 - GFR 30-59.  Monitor UOP and serial BMP and adjust therapy as needed. Renally dose meds. Avoid nephrotoxic medications and procedures.  VTE Risk Mitigation (From admission, onward)           Ordered     enoxaparin injection 40 mg  Daily         07/20/25 2008     IP VTE HIGH RISK PATIENT  Once         07/20/25 2008     Place sequential compression device  Until discontinued         07/20/25 2008                    Discharge Planning   AIXA: 7/23/2025     Code Status: Full Code   Medical Readiness for Discharge Date:   Discharge Plan A: Home with family                        RUCHI Biswas  Department of Hospital Medicine   [facility]

## 2025-07-21 NOTE — ANESTHESIA PROCEDURE NOTES
Intubation    Date/Time: 7/20/2025 9:55 PM    Performed by: Bela Cohen CRNA  Authorized by: Jalil Rebolledo MD    Intubation:     Induction:  Intravenous    Intubated:  Postinduction    Mask Ventilation:  N/a    Attempts:  1    Attempted By:  CRNA    Method of Intubation:  Video laryngoscopy    Blade:  [place] 4    Laryngeal View Grade: Grade I - full view of cords      Difficult Airway Encountered?: No      Complications:  None    Airway Device:  Oral endotracheal tube    Airway Device Size:  7.5    Style/Cuff Inflation:  Cuffed    Secured at:  The lips    Placement Verified By:  Capnometry    Complicating Factors:  None    Findings Post-Intubation:  BS equal bilateral and atraumatic/condition of teeth unchanged

## 2025-07-21 NOTE — OP NOTE
[facility]  Surgery Department  Operative Note    SUMMARY     Date of Procedure: 7/20/2025     Procedure: Procedure(s) (LRB):  REPAIR, INGUINAL HERNIA (N/A)     Surgeons and Role:     * Dima Díaz MD - Primary    Assisting Surgeon: None    Pre-Operative Diagnosis: Inguinal hernia [K40.90]    Post-Operative Diagnosis: Post-Op Diagnosis Codes:     * Inguinal hernia [K40.90]    Anesthesia: General    Operative Findings (including complications, if any):  Left direct space inguinal hernia repaired with mesh    Description of Technical Procedures:  This is an 85-year-old male who was transferred with concerns of an incarcerated inguinal hernia.  By the time he arrived, the hernia had reduced.  He did consent to operative repair in an open fashion with mesh.  He was taken to the OR, placed supine, general endotracheal anesthesia was induced, the left groin was prepped and draped in the usual fashion, and a time-out was completed.  An obliquely oriented incision just lateral to the pubic tubercle was made sharply.  Deep tissues were divided using electrocautery down to and through Reilly's fascia until the aponeurosis of the external oblique was encountered.  A weak spot in the fibers was identified.  This was incised sharply in line with its fibers down to the external ring.  The cord structures were then encircled 1st digitally and then with a Penrose drain.  There was a large cord lipoma emanating from the direct space into the scrotum.  This was reduced and excised.  There was no apparent indirect hernia sac that could be identified.  Elected to repair the defect using a piece of mesh.  Mesh was secured to the lateral border of the pubic tubercle using an interrupted Ethibond suture.  Running Ethibond sutures were secured to the mesh inferiorly and laterally along the shelving edge of the inguinal ligament and along the conjoined tendon medially.  The tails of the mesh were wrapped around the cord  so that it allowed just the tip of my finger through the residual defect in the tails were secured together.  The aponeurosis of the external oblique was then closed with a running Vicryl suture over the mesh.  Reilly's fascia was closed with a running Vicryl suture.  Skin was closed in layers with Vicryl and Monocryl.  Dermabond was applied as a dressing.  Patient tolerated the entire procedure without apparent complication, was extubated in the OR, brought to recovery in stable condition.  All counts were correct.    Significant Surgical Tasks Conducted by the Assistant(s), if Applicable: n/a    Estimated Blood Loss (EBL): min           Implants:   Implant Name Type Inv. Item Serial No.  Lot No. LRB No. Used Action   MESH PARIETEX PROGRIP LEFT - [id number]  MESH PARIETEX PROGRIP LEFT  [facility] [id number] Left 1 Implanted and Explanted   MESH KNITTED ST 3 X 6 - [id number]  MESH KNITTED ST 3 X 6  [employer] [id number] Left 1 Implanted       Specimens:   Specimen (24h ago, onward)      None           * No specimens in log *           Condition: Good    Disposition: PACU - hemodynamically stable.    Attestation: Op Note Attestation: I was physically present and scrubbed for the entire procedure.

## 2025-07-21 NOTE — HOSPITAL COURSE
This is an 85-year-old male with a past medical history of CAD, PCI with stenting, hypertension, hyperlipidemia, history of prostate cancer status post prostatectomy CKD 3 with solitary kidney who presented [facility] for the evaluation of left-sided inguinal pain.  He was transferred and admitted to the hospital medicine service at [facility].  CT imaging revealed left inguinal hernia with focally dilated loop bowel concerning for incarceration with a fusiform 4.2 cm abdominal aortic aneurysm and right renal atrophy with hemorrhagic cyst.  Patient was evaluated by General surgery.  Patient was taken to the OR with Dr. Díaz on 07/20/2025 where he had a left inguinal hernia repair.  Patient did well postoperatively however he did have urinary retention requiring in & out cath initially, but ultimately required ellis placement.  PT and OT was consulted and recommended moderate intensity therapy initially, but was re-evaluated on 7/23 and recommended low intensity therapy. His diet was advanced and he began passing flatus. Pain was controlled with oral medication. He was seen and examined on the date of discharge. Strict return prxn provided.

## 2025-07-21 NOTE — ASSESSMENT & PLAN NOTE
Patient's blood pressure range in the last 24 hours was: BP  Min: 112/65  Max: 174/79.The patient's inpatient anti-hypertensive regimen is listed below:  Current Antihypertensives  losartan tablet 50 mg, Daily, Oral    Plan  - BP is controlled, no changes needed to their regimen

## 2025-07-21 NOTE — NURSING
Attempted to interrogate pacemaker. Will follow up on report tomorrow. Patient is currently on telemetry.

## 2025-07-21 NOTE — ASSESSMENT & PLAN NOTE
Patient presented with inguinal hernia seen on CT, currently symptoms have resolved.  Plan for OR today.      Keep NPO  General surgery consult   Pain control PRN  Nausea medications PRN

## 2025-07-21 NOTE — PLAN OF CARE
Patient underwent open inguinal hernia repair.  He may start on full liquids and advance as tolerated.  If he is tolerating p.o., vitals are stable, pain is controlled, okay for DC if cleared medically

## 2025-07-21 NOTE — ASSESSMENT & PLAN NOTE
Patient with the acute urinary retention requiring in and out catheterization  Has history of prostate cancer and CKD secondary to solitary kidney  Continue Flomax  Bladder scan PRN  If retention continues recommend Villasenor placement with follow up to Urology

## 2025-07-21 NOTE — PLAN OF CARE
[facility]  Initial Discharge Assessment       Primary Care Provider: Justine Barragan FNP    Admission Diagnosis: Incarcerated hernia [K46.0]  Inguinal hernia [K40.90]    Admission Date: 7/20/2025  Expected Discharge Date: 7/23/2025    Assessment and facesheet verification done at bedside. Patient lives with friend , Miriam Gerard  [phone] , who will transport patient home @ time of discharge. Patient is independent with ADL's. States  that he does have HH services , but does not recall the name of the company . Current DME : BP machine and RW. Denies Home o2 , dialysis and Coumadin use .     No anticipated needs for DC @ this time       Transition of Care Barriers: None    Payor: [employer] MEDICARE / Plan: [employer] PPO SPECIAL NEEDS / Product Type: Medicare Advantage /     Extended Emergency Contact Information  Primary Emergency Contact: Yobani Rios  Home Phone: [phone]  Mobile Phone: [phone]  Relation: Son  Preferred language: English   needed? No  Secondary Emergency Contact: Miriam Gerard  Mobile Phone: [phone]  Relation: Roommate  Preferred language: English   needed? No    Discharge Plan A: Home with family  Discharge Plan B: Home with family      [facility], [place] - [address]  [address]  [facility] [address]  Phone: [phone] Fax: [phone]      Initial Assessment (most recent)       Adult Discharge Assessment - 07/21/25 1512          Discharge Assessment    Assessment Type Discharge Planning Assessment     Confirmed/corrected address, phone number and insurance Yes     Confirmed Demographics Correct on Facesheet     Source of Information patient     Communicated AIXA with patient/caregiver Yes     People in Home friend(s)     Name(s) of People in Home Miriam Rajani ( friend ) [phone]     Do you expect to return to your current living situation? Yes     Do you have help at home or someone to help  you manage your care at home? Yes     Who are your caregiver(s) and their phone number(s)? Miriam Gerard ( friend ) [phone]     Prior to hospitilization cognitive status: Alert/Oriented     Current cognitive status: Alert/Oriented     Walking or Climbing Stairs Difficulty no     Dressing/Bathing Difficulty no     Home Layout Able to live on 1st floor     Equipment Currently Used at Home blood pressure machine;walker, rolling     Readmission within 30 days? No     Patient currently being followed by outpatient case management? No     Do you currently have service(s) that help you manage your care at home? Yes     Name and Contact number of agency patient does not recall name of HH company     Is the pt/caregiver preference to resume services with current agency Yes     Do you take prescription medications? Yes     Do you have prescription coverage? Yes     Do you have any problems affording any of your prescribed medications? No     Is the patient taking medications as prescribed? yes     Who is going to help you get home at discharge? Miriam Gerard ( friend ) [phone]     How do you get to doctors appointments? family or friend will provide     Are you on dialysis? No     Do you take coumadin? No     Discharge Plan A Home with family     Discharge Plan B Home with family     DME Needed Upon Discharge  none     Discharge Plan discussed with: Patient     Transition of Care Barriers None

## 2025-07-21 NOTE — PROGRESS NOTES
Patient seen and examined.  Doing well after open inguinal hernia repair.  Had some urinary retention requiring in and out catheter    Vitals are stable  Afebrile  Abdomen soft    Overall, seems to be doing well.  Okay to DC if patient is able to void.  If his unable to void again, would recommend catheter placement and DC with [facility].

## 2025-07-21 NOTE — TRANSFER OF CARE
"Anesthesia Transfer of Care Note    Patient: Yobani Rios    Procedure(s) Performed: Procedure(s) (LRB):  REPAIR, INGUINAL HERNIA (N/A)    Patient location: PACU    Anesthesia Type: general    Transport from OR: Transported from OR on room air with adequate spontaneous ventilation    Post pain: adequate analgesia    Post assessment: no apparent anesthetic complications and tolerated procedure well    Post vital signs: stable    Level of consciousness: awake and alert    Nausea/Vomiting: no nausea/vomiting    Complications: none    Transfer of care protocol was followed    Last vitals: Visit Vitals  /64   Pulse 79   Temp 36.6 °C (97.9 °F) (Oral)   Resp 18   Ht 6' 1" (1.854 m)   Wt 79.7 kg (175 lb 11.3 oz)   SpO2 96%   BMI 23.18 kg/m²     "

## 2025-07-21 NOTE — PLAN OF CARE
Problem: Oral Intake Inadequate  Goal: Improved Oral Intake  Intervention: Promote and Optimize Oral Intake  Flowsheets (Taken 7/21/2025 1650)  Nutrition Interventions:   food preferences provided   supplemental drinks provided

## 2025-07-21 NOTE — ANESTHESIA PREPROCEDURE EVALUATION
07/20/2025  Yobani Rios is a 85 y.o., male.      Problem List[1]    Past Surgical History:   Procedure Laterality Date    colon repair      after prostatectomy    LYSIS OF ADHESIONS N/A 10/18/2018    Procedure: LYSIS, ADHESIONS;  Surgeon: Rancho Mariee MD;  Location: [address];  Service: General;  Laterality: N/A;    PROSTATECTOMY      PROSTATECTOMY  2010    REPAIR OF RECURRENT INCISIONAL HERNIA N/A 10/18/2018    Procedure: REPAIR, HERNIA, INCISIONAL -multiple;  Surgeon: Rancho Mariee MD;  Location: [facility] OR [address] [place] [address];  Service: General;  Laterality: N/A;        Tobacco Use:  The patient  reports that he quit smoking about 53 years ago. His smoking use included cigarettes. He has never used smokeless tobacco.     Results for orders placed or performed during the hospital encounter of 01/24/25   EKG 12-lead    Collection Time: 01/24/25  9:21 PM   Result Value Ref Range    QRS Duration 168 ms    OHS QTC Calculation 508 ms    Narrative    Test Reason : R07.9,    Vent. Rate :  90 BPM     Atrial Rate :  90 BPM     P-R Int : 248 ms          QRS Dur : 168 ms      QT Int : 416 ms       P-R-T Axes :  14 -88  49 degrees    QTcB Int : 508 ms    Sinus rhythm with 1st degree A-V block  Left axis deviation  Right bundle branch block  Abnormal ECG  When compared with ECG of 24-Jan-2025 15:43,  Changed from 2nd degree AVB  Minimal criteria for Septal infarct are no longer Present  T wave inversion no longer evident in Inferior leads  T wave inversion no longer evident in Anterior-lateral leads  QT has lengthened  Confirmed by Asif Martin ([id number]) on 1/27/2025 11:07:26 AM    Referred By: AAAREFERRAL SELF           Confirmed By: Asif Martin        Imaging Results    None          Lab Results   Component Value Date    WBC 10.89 07/20/2025    HGB 15.3 07/20/2025    HCT 45.9 07/20/2025    MCV 94 07/20/2025      07/20/2025     BMP  Lab Results   Component Value Date     07/20/2025    K 4.2 07/20/2025     (H) 07/20/2025    CO2 22 (L) 07/20/2025    BUN 35 (H) 07/20/2025    CREATININE 1.7 (H) 07/20/2025    CALCIUM 8.9 07/20/2025    ANIONGAP 10 07/20/2025     (H) 07/20/2025     (H) 01/24/2025     (H) 10/06/2024       Results for orders placed during the hospital encounter of 01/23/24    Echo    Interpretation Summary    Left Ventricle: The left ventricle is normal in size. Mildly increased wall thickness. Severe global hypokinesis present. There is severely reduced systolic function with a visually estimated ejection fraction of 20 - 25%. Ejection fraction by visual approximation is 20%. Global longitudinal strain is -8%. Reduced. There is diastolic dysfunction.    Right Ventricle: Normal right ventricular cavity size. Systolic function is reduced.TAPSE is 1.90 cm.    Left Atrium: Left atrium is moderately dilated.    Aortic Valve: The aortic valve is a trileaflet valve. Moderately restricted motion. There is severe stenosis. Aortic valve area by VTI is 0.93 cm². Aortic valve peak velocity is 2.75 m/s. Mean gradient is 18 mmHg. The dimensionless index is 0.23. There is mild aortic regurgitation.    Mitral Valve: There is moderate regurgitation.    IVC/SVC: Normal venous pressure at 3 mmHg.    Pericardium: There is a small effusion. No indication of cardiac tamponade.              Pre-op Assessment    I have reviewed the Patient Summary Reports.     I have reviewed the Nursing Notes. I have reviewed the NPO Status.   I have reviewed the Medications.     Review of Systems  Anesthesia Hx:  No problems with previous Anesthesia             Denies Family Hx of Anesthesia complications.    Denies Personal Hx of Anesthesia complications.                    Social:  Former Smoker       Hematology/Oncology:  Hematology Normal                       --  Cancer in past history (prostate CA):                      Cardiovascular:    Pacemaker (pacemaker placed 1/27/2025) Hypertension, well controlled Valvular problems/Murmurs, AS, MR  CAD (denies recent chest pain)   CABG/stent (hx of stent 09/2023, PCI 1/25/25) Dysrhythmias (hx of heart block, s/p  pacemaker placement 1/27/2025)   CHF (reduced EF ranging 20-35% on recent evaluations)                                   Pulmonary:  Pulmonary Normal                       Renal/:  Chronic Renal Disease (stage 3 CRI, Right renal atrophy), CKD                Hepatic/GI:  Hepatic/GI Normal       Admitted with suspected incarcerated inguinal hernia             Musculoskeletal:  Musculoskeletal Normal                Neurological:  Neurology Normal                                      Endocrine:  Endocrine Normal            Psych:  Psychiatric History anxiety                 Physical Exam  General: Cooperative, Oriented and Lethargic    Airway:  Mallampati: III / II  Mouth Opening: Normal  TM Distance: Normal  Tongue: Normal  Neck ROM: Normal ROM    Chest/Lungs:  Clear to auscultation    Heart:  Rate: Normal  Rhythm: Regular Rhythm  Sounds: Normal    Abdomen:  Normal, Soft, Nontender        Anesthesia Plan  Type of Anesthesia, risks & benefits discussed:    Anesthesia Type: Gen ETT  Intra-op Monitoring Plan: Standard ASA Monitors  Post Op Pain Control Plan: IV/PO Opioids PRN  (medical reason for not using multimodal pain management)  Induction:  IV  Airway Plan: Video, Post-Induction  Informed Consent: Informed consent signed with the Patient and all parties understand the risks and agree with anesthesia plan.  All questions answered.   ASA Score: 3 Emergent  Anesthesia Plan Notes:   **CHART PREOP**  GETA - RSI  NO tylenol  Cautious hydration - hx CHF and chronic renal impairment  Zofran Pepcid  No decadron     Ready For Surgery From Anesthesia Perspective.     .           [1]   Patient Active Problem List  Diagnosis    Renal mass    Retroperitoneal mass    Abdominal aortic  aneurysm (AAA) 30 to 34 mm in diameter    Confusion    Shortness of breath    Chronic combined systolic and diastolic heart failure    Essential hypertension    Single kidney    Anxiety    Pancreatic lesion    Pulmonary nodules    Left inguinal hernia

## 2025-07-21 NOTE — ASSESSMENT & PLAN NOTE
Patient presented with inguinal hernia seen on CT, currently symptoms have resolved.  Plan for OR today.    Postop day 1 surgical repair with general surgery  PTOT ordered  Pain control

## 2025-07-21 NOTE — ANESTHESIA POSTPROCEDURE EVALUATION
Anesthesia Post Evaluation    Patient: Yobani Rios    Procedure(s) Performed: Procedure(s) (LRB):  REPAIR, INGUINAL HERNIA (N/A)    Final Anesthesia Type: general      Patient location during evaluation: PACU  Patient participation: Yes- Able to Participate  Level of consciousness: awake and alert and oriented  Post-procedure vital signs: reviewed and stable  Pain management: adequate  Airway patency: patent    PONV status at discharge: No PONV  Anesthetic complications: no      Cardiovascular status: blood pressure returned to baseline and hemodynamically stable  Respiratory status: unassisted, spontaneous ventilation and room air  Hydration status: euvolemic  Follow-up not needed.              Vitals Value Taken Time   /97 07/20/25 23:34   Temp 36.2 °C (97.2 °F) 07/20/25 23:05   Pulse 91 07/20/25 23:40   Resp 24 07/20/25 23:40   SpO2 99 % 07/20/25 23:40   Vitals shown include unfiled device data.      No case tracking events are documented in the log.      Pain/Kathrin Score: Pain Rating Prior to Med Admin: 0 (tremors) (7/20/2025 11:30 PM)  Pain Rating Post Med Admin: 10 (7/20/2025  4:02 PM)  Kathrin Score: 10 (7/20/2025 11:38 PM)

## 2025-07-21 NOTE — ASSESSMENT & PLAN NOTE
Creatine stable for now. BMP reviewed- noted Estimated Creatinine Clearance: 40.6 mL/min (A) (based on SCr of 1.5 mg/dL (H)). according to latest data. Based on current GFR, CKD stage is stage 3 - GFR 30-59.  Monitor UOP and serial BMP and adjust therapy as needed. Renally dose meds. Avoid nephrotoxic medications and procedures.

## 2025-07-21 NOTE — H&P
[facility] Medicine  History & Physical    Patient Name: Yobani Rios  MRN: [id number]  Patient Class: IP- Inpatient  Admission Date: 7/20/2025  Attending Physician: No att. providers found   Primary Care Provider: Justine Barragan FNP         Patient information was obtained from patient and ER records.     Subjective:     Principal Problem:Left inguinal hernia    Chief Complaint: No chief complaint on file.       HPI: 85 year old male with comorbid conditions of CAD s/p PCI/stenting, hypertension, hypercholesterolemia, h/o prostate cancer s/p prostatectomy, CKD3, one functioning kidney, h/o third degree AV block s/p pacemaker presented to [facility] due to sudden onset left sided inguinal pain which started today around 1:30 PM.  CT imaging revealed left inguinal hernia with focally dilated bowel loop concerning for incarceration, fusiform 4.2 cm abdominal aortic aneurysm, right renal atrophy with hemorrhagic cyst.  Patient was given pain medications and transferred to [facility] for surgery.  Dr. Pittman notified - plan to take patient to OR tonight.      Past Medical History:   Diagnosis Date    Cancer     prostate    Hypertension     Kidney problem     only 1 kidney functions    Liver disease     Umbilical hernia        Past Surgical History:   Procedure Laterality Date    colon repair      after prostatectomy    LYSIS OF ADHESIONS N/A 10/18/2018    Procedure: LYSIS, ADHESIONS;  Surgeon: Rancho Mariee MD;  Location: [facility] OR [address] [place] [address];  Service: General;  Laterality: N/A;    PROSTATECTOMY      PROSTATECTOMY  2010    REPAIR OF RECURRENT INCISIONAL HERNIA N/A 10/18/2018    Procedure: REPAIR, HERNIA, INCISIONAL -multiple;  Surgeon: Rancho Mariee MD;  Location: [facility] OR [address] [place] [address];  Service: General;  Laterality: N/A;       Review of patient's allergies indicates:   Allergen Reactions    Vincristine Other (See Comments)     Other Reaction(s): Neuropathy    neuropathy, 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       Current Facility-Administered Medications on File Prior to Encounter   Medication    [COMPLETED] 0.9% NaCl infusion    [COMPLETED] HYDROmorphone injection 0.5 mg    [COMPLETED] morphine injection 4 mg    [COMPLETED] morphine injection 4 mg    [COMPLETED] ondansetron injection 4 mg    [COMPLETED] ondansetron injection 4 mg     Current Outpatient Medications on File Prior to Encounter   Medication Sig    lactulose (CHRONULAC) 10 gram/15 mL solution Take 20 g by mouth 2 (two) times daily as needed.    albuterol (PROVENTIL/VENTOLIN HFA) 90 mcg/actuation inhaler Inhale 1-2 puffs into the lungs every 6 (six) hours as needed for Shortness of Breath. Rescue    ALPRAZolam (XANAX) 1 MG tablet Take 0.5-1 mg by mouth 2 (two) times daily as needed.    aspirin (ECOTRIN) 81 MG EC tablet Take 81 mg by mouth once daily.    atorvastatin (LIPITOR) 40 MG tablet Take 40 mg by mouth once daily.    B-complex with vitamin C tablet Take 1 tablet by mouth once daily.      [] cefdinir (OMNICEF) 300 MG capsule Take 1 capsule (300 mg total) by mouth 2 (two) times daily. for 7 days    citalopram (CELEXA) 20 MG tablet Take 20 mg by mouth every morning.    clopidogreL (PLAVIX) 75 mg tablet Take 75 mg by mouth once daily.    cyproheptadine (PERIACTIN) 4 mg tablet Take 4 mg by mouth 2 (two) times daily.    furosemide (LASIX) 40 MG tablet Take 40 mg by mouth 2 (two) times daily.    HYDROcodone-acetaminophen (NORCO) 5-325 mg per tablet Take 1 tablet by mouth every 6 (six) hours as needed for Pain.    hydrocortisone (ANUSOL-HC) 2.5 % rectal cream Place rectally 2 (two) times daily. for 10 days    [] lactulose (CHRONULAC) 20 gram/30 mL Soln Take 30 mLs (20 g total) by mouth 2 (two) times daily as needed (constipation).    losartan (COZAAR) 100 MG tablet Take 100 mg by mouth once daily.    metoprolol succinate (TOPROL-XL) 25 MG 24 hr tablet Take 0.5 tablets (12.5 mg total) by mouth once daily.     mirtazapine (REMERON) 45 MG tablet Take 45 mg by mouth every evening.    multivitamin,therapeutic (THERA ORAL) Take 1 tablet by mouth once daily.    spironolactone (ALDACTONE) 25 MG tablet Take 12.5 mg by mouth 2 (two) times daily.    vitamin D (VITAMIN D3) 1000 units Tab Take 25 mcg by mouth once daily.     Family History       Problem Relation (Age of Onset)    Diabetes Sister    Heart disease Mother, Father          Tobacco Use    Smoking status: Former     Current packs/day: 0.00     Types: Cigarettes     Quit date: 2/15/1972     Years since quittin.4    Smokeless tobacco: Never   Substance and Sexual Activity    Alcohol use: No    Drug use: No    Sexual activity: Never     Review of Systems   Constitutional:  Negative for chills, fatigue and fever.   HENT:  Negative for hearing loss, rhinorrhea, sinus pain and sore throat.    Respiratory:  Negative for cough and shortness of breath.    Cardiovascular:  Positive for palpitations. Negative for chest pain and leg swelling.   Gastrointestinal:  Positive for abdominal pain, nausea and vomiting. Negative for diarrhea.        Left pelvic pain    Endocrine: Negative for polyuria.   Genitourinary:  Negative for dysuria and hematuria.   Musculoskeletal:  Negative for back pain.   Skin:  Negative for pallor and rash.   Neurological:  Negative for seizures and headaches.   Psychiatric/Behavioral:  Negative for confusion.      Objective:     Vital Signs (Most Recent):  Temp: 97.9 °F (36.6 °C) (25)  Pulse: 79 (25)  Resp: 18 (25)  BP: 128/64 (25)  SpO2: 96 % (25) Vital Signs (24h Range):  Temp:  [97.9 °F (36.6 °C)-98.1 °F (36.7 °C)] 97.9 °F (36.6 °C)  Pulse:  [68-88] 79  Resp:  [14-20] 18  SpO2:  [95 %-99 %] 96 %  BP: (128-174)/(64-79) 128/64     Weight: 79.7 kg (175 lb 11.3 oz)  Body mass index is 23.18 kg/m².     Physical Exam  Constitutional:       General: He is not in acute distress.     Appearance: Normal  "appearance. He is not toxic-appearing.   HENT:      Head: Normocephalic and atraumatic.      Nose: No congestion or rhinorrhea.   Eyes:      General: No scleral icterus.     Extraocular Movements: Extraocular movements intact.      Pupils: Pupils are equal, round, and reactive to light.   Cardiovascular:      Rate and Rhythm: Normal rate.      Pulses: Normal pulses.      Heart sounds: Normal heart sounds. No murmur heard.  Pulmonary:      Effort: Pulmonary effort is normal. No respiratory distress.      Breath sounds: Normal breath sounds. No wheezing or rales.   Abdominal:      General: There is no distension.      Palpations: Abdomen is soft.      Tenderness: There is no abdominal tenderness.      Comments: No palpable inguinal hernias noted or tenderness    Musculoskeletal:      Right lower leg: No edema.      Left lower leg: No edema.   Skin:     General: Skin is warm and dry.      Coloration: Skin is not jaundiced.      Findings: No bruising or lesion.   Neurological:      General: No focal deficit present.      Mental Status: He is alert and oriented to person, place, and time.      Cranial Nerves: No cranial nerve deficit.      Motor: No weakness.              CRANIAL NERVES     CN III, IV, VI   Pupils are equal, round, and reactive to light.       Significant Labs: All pertinent labs within the past 24 hours have been reviewed.  A1C:   Recent Labs   Lab 01/26/25  0401   HGBA1C 6.3*     ABGs: No results for input(s): "PH", "PCO2", "HCO3", "POCSATURATED", "BE", "TOTALHB", "COHB", "METHB", "O2HB", "POCFIO2", "PO2" in the last 48 hours.  Bilirubin:   Recent Labs   Lab 07/20/25  1501   BILITOT 0.7     Blood Culture: No results for input(s): "LABBLOO" in the last 48 hours.  BMP:   Recent Labs   Lab 07/20/25  1501   *      K 4.2   *   CO2 22*   BUN 35*   CREATININE 1.7*   CALCIUM 8.9     CBC:   Recent Labs   Lab 07/20/25  1501   WBC 10.89   HGB 15.3   HCT 45.9        CMP:   Recent Labs " "  Lab 07/20/25  1501      K 4.2   *   CO2 22*   *   BUN 35*   CREATININE 1.7*   CALCIUM 8.9   PROT 6.9   ALBUMIN 3.4*   BILITOT 0.7   ALKPHOS 160*   AST 27   ALT 23   ANIONGAP 10     Cardiac Markers: No results for input(s): "CKMB", "MYOGLOBIN", "BNP", "TROPISTAT" in the last 48 hours.  Coagulation: No results for input(s): "PT", "INR", "APTT" in the last 48 hours.  Lactic Acid:   Recent Labs   Lab 07/20/25  1540   LACTATE 1.4     Lipase: No results for input(s): "LIPASE" in the last 48 hours.  Lipid Panel: No results for input(s): "CHOL", "HDL", "LDLCALC", "TRIG", "CHOLHDL" in the last 48 hours.  Magnesium: No results for input(s): "MG" in the last 48 hours.  TSH: No results for input(s): "TSH" in the last 4320 hours.  Urine Studies: No results for input(s): "COLORU", "APPEARANCEUA", "PHUR", "SPECGRAV", "PROTEINUA", "GLUCUA", "KETONESU", "BILIRUBINUA", "OCCULTUA", "NITRITE", "UROBILINOGEN", "LEUKOCYTESUR", "RBCUA", "WBCUA", "BACTERIA", "SQUAMEPITHEL", "HYALINECASTS" in the last 48 hours.    Invalid input(s): "WRIGHTSUR"    Significant Imaging: I have reviewed all pertinent imaging results/findings within the past 24 hours.  Assessment/Plan:     Assessment & Plan  Left inguinal hernia  Patient presented with inguinal hernia seen on CT, currently symptoms have resolved.  Plan for OR today.      Keep NPO  General surgery consult   Pain control PRN  Nausea medications PRN  Essential hypertension  Patient's blood pressure range in the last 24 hours was: BP  Min: 128/64  Max: 174/79.The patient's inpatient anti-hypertensive regimen is listed below:  Current Antihypertensives  losartan tablet 50 mg, Daily, Oral    Plan  - BP is controlled, no changes needed to their regimen    CAD (coronary artery disease)  Patient with known CAD s/p stent placement, which is controlled Will continue ASA, Plavix, and Statin and monitor for S/Sx of angina/ACS. Continue to monitor on telemetry. "   Hypercholesterolemia  Continue statin    VTE Risk Mitigation (From admission, onward)           Ordered     enoxaparin injection 40 mg  Daily         07/20/25 2008     IP VTE HIGH RISK PATIENT  Once         07/20/25 2008     Place sequential compression device  Until discontinued         07/20/25 2008                                                Ziggy Gannon MD  Department of Hospital Medicine  [facility]

## 2025-07-22 ENCOUNTER — TELEPHONE (OUTPATIENT)
Dept: SURGERY | Facility: CLINIC | Age: 85
End: 2025-07-22
Payer: MEDICARE

## 2025-07-22 LAB
ABSOLUTE EOSINOPHIL (SMH): 0.34 K/UL
ABSOLUTE MONOCYTE (SMH): 0.85 K/UL (ref 0.3–1)
ABSOLUTE NEUTROPHIL COUNT (SMH): 6 K/UL (ref 1.8–7.7)
ANION GAP (SMH): 4 MMOL/L (ref 8–16)
BASOPHILS # BLD AUTO: 0.05 K/UL
BASOPHILS NFR BLD AUTO: 0.6 %
BUN SERPL-MCNC: 24 MG/DL (ref 8–23)
CALCIUM SERPL-MCNC: 8 MG/DL (ref 8.7–10.5)
CHLORIDE SERPL-SCNC: 112 MMOL/L (ref 95–110)
CO2 SERPL-SCNC: 23 MMOL/L (ref 23–29)
CREAT SERPL-MCNC: 1.4 MG/DL (ref 0.5–1.4)
ERYTHROCYTE [DISTWIDTH] IN BLOOD BY AUTOMATED COUNT: 12.3 % (ref 11.5–14.5)
GFR SERPLBLD CREATININE-BSD FMLA CKD-EPI: 49 ML/MIN/1.73/M2
GLUCOSE SERPL-MCNC: 137 MG/DL (ref 70–110)
HCT VFR BLD AUTO: 42.8 % (ref 40–54)
HGB BLD-MCNC: 13.4 GM/DL (ref 14–18)
IMM GRANULOCYTES # BLD AUTO: 0.04 K/UL (ref 0–0.04)
IMM GRANULOCYTES NFR BLD AUTO: 0.5 % (ref 0–0.5)
LYMPHOCYTES # BLD AUTO: 1.62 K/UL (ref 1–4.8)
MAGNESIUM SERPL-MCNC: 2.5 MG/DL (ref 1.6–2.6)
MCH RBC QN AUTO: 31.4 PG (ref 27–31)
MCHC RBC AUTO-ENTMCNC: 31.3 G/DL (ref 32–36)
MCV RBC AUTO: 100 FL (ref 82–98)
NUCLEATED RBC (/100WBC) (SMH): 0 /100 WBC
PHOSPHATE SERPL-MCNC: 1.9 MG/DL (ref 2.7–4.5)
PLATELET # BLD AUTO: 169 K/UL (ref 150–450)
PMV BLD AUTO: 9.6 FL (ref 9.2–12.9)
POTASSIUM SERPL-SCNC: 4.1 MMOL/L (ref 3.5–5.1)
RBC # BLD AUTO: 4.27 M/UL (ref 4.6–6.2)
RELATIVE EOSINOPHIL (SMH): 3.8 % (ref 0–8)
RELATIVE LYMPHOCYTE (SMH): 18.3 % (ref 18–48)
RELATIVE MONOCYTE (SMH): 9.6 % (ref 4–15)
RELATIVE NEUTROPHIL (SMH): 67.2 % (ref 38–73)
SODIUM SERPL-SCNC: 139 MMOL/L (ref 136–145)
WBC # BLD AUTO: 8.86 K/UL (ref 3.9–12.7)

## 2025-07-22 PROCEDURE — 83735 ASSAY OF MAGNESIUM: CPT | Performed by: STUDENT IN AN ORGANIZED HEALTH CARE EDUCATION/TRAINING PROGRAM

## 2025-07-22 PROCEDURE — 84100 ASSAY OF PHOSPHORUS: CPT | Performed by: INTERNAL MEDICINE

## 2025-07-22 PROCEDURE — 25000003 PHARM REV CODE 250: Performed by: STUDENT IN AN ORGANIZED HEALTH CARE EDUCATION/TRAINING PROGRAM

## 2025-07-22 PROCEDURE — 25000003 PHARM REV CODE 250: Performed by: NURSE PRACTITIONER

## 2025-07-22 PROCEDURE — 25000003 PHARM REV CODE 250: Performed by: ANESTHESIOLOGY

## 2025-07-22 PROCEDURE — 36415 COLL VENOUS BLD VENIPUNCTURE: CPT | Performed by: STUDENT IN AN ORGANIZED HEALTH CARE EDUCATION/TRAINING PROGRAM

## 2025-07-22 PROCEDURE — 97535 SELF CARE MNGMENT TRAINING: CPT

## 2025-07-22 PROCEDURE — 80048 BASIC METABOLIC PNL TOTAL CA: CPT | Performed by: INTERNAL MEDICINE

## 2025-07-22 PROCEDURE — 63600175 PHARM REV CODE 636 W HCPCS: Performed by: STUDENT IN AN ORGANIZED HEALTH CARE EDUCATION/TRAINING PROGRAM

## 2025-07-22 PROCEDURE — 97116 GAIT TRAINING THERAPY: CPT

## 2025-07-22 PROCEDURE — 86580 TB INTRADERMAL TEST: CPT | Performed by: NURSE PRACTITIONER

## 2025-07-22 PROCEDURE — 85025 COMPLETE CBC W/AUTO DIFF WBC: CPT | Performed by: STUDENT IN AN ORGANIZED HEALTH CARE EDUCATION/TRAINING PROGRAM

## 2025-07-22 PROCEDURE — 36415 COLL VENOUS BLD VENIPUNCTURE: CPT | Performed by: INTERNAL MEDICINE

## 2025-07-22 PROCEDURE — 30200315 PPD INTRADERMAL TEST REV CODE 302: Performed by: NURSE PRACTITIONER

## 2025-07-22 PROCEDURE — 97162 PT EVAL MOD COMPLEX 30 MIN: CPT

## 2025-07-22 PROCEDURE — 97165 OT EVAL LOW COMPLEX 30 MIN: CPT

## 2025-07-22 PROCEDURE — 51702 INSERT TEMP BLADDER CATH: CPT

## 2025-07-22 PROCEDURE — 11000001 HC ACUTE MED/SURG PRIVATE ROOM

## 2025-07-22 RX ORDER — TAMSULOSIN HYDROCHLORIDE 0.4 MG/1
0.4 CAPSULE ORAL DAILY
Qty: 30 CAPSULE | Refills: 11 | Status: SHIPPED | OUTPATIENT
Start: 2025-07-23 | End: 2026-07-23

## 2025-07-22 RX ORDER — POLYETHYLENE GLYCOL 3350 17 G/17G
17 POWDER, FOR SOLUTION ORAL DAILY
Status: DISCONTINUED | OUTPATIENT
Start: 2025-07-22 | End: 2025-07-23 | Stop reason: HOSPADM

## 2025-07-22 RX ORDER — HYDROCODONE BITARTRATE AND ACETAMINOPHEN 5; 325 MG/1; MG/1
1 TABLET ORAL EVERY 6 HOURS PRN
Qty: 12 TABLET | Refills: 0 | Status: SHIPPED | OUTPATIENT
Start: 2025-07-22 | End: 2025-07-23

## 2025-07-22 RX ADMIN — SENNOSIDES AND DOCUSATE SODIUM 1 TABLET: 50; 8.6 TABLET ORAL at 09:07

## 2025-07-22 RX ADMIN — ASPIRIN 81 MG: 81 TABLET ORAL at 09:07

## 2025-07-22 RX ADMIN — TAMSULOSIN HYDROCHLORIDE 0.4 MG: 0.4 CAPSULE ORAL at 09:07

## 2025-07-22 RX ADMIN — ATORVASTATIN CALCIUM 40 MG: 40 TABLET, FILM COATED ORAL at 09:07

## 2025-07-22 RX ADMIN — HYDROCODONE BITARTRATE AND ACETAMINOPHEN 1 TABLET: 5; 325 TABLET ORAL at 10:07

## 2025-07-22 RX ADMIN — OXYCODONE HYDROCHLORIDE 5 MG: 5 TABLET ORAL at 06:07

## 2025-07-22 RX ADMIN — LOSARTAN POTASSIUM 50 MG: 50 TABLET, FILM COATED ORAL at 09:07

## 2025-07-22 RX ADMIN — TUBERCULIN PURIFIED PROTEIN DERIVATIVE 5 UNITS: 5 INJECTION, SOLUTION INTRADERMAL at 02:07

## 2025-07-22 RX ADMIN — POLYETHYLENE GLYCOL 3350 17 G: 17 POWDER, FOR SOLUTION ORAL at 02:07

## 2025-07-22 RX ADMIN — ENOXAPARIN SODIUM 40 MG: 100 INJECTION SUBCUTANEOUS at 06:07

## 2025-07-22 RX ADMIN — CLOPIDOGREL BISULFATE 75 MG: 75 TABLET, FILM COATED ORAL at 09:07

## 2025-07-22 NOTE — PT/OT/SLP EVAL
"Occupational Therapy   Evaluation    Name: Yobani Rios  MRN: [id number]  Admitting Diagnosis: Left inguinal hernia  Recent Surgery: Procedure(s) (LRB):  REPAIR, INGUINAL HERNIA (N/A) 2 Days Post-Op    Recommendations:     Discharge Recommendations: Moderate Intensity Therapy  Discharge Equipment Recommendations:  to be determined by next level of care  Barriers to discharge:   (Increased assist with ADLs, IADLs, and mobility)    Assessment:     Yobani Rios is a 85 y.o. male with a medical diagnosis of Left inguinal hernia.  Pt is agreeable to  OT evaluation this AM. Performance deficits affecting function: weakness, impaired endurance, impaired self care skills, impaired functional mobility, impaired balance, gait instability, decreased lower extremity function, decreased safety awareness, pain, decreased ROM.      Rehab Prognosis: Good; patient would benefit from acute skilled OT services to address these deficits and reach maximum level of function.       Plan:     Patient to be seen 5 x/week to address the above listed problems via self-care/home management, therapeutic activities, therapeutic exercises  Plan of Care Expires: 08/22/25  Plan of Care Reviewed with: patient    Subjective     Chief Complaint: burning groin pain  Patient/Family Comments/goals: improve function    Occupational Profile:  Living Environment: Pt lives with friend in [facility] with 3 CORBIN; has a WIS   Previous level of function: Mod I with ADLs prior to admission  Roles and Routines: participates in light housework; drives self   Equipment Used at Home: walker, rolling, shower chair  Assistance upon Discharge: yes, from facility     Pain/Comfort:  Pain Rating 1:  ("15/10")  Location 1: groin  Pain Addressed 1: Pre-medicate for activity, Reposition, Distraction, Cessation of Activity    Patients cultural, spiritual, Oriental orthodox conflicts given the current situation: no    Objective:     Communicated with: nursing prior to session.  Patient found " HOB elevated with telemetry, bed alarm, ellis catheter upon OT entry to room.    General Precautions: Standard, fall  Orthopedic Precautions: N/A  Braces: N/A  Respiratory Status: Room air    Occupational Performance:    Bed Mobility:    Patient completed Supine to Sit with minimum assistance; increased time d/t pain  Patient completed Sit to Supine with minimum assistance; increased time d/t pain    Functional Mobility/Transfers:  Pt declined d/t to pain     Activities of Daily Living:  Grooming: set-up assistance to wash face EOB ; SBA for seated balance  Lower Body Dressing: total assistance to don/doff socks seated EOB; pt would benefit from instruction on sock aid for greater independence with dressing  Toileting: ellis      Cognitive/Visual Perceptual:  Cognitive/Psychosocial Skills:     -       Follows Commands/attention:Follows two-step commands  -       Communication: clear/fluent  -       Memory: No Deficits noted  -       Safety awareness/insight to disability: intact   -       Mood/Affect/Coping skills/emotional control: Appropriate to situation, Cooperative, and Pleasant    Physical Exam:  Balance:    -       SBA seated balance EOB  Upper Extremity Range of Motion:     -       Right Upper Extremity: WFL  -       Left Upper Extremity: WFL  Upper Extremity Strength:    -       Right Upper Extremity: WFL  -       Left Upper Extremity: WFL   Strength:    -       Right Upper Extremity: WFL  -       Left Upper Extremity: WFL    AMPAC 6 Click ADL:  AMPAC Total Score: 16    Treatment & Education:  Pt educated on role of OT/POC, importance of OOB/EOB activity, use of call bell, and safety during ADLs, transfers, and functional mobility.    Patient left HOB elevated with all lines intact, call button in reach, and bed alarm on    GOALS:   Multidisciplinary Problems       Occupational Therapy Goals          Problem: Occupational Therapy    Goal Priority Disciplines Outcome Interventions   Occupational Therapy  Goal     OT, PT/OT     Description: Goals to be met by: 8/22/25     Patient will increase functional independence with ADLs by performing:    UE Dressing with Supervision.  LE Dressing with Set-up Assistance and Assistive Devices as needed.  Grooming while standing at sink with Supervision.  Toileting from toilet with Supervision for hygiene and clothing management.   Toilet transfer to toilet with Supervision.                         DME Justifications:  No DME recommended requiring DME justifications    History:     Past Medical History:   Diagnosis Date    Cancer     prostate    Hypertension     Kidney problem     only 1 kidney functions    Liver disease     Umbilical hernia          Past Surgical History:   Procedure Laterality Date    colon repair      after prostatectomy    LYSIS OF ADHESIONS N/A 10/18/2018    Procedure: LYSIS, ADHESIONS;  Surgeon: Rancho Mariee MD;  Location: [address];  Service: General;  Laterality: N/A;    PROSTATECTOMY      PROSTATECTOMY  2010    REPAIR OF RECURRENT INCISIONAL HERNIA N/A 10/18/2018    Procedure: REPAIR, HERNIA, INCISIONAL -multiple;  Surgeon: Rancho Mariee MD;  Location: [address];  Service: General;  Laterality: N/A;    REPAIR, HERNIA, INGUINAL N/A 7/20/2025    Procedure: REPAIR, INGUINAL HERNIA;  Surgeon: Dima Díaz MD;  Location: [facility];  Service: General;  Laterality: N/A;       Time Tracking:     OT Date of Treatment: 07/22/25  OT Start Time: 1147  OT Stop Time: 1202  OT Total Time (min): 15 min    Billable Minutes:Evaluation 7  Self Care/Home Management 8    7/22/2025

## 2025-07-22 NOTE — SUBJECTIVE & OBJECTIVE
Interval History:  Postop day 2 status post left inguinal hernia repair.        Patient evaluated at bedside.  Reports some discomfort to surgical site.  No overnight events other than requiring Villasenor catheter placement for urinary retention.    PT and OT evaluated the patient recommended skilled nursing placement, patient is agreeable to this discussed with case management.      Review of Systems   Constitutional:  Negative for chills and fever.   Respiratory:  Negative for shortness of breath and wheezing.    Gastrointestinal:  Negative for abdominal pain, nausea and vomiting.   Genitourinary:         Left inguinal pain     Objective:     Vital Signs (Most Recent):  Temp: 98 °F (36.7 °C) (07/22/25 0834)  Pulse: 65 (07/22/25 0834)  Resp: 18 (07/22/25 1040)  BP: 131/70 (07/22/25 0904)  SpO2: (!) 92 % (07/22/25 0834) Vital Signs (24h Range):  Temp:  [97.7 °F (36.5 °C)-98.4 °F (36.9 °C)] 98 °F (36.7 °C)  Pulse:  [65-76] 65  Resp:  [16-18] 18  SpO2:  [92 %-97 %] 92 %  BP: (118-154)/(65-80) 131/70     Weight: 79.7 kg (175 lb 11.3 oz)  Body mass index is 23.18 kg/m².    Intake/Output Summary (Last 24 hours) at 7/22/2025 1212  Last data filed at 7/22/2025 0348  Gross per 24 hour   Intake --   Output 550 ml   Net -550 ml         Physical Exam  Constitutional:       General: He is not in acute distress.     Appearance: Normal appearance. He is not ill-appearing or toxic-appearing.   HENT:      Head: Normocephalic.      Mouth/Throat:      Mouth: Mucous membranes are moist.      Pharynx: Oropharynx is clear.   Cardiovascular:      Rate and Rhythm: Normal rate and regular rhythm.      Pulses: Normal pulses.      Heart sounds: Normal heart sounds.   Pulmonary:      Effort: Pulmonary effort is normal.      Breath sounds: Normal breath sounds.   Abdominal:      General: Bowel sounds are normal. There is no distension.      Tenderness: There is no abdominal tenderness. There is no guarding.      Comments: Incision noted to left  lower quadrant near inguinal region, glue is intact.  Mild hematoma noted slight tenderness to palpation   Genitourinary:     Comments: Villasenor in place with clear yellow urine  Skin:     General: Skin is warm and dry.      Capillary Refill: Capillary refill takes less than 2 seconds.   Neurological:      Mental Status: He is alert and oriented to person, place, and time.   Psychiatric:         Mood and Affect: Mood normal.               Significant Labs: All pertinent labs within the past 24 hours have been reviewed.  Recent Lab Results         07/22/25  0520        Baso # 0.05       Basophil % 0.6       Eos # 0.34       Eos % 3.8       Hematocrit 42.8       Hemoglobin 13.4       Immature Grans (Abs) 0.04  Comment: Mild elevation in immature granulocytes is non specific and can be seen in a variety of conditions including stress response, acute inflammation, trauma and pregnancy. Correlation with other laboratory and clinical findings is essential.       Immature Granulocytes 0.5       Lymph # 1.62       LYMPH % 18.3       Magnesium  2.5       MCH 31.4       MCHC 31.3              Mono # 0.85       Mono % 9.6       MPV 9.6       Neut # 6.0       Neut % 67.2       nRBC 0       Platelet Count 169       RBC 4.27       RDW 12.3       WBC 8.86               Significant Imaging: I have reviewed all pertinent imaging results/findings within the past 24 hours.

## 2025-07-22 NOTE — PLAN OF CARE
SW Met with Pt at bedside  to review discharge recommendation of high/moderate intense therapy and is agreeable to plan    Patient/family provided list of facilities in-network with patient's payor plan. Providers that are owned, operated, or affiliated with [facility] are included on the list.     Notified that referral sent to below listed facilities from in-network list based on proximity to home/family support:   [facility]  2. [facility]  3. [facility]  4.[place] [facility]  5. (can send more than 5)    Patient/family instructed to identify preference.    Preferred Facility: (if more than 1, listed in order of descending preference)  Pt prefers to go to SNF closer to home in [facility] but no preference on facility referrals to be sent once PT/OT notes are in.    If an additional preferred facility not listed above is identified, additional referral to be sent. If above facilities unable to accept, will send additional referrals to in-network providers.     07/22/25 1202   Post-Acute Status   Post-Acute Authorization Placement   Post-Acute Placement Status Referrals Sent   Discharge Delays None known at this time   Discharge Plan   Discharge Plan A Skilled Nursing Facility   Discharge Plan B Home Health

## 2025-07-22 NOTE — PROGRESS NOTES
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  CT imaging revealed left inguinal hernia with focally dilated bowel loop concerning for incarceration, fusiform 4.2 cm abdominal aortic aneurysm, right renal atrophy with hemorrhagic cyst.  Patient was given pain medications and transferred to [facility] for surgery.  Dr. Pittman notified - plan to take patient to OR tonight.      Overview/Hospital Course:  This is an 85-year-old male with a past medical history of CAD, PCI with stenting, hypertension, hyperlipidemia, history of prostate cancer status post prostatectomy CKD 3 with solitary kidney who presented [facility] for the evaluation of left-sided inguinal pain.  He was transferred and admitted to the hospital medicine service at [facility].  CT imaging revealed left inguinal hernia with focally dilated loop bowel concerning for incarceration with a fusiform 4.2 cm abdominal aortic aneurysm and right renal atrophy with hemorrhagic cyst.  Patient was evaluated by General surgery.  Patient was taken to the OR with Dr. Díaz on 07/20/2025 where he had a left inguinal hernia repair.  Patient did well postoperatively however he did have an episode of urinary retention with overnight 100 cc in his bladder.  A in and out catheter was placed and urine was removed.  Patient was monitored  overnight for further urinary retention.  The patient did continue to have urinary retention and a Villasenor catheter was placed.  PT and OT evaluated pain shin.  Recommended skilled nursing.  Patient agreeable to skilled nursing.  Case management notified.    Interval History:  Postop day 2 status post left inguinal hernia repair.        Patient evaluated at bedside.  Reports some discomfort to surgical site.  No overnight events other than requiring Villasenor catheter placement for urinary retention.    PT and OT evaluated the patient recommended skilled nursing placement, patient is agreeable to this discussed with case management.      Review of Systems   Constitutional:  Negative for chills and fever.   Respiratory:  Negative for shortness of breath and wheezing.    Gastrointestinal:  Negative for abdominal pain, nausea and vomiting.   Genitourinary:         Left inguinal pain     Objective:     Vital Signs (Most Recent):  Temp: 98 °F (36.7 °C) (07/22/25 0834)  Pulse: 65 (07/22/25 0834)  Resp: 18 (07/22/25 1040)  BP: 131/70 (07/22/25 0904)  SpO2: (!) 92 % (07/22/25 0834) Vital Signs (24h Range):  Temp:  [97.7 °F (36.5 °C)-98.4 °F (36.9 °C)] 98 °F (36.7 °C)  Pulse:  [65-76] 65  Resp:  [16-18] 18  SpO2:  [92 %-97 %] 92 %  BP: (118-154)/(65-80) 131/70     Weight: 79.7 kg (175 lb 11.3 oz)  Body mass index is 23.18 kg/m².    Intake/Output Summary (Last 24 hours) at 7/22/2025 1212  Last data filed at 7/22/2025 0348  Gross per 24 hour   Intake --   Output 550 ml   Net -550 ml         Physical Exam  Constitutional:       General: He is not in acute distress.     Appearance: Normal appearance. He is not ill-appearing or toxic-appearing.   HENT:      Head: Normocephalic.      Mouth/Throat:      Mouth: Mucous membranes are moist.      Pharynx: Oropharynx is clear.   Cardiovascular:      Rate and Rhythm: Normal rate and regular rhythm.      Pulses: Normal pulses.      Heart sounds: Normal heart sounds.   Pulmonary:      Effort:  Pulmonary effort is normal.      Breath sounds: Normal breath sounds.   Abdominal:      General: Bowel sounds are normal. There is no distension.      Tenderness: There is no abdominal tenderness. There is no guarding.      Comments: Incision noted to left lower quadrant near inguinal region, glue is intact.  Mild hematoma noted slight tenderness to palpation   Genitourinary:     Comments: Villasenor in place with clear yellow urine  Skin:     General: Skin is warm and dry.      Capillary Refill: Capillary refill takes less than 2 seconds.   Neurological:      Mental Status: He is alert and oriented to person, place, and time.   Psychiatric:         Mood and Affect: Mood normal.               Significant Labs: All pertinent labs within the past 24 hours have been reviewed.  Recent Lab Results         07/22/25  0520        Baso # 0.05       Basophil % 0.6       Eos # 0.34       Eos % 3.8       Hematocrit 42.8       Hemoglobin 13.4       Immature Grans (Abs) 0.04  Comment: Mild elevation in immature granulocytes is non specific and can be seen in a variety of conditions including stress response, acute inflammation, trauma and pregnancy. Correlation with other laboratory and clinical findings is essential.       Immature Granulocytes 0.5       Lymph # 1.62       LYMPH % 18.3       Magnesium  2.5       MCH 31.4       MCHC 31.3              Mono # 0.85       Mono % 9.6       MPV 9.6       Neut # 6.0       Neut % 67.2       nRBC 0       Platelet Count 169       RBC 4.27       RDW 12.3       WBC 8.86               Significant Imaging: I have reviewed all pertinent imaging results/findings within the past 24 hours.      Assessment & Plan  Left inguinal hernia  Patient presented with inguinal hernia seen on CT, currently symptoms have resolved.  Plan for OR today.    Postop day 2 surgical repair with general surgery  PTOT ordered  Pain control    Essential hypertension  Patient's blood pressure range in the last 24 hours  was: BP  Min: 118/65  Max: 154/79.The patient's inpatient anti-hypertensive regimen is listed below:  Current Antihypertensives  losartan tablet 50 mg, Daily, Oral    Plan  - BP is controlled, no changes needed to their regimen    CAD (coronary artery disease)  Patient with known CAD s/p stent placement, which is controlled Will continue ASA, Plavix, and Statin and monitor for S/Sx of angina/ACS. Continue to monitor on telemetry.   Hypercholesterolemia  Continue statin    Acute urinary retention  Patient with the acute urinary retention requiring in and out catheterization  Has history of prostate cancer and CKD secondary to solitary kidney  Continue Flomax  Bladder scan PRN  Villasenor catheter placed   He will follow up with Urology outpatient    CKD (chronic kidney disease) stage 3, GFR 30-59 ml/min  Creatine stable for now. BMP reviewed- noted Estimated Creatinine Clearance: 40.6 mL/min (A) (based on SCr of 1.5 mg/dL (H)). according to latest data. Based on current GFR, CKD stage is stage 3 - GFR 30-59.  Monitor UOP and serial BMP and adjust therapy as needed. Renally dose meds. Avoid nephrotoxic medications and procedures.  VTE Risk Mitigation (From admission, onward)           Ordered     enoxaparin injection 40 mg  Daily         07/20/25 2008     IP VTE HIGH RISK PATIENT  Once         07/20/25 2008     Place sequential compression device  Until discontinued         07/20/25 2008                    Discharge Planning   AIXA: 7/24/2025     Code Status: Full Code   Medical Readiness for Discharge Date: 7/22/2025  Discharge Plan A: Skilled Nursing Facility   Discharge Delays: None known at this time              Please place Justification for DME      RUCHI Biswas  Department of Hospital Medicine   [facility]

## 2025-07-22 NOTE — ASSESSMENT & PLAN NOTE
Patient presented with inguinal hernia seen on CT, currently symptoms have resolved.  Plan for OR today.    Postop day 2 surgical repair with general surgery  PTOT ordered  Pain control

## 2025-07-22 NOTE — PT/OT/SLP PROGRESS
Physical Therapy Treatment    Patient Name:  Yobani Rios   MRN:  [id number]    Recommendations:     Discharge Recommendations: Moderate Intensity Therapy  Discharge Equipment Recommendations: to be determined by next level of care  Barriers to discharge: decrease caregiver support, pain, high fall risk, impaired gait    Assessment:     Yobani Rios is a 85 y.o. male admitted with a medical diagnosis of Left inguinal hernia.  He presents with the following impairments/functional limitations: weakness, impaired endurance, impaired self care skills, impaired functional mobility, gait instability, impaired balance, decreased lower extremity function, decreased safety awareness, pain, impaired cardiopulmonary response to activity.    Pt seen BID this date after premedication for L groin pain. Pt reports improved pain management but still very difficulty to tolerate LLE WS/WB with RW and mod A. Flexed posture and increase risk for falls    Rehab Prognosis: Fair; patient would benefit from acute skilled PT services to address these deficits and reach maximum level of function.    Recent Surgery: Procedure(s) (LRB):  REPAIR, INGUINAL HERNIA (N/A) 2 Days Post-Op    Plan:     During this hospitalization, patient to be seen daily to address the identified rehab impairments via therapeutic activities, gait training, therapeutic exercises, neuromuscular re-education and progress toward the following goals:    Plan of Care Expires:  08/22/25    Subjective     Chief Complaint: L groin pain  Patient/Family Comments/goals: go to rehab  Pain/Comfort:  Pain Rating 1: other (see comments) (did not quantify)  Location - Side 1: Left  Location 1: groin  Pain Addressed 1: Pre-medicate for activity      Objective:     Communicated with RN prior to session.  Patient found sitting edge of bed with peripheral IV, telemetry upon PT entry to room.     General Precautions: Standard, fall  Orthopedic Precautions: N/A  Braces: N/A  Respiratory  Status: Room air     Functional Mobility:  Bed Mobility:     Sit to Supine: minimum assistance  Transfers:     Sit to Stand:  minimum assistance with rolling walker  Gait: 5 ft x 2 with RW and mod A with decrease LLE WB, poor WS, increase pain and high risk for falls      AM-PAC 6 CLICK MOBILITY  Turning over in bed (including adjusting bedclothes, sheets and blankets)?: 3  Sitting down on and standing up from a chair with arms (e.g., wheelchair, bedside commode, etc.): 3  Moving from lying on back to sitting on the side of the bed?: 2  Moving to and from a bed to a chair (including a wheelchair)?: 2  Need to walk in hospital room?: 2  Climbing 3-5 steps with a railing?: 1  Basic Mobility Total Score: 13       Treatment & Education:  Pt educated on POC, discharge recommendation, importance of time OOB, optimal gait pattern, need for assist with mobility, use of call bell to seek assistance as needed and fall prevention      Patient left HOB elevated with all lines intact, call button in reach, and RN notified..    GOALS:   Multidisciplinary Problems       Physical Therapy Goals          Problem: Physical Therapy    Goal Priority Disciplines Outcome Interventions   Physical Therapy Goal     PT, PT/OT Progressing    Description: Goals to be met by: 25     Patient will increase functional independence with mobility by performin. Supine to sit with Supervision  2. Sit to stand transfer with Supervision  3. Bed to chair transfer with Supervision using Rolling Walker  4. Gait  x 150 feet with Supervision using Rolling Walker.                              DME Justifications:  No DME recommended requiring DME justifications    Time Tracking:     PT Received On: 25  PT Start Time: 1030     PT Stop Time: 1039  PT Total Time (min): 9 min     Billable Minutes: Gait Training 9    Treatment Type: Treatment  PT/PTA: PT     Number of PTA visits since last PT visit: 0     2025

## 2025-07-22 NOTE — PT/OT/SLP EVAL
Physical Therapy Evaluation    Patient Name:  Yobani Rios   MRN:  [id number]    Recommendations:     Discharge Recommendations: Moderate Intensity Therapy   Discharge Equipment Recommendations: rolling walker   Barriers to discharge: decrease caregiver support, pain, high fall risk, impaired gait     Assessment:     Yobani Rios is a 85 y.o. male admitted with a medical diagnosis of Left inguinal hernia.  He presents with the following impairments/functional limitations: weakness, impaired endurance, impaired self care skills, impaired functional mobility, gait instability, impaired balance, decreased lower extremity function, decreased safety awareness, pain, impaired cardiopulmonary response to activity.    Pt report R groin pain. Pt required mod A with bed mobility due to decrease hip flexion secondary to pain. Pt educated on use of log roll technique with improved pain and increase independence. Sit to stand with min A decrease LLE WB. Pt attempted to ambulate but only able to go 3 ft forward and backward due to LLE pain and poor LLE WS. RN notified of pain and provided pain medication. Will try patient again once pain meds kick in.    Rehab Prognosis: Fair; patient would benefit from acute skilled PT services to address these deficits and reach maximum level of function.    Recent Surgery: Procedure(s) (LRB):  REPAIR, INGUINAL HERNIA (N/A) 2 Days Post-Op    Plan:     During this hospitalization, patient to be seen daily to address the identified rehab impairments via therapeutic activities, gait training, therapeutic exercises, neuromuscular re-education and progress toward the following goals:    Plan of Care Expires:  08/22/25    Subjective     Chief Complaint: L groin pain  Patient/Family Comments/goals: go home  Pain/Comfort:  Pain Rating 1: other (see comments) (did not quantify)  Location - Side 1: Left  Location 1: groin  Pain Addressed 1: Reposition, Distraction, Cessation of Activity    Patients  cultural, spiritual, Mandaen conflicts given the current situation: no    Living Environment:  Pt lives with a friend in a one story home with elevator access.  Prior to admission, patients level of function was Independent no aD.  Equipment used at home: none.  DME owned (not currently used): rolling walker.  Upon discharge, patient will have assistance from friend.    Objective:     Communicated with RN prior to session.  Patient found HOB elevated with peripheral IV, telemetry  upon PT entry to room.    General Precautions: Standard, fall  Orthopedic Precautions:N/A   Braces: N/A  Respiratory Status: Room air    Exams:  RLE ROM: WFL  RLE Strength: WFL  LLE ROM: WFL except hip flexion limited due to pain  LLE Strength: WFL except 3-/5    Functional Mobility:  Bed Mobility:     Supine to Sit: moderate assistance  Transfers:     Sit to Stand:  minimum assistance with rolling walker  Gait: 3 ft forward and backward RW and mod A due to pain and poor LLE WS      AM-PAC 6 CLICK MOBILITY  Total Score:13       Treatment & Education:  Pt educated on POC, discharge recommendation, importance of time OOB, optimal gait pattern, need for assist with mobility, use of call bell to seek assistance as needed and fall prevention      Patient left sitting edge of bed with all lines intact, call button in reach, and RN present.    GOALS:   Multidisciplinary Problems       Physical Therapy Goals          Problem: Physical Therapy    Goal Priority Disciplines Outcome Interventions   Physical Therapy Goal     PT, PT/OT Progressing    Description: Goals to be met by: 25     Patient will increase functional independence with mobility by performin. Supine to sit with Supervision  2. Sit to stand transfer with Supervision  3. Bed to chair transfer with Supervision using Rolling Walker  4. Gait  x 150 feet with Supervision using Rolling Walker.                              DME Justifications:   Yobani's mobility limitation  cannot be sufficiently resolved by the use of a cane. His functional mobility deficit can be sufficiently resolved with the use of a Rolling Walker. Patient's mobility limitation significantly impairs their ability to participate in one of more activities of daily living.  The use of a RW will significantly improve the patient's ability to participate in MRADLS and the patient will use it on regular basis in the home.    History:     Past Medical History:   Diagnosis Date    Cancer     prostate    Hypertension     Kidney problem     only 1 kidney functions    Liver disease     Umbilical hernia        Past Surgical History:   Procedure Laterality Date    colon repair      after prostatectomy    LYSIS OF ADHESIONS N/A 10/18/2018    Procedure: LYSIS, ADHESIONS;  Surgeon: Rancho Mariee MD;  Location: [address];  Service: General;  Laterality: N/A;    PROSTATECTOMY      PROSTATECTOMY  2010    REPAIR OF RECURRENT INCISIONAL HERNIA N/A 10/18/2018    Procedure: REPAIR, HERNIA, INCISIONAL -multiple;  Surgeon: Rancho Mariee MD;  Location: [address];  Service: General;  Laterality: N/A;    REPAIR, HERNIA, INGUINAL N/A 7/20/2025    Procedure: REPAIR, INGUINAL HERNIA;  Surgeon: Dima Díaz MD;  Location: [facility];  Service: General;  Laterality: N/A;       Time Tracking:     PT Received On: 07/22/25  PT Start Time: 1030     PT Stop Time: 1039  PT Total Time (min): 9 min     Billable Minutes: Evaluation 9      07/22/2025

## 2025-07-22 NOTE — ASSESSMENT & PLAN NOTE
Patient's blood pressure range in the last 24 hours was: BP  Min: 118/65  Max: 154/79.The patient's inpatient anti-hypertensive regimen is listed below:  Current Antihypertensives  losartan tablet 50 mg, Daily, Oral    Plan  - BP is controlled, no changes needed to their regimen

## 2025-07-22 NOTE — ASSESSMENT & PLAN NOTE
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

## 2025-07-22 NOTE — TELEPHONE ENCOUNTER
----- Message from  Izzy sent at 7/22/2025 11:07 AM CDT -----  Regarding: hospital follow up appt  Good Morning ,    Please contact patient to schedule hospital follow up appt within 1 wk .    Thanks ,   Izzy Nino RN CM

## 2025-07-23 VITALS
DIASTOLIC BLOOD PRESSURE: 75 MMHG | HEIGHT: 73 IN | TEMPERATURE: 98 F | SYSTOLIC BLOOD PRESSURE: 124 MMHG | OXYGEN SATURATION: 91 % | WEIGHT: 175.69 LBS | HEART RATE: 72 BPM | BODY MASS INDEX: 23.28 KG/M2 | RESPIRATION RATE: 16 BRPM

## 2025-07-23 LAB
ABSOLUTE EOSINOPHIL (SMH): 0.52 K/UL
ABSOLUTE MONOCYTE (SMH): 0.94 K/UL (ref 0.3–1)
ABSOLUTE NEUTROPHIL COUNT (SMH): 6.9 K/UL (ref 1.8–7.7)
ANION GAP (SMH): 3 MMOL/L (ref 8–16)
BASOPHILS # BLD AUTO: 0.06 K/UL
BASOPHILS NFR BLD AUTO: 0.6 %
BUN SERPL-MCNC: 27 MG/DL (ref 8–23)
CALCIUM SERPL-MCNC: 7.7 MG/DL (ref 8.7–10.5)
CHLORIDE SERPL-SCNC: 113 MMOL/L (ref 95–110)
CO2 SERPL-SCNC: 23 MMOL/L (ref 23–29)
CREAT SERPL-MCNC: 1.3 MG/DL (ref 0.5–1.4)
ERYTHROCYTE [DISTWIDTH] IN BLOOD BY AUTOMATED COUNT: 12.3 % (ref 11.5–14.5)
GFR SERPLBLD CREATININE-BSD FMLA CKD-EPI: 54 ML/MIN/1.73/M2
GLUCOSE SERPL-MCNC: 91 MG/DL (ref 70–110)
HCT VFR BLD AUTO: 39 % (ref 40–54)
HGB BLD-MCNC: 13.1 GM/DL (ref 14–18)
IMM GRANULOCYTES # BLD AUTO: 0.04 K/UL (ref 0–0.04)
IMM GRANULOCYTES NFR BLD AUTO: 0.4 % (ref 0–0.5)
LYMPHOCYTES # BLD AUTO: 1.82 K/UL (ref 1–4.8)
MAGNESIUM SERPL-MCNC: 2.5 MG/DL (ref 1.6–2.6)
MCH RBC QN AUTO: 31.9 PG (ref 27–31)
MCHC RBC AUTO-ENTMCNC: 33.6 G/DL (ref 32–36)
MCV RBC AUTO: 95 FL (ref 82–98)
NUCLEATED RBC (/100WBC) (SMH): 0 /100 WBC
PHOSPHATE SERPL-MCNC: 2.3 MG/DL (ref 2.7–4.5)
PLATELET # BLD AUTO: 221 K/UL (ref 150–450)
PMV BLD AUTO: 9.3 FL (ref 9.2–12.9)
POTASSIUM SERPL-SCNC: 4.5 MMOL/L (ref 3.5–5.1)
RBC # BLD AUTO: 4.11 M/UL (ref 4.6–6.2)
RELATIVE EOSINOPHIL (SMH): 5.1 % (ref 0–8)
RELATIVE LYMPHOCYTE (SMH): 17.8 % (ref 18–48)
RELATIVE MONOCYTE (SMH): 9.2 % (ref 4–15)
RELATIVE NEUTROPHIL (SMH): 66.9 % (ref 38–73)
SODIUM SERPL-SCNC: 139 MMOL/L (ref 136–145)
WBC # BLD AUTO: 10.23 K/UL (ref 3.9–12.7)

## 2025-07-23 PROCEDURE — 83735 ASSAY OF MAGNESIUM: CPT | Performed by: STUDENT IN AN ORGANIZED HEALTH CARE EDUCATION/TRAINING PROGRAM

## 2025-07-23 PROCEDURE — 85025 COMPLETE CBC W/AUTO DIFF WBC: CPT | Performed by: STUDENT IN AN ORGANIZED HEALTH CARE EDUCATION/TRAINING PROGRAM

## 2025-07-23 PROCEDURE — 97116 GAIT TRAINING THERAPY: CPT

## 2025-07-23 PROCEDURE — 25000003 PHARM REV CODE 250: Performed by: INTERNAL MEDICINE

## 2025-07-23 PROCEDURE — 97110 THERAPEUTIC EXERCISES: CPT

## 2025-07-23 PROCEDURE — 25000003 PHARM REV CODE 250: Performed by: NURSE PRACTITIONER

## 2025-07-23 PROCEDURE — 84100 ASSAY OF PHOSPHORUS: CPT | Performed by: STUDENT IN AN ORGANIZED HEALTH CARE EDUCATION/TRAINING PROGRAM

## 2025-07-23 PROCEDURE — 25000003 PHARM REV CODE 250: Performed by: STUDENT IN AN ORGANIZED HEALTH CARE EDUCATION/TRAINING PROGRAM

## 2025-07-23 PROCEDURE — 80048 BASIC METABOLIC PNL TOTAL CA: CPT | Performed by: INTERNAL MEDICINE

## 2025-07-23 PROCEDURE — 36415 COLL VENOUS BLD VENIPUNCTURE: CPT | Performed by: INTERNAL MEDICINE

## 2025-07-23 RX ORDER — MUPIROCIN 20 MG/G
OINTMENT TOPICAL 2 TIMES DAILY
Status: DISCONTINUED | OUTPATIENT
Start: 2025-07-23 | End: 2025-07-23 | Stop reason: HOSPADM

## 2025-07-23 RX ORDER — AMOXICILLIN 250 MG
1 CAPSULE ORAL DAILY
Qty: 10 TABLET | Refills: 0 | Status: SHIPPED | OUTPATIENT
Start: 2025-07-23 | End: 2025-08-02

## 2025-07-23 RX ORDER — HYDROCODONE BITARTRATE AND ACETAMINOPHEN 5; 325 MG/1; MG/1
1 TABLET ORAL EVERY 6 HOURS PRN
Qty: 12 TABLET | Refills: 0 | Status: SHIPPED | OUTPATIENT
Start: 2025-07-23 | End: 2025-08-04

## 2025-07-23 RX ADMIN — CLOPIDOGREL BISULFATE 75 MG: 75 TABLET, FILM COATED ORAL at 09:07

## 2025-07-23 RX ADMIN — LOSARTAN POTASSIUM 50 MG: 50 TABLET, FILM COATED ORAL at 09:07

## 2025-07-23 RX ADMIN — ASPIRIN 81 MG: 81 TABLET ORAL at 09:07

## 2025-07-23 RX ADMIN — HYDROCODONE BITARTRATE AND ACETAMINOPHEN 1 TABLET: 5; 325 TABLET ORAL at 09:07

## 2025-07-23 RX ADMIN — POLYETHYLENE GLYCOL 3350 17 G: 17 POWDER, FOR SOLUTION ORAL at 09:07

## 2025-07-23 RX ADMIN — Medication 9 MG: at 01:07

## 2025-07-23 RX ADMIN — TAMSULOSIN HYDROCHLORIDE 0.4 MG: 0.4 CAPSULE ORAL at 09:07

## 2025-07-23 RX ADMIN — MUPIROCIN 1 G: 20 OINTMENT TOPICAL at 09:07

## 2025-07-23 RX ADMIN — ATORVASTATIN CALCIUM 40 MG: 40 TABLET, FILM COATED ORAL at 09:07

## 2025-07-23 NOTE — PT/OT/SLP PROGRESS
Occupational Therapy   Treatment    Name: Yobani Rios  MRN: [id number]  Admitting Diagnosis:  Left inguinal hernia  3 Days Post-Op    Recommendations:     Discharge Recommendations: Low Intensity Therapy  Discharge Equipment Recommendations:  walker, rolling  Barriers to discharge:   (increased assistance with ADL, fatigue)    Assessment:     Yobani Rios is a 85 y.o. male with a medical diagnosis of Left inguinal hernia.  He presents with fatigue with activity. Performance deficits affecting function are weakness, impaired endurance, impaired self care skills, impaired functional mobility, gait instability, impaired balance, decreased lower extremity function, pain, decreased safety awareness, impaired cardiopulmonary response to activity.     Rehab Prognosis:  Good; patient would benefit from acute skilled OT services to address these deficits and reach maximum level of function.       Plan:     Patient to be seen 5 x/week to address the above listed problems via self-care/home management, therapeutic activities, therapeutic exercises  Plan of Care Expires: 08/22/25  Plan of Care Reviewed with: patient    Subjective     Chief Complaint: I think I am going home today.   Patient/Family Comments/goals: He is focused on getting to go home today.   Pain/Comfort:  Pain Rating 1:  (did not rate)  Location - Side 1: Left  Location - Orientation 1: generalized  Location 1: groin  Pain Addressed 1: Reposition, Distraction, Cessation of Activity    Objective:     Communicated with: nurse prior to session.  Patient found supine with peripheral IV, telemetry upon OT entry to room.    General Precautions: Standard, fall    Orthopedic Precautions:N/A  Braces: N/A  Respiratory Status: Room air     Occupational Performance:     Bed Mobility:    Patient completed Rolling/Turning to Right with minimum assistance  Patient completed Scooting/Bridging with supervision  Patient completed Supine to Sit with moderate assistance  Patient  completed Sit to Supine with minimum assistance     Functional Mobility/Transfers:  Patient completed Sit <> Stand Transfer with supervision  with  no assistive device     Therapeutic exercise performed EOB  Anterior/ posterior tilt x 5,  shoulder rollsx 10 forward and backward, scapula retraction x 5, neck exercises in all planes of motion x 10, UE exercises in all planes of motion x 10,  Over head reach x 3. Neck therex in all planes x 5 reps    He fatigued quickly and requested to return to bed.    [facility] 6 Click ADL: 16    Treatment & Education:  Pt seen for UE and Neck therex as stated above.  All questions/concerns addressed within scope.  Call staff for BTB/OOB assist. Call bell use explained. Pt acknowledged.  Purpose of OT and POC     Patient left HOB elevated with all lines intact, call button in reach, and friend present    GOALS:   Multidisciplinary Problems       Occupational Therapy Goals       Not on file              Multidisciplinary Problems (Resolved)          Problem: Occupational Therapy    Goal Priority Disciplines Outcome Interventions   Occupational Therapy Goal   (Resolved)     OT, PT/OT Met    Description: Goals to be met by: 8/22/25     Patient will increase functional independence with ADLs by performing:    UE Dressing with Supervision.  LE Dressing with Set-up Assistance and Assistive Devices as needed.  Grooming while standing at sink with Supervision.  Toileting from toilet with Supervision for hygiene and clothing management.   Toilet transfer to toilet with Supervision.                         DME Justifications:   Yobani's mobility limitation cannot be sufficiently resolved by the use of a cane. His functional mobility deficit can be sufficiently resolved with the use of a Rolling Walker. Patient's mobility limitation significantly impairs their ability to participate in one of more activities of daily living.  The use of a RW will significantly improve the patient's ability to  participate in MRADLS and the patient will use it on regular basis in the home.    Time Tracking:     OT Date of Treatment: 07/23/25  OT Start Time: 1152  OT Stop Time: 1202  OT Total Time (min): 10 min    Billable Minutes:Therapeutic Exercise 10    OT/ROBERT: OT          7/23/2025

## 2025-07-23 NOTE — PLAN OF CARE
Pt clear for DC from case management standpoint. Discharging to Home with HH . Patient's friend will transport patient home from facility .        07/23/25 1216   Final Note   Assessment Type Final Discharge Note   Anticipated Discharge Disposition Home-Health

## 2025-07-23 NOTE — PT/OT/SLP PROGRESS
Physical Therapy Treatment    Patient Name:  Yobani Rios   MRN:  [id number]    Recommendations:     Discharge Recommendations: Low Intensity Therapy  Discharge Equipment Recommendations: walker, rolling  Barriers to discharge: pain, increase assist with mobility,    Assessment:     Yobani Rios is a 85 y.o. male admitted with a medical diagnosis of Left inguinal hernia.  He presents with the following impairments/functional limitations: weakness, impaired endurance, impaired self care skills, impaired functional mobility, gait instability, impaired balance, decreased lower extremity function, decreased safety awareness, pain, impaired cardiopulmonary response to activity.    Pt present with improve pain management this date allowing patient to progress ambulation to 40 ft with min A and RW. Pt presents with decrease LLE WB but improved from previous date. Pt and his friend feel that he is at a level that he can discharge home with her assistance.     Rehab Prognosis: Fair; patient would benefit from acute skilled PT services to address these deficits and reach maximum level of function.    Recent Surgery: Procedure(s) (LRB):  REPAIR, INGUINAL HERNIA (N/A) 3 Days Post-Op    Plan:     During this hospitalization, patient to be seen daily to address the identified rehab impairments via therapeutic activities, gait training, therapeutic exercises, neuromuscular re-education and progress toward the following goals:    Plan of Care Expires:  08/23/25    Subjective     Chief Complaint: a pulling in the groin  Patient/Family Comments/goals: go home  Pain/Comfort:  Pain Rating 1: other (see comments) (did not quantify)  Location - Side 1: Left  Location 1: groin  Pain Addressed 1: Reposition, Distraction, Cessation of Activity      Objective:     Communicated with RN prior to session.  Patient found HOB elevated with peripheral IV, telemetry upon PT entry to room.     General Precautions: Standard, fall  Orthopedic  Precautions: N/A  Braces: N/A  Respiratory Status: Room air     Functional Mobility:  Bed Mobility:     Supine to Sit: supervision  Sit to Supine: supervision  Transfers:     Sit to Stand:  contact guard assistance with rolling walker  Gait: 40 ft with RW and min A      AM-PAC 6 CLICK MOBILITY  Turning over in bed (including adjusting bedclothes, sheets and blankets)?: 3  Sitting down on and standing up from a chair with arms (e.g., wheelchair, bedside commode, etc.): 3  Moving from lying on back to sitting on the side of the bed?: 3  Moving to and from a bed to a chair (including a wheelchair)?: 3  Need to walk in hospital room?: 3  Climbing 3-5 steps with a railing?: 1  Basic Mobility Total Score: 16       Treatment & Education:  Pt educated on POC, discharge recommendation, importance of time OOB, DME needs, need for assist with mobility, use of call bell to seek assistance as needed and fall prevention      Patient left HOB elevated with all lines intact, call button in reach, bed alarm on, and friend present..    GOALS:   Multidisciplinary Problems       Physical Therapy Goals       Not on file              Multidisciplinary Problems (Resolved)          Problem: Physical Therapy    Goal Priority Disciplines Outcome Interventions   Physical Therapy Goal   (Resolved)     PT, PT/OT Met    Description: Goals to be met by: 25     Patient will increase functional independence with mobility by performin. Supine to sit with Supervision  2. Sit to stand transfer with Supervision  3. Bed to chair transfer with Supervision using Rolling Walker  4. Gait  x 150 feet with Supervision using Rolling Walker.                              DME Justifications:  No DME recommended requiring DME justifications    Time Tracking:     PT Received On: 25  PT Start Time: 1017     PT Stop Time: 1025  PT Total Time (min): 8 min     Billable Minutes: Gait Training 8    Treatment Type: Treatment  PT/PTA: PT     Number of PTA  visits since last PT visit: 0     07/23/2025

## 2025-07-23 NOTE — PLAN OF CARE
Pt accepted at [facility]; Staff at [facility] spoke with Pt and Pt agreeable to facility. Auth submitted today (7/23) SW will continue to follow.   07/23/25 0907   Post-Acute Status   Post-Acute Authorization Placement   Post-Acute Placement Status Pending payor review/awaiting authorization (if required)   Discharge Delays None known at this time   Discharge Plan   Discharge Plan A Skilled Nursing Facility   Discharge Plan B Home Health

## 2025-07-23 NOTE — ASSESSMENT & PLAN NOTE
Creatine stable for now. BMP reviewed- noted Estimated Creatinine Clearance: 46.8 mL/min (based on SCr of 1.3 mg/dL). according to latest data. Based on current GFR, CKD stage is stage 3 - GFR 30-59.  Monitor UOP and serial BMP and adjust therapy as needed. Renally dose meds. Avoid nephrotoxic medications and procedures.

## 2025-07-23 NOTE — DISCHARGE INSTRUCTIONS
Scheduling will call for an appt with urology to address the ellis catheter and assist with removal

## 2025-07-23 NOTE — PLAN OF CARE
"RW ordered at this time . [facility].     Per Khushbu , " He received a rolling walker in 2024 I spoke with him about it and advise his insurance will not cover a rollator at this time "     07/23/25 1038   Post-Acute Status   [facility]   Discharge Plan   Discharge Plan A Home Health   Discharge Plan B Home Health       "

## 2025-07-23 NOTE — ASSESSMENT & PLAN NOTE
Patient's blood pressure range in the last 24 hours was: BP  Min: 114/63  Max: 174/77.The patient's inpatient anti-hypertensive regimen is listed below:  Current Antihypertensives  losartan tablet 50 mg, Daily, Oral    Plan  - BP is controlled, no changes needed to their regimen

## 2025-07-23 NOTE — PROGRESS NOTES
AVS virtually reviewed with patient's family in its entirety with emphasis on diet, medications, follow-up appointments and reasons to return to the ED. Patient also encouraged to utilize their patient portal. Ease and convenience of use reiterated. Education complete and patient voiced understanding. All questions answered. Discharge teaching complete. Did request for pt nurse to go show family how to empty ellis bag prior to dc.

## 2025-07-23 NOTE — DISCHARGE SUMMARY
[facility] Medicine  Discharge Summary      Patient Name: Yobani Rios  MRN: [id number]  [facility]: [id number]  Patient Class: IP- Inpatient  Admission Date: 7/20/2025  Hospital Length of Stay: 3 days  Discharge Date and Time: 7/23/2025  2:13 PM  Attending Physician: No att. providers found   Discharging Provider: Jazmin Medina NP  Primary Care Provider: Justine Barragan FNP    Primary Care Team: Networked reference to record PCT     HPI:   85 year old male with comorbid conditions of CAD s/p PCI/stenting, hypertension, hypercholesterolemia, h/o prostate cancer s/p prostatectomy, CKD3, one functioning kidney, h/o third degree AV block s/p pacemaker presented to [facility] due to sudden onset left sided inguinal pain which started today around 1:30 PM.  CT imaging revealed left inguinal hernia with focally dilated bowel loop concerning for incarceration, fusiform 4.2 cm abdominal aortic aneurysm, right renal atrophy with hemorrhagic cyst.  Patient was given pain medications and transferred to [facility] for surgery.  Dr. Pittman notified - plan to take patient to OR tonight.      Procedure(s) (LRB):  REPAIR, INGUINAL HERNIA (N/A)      Hospital Course:   This is an 85-year-old male with a past medical history of CAD, PCI with stenting, hypertension, hyperlipidemia, history of prostate cancer status post prostatectomy CKD 3 with solitary kidney who presented [facility] for the evaluation of left-sided inguinal pain.  He was transferred and admitted to the hospital medicine service at [facility].  CT imaging revealed left inguinal hernia with focally dilated loop bowel concerning for incarceration with a fusiform 4.2 cm abdominal aortic aneurysm and right renal atrophy with hemorrhagic cyst.  Patient was evaluated by General surgery.  Patient was taken to the OR with Dr. Díaz on 07/20/2025 where he had a left inguinal hernia repair.  Patient did well postoperatively however he did  have urinary retention requiring in & out cath initially, but ultimately required ellis placement.  PT and OT was consulted and recommended moderate intensity therapy initially, but was re-evaluated on 7/23 and recommended low intensity therapy. His diet was advanced and he began passing flatus. Pain was controlled with oral medication. He was seen and examined on the date of discharge. Strict return prxn provided.     Goals of Care Treatment Preferences:  Code Status: Full Code         Consults:   Consults (From admission, onward)          Status Ordering Provider     Inpatient consult to   Once        Provider:  (Not yet assigned)    Acknowledged CB CALLAHAN     Inpatient consult to General Surgery  Once        Provider:  Dima Díaz MD    Completed DARCI CRAWFORD            Assessment & Plan  Left inguinal hernia  Patient presented with inguinal hernia seen on CT, currently symptoms have resolved.  Plan for OR today.    Postop day 2 surgical repair with general surgery  PTOT ordered  Pain control    Essential hypertension  Patient's blood pressure range in the last 24 hours was: BP  Min: 114/63  Max: 174/77.The patient's inpatient anti-hypertensive regimen is listed below:  Current Antihypertensives  losartan tablet 50 mg, Daily, Oral    Plan  - BP is controlled, no changes needed to their regimen    CAD (coronary artery disease)  Patient with known CAD s/p stent placement, which is controlled Will continue ASA, Plavix, and Statin and monitor for S/Sx of angina/ACS. Continue to monitor on telemetry.   Hypercholesterolemia  Continue statin    Acute urinary retention  Patient with the acute urinary retention requiring in and out catheterization  Has history of prostate cancer and CKD secondary to solitary kidney  Continue Flomax  Bladder scan PRN  Ellis catheter placed   He will follow up with Urology outpatient    CKD (chronic kidney disease) stage 3, GFR 30-59 ml/min  Creatine stable for  "now. BMP reviewed- noted Estimated Creatinine Clearance: 46.8 mL/min (based on SCr of 1.3 mg/dL). according to latest data. Based on current GFR, CKD stage is stage 3 - GFR 30-59.  Monitor UOP and serial BMP and adjust therapy as needed. Renally dose meds. Avoid nephrotoxic medications and procedures.  Final Active Diagnoses:    Diagnosis Date Noted POA    PRINCIPAL PROBLEM:  Left inguinal hernia [K40.90] 07/01/2024 Yes    Acute urinary retention [R33.8] 07/21/2025 No    CKD (chronic kidney disease) stage 3, GFR 30-59 ml/min [N18.30] 07/21/2025 Yes    CAD (coronary artery disease) [I25.10] 07/20/2025 Yes    Hypercholesterolemia [E78.00] 07/20/2025 Yes    Essential hypertension [I10] 01/23/2024 Yes      Problems Resolved During this Admission:       Discharged Condition: stable    [place] [facility]    [place] Up:   Contact information for follow-up providers       Justine Barragan FNP. Go on 7/29/2025.    Specialty: Family Medicine  Why: hospital follow up appt @ 10 am  Contact information:  [address] [facility] [address]  [phone]               Dima Díaz MD. Call in 1 week(s).    Specialty: General Surgery  Why: In basket request sent  for hospital follow up appt within 1 wk.     Office staff will contact patient to schedule  Contact information:  [address] [address] [phone]                       Contact information for after-discharge care       [facility]       [facility] .    Service: Home Health Services  Contact information:  [address] [address], [address]  [facility] [address] [phone]                                 Patient Instructions:      WALKER FOR HOME USE     Order Specific Question Answer Comments   Type of Walker: Rollator with brakes and/or seat    With wheels? Yes    Height: 6' 1" (1.854 m)    Weight: 79.7 kg (175 lb 11.3 oz)    Length of need (1-99 months): 99    Does patient have medical " equipment at home? walker, rolling    Does patient have medical equipment at home? shower chair    Please check all that apply: Patient's condition impairs ambulation.      Ambulatory referral/consult to Urology   Standing Status: Future   Referral Priority: Routine Referral Type: Consultation   Referral Reason: Specialty Services Required   Requested Specialty: Urology   Number of Visits Requested: 1     Ambulatory referral/consult to Home Health   Standing Status: Future   Referral Priority: Routine Referral Type: Home Health Care   Referral Reason: Specialty Services Required   Requested Specialty: Home Health Services   Number of Visits Requested: 1     Diet Cardiac     Notify your health care provider if you experience any of the following:  temperature >100.4     Notify your health care provider if you experience any of the following:  persistent nausea and vomiting or diarrhea     Notify your health care provider if you experience any of the following:  severe uncontrolled pain     Notify your health care provider if you experience any of the following:  redness, tenderness, or signs of infection (pain, swelling, redness, odor or green/yellow discharge around incision site)     Notify your health care provider if you experience any of the following:  difficulty breathing or increased cough     Notify your health care provider if you experience any of the following:  severe persistent headache     Notify your health care provider if you experience any of the following:  worsening rash     Notify your health care provider if you experience any of the following:  persistent dizziness, light-headedness, or visual disturbances     Notify your health care provider if you experience any of the following:  increased confusion or weakness     Lifting restrictions   Order Comments: No heavy lifting over 5lbs until cleared by surgeon     Notify your health care provider if you experience any of the following:   temperature >100.4     Notify your health care provider if you experience any of the following:  persistent nausea and vomiting or diarrhea     Notify your health care provider if you experience any of the following:  severe uncontrolled pain     Notify your health care provider if you experience any of the following:  redness, tenderness, or signs of infection (pain, swelling, redness, odor or green/yellow discharge around incision site)     No dressing needed     Other restrictions (specify):   Order Comments: Showers only, no baths     Nursing communication   Order Comments: Discharge with ellis to leg bag     Activity as tolerated       Significant Diagnostic Studies: Labs: CMP   Recent Labs   Lab 07/22/25  1136 07/23/25  0527    139   K 4.1 4.5   * 113*   CO2 23 23   * 91   BUN 24* 27*   CREATININE 1.4 1.3   CALCIUM 8.0* 7.7*   ANIONGAP 4* 3*    and CBC   Recent Labs   Lab 07/22/25  0520 07/23/25  0527   WBC 8.86 10.23   HGB 13.4* 13.1*   HCT 42.8 39.0*    221       Pending Diagnostic Studies:       Procedure Component Value Units Date/Time    EXTRA TUBES [[id number]] Collected: 07/22/25 1135    Order Status: Sent Lab Status: In process Updated: 07/22/25 1153    Specimen: Blood, Venous     Narrative:      The following orders were created for panel order EXTRA TUBES.  Procedure                               Abnormality         Status                     ---------                               -----------         ------                     Lavender Top Hold[[id number]]                               In process                   Please view results for these tests on the individual orders.           Medications:  Reconciled Home Medications:      Medication List        PAUSE taking these medications      * lactulose 20 gram/30 mL Soln  Wait to take this until your doctor or other care provider tells you to start again.  Commonly known as: CHRONULAC  Take 30 mLs (20 g total) by mouth 2  (two) times daily as needed (constipation).  You also have another medication with the same name that you may need to continue taking.  Ask about: Should I take this medication?           * This list has 1 medication(s) that are the same as other medications prescribed for you. Read the directions carefully, and ask your doctor or other care provider to review them with you.                START taking these medications      senna-docusate 8.6-50 mg per tablet  Commonly known as: SENNA WITH DOCUSATE SODIUM  Take 1 tablet by mouth once daily. for 10 days     tamsulosin 0.4 mg Cap  Commonly known as: FLOMAX  Take 1 capsule (0.4 mg total) by mouth once daily.            CHANGE how you take these medications      * lactulose 10 gram/15 mL solution  Commonly known as: CHRONULAC  Take 20 g by mouth 2 (two) times daily as needed.  What changed: Another medication with the same name was paused. Ask your nurse or doctor if you should take this medication.           * This list has 1 medication(s) that are the same as other medications prescribed for you. Read the directions carefully, and ask your doctor or other care provider to review them with you.                CONTINUE taking these medications      albuterol 90 mcg/actuation inhaler  Commonly known as: PROVENTIL/VENTOLIN HFA  Inhale 1-2 puffs into the lungs every 6 (six) hours as needed for Shortness of Breath. Rescue     ALPRAZolam 1 MG tablet  Commonly known as: XANAX  Take 0.5-1 mg by mouth 2 (two) times daily as needed.     aspirin 81 MG EC tablet  Commonly known as: ECOTRIN  Take 81 mg by mouth once daily.     atorvastatin 40 MG tablet  Commonly known as: LIPITOR  Take 40 mg by mouth once daily.     B-complex with vitamin C tablet  Commonly known as: Z-Bec or Equiv  Take 1 tablet by mouth once daily.     citalopram 20 MG tablet  Commonly known as: CeleXA  Take 20 mg by mouth every morning.     clopidogreL 75 mg tablet  Commonly known as: PLAVIX  Take 75 mg by  mouth once daily.     cyproheptadine 4 mg tablet  Commonly known as: PERIACTIN  Take 4 mg by mouth 2 (two) times daily.     furosemide 40 MG tablet  Commonly known as: LASIX  Take 40 mg by mouth 2 (two) times daily.     HYDROcodone-acetaminophen 5-325 mg per tablet  Commonly known as: NORCO  Take 1 tablet by mouth every 6 (six) hours as needed for Pain.     losartan 100 MG tablet  Commonly known as: COZAAR  Take 100 mg by mouth once daily.     metoprolol succinate 25 MG 24 hr tablet  Commonly known as: TOPROL-XL  Take 0.5 tablets (12.5 mg total) by mouth once daily.     mirtazapine 45 MG tablet  Commonly known as: REMERON  Take 45 mg by mouth every evening.     spironolactone 25 MG tablet  Commonly known as: ALDACTONE  Take 12.5 mg by mouth 2 (two) times daily.     THERA ORAL  Take 1 tablet by mouth once daily.     vitamin D 1000 units Tab  Commonly known as: VITAMIN D3  Take 25 mcg by mouth once daily.            STOP taking these medications      cefdinir 300 MG capsule  Commonly known as: OMNICEF            ASK your doctor about these medications      hydrocortisone 2.5 % rectal cream  Commonly known as: ANUSOL-HC  Place rectally 2 (two) times daily. for 10 days  Ask about: Should I take this medication?              Indwelling Lines/Drains at time of discharge:   Lines/Drains/Airways       Drain  Duration                  Urethral Catheter 07/21/25 5352 1 day                        Time spent on the discharge of patient: 41 minutes         Jazmin Medina NP  Department of Hospital Medicine  [facility]

## 2025-07-23 NOTE — PLAN OF CARE
[facility] Met with Yobani to review discharge recommendation of home health and is agreeable to plan    Patient/family provided list of facilities in-network with patient's payor plan. Providers that are owned, operated, or affiliated with [facility] are included on the list.     Notified that referral sent to below listed facilities from in-network list based on proximity to home/family support:   [facility]  2.  3.  4.  5. (can send more than 5)    Patient/family instructed to identify preference.    Preferred Facility: (if more than 1, listed in order of descending preference)  [facility]  OR  Patient has declined to select a preferred provider and elects placement with the first accepting in network provider that is available to provide services as ordered by the physician       If an additional preferred facility not listed above is identified, additional referral to be sent. If above facilities unable to accept, will send additional referrals to in-network providers.

## 2025-07-23 NOTE — PLAN OF CARE
Patient does not want SNF placement at this time . PT / OT re-evaluated patient and states that he can dc with HHPT . SS order for HH placed , [place] [facility] [place]         07/23/25 1028   Discharge Reassessment   Assessment Type Discharge Planning Reassessment   Did the patient's condition or plan change since previous assessment? Yes   Discharge Plan discussed with: Patient   Communicated AIXA with patient/caregiver Yes   Discharge Plan A Home Health   Discharge Plan B Home Health

## 2025-07-23 NOTE — SUBJECTIVE & OBJECTIVE
Interval History: Pt seen and examined at bedside. No acute events overnight.    Review of Systems   Gastrointestinal:  Positive for abdominal pain.   Genitourinary:  Positive for difficulty urinating.   Musculoskeletal:  Positive for gait problem.     Objective:     Vital Signs (Most Recent):  Temp: 98.2 °F (36.8 °C) (07/23/25 0839)  Pulse: 68 (07/23/25 0839)  Resp: 16 (07/23/25 0934)  BP: (!) 143/75 (07/23/25 0839)  SpO2: (!) 94 % (07/23/25 0839) Vital Signs (24h Range):  Temp:  [97.5 °F (36.4 °C)-99.1 °F (37.3 °C)] 98.2 °F (36.8 °C)  Pulse:  [68-80] 68  Resp:  [15-18] 16  SpO2:  [92 %-98 %] 94 %  BP: (114-174)/(63-77) 143/75     Weight: 79.7 kg (175 lb 11.3 oz)  Body mass index is 23.18 kg/m².    Intake/Output Summary (Last 24 hours) at 7/23/2025 0940  Last data filed at 7/23/2025 0332  Gross per 24 hour   Intake 350 ml   Output 900 ml   Net -550 ml         Physical Exam  Vitals and nursing note reviewed.   Constitutional:       General: He is not in acute distress.  HENT:      Head: Normocephalic and atraumatic.      Nose: Nose normal.      Mouth/Throat:      Mouth: Mucous membranes are moist.      Pharynx: Oropharynx is clear.   Eyes:      Conjunctiva/sclera: Conjunctivae normal.      Pupils: Pupils are equal, round, and reactive to light.   Cardiovascular:      Rate and Rhythm: Normal rate and regular rhythm.   Pulmonary:      Effort: Pulmonary effort is normal. No respiratory distress.   Abdominal:      Palpations: Abdomen is soft.      Comments: Hypoactive bowel sounds throughout   Genitourinary:     Comments: Villasenor in place draining clear yellow urine  Musculoskeletal:         General: Normal range of motion.      Cervical back: Normal range of motion and neck supple.   Skin:     General: Skin is warm and dry.      Capillary Refill: Capillary refill takes less than 2 seconds.      Findings: Bruising present.      Comments: Left groin/lower quad with bruising. Incision clean and dry, open to air    Neurological:      Mental Status: He is alert and oriented to person, place, and time. Mental status is at baseline.   Psychiatric:         Mood and Affect: Mood normal.         Behavior: Behavior normal.               Significant Labs: All pertinent labs within the past 24 hours have been reviewed.  CBC:   Recent Labs   Lab 07/22/25  0520 07/23/25  0527   WBC 8.86 10.23   HGB 13.4* 13.1*   HCT 42.8 39.0*    221     CMP:   Recent Labs   Lab 07/22/25  1136 07/23/25  0527    139   K 4.1 4.5   * 113*   CO2 23 23   * 91   BUN 24* 27*   CREATININE 1.4 1.3   CALCIUM 8.0* 7.7*   ANIONGAP 4* 3*       Significant Imaging: I have reviewed all pertinent imaging results/findings within the past 24 hours.  CT Abdomen Pelvis  Without Contrast  Result Date: 7/20/2025  EXAMINATION: CT ABDOMEN PELVIS WITHOUT CONTRAST CLINICAL HISTORY: Abdominal pain, acute, nonlocalized;Left inguinal mass; TECHNIQUE: Low dose axial images, sagittal and coronal reformations were obtained from the lung bases to the pubic symphysis.  Oral contrast was not administered. COMPARISON: October 6, 2024 CT scan FINDINGS: Bibasal scarring and atelectasis are evident.  Extensive coronary artery calcifications are present and there is evidence of prior cardiac pacer placement.  Noncontrast evaluation of the liver and spleen are unremarkable except for a presumed cyst in the lateral segment left lobe of the liver measuring 16 mm.  This is unchanged relative the previous exam.  The adrenal glands are unremarkable.  There is fatty atrophy of the pancreas.  The right kidney is markedly atrophic and there is an exophytic 24 mm in diameter hyperdense mass emanating from the right mid kidney.  This is smaller than was seen previously and likely represents a hemorrhagic cyst.  In the left kidney there are parapelvic cysts and probable additional cortical cyst formation.  These are incompletely evaluated on this noncontrast scan.  No definite  left-sided hydroureter is seen.  There is extensive aortoiliac atherosclerosis with a fusiform infrarenal abdominal aortic aneurysm being demonstrated.  The maximal external diameter is 4.2 x 4.2 cm.  This has minimally enlarged relative the previous exam when it measured 4.0 cm.  There is no CT evidence of rupture.  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  There is a left inguinal hernia which contains a portion of the bladder as well as a loop of bowel.  The loop of bowel is mildly dilated and there is some increased density in the adjacent fat suggesting regional inflammation.  Do not see evidence of a harrison bowel obstruction.  The region the appendix is unremarkable.  Partially visualized is a penile prosthesis.  The left inguinal hernia also contains fat.  Degenerative changes are seen in the spine. This report was flagged in Epic as abnormal.     The previously described left inguinal hernia now contains a bowel loop which appears focally dilated raising the question of some degree of incarceration.  The bowel loop measures as much as 2.9 cm.  The more proximal bowel does not appear to be significantly dilated suggesting the absence of a associated marked small-bowel obstruction. Multiple chronic findings including and a fusiform infrarenal abdominal aortic aneurysm with a 4.2 cm diameter.  There is no evidence of acute rupture or hemorrhage. Right renal atrophy and a hemorrhagic right renal cysts are noted.  The bladder is partially distended and the tip extends into the neck of the hernia. Electronically signed by: Gene Leo MD Date:    07/20/2025 Time:    16:26    X-Ray Abdomen AP 1 View (KUB)  Result Date: 7/11/2025  EXAMINATION: XR ABDOMEN AP 1 VIEW CLINICAL HISTORY: Constipation, unspecified TECHNIQUE: AP View(s) of the abdomen was performed. COMPARISON: 07/02/2024. FINDINGS: Pacer leads noted.  There is a normal, nonobstructive bowel gas pattern.   There is a moderate volume of stool.  Free air cannot be excluded on this supine radiograph. There is no abnormal calcification. The visualized osseous structures demonstrate degenerative changes.  The visualized lung bases are clear.  Penile prostheses partially imaged.     No acute abnormality. Electronically signed by: Ac Llanes Date:    07/11/2025 Time:    23:53

## 2025-08-13 ENCOUNTER — HOSPITAL ENCOUNTER (INPATIENT)
Facility: HOSPITAL | Age: 85
LOS: 1 days | Discharge: HOME HEALTH | DRG: 603 | End: 2025-08-15
Attending: EMERGENCY MEDICINE | Admitting: INTERNAL MEDICINE
Payer: MEDICARE

## 2025-08-13 DIAGNOSIS — L03.90 CELLULITIS: ICD-10-CM

## 2025-08-13 DIAGNOSIS — Z87.448 HISTORY OF CHRONIC KIDNEY DISEASE: ICD-10-CM

## 2025-08-13 DIAGNOSIS — R79.9 ELEVATED BUN: ICD-10-CM

## 2025-08-13 DIAGNOSIS — R07.9 CHEST PAIN: ICD-10-CM

## 2025-08-13 DIAGNOSIS — R60.0 EDEMA OF RIGHT UPPER ARM: Primary | ICD-10-CM

## 2025-08-13 DIAGNOSIS — R60.9 EDEMA: ICD-10-CM

## 2025-08-13 DIAGNOSIS — R79.89 ELEVATED SERUM CREATININE: ICD-10-CM

## 2025-08-13 PROBLEM — N18.9 ACUTE KIDNEY INJURY SUPERIMPOSED ON CHRONIC KIDNEY DISEASE: Status: ACTIVE | Noted: 2024-01-23

## 2025-08-13 PROBLEM — L03.113 CELLULITIS OF RIGHT ARM: Status: ACTIVE | Noted: 2025-08-13

## 2025-08-13 LAB
ABSOLUTE EOSINOPHIL (OHS): 0.24 K/UL
ABSOLUTE MONOCYTE (OHS): 1.06 K/UL (ref 0.3–1)
ABSOLUTE NEUTROPHIL COUNT (OHS): 6.9 K/UL (ref 1.8–7.7)
ALBUMIN SERPL BCP-MCNC: 3.5 G/DL (ref 3.5–5.2)
ALP SERPL-CCNC: 132 UNIT/L (ref 40–150)
ALT SERPL W/O P-5'-P-CCNC: 10 UNIT/L (ref 10–44)
ANION GAP (OHS): 9 MMOL/L (ref 8–16)
AST SERPL-CCNC: 22 UNIT/L (ref 11–45)
BACTERIA #/AREA URNS HPF: ABNORMAL /HPF
BASOPHILS # BLD AUTO: 0.04 K/UL
BASOPHILS NFR BLD AUTO: 0.4 %
BILIRUB SERPL-MCNC: 0.9 MG/DL (ref 0.1–1)
BILIRUB UR QL STRIP.AUTO: NEGATIVE
BUN SERPL-MCNC: 25 MG/DL (ref 8–23)
CALCIUM SERPL-MCNC: 8.7 MG/DL (ref 8.7–10.5)
CHLORIDE SERPL-SCNC: 109 MMOL/L (ref 95–110)
CLARITY UR: CLEAR
CO2 SERPL-SCNC: 24 MMOL/L (ref 23–29)
COLOR UR AUTO: YELLOW
CREAT SERPL-MCNC: 1.7 MG/DL (ref 0.5–1.4)
ERYTHROCYTE [DISTWIDTH] IN BLOOD BY AUTOMATED COUNT: 14.1 % (ref 11.5–14.5)
GFR SERPLBLD CREATININE-BSD FMLA CKD-EPI: 39 ML/MIN/1.73/M2
GLUCOSE SERPL-MCNC: 95 MG/DL (ref 70–110)
GLUCOSE UR QL STRIP: NEGATIVE
HCT VFR BLD AUTO: 44.4 % (ref 40–54)
HGB BLD-MCNC: 14.7 GM/DL (ref 14–18)
HGB UR QL STRIP: NEGATIVE
HYALINE CASTS #/AREA URNS LPF: 3 /LPF (ref 0–1)
IMM GRANULOCYTES # BLD AUTO: 0.06 K/UL (ref 0–0.04)
IMM GRANULOCYTES NFR BLD AUTO: 0.6 % (ref 0–0.5)
INR PPP: 1.1 (ref 0.8–1.2)
KETONES UR QL STRIP: NEGATIVE
LACTATE SERPL-SCNC: 1 MMOL/L (ref 0.5–2.2)
LEUKOCYTE ESTERASE UR QL STRIP: NEGATIVE
LYMPHOCYTES # BLD AUTO: 1.58 K/UL (ref 1–4.8)
MAGNESIUM SERPL-MCNC: 2.2 MG/DL (ref 1.6–2.6)
MCH RBC QN AUTO: 30.9 PG (ref 27–31)
MCHC RBC AUTO-ENTMCNC: 33.1 G/DL (ref 32–36)
MCV RBC AUTO: 94 FL (ref 82–98)
MICROSCOPIC COMMENT: ABNORMAL
NITRITE UR QL STRIP: NEGATIVE
NUCLEATED RBC (/100WBC) (OHS): 0 /100 WBC
PH UR STRIP: 6 [PH]
PLATELET # BLD AUTO: 209 K/UL (ref 150–450)
PMV BLD AUTO: 9.6 FL (ref 9.2–12.9)
POTASSIUM SERPL-SCNC: 3.7 MMOL/L (ref 3.5–5.1)
PROT SERPL-MCNC: 6.6 GM/DL (ref 6–8.4)
PROT UR QL STRIP: ABNORMAL
PROTHROMBIN TIME: 12 SECONDS (ref 9–12.5)
RBC # BLD AUTO: 4.75 M/UL (ref 4.6–6.2)
RBC #/AREA URNS HPF: 1 /HPF (ref 0–4)
RELATIVE EOSINOPHIL (OHS): 2.4 %
RELATIVE LYMPHOCYTE (OHS): 16 % (ref 18–48)
RELATIVE MONOCYTE (OHS): 10.7 % (ref 4–15)
RELATIVE NEUTROPHIL (OHS): 69.9 % (ref 38–73)
SODIUM SERPL-SCNC: 142 MMOL/L (ref 136–145)
SP GR UR STRIP: 1.02
SQUAMOUS #/AREA URNS HPF: 5 /HPF
UROBILINOGEN UR STRIP-ACNC: NEGATIVE EU/DL
WBC # BLD AUTO: 9.88 K/UL (ref 3.9–12.7)
WBC #/AREA URNS HPF: 2 /HPF (ref 0–5)

## 2025-08-13 PROCEDURE — 85025 COMPLETE CBC W/AUTO DIFF WBC: CPT

## 2025-08-13 PROCEDURE — 25000003 PHARM REV CODE 250: Performed by: NURSE PRACTITIONER

## 2025-08-13 PROCEDURE — 94761 N-INVAS EAR/PLS OXIMETRY MLT: CPT

## 2025-08-13 PROCEDURE — 83735 ASSAY OF MAGNESIUM: CPT

## 2025-08-13 PROCEDURE — 99285 EMERGENCY DEPT VISIT HI MDM: CPT | Mod: 25

## 2025-08-13 PROCEDURE — 99406 BEHAV CHNG SMOKING 3-10 MIN: CPT

## 2025-08-13 PROCEDURE — 85610 PROTHROMBIN TIME: CPT

## 2025-08-13 PROCEDURE — 73080 X-RAY EXAM OF ELBOW: CPT | Mod: TC,RT

## 2025-08-13 PROCEDURE — 93005 ELECTROCARDIOGRAM TRACING: CPT | Performed by: INTERNAL MEDICINE

## 2025-08-13 PROCEDURE — 99223 1ST HOSP IP/OBS HIGH 75: CPT | Mod: 95,AI,, | Performed by: NURSE PRACTITIONER

## 2025-08-13 PROCEDURE — 63600175 PHARM REV CODE 636 W HCPCS

## 2025-08-13 PROCEDURE — G0378 HOSPITAL OBSERVATION PER HR: HCPCS

## 2025-08-13 PROCEDURE — 94799 UNLISTED PULMONARY SVC/PX: CPT

## 2025-08-13 PROCEDURE — 87040 BLOOD CULTURE FOR BACTERIA: CPT

## 2025-08-13 PROCEDURE — 63600175 PHARM REV CODE 636 W HCPCS: Performed by: NURSE PRACTITIONER

## 2025-08-13 PROCEDURE — 96372 THER/PROPH/DIAG INJ SC/IM: CPT | Performed by: NURSE PRACTITIONER

## 2025-08-13 PROCEDURE — 73090 X-RAY EXAM OF FOREARM: CPT | Mod: TC,RT

## 2025-08-13 PROCEDURE — 83605 ASSAY OF LACTIC ACID: CPT

## 2025-08-13 PROCEDURE — 81001 URINALYSIS AUTO W/SCOPE: CPT

## 2025-08-13 PROCEDURE — 80053 COMPREHEN METABOLIC PANEL: CPT

## 2025-08-13 PROCEDURE — 96365 THER/PROPH/DIAG IV INF INIT: CPT

## 2025-08-13 PROCEDURE — 94760 N-INVAS EAR/PLS OXIMETRY 1: CPT

## 2025-08-13 PROCEDURE — 93010 ELECTROCARDIOGRAM REPORT: CPT | Mod: ,,, | Performed by: INTERNAL MEDICINE

## 2025-08-13 PROCEDURE — 73080 X-RAY EXAM OF ELBOW: CPT | Mod: 26,RT,, | Performed by: RADIOLOGY

## 2025-08-13 PROCEDURE — 99900035 HC TECH TIME PER 15 MIN (STAT)

## 2025-08-13 PROCEDURE — 73090 X-RAY EXAM OF FOREARM: CPT | Mod: 26,RT,, | Performed by: RADIOLOGY

## 2025-08-13 RX ORDER — CLINDAMYCIN PHOSPHATE 150 MG/ML
900 INJECTION, SOLUTION INTRAVENOUS
Status: DISCONTINUED | OUTPATIENT
Start: 2025-08-14 | End: 2025-08-13

## 2025-08-13 RX ORDER — AMOXICILLIN 250 MG
1 CAPSULE ORAL 2 TIMES DAILY
Status: DISCONTINUED | OUTPATIENT
Start: 2025-08-13 | End: 2025-08-15 | Stop reason: HOSPADM

## 2025-08-13 RX ORDER — SIMETHICONE 80 MG
1 TABLET,CHEWABLE ORAL 4 TIMES DAILY PRN
Status: DISCONTINUED | OUTPATIENT
Start: 2025-08-13 | End: 2025-08-15 | Stop reason: HOSPADM

## 2025-08-13 RX ORDER — ASPIRIN 81 MG/1
81 TABLET ORAL DAILY
Status: DISCONTINUED | OUTPATIENT
Start: 2025-08-14 | End: 2025-08-15 | Stop reason: HOSPADM

## 2025-08-13 RX ORDER — ENOXAPARIN SODIUM 100 MG/ML
30 INJECTION SUBCUTANEOUS EVERY 24 HOURS
Status: DISCONTINUED | OUTPATIENT
Start: 2025-08-13 | End: 2025-08-14

## 2025-08-13 RX ORDER — ATORVASTATIN CALCIUM 40 MG/1
40 TABLET, FILM COATED ORAL DAILY
Status: DISCONTINUED | OUTPATIENT
Start: 2025-08-14 | End: 2025-08-15 | Stop reason: HOSPADM

## 2025-08-13 RX ORDER — CITALOPRAM 10 MG/1
20 TABLET ORAL EVERY MORNING
Status: DISCONTINUED | OUTPATIENT
Start: 2025-08-14 | End: 2025-08-15 | Stop reason: HOSPADM

## 2025-08-13 RX ORDER — IBUPROFEN 200 MG
24 TABLET ORAL
Status: DISCONTINUED | OUTPATIENT
Start: 2025-08-13 | End: 2025-08-15 | Stop reason: HOSPADM

## 2025-08-13 RX ORDER — SODIUM CHLORIDE 9 MG/ML
INJECTION, SOLUTION INTRAVENOUS CONTINUOUS
Status: DISCONTINUED | OUTPATIENT
Start: 2025-08-13 | End: 2025-08-13

## 2025-08-13 RX ORDER — SODIUM,POTASSIUM PHOSPHATES 280-250MG
2 POWDER IN PACKET (EA) ORAL
Status: DISCONTINUED | OUTPATIENT
Start: 2025-08-13 | End: 2025-08-15 | Stop reason: HOSPADM

## 2025-08-13 RX ORDER — LANOLIN ALCOHOL/MO/W.PET/CERES
800 CREAM (GRAM) TOPICAL
Status: DISCONTINUED | OUTPATIENT
Start: 2025-08-13 | End: 2025-08-15 | Stop reason: HOSPADM

## 2025-08-13 RX ORDER — IPRATROPIUM BROMIDE AND ALBUTEROL SULFATE 2.5; .5 MG/3ML; MG/3ML
3 SOLUTION RESPIRATORY (INHALATION) EVERY 4 HOURS PRN
Status: DISCONTINUED | OUTPATIENT
Start: 2025-08-13 | End: 2025-08-15 | Stop reason: HOSPADM

## 2025-08-13 RX ORDER — GLUCAGON 1 MG
1 KIT INJECTION
Status: DISCONTINUED | OUTPATIENT
Start: 2025-08-13 | End: 2025-08-15 | Stop reason: HOSPADM

## 2025-08-13 RX ORDER — CLOPIDOGREL BISULFATE 75 MG/1
75 TABLET ORAL DAILY
Status: DISCONTINUED | OUTPATIENT
Start: 2025-08-14 | End: 2025-08-15 | Stop reason: HOSPADM

## 2025-08-13 RX ORDER — ONDANSETRON HYDROCHLORIDE 2 MG/ML
4 INJECTION, SOLUTION INTRAVENOUS EVERY 8 HOURS PRN
Status: DISCONTINUED | OUTPATIENT
Start: 2025-08-13 | End: 2025-08-15 | Stop reason: HOSPADM

## 2025-08-13 RX ORDER — ALPRAZOLAM 0.25 MG/1
0.5 TABLET ORAL 2 TIMES DAILY PRN
Status: DISCONTINUED | OUTPATIENT
Start: 2025-08-13 | End: 2025-08-15 | Stop reason: HOSPADM

## 2025-08-13 RX ORDER — ALUMINUM HYDROXIDE, MAGNESIUM HYDROXIDE, AND SIMETHICONE 1200; 120; 1200 MG/30ML; MG/30ML; MG/30ML
30 SUSPENSION ORAL 4 TIMES DAILY PRN
Status: DISCONTINUED | OUTPATIENT
Start: 2025-08-13 | End: 2025-08-15 | Stop reason: HOSPADM

## 2025-08-13 RX ORDER — CLINDAMYCIN PHOSPHATE 900 MG/50ML
900 INJECTION, SOLUTION INTRAVENOUS
Status: DISCONTINUED | OUTPATIENT
Start: 2025-08-14 | End: 2025-08-14

## 2025-08-13 RX ORDER — NALOXONE HCL 0.4 MG/ML
0.02 VIAL (ML) INJECTION
Status: DISCONTINUED | OUTPATIENT
Start: 2025-08-13 | End: 2025-08-15 | Stop reason: HOSPADM

## 2025-08-13 RX ORDER — CLINDAMYCIN PHOSPHATE 900 MG/50ML
900 INJECTION, SOLUTION INTRAVENOUS
Status: COMPLETED | OUTPATIENT
Start: 2025-08-13 | End: 2025-08-13

## 2025-08-13 RX ORDER — SODIUM CHLORIDE 9 MG/ML
INJECTION, SOLUTION INTRAVENOUS CONTINUOUS
Status: DISCONTINUED | OUTPATIENT
Start: 2025-08-13 | End: 2025-08-14

## 2025-08-13 RX ORDER — ACETAMINOPHEN 325 MG/1
650 TABLET ORAL EVERY 8 HOURS PRN
Status: DISCONTINUED | OUTPATIENT
Start: 2025-08-13 | End: 2025-08-15 | Stop reason: HOSPADM

## 2025-08-13 RX ORDER — TAMSULOSIN HYDROCHLORIDE 0.4 MG/1
0.4 CAPSULE ORAL DAILY
Status: DISCONTINUED | OUTPATIENT
Start: 2025-08-14 | End: 2025-08-15 | Stop reason: HOSPADM

## 2025-08-13 RX ORDER — IBUPROFEN 200 MG
16 TABLET ORAL
Status: DISCONTINUED | OUTPATIENT
Start: 2025-08-13 | End: 2025-08-15 | Stop reason: HOSPADM

## 2025-08-13 RX ORDER — TALC
6 POWDER (GRAM) TOPICAL NIGHTLY PRN
Status: DISCONTINUED | OUTPATIENT
Start: 2025-08-13 | End: 2025-08-15 | Stop reason: HOSPADM

## 2025-08-13 RX ORDER — SODIUM CHLORIDE 0.9 % (FLUSH) 0.9 %
10 SYRINGE (ML) INJECTION EVERY 12 HOURS PRN
Status: DISCONTINUED | OUTPATIENT
Start: 2025-08-13 | End: 2025-08-15 | Stop reason: HOSPADM

## 2025-08-13 RX ADMIN — SODIUM CHLORIDE: 9 INJECTION, SOLUTION INTRAVENOUS at 10:08

## 2025-08-13 RX ADMIN — ENOXAPARIN SODIUM 30 MG: 30 INJECTION SUBCUTANEOUS at 09:08

## 2025-08-13 RX ADMIN — CLINDAMYCIN PHOSPHATE 900 MG: 900 INJECTION, SOLUTION INTRAVENOUS at 06:08

## 2025-08-13 RX ADMIN — DOCUSATE SODIUM 50MG AND SENNOSIDES 8.6MG 1 TABLET: 8.6; 5 TABLET, FILM COATED ORAL at 09:08

## 2025-08-13 RX ADMIN — Medication 6 MG: at 10:08

## 2025-08-14 LAB
ABSOLUTE EOSINOPHIL (OHS): 0.38 K/UL
ABSOLUTE MONOCYTE (OHS): 0.94 K/UL (ref 0.3–1)
ABSOLUTE NEUTROPHIL COUNT (OHS): 4.88 K/UL (ref 1.8–7.7)
ANION GAP (OHS): 8 MMOL/L (ref 8–16)
BASOPHILS # BLD AUTO: 0.05 K/UL
BASOPHILS NFR BLD AUTO: 0.6 %
BUN SERPL-MCNC: 23 MG/DL (ref 8–23)
CALCIUM SERPL-MCNC: 7.9 MG/DL (ref 8.7–10.5)
CHLORIDE SERPL-SCNC: 115 MMOL/L (ref 95–110)
CO2 SERPL-SCNC: 18 MMOL/L (ref 23–29)
CREAT SERPL-MCNC: 1.3 MG/DL (ref 0.5–1.4)
ERYTHROCYTE [DISTWIDTH] IN BLOOD BY AUTOMATED COUNT: 14 % (ref 11.5–14.5)
GFR SERPLBLD CREATININE-BSD FMLA CKD-EPI: 54 ML/MIN/1.73/M2
GLUCOSE SERPL-MCNC: 90 MG/DL (ref 70–110)
HCT VFR BLD AUTO: 41 % (ref 40–54)
HGB BLD-MCNC: 13.7 GM/DL (ref 14–18)
HOLD SPECIMEN: NORMAL
IMM GRANULOCYTES # BLD AUTO: 0.02 K/UL (ref 0–0.04)
IMM GRANULOCYTES NFR BLD AUTO: 0.2 % (ref 0–0.5)
LYMPHOCYTES # BLD AUTO: 1.86 K/UL (ref 1–4.8)
MAGNESIUM SERPL-MCNC: 2.2 MG/DL (ref 1.6–2.6)
MCH RBC QN AUTO: 31.2 PG (ref 27–31)
MCHC RBC AUTO-ENTMCNC: 33.4 G/DL (ref 32–36)
MCV RBC AUTO: 93 FL (ref 82–98)
NUCLEATED RBC (/100WBC) (OHS): 0 /100 WBC
OHS QRS DURATION: 150 MS
OHS QTC CALCULATION: 486 MS
PLATELET # BLD AUTO: 176 K/UL (ref 150–450)
PMV BLD AUTO: 9.9 FL (ref 9.2–12.9)
POTASSIUM SERPL-SCNC: 3.6 MMOL/L (ref 3.5–5.1)
RBC # BLD AUTO: 4.39 M/UL (ref 4.6–6.2)
RELATIVE EOSINOPHIL (OHS): 4.7 %
RELATIVE LYMPHOCYTE (OHS): 22.9 % (ref 18–48)
RELATIVE MONOCYTE (OHS): 11.6 % (ref 4–15)
RELATIVE NEUTROPHIL (OHS): 60 % (ref 38–73)
SODIUM SERPL-SCNC: 141 MMOL/L (ref 136–145)
WBC # BLD AUTO: 8.13 K/UL (ref 3.9–12.7)

## 2025-08-14 PROCEDURE — 94761 N-INVAS EAR/PLS OXIMETRY MLT: CPT

## 2025-08-14 PROCEDURE — 63600175 PHARM REV CODE 636 W HCPCS: Performed by: NURSE PRACTITIONER

## 2025-08-14 PROCEDURE — 85025 COMPLETE CBC W/AUTO DIFF WBC: CPT | Performed by: NURSE PRACTITIONER

## 2025-08-14 PROCEDURE — 99232 SBSQ HOSP IP/OBS MODERATE 35: CPT | Mod: 95,,, | Performed by: NURSE PRACTITIONER

## 2025-08-14 PROCEDURE — 36415 COLL VENOUS BLD VENIPUNCTURE: CPT | Performed by: NURSE PRACTITIONER

## 2025-08-14 PROCEDURE — 80048 BASIC METABOLIC PNL TOTAL CA: CPT | Performed by: NURSE PRACTITIONER

## 2025-08-14 PROCEDURE — 83735 ASSAY OF MAGNESIUM: CPT | Performed by: NURSE PRACTITIONER

## 2025-08-14 PROCEDURE — 25000003 PHARM REV CODE 250: Performed by: NURSE PRACTITIONER

## 2025-08-14 PROCEDURE — 99900035 HC TECH TIME PER 15 MIN (STAT)

## 2025-08-14 PROCEDURE — 21400001 HC TELEMETRY ROOM

## 2025-08-14 PROCEDURE — 96366 THER/PROPH/DIAG IV INF ADDON: CPT

## 2025-08-14 RX ORDER — BENZONATATE 100 MG/1
200 CAPSULE ORAL 3 TIMES DAILY PRN
Status: DISCONTINUED | OUTPATIENT
Start: 2025-08-14 | End: 2025-08-15 | Stop reason: HOSPADM

## 2025-08-14 RX ADMIN — BENZONATATE 200 MG: 100 CAPSULE ORAL at 09:08

## 2025-08-14 RX ADMIN — ATORVASTATIN CALCIUM 40 MG: 40 TABLET, FILM COATED ORAL at 09:08

## 2025-08-14 RX ADMIN — POTASSIUM BICARBONATE 50 MEQ: 977.5 TABLET, EFFERVESCENT ORAL at 09:08

## 2025-08-14 RX ADMIN — CLOPIDOGREL 75 MG: 75 TABLET ORAL at 09:08

## 2025-08-14 RX ADMIN — DOCUSATE SODIUM 50MG AND SENNOSIDES 8.6MG 1 TABLET: 8.6; 5 TABLET, FILM COATED ORAL at 09:08

## 2025-08-14 RX ADMIN — TAMSULOSIN HYDROCHLORIDE 0.4 MG: 0.4 CAPSULE ORAL at 09:08

## 2025-08-14 RX ADMIN — Medication 6 MG: at 09:08

## 2025-08-14 RX ADMIN — ALPRAZOLAM 0.5 MG: 0.25 TABLET ORAL at 11:08

## 2025-08-14 RX ADMIN — METOPROLOL SUCCINATE 12.5 MG: 25 TABLET, EXTENDED RELEASE ORAL at 09:08

## 2025-08-14 RX ADMIN — ASPIRIN 81 MG: 81 TABLET, COATED ORAL at 09:08

## 2025-08-14 RX ADMIN — CITALOPRAM HYDROBROMIDE 20 MG: 10 TABLET ORAL at 06:08

## 2025-08-14 RX ADMIN — DOXYCYCLINE 100 MG: 100 INJECTION, POWDER, LYOPHILIZED, FOR SOLUTION INTRAVENOUS at 01:08

## 2025-08-14 RX ADMIN — CLINDAMYCIN PHOSPHATE 900 MG: 900 INJECTION, SOLUTION INTRAVENOUS at 11:08

## 2025-08-14 RX ADMIN — CLINDAMYCIN PHOSPHATE 900 MG: 900 INJECTION, SOLUTION INTRAVENOUS at 03:08

## 2025-08-15 VITALS
RESPIRATION RATE: 16 BRPM | HEART RATE: 76 BPM | DIASTOLIC BLOOD PRESSURE: 80 MMHG | OXYGEN SATURATION: 94 % | WEIGHT: 179.69 LBS | BODY MASS INDEX: 23.82 KG/M2 | TEMPERATURE: 98 F | HEIGHT: 73 IN | SYSTOLIC BLOOD PRESSURE: 169 MMHG

## 2025-08-15 LAB
ABSOLUTE EOSINOPHIL (OHS): 0.51 K/UL
ABSOLUTE MONOCYTE (OHS): 0.84 K/UL (ref 0.3–1)
ABSOLUTE NEUTROPHIL COUNT (OHS): 6.78 K/UL (ref 1.8–7.7)
ANION GAP (OHS): 7 MMOL/L (ref 8–16)
BASOPHILS # BLD AUTO: 0.03 K/UL
BASOPHILS NFR BLD AUTO: 0.3 %
BUN SERPL-MCNC: 19 MG/DL (ref 8–23)
CALCIUM SERPL-MCNC: 7.9 MG/DL (ref 8.7–10.5)
CHLORIDE SERPL-SCNC: 113 MMOL/L (ref 95–110)
CO2 SERPL-SCNC: 21 MMOL/L (ref 23–29)
CREAT SERPL-MCNC: 1.4 MG/DL (ref 0.5–1.4)
ERYTHROCYTE [DISTWIDTH] IN BLOOD BY AUTOMATED COUNT: 14 % (ref 11.5–14.5)
GFR SERPLBLD CREATININE-BSD FMLA CKD-EPI: 49 ML/MIN/1.73/M2
GLUCOSE SERPL-MCNC: 95 MG/DL (ref 70–110)
HCT VFR BLD AUTO: 42.7 % (ref 40–54)
HGB BLD-MCNC: 14.2 GM/DL (ref 14–18)
IMM GRANULOCYTES # BLD AUTO: 0.03 K/UL (ref 0–0.04)
IMM GRANULOCYTES NFR BLD AUTO: 0.3 % (ref 0–0.5)
LYMPHOCYTES # BLD AUTO: 1.55 K/UL (ref 1–4.8)
MAGNESIUM SERPL-MCNC: 2.1 MG/DL (ref 1.6–2.6)
MCH RBC QN AUTO: 31.3 PG (ref 27–31)
MCHC RBC AUTO-ENTMCNC: 33.3 G/DL (ref 32–36)
MCV RBC AUTO: 94 FL (ref 82–98)
NUCLEATED RBC (/100WBC) (OHS): 0 /100 WBC
PLATELET # BLD AUTO: 208 K/UL (ref 150–450)
PMV BLD AUTO: 9.7 FL (ref 9.2–12.9)
POTASSIUM SERPL-SCNC: 4.1 MMOL/L (ref 3.5–5.1)
RBC # BLD AUTO: 4.54 M/UL (ref 4.6–6.2)
RELATIVE EOSINOPHIL (OHS): 5.2 %
RELATIVE LYMPHOCYTE (OHS): 15.9 % (ref 18–48)
RELATIVE MONOCYTE (OHS): 8.6 % (ref 4–15)
RELATIVE NEUTROPHIL (OHS): 69.7 % (ref 38–73)
SODIUM SERPL-SCNC: 141 MMOL/L (ref 136–145)
WBC # BLD AUTO: 9.74 K/UL (ref 3.9–12.7)

## 2025-08-15 PROCEDURE — 25000003 PHARM REV CODE 250: Performed by: NURSE PRACTITIONER

## 2025-08-15 PROCEDURE — 99239 HOSP IP/OBS DSCHRG MGMT >30: CPT | Mod: ,,, | Performed by: NURSE PRACTITIONER

## 2025-08-15 PROCEDURE — 83735 ASSAY OF MAGNESIUM: CPT | Performed by: NURSE PRACTITIONER

## 2025-08-15 PROCEDURE — 63600175 PHARM REV CODE 636 W HCPCS: Performed by: NURSE PRACTITIONER

## 2025-08-15 PROCEDURE — 1111F DSCHRG MED/CURRENT MED MERGE: CPT | Mod: CPTII,,, | Performed by: NURSE PRACTITIONER

## 2025-08-15 PROCEDURE — 80048 BASIC METABOLIC PNL TOTAL CA: CPT | Performed by: NURSE PRACTITIONER

## 2025-08-15 PROCEDURE — 85025 COMPLETE CBC W/AUTO DIFF WBC: CPT | Performed by: NURSE PRACTITIONER

## 2025-08-15 PROCEDURE — 94761 N-INVAS EAR/PLS OXIMETRY MLT: CPT

## 2025-08-15 PROCEDURE — 94760 N-INVAS EAR/PLS OXIMETRY 1: CPT

## 2025-08-15 RX ORDER — DOXYCYCLINE HYCLATE 100 MG
100 TABLET ORAL 2 TIMES DAILY
Qty: 20 TABLET | Refills: 0 | Status: SHIPPED | OUTPATIENT
Start: 2025-08-15 | End: 2025-08-25

## 2025-08-15 RX ORDER — MUPIROCIN 20 MG/G
OINTMENT TOPICAL DAILY
Qty: 30 G | Refills: 0 | Status: SHIPPED | OUTPATIENT
Start: 2025-08-15

## 2025-08-15 RX ADMIN — DOCUSATE SODIUM 50MG AND SENNOSIDES 8.6MG 1 TABLET: 8.6; 5 TABLET, FILM COATED ORAL at 08:08

## 2025-08-15 RX ADMIN — ASPIRIN 81 MG: 81 TABLET, COATED ORAL at 08:08

## 2025-08-15 RX ADMIN — CITALOPRAM HYDROBROMIDE 20 MG: 10 TABLET ORAL at 08:08

## 2025-08-15 RX ADMIN — ATORVASTATIN CALCIUM 40 MG: 40 TABLET, FILM COATED ORAL at 08:08

## 2025-08-15 RX ADMIN — CLOPIDOGREL 75 MG: 75 TABLET ORAL at 08:08

## 2025-08-15 RX ADMIN — DOXYCYCLINE 100 MG: 100 INJECTION, POWDER, LYOPHILIZED, FOR SOLUTION INTRAVENOUS at 04:08

## 2025-08-15 RX ADMIN — METOPROLOL SUCCINATE 12.5 MG: 25 TABLET, EXTENDED RELEASE ORAL at 08:08

## 2025-08-15 RX ADMIN — TAMSULOSIN HYDROCHLORIDE 0.4 MG: 0.4 CAPSULE ORAL at 08:08

## 2025-08-19 LAB
BACTERIA BLD CULT: NORMAL
BACTERIA BLD CULT: NORMAL

## 2025-08-25 ENCOUNTER — OFFICE VISIT (OUTPATIENT)
Dept: SURGERY | Facility: CLINIC | Age: 85
End: 2025-08-25
Payer: MEDICARE

## 2025-08-25 VITALS — TEMPERATURE: 98 F | HEART RATE: 90 BPM | SYSTOLIC BLOOD PRESSURE: 140 MMHG | DIASTOLIC BLOOD PRESSURE: 79 MMHG

## 2025-08-25 DIAGNOSIS — Z98.890 POST-OPERATIVE STATE: Primary | ICD-10-CM

## 2025-08-25 PROCEDURE — 99999 PR PBB SHADOW E&M-EST. PATIENT-LVL II: CPT | Mod: PBBFAC,,, | Performed by: STUDENT IN AN ORGANIZED HEALTH CARE EDUCATION/TRAINING PROGRAM

## 2025-08-30 ENCOUNTER — HOSPITAL ENCOUNTER (EMERGENCY)
Facility: HOSPITAL | Age: 85
Discharge: HOME | End: 2025-08-30
Attending: EMERGENCY MEDICINE
Payer: MEDICARE

## 2025-08-30 VITALS
OXYGEN SATURATION: 95 % | HEIGHT: 73 IN | TEMPERATURE: 98 F | SYSTOLIC BLOOD PRESSURE: 158 MMHG | HEART RATE: 88 BPM | DIASTOLIC BLOOD PRESSURE: 73 MMHG | RESPIRATION RATE: 19 BRPM | WEIGHT: 184 LBS | BODY MASS INDEX: 24.39 KG/M2

## 2025-08-30 DIAGNOSIS — R05.9 COUGH: ICD-10-CM

## 2025-08-30 DIAGNOSIS — E87.70 HYPERVOLEMIA, UNSPECIFIED HYPERVOLEMIA TYPE: Primary | ICD-10-CM

## 2025-08-30 LAB
ABSOLUTE EOSINOPHIL (OHS): 0.4 K/UL
ABSOLUTE MONOCYTE (OHS): 0.93 K/UL (ref 0.3–1)
ABSOLUTE NEUTROPHIL COUNT (OHS): 6.32 K/UL (ref 1.8–7.7)
ALBUMIN SERPL BCP-MCNC: 3.4 G/DL (ref 3.5–5.2)
ALP SERPL-CCNC: 177 UNIT/L (ref 40–150)
ALT SERPL W/O P-5'-P-CCNC: 12 UNIT/L (ref 10–44)
ANION GAP (OHS): 7 MMOL/L (ref 8–16)
AST SERPL-CCNC: 26 UNIT/L (ref 11–45)
BASOPHILS # BLD AUTO: 0.05 K/UL
BASOPHILS NFR BLD AUTO: 0.5 %
BILIRUB SERPL-MCNC: 0.5 MG/DL (ref 0.1–1)
BUN SERPL-MCNC: 25 MG/DL (ref 8–23)
CALCIUM SERPL-MCNC: 8.3 MG/DL (ref 8.7–10.5)
CHLORIDE SERPL-SCNC: 115 MMOL/L (ref 95–110)
CO2 SERPL-SCNC: 19 MMOL/L (ref 23–29)
CREAT SERPL-MCNC: 1.4 MG/DL (ref 0.5–1.4)
ERYTHROCYTE [DISTWIDTH] IN BLOOD BY AUTOMATED COUNT: 13.8 % (ref 11.5–14.5)
FLUAV AG UPPER RESP QL IA.RAPID: NEGATIVE
FLUBV AG UPPER RESP QL IA.RAPID: NEGATIVE
GFR SERPLBLD CREATININE-BSD FMLA CKD-EPI: 49 ML/MIN/1.73/M2
GLUCOSE SERPL-MCNC: 108 MG/DL (ref 70–110)
HCT VFR BLD AUTO: 42.9 % (ref 40–54)
HGB BLD-MCNC: 14.2 GM/DL (ref 14–18)
IMM GRANULOCYTES # BLD AUTO: 0.03 K/UL (ref 0–0.04)
IMM GRANULOCYTES NFR BLD AUTO: 0.3 % (ref 0–0.5)
LYMPHOCYTES # BLD AUTO: 1.42 K/UL (ref 1–4.8)
MCH RBC QN AUTO: 30.9 PG (ref 27–31)
MCHC RBC AUTO-ENTMCNC: 33.1 G/DL (ref 32–36)
MCV RBC AUTO: 93 FL (ref 82–98)
NT-PROBNP SERPL-MCNC: ABNORMAL PG/ML
NUCLEATED RBC (/100WBC) (OHS): 0 /100 WBC
PLATELET # BLD AUTO: 268 K/UL (ref 150–450)
PMV BLD AUTO: 9.9 FL (ref 9.2–12.9)
POTASSIUM SERPL-SCNC: 3.9 MMOL/L (ref 3.5–5.1)
PROT SERPL-MCNC: 5.9 GM/DL (ref 6–8.4)
RBC # BLD AUTO: 4.6 M/UL (ref 4.6–6.2)
RELATIVE EOSINOPHIL (OHS): 4.4 %
RELATIVE LYMPHOCYTE (OHS): 15.5 % (ref 18–48)
RELATIVE MONOCYTE (OHS): 10.2 % (ref 4–15)
RELATIVE NEUTROPHIL (OHS): 69.1 % (ref 38–73)
SARS-COV-2 RDRP RESP QL NAA+PROBE: NEGATIVE
SODIUM SERPL-SCNC: 141 MMOL/L (ref 136–145)
WBC # BLD AUTO: 9.15 K/UL (ref 3.9–12.7)

## 2025-08-30 PROCEDURE — 71046 X-RAY EXAM CHEST 2 VIEWS: CPT | Mod: TC

## 2025-08-30 PROCEDURE — 99284 EMERGENCY DEPT VISIT MOD MDM: CPT | Mod: 25

## 2025-08-30 PROCEDURE — 94760 N-INVAS EAR/PLS OXIMETRY 1: CPT

## 2025-08-30 PROCEDURE — 83880 ASSAY OF NATRIURETIC PEPTIDE: CPT | Performed by: EMERGENCY MEDICINE

## 2025-08-30 PROCEDURE — 85025 COMPLETE CBC W/AUTO DIFF WBC: CPT | Performed by: EMERGENCY MEDICINE

## 2025-08-30 PROCEDURE — 71046 X-RAY EXAM CHEST 2 VIEWS: CPT | Mod: 26,,, | Performed by: RADIOLOGY

## 2025-08-30 PROCEDURE — U0002 COVID-19 LAB TEST NON-CDC: HCPCS | Performed by: EMERGENCY MEDICINE

## 2025-08-30 PROCEDURE — 96374 THER/PROPH/DIAG INJ IV PUSH: CPT

## 2025-08-30 PROCEDURE — 80053 COMPREHEN METABOLIC PANEL: CPT | Performed by: EMERGENCY MEDICINE

## 2025-08-30 PROCEDURE — 63600175 PHARM REV CODE 636 W HCPCS: Performed by: EMERGENCY MEDICINE

## 2025-08-30 PROCEDURE — 87502 INFLUENZA DNA AMP PROBE: CPT | Performed by: EMERGENCY MEDICINE

## 2025-08-30 RX ORDER — FUROSEMIDE 10 MG/ML
20 INJECTION INTRAMUSCULAR; INTRAVENOUS
Status: COMPLETED | OUTPATIENT
Start: 2025-08-30 | End: 2025-08-30

## 2025-08-30 RX ORDER — FUROSEMIDE 20 MG/1
20 TABLET ORAL DAILY
Qty: 5 TABLET | Refills: 0 | Status: SHIPPED | OUTPATIENT
Start: 2025-08-30 | End: 2025-09-04

## 2025-08-30 RX ADMIN — FUROSEMIDE 20 MG: 10 INJECTION, SOLUTION INTRAMUSCULAR; INTRAVENOUS at 06:08

## (undated) DEVICE — DRESSING ADH ISLAND 3.6 X 14

## (undated) DEVICE — DRAPE ABDOMINAL TIBURON 14X11

## (undated) DEVICE — GLOVE SENSICARE PI MICRO 7.5

## (undated) DEVICE — SEE MEDLINE ITEM 146417

## (undated) DEVICE — DRAIN PENRS SIL STRL .25X18IN

## (undated) DEVICE — SEE MEDLINE ITEM 156902

## (undated) DEVICE — ADHESIVE DERMABOND ADVANCED

## (undated) DEVICE — DRAPE LAP TIBURON 77X122IN

## (undated) DEVICE — NDL ECLIPSE SAFETY 23G 1.5IN

## (undated) DEVICE — MESH PARIETEX PROGRIP LEFT
Type: IMPLANTABLE DEVICE | Site: GROIN | Status: NON-FUNCTIONAL
Removed: 2025-07-20

## (undated) DEVICE — STAPLER SKIN PROXIMATE WIDE

## (undated) DEVICE — ELECTRODE MEGADYNE RETURN DUAL

## (undated) DEVICE — TRAY GENERAL SURGERY SMH

## (undated) DEVICE — GLOVE SENSICARE PI GRN 8

## (undated) DEVICE — BLADE 4 INCH EDGE UN-INS

## (undated) DEVICE — SOL NACL IRR 1000ML BTL

## (undated) DEVICE — COVER LIGHT HANDLE 80/CA

## (undated) DEVICE — DRAPE INCISE IOBAN 2 23X17IN

## (undated) DEVICE — Device

## (undated) DEVICE — SUT ETHIBOND EX 0SH 30IN GR

## (undated) DEVICE — COVER CAMERA OPERATING ROOM

## (undated) DEVICE — SUT 1 48IN PDS II VIO MONO

## (undated) DEVICE — SUT VICRYL PLUS 0 CT1 36IN

## (undated) DEVICE — ELECTRODE REM PLYHSV RETURN 9

## (undated) DEVICE — SUT MCRYL PLUS 4-0 PS2 27IN